# Patient Record
Sex: FEMALE | Race: WHITE | NOT HISPANIC OR LATINO
[De-identification: names, ages, dates, MRNs, and addresses within clinical notes are randomized per-mention and may not be internally consistent; named-entity substitution may affect disease eponyms.]

---

## 2017-09-12 PROBLEM — Z00.00 ENCOUNTER FOR PREVENTIVE HEALTH EXAMINATION: Status: ACTIVE | Noted: 2017-09-12

## 2017-09-13 ENCOUNTER — APPOINTMENT (OUTPATIENT)
Dept: GASTROENTEROLOGY | Facility: CLINIC | Age: 83
End: 2017-09-13
Payer: MEDICARE

## 2017-09-13 VITALS
HEART RATE: 84 BPM | WEIGHT: 158 LBS | BODY MASS INDEX: 29.08 KG/M2 | OXYGEN SATURATION: 98 % | RESPIRATION RATE: 14 BRPM | SYSTOLIC BLOOD PRESSURE: 114 MMHG | HEIGHT: 62 IN | DIASTOLIC BLOOD PRESSURE: 62 MMHG

## 2017-09-13 DIAGNOSIS — Z87.891 PERSONAL HISTORY OF NICOTINE DEPENDENCE: ICD-10-CM

## 2017-09-13 DIAGNOSIS — Z78.9 OTHER SPECIFIED HEALTH STATUS: ICD-10-CM

## 2017-09-13 DIAGNOSIS — Z86.59 PERSONAL HISTORY OF OTHER MENTAL AND BEHAVIORAL DISORDERS: ICD-10-CM

## 2017-09-13 DIAGNOSIS — Z86.79 PERSONAL HISTORY OF OTHER DISEASES OF THE CIRCULATORY SYSTEM: ICD-10-CM

## 2017-09-13 DIAGNOSIS — I10 ESSENTIAL (PRIMARY) HYPERTENSION: ICD-10-CM

## 2017-09-13 DIAGNOSIS — Z86.39 PERSONAL HISTORY OF OTHER ENDOCRINE, NUTRITIONAL AND METABOLIC DISEASE: ICD-10-CM

## 2017-09-13 DIAGNOSIS — Z80.0 FAMILY HISTORY OF MALIGNANT NEOPLASM OF DIGESTIVE ORGANS: ICD-10-CM

## 2017-09-13 DIAGNOSIS — E11.9 TYPE 2 DIABETES MELLITUS W/OUT COMPLICATIONS: ICD-10-CM

## 2017-09-13 PROCEDURE — 99204 OFFICE O/P NEW MOD 45 MIN: CPT

## 2017-09-13 RX ORDER — PEN NEEDLE, DIABETIC 32 GX 1/4"
32G X 6 MM NEEDLE, DISPOSABLE MISCELLANEOUS
Qty: 100 | Refills: 0 | Status: ACTIVE | COMMUNITY
Start: 2017-05-12

## 2017-09-13 RX ORDER — LIRAGLUTIDE 6 MG/ML
18 INJECTION SUBCUTANEOUS
Qty: 9 | Refills: 0 | Status: ACTIVE | COMMUNITY
Start: 2017-02-22

## 2017-09-13 RX ORDER — HYDROCHLOROTHIAZIDE 25 MG/1
25 TABLET ORAL
Qty: 25 | Refills: 0 | Status: ACTIVE | COMMUNITY
Start: 2016-11-01

## 2017-09-13 RX ORDER — SIMVASTATIN 40 MG/1
40 TABLET, FILM COATED ORAL
Qty: 30 | Refills: 0 | Status: ACTIVE | COMMUNITY
Start: 2017-01-23

## 2017-09-13 RX ORDER — ALPRAZOLAM 0.25 MG/1
0.25 TABLET ORAL
Qty: 30 | Refills: 0 | Status: ACTIVE | COMMUNITY
Start: 2017-04-26

## 2017-09-13 RX ORDER — BLOOD SUGAR DIAGNOSTIC
STRIP MISCELLANEOUS
Qty: 200 | Refills: 0 | Status: ACTIVE | COMMUNITY
Start: 2016-10-05

## 2017-09-13 RX ORDER — METFORMIN HYDROCHLORIDE 500 MG/1
500 TABLET, COATED ORAL
Qty: 120 | Refills: 0 | Status: ACTIVE | COMMUNITY
Start: 2016-11-28

## 2017-09-13 RX ORDER — HYDROCHLOROTHIAZIDE 12.5 MG/1
12.5 TABLET ORAL
Qty: 30 | Refills: 0 | Status: ACTIVE | COMMUNITY
Start: 2017-05-12

## 2017-09-13 RX ORDER — FOSINOPRIL SODIUM 40 MG/1
40 TABLET ORAL
Qty: 25 | Refills: 0 | Status: ACTIVE | COMMUNITY
Start: 2016-11-01

## 2017-09-13 RX ORDER — DULOXETINE HYDROCHLORIDE 30 MG/1
30 CAPSULE, DELAYED RELEASE PELLETS ORAL
Qty: 90 | Refills: 0 | Status: ACTIVE | COMMUNITY
Start: 2017-04-26

## 2018-05-29 ENCOUNTER — OTHER (OUTPATIENT)
Age: 84
End: 2018-05-29

## 2018-06-19 ENCOUNTER — APPOINTMENT (OUTPATIENT)
Dept: ORTHOPEDIC SURGERY | Facility: CLINIC | Age: 84
End: 2018-06-19

## 2018-07-23 PROBLEM — Z80.0 FAMILY HISTORY OF COLON CANCER: Status: ACTIVE | Noted: 2017-09-13

## 2018-07-23 PROBLEM — Z78.9 ALCOHOL USE: Status: ACTIVE | Noted: 2017-09-13

## 2019-01-23 ENCOUNTER — APPOINTMENT (OUTPATIENT)
Dept: GASTROENTEROLOGY | Facility: CLINIC | Age: 85
End: 2019-01-23

## 2020-07-29 ENCOUNTER — APPOINTMENT (OUTPATIENT)
Dept: GASTROENTEROLOGY | Facility: CLINIC | Age: 86
End: 2020-07-29
Payer: MEDICARE

## 2020-07-29 VITALS
SYSTOLIC BLOOD PRESSURE: 128 MMHG | WEIGHT: 172 LBS | DIASTOLIC BLOOD PRESSURE: 69 MMHG | HEIGHT: 61 IN | HEART RATE: 84 BPM | BODY MASS INDEX: 32.47 KG/M2 | OXYGEN SATURATION: 97 %

## 2020-07-29 DIAGNOSIS — R19.5 OTHER FECAL ABNORMALITIES: ICD-10-CM

## 2020-07-29 DIAGNOSIS — I10 ESSENTIAL (PRIMARY) HYPERTENSION: ICD-10-CM

## 2020-07-29 DIAGNOSIS — R15.9 FULL INCONTINENCE OF FECES: ICD-10-CM

## 2020-07-29 DIAGNOSIS — E11.9 TYPE 2 DIABETES MELLITUS W/OUT COMPLICATIONS: ICD-10-CM

## 2020-07-29 DIAGNOSIS — F41.9 ANXIETY DISORDER, UNSPECIFIED: ICD-10-CM

## 2020-07-29 DIAGNOSIS — K21.9 GASTRO-ESOPHAGEAL REFLUX DISEASE W/OUT ESOPHAGITIS: ICD-10-CM

## 2020-07-29 DIAGNOSIS — E78.00 PURE HYPERCHOLESTEROLEMIA, UNSPECIFIED: ICD-10-CM

## 2020-07-29 PROCEDURE — 36415 COLL VENOUS BLD VENIPUNCTURE: CPT

## 2020-07-29 PROCEDURE — 99214 OFFICE O/P EST MOD 30 MIN: CPT | Mod: 25

## 2020-07-29 NOTE — ASSESSMENT
[FreeTextEntry1] : Pression,\par \par Change in bowel movement, loose bowel movement, change in size\par \par Son had colon cancer at a very young age, after exposure to ground 0, retired [employer] department\par \par Mild chronic acid reflux\par \par Suggest,\par \par Blood work\par \par Stool studies\par \par CT virtual colonoscopy\par \par We discussed possible traditional colonoscopy, we both feel CT virtual might make more sense at age 86\par \par Small bowel bacterial overgrowth breath testing\par \par Follow-up after above\par \par Call or return anytime sooner as needed
Name band;

## 2020-07-29 NOTE — CONSULT LETTER
[Dear  ___] : Dear  [unfilled], [Consult Letter:] : I had the pleasure of evaluating your patient, [unfilled]. [Please see my note below.] : Please see my note below. [Consult Closing:] : Thank you very much for allowing me to participate in the care of this patient.  If you have any questions, please do not hesitate to contact me. [Sincerely,] : Sincerely, [FreeTextEntry3] : Fredo Schaefer MD\par  [FreeTextEntry2] : Dr. Mai Marshall\par \par [address] [facility] [address], [place] [address]\par \par [phone]\par

## 2020-07-29 NOTE — PHYSICAL EXAM
[General Appearance - Alert] : alert [General Appearance - In No Acute Distress] : in no acute distress [FreeTextEntry1] : Heavyset pleasant female, no acute distress alert and oriented x3 [Neck Appearance] : the appearance of the neck was normal [Sclera] : the sclera and conjunctiva were normal [Jugular Venous Distention Increased] : there was no jugular-venous distention [Neck Cervical Mass (___cm)] : no neck mass was observed [Auscultation Breath Sounds / Voice Sounds] : lungs were clear to auscultation bilaterally [Apical Impulse] : the apical impulse was normal [Full Pulse] : the pedal pulses are present [Edema] : there was no peripheral edema [Bowel Sounds] : normal bowel sounds [Abdomen Soft] : soft [Abdomen Tenderness] : non-tender [Abdomen Mass (___ Cm)] : no abdominal mass palpated [Cervical Lymph Nodes Enlarged Anterior Bilaterally] : anterior cervical [Patient Refused] : rectal exam was refused by the patient [Cervical Lymph Nodes Enlarged Posterior Bilaterally] : posterior cervical [Femoral Lymph Nodes Enlarged Bilaterally] : femoral [Axillary Lymph Nodes Enlarged Bilaterally] : axillary [Supraclavicular Lymph Nodes Enlarged Bilaterally] : supraclavicular [Inguinal Lymph Nodes Enlarged Bilaterally] : inguinal [No CVA Tenderness] : no ~M costovertebral angle tenderness [No Spinal Tenderness] : no spinal tenderness [Abnormal Walk] : normal gait [Musculoskeletal - Swelling] : no joint swelling seen [Nail Clubbing] : no clubbing  or cyanosis of the fingernails [Motor Tone] : muscle strength and tone were normal [Skin Turgor] : normal skin turgor [Skin Color & Pigmentation] : normal skin color and pigmentation [] : no rash [Oriented To Time, Place, And Person] : oriented to person, place, and time [No Focal Deficits] : no focal deficits [Affect] : the affect was normal [Impaired Insight] : insight and judgment were intact

## 2020-07-29 NOTE — HISTORY OF PRESENT ILLNESS
[de-identified] : Dr. Mai Marshall\par \par [address] [facility] [address], [place] [address]\par \par [phone]\par \par Very pleasant 86-year-old female, lost her son to colon cancer after he had exposure to ground 0, [place] \par \par She has altered bowel movements, loose bowel movements, change in size\par \par No blood or mucus\par \par No abdominal pain\par \par She has some mild heartburn controlled with omeprazole\par \par No dysphasia or pain with swallowing\par \par She is a long-term diabetic on oral medication, metformin\par \par No weight loss\par \par No nausea or vomiting

## 2020-07-30 LAB
ALBUMIN SERPL ELPH-MCNC: 4.4 G/DL
ALP BLD-CCNC: 69 U/L
ALT SERPL-CCNC: 11 U/L
ANION GAP SERPL CALC-SCNC: 12 MMOL/L
AST SERPL-CCNC: 14 U/L
BASOPHILS # BLD AUTO: 0.01 K/UL
BASOPHILS NFR BLD AUTO: 0.2 %
BILIRUB SERPL-MCNC: 0.2 MG/DL
BUN SERPL-MCNC: 27 MG/DL
CALCIUM SERPL-MCNC: 9.6 MG/DL
CHLORIDE SERPL-SCNC: 102 MMOL/L
CO2 SERPL-SCNC: 27 MMOL/L
CREAT SERPL-MCNC: 1.11 MG/DL
CRP SERPL-MCNC: 0.19 MG/DL
ENDOMYSIUM IGA SER QL: NEGATIVE
ENDOMYSIUM IGA TITR SER: NORMAL
EOSINOPHIL # BLD AUTO: 0.2 K/UL
EOSINOPHIL NFR BLD AUTO: 3.5 %
FERRITIN SERPL-MCNC: 46 NG/ML
FOLATE SERPL-MCNC: >20 NG/ML
GLIADIN IGA SER QL: <5 UNITS
GLIADIN IGG SER QL: <5 UNITS
GLIADIN PEPTIDE IGA SER-ACNC: NEGATIVE
GLIADIN PEPTIDE IGG SER-ACNC: NEGATIVE
GLUCOSE SERPL-MCNC: 113 MG/DL
HCT VFR BLD CALC: 40.4 %
HGB BLD-MCNC: 12.6 G/DL
IGA SER QL IEP: 96 MG/DL
IMM GRANULOCYTES NFR BLD AUTO: 0.3 %
IRON SATN MFR SERPL: 19 %
IRON SERPL-MCNC: 67 UG/DL
LYMPHOCYTES # BLD AUTO: 1.4 K/UL
LYMPHOCYTES NFR BLD AUTO: 24.3 %
MAN DIFF?: NORMAL
MCHC RBC-ENTMCNC: 30.1 PG
MCHC RBC-ENTMCNC: 31.2 GM/DL
MCV RBC AUTO: 96.4 FL
MONOCYTES # BLD AUTO: 0.58 K/UL
MONOCYTES NFR BLD AUTO: 10.1 %
NEUTROPHILS # BLD AUTO: 3.56 K/UL
NEUTROPHILS NFR BLD AUTO: 61.6 %
PLATELET # BLD AUTO: 229 K/UL
POTASSIUM SERPL-SCNC: 5.1 MMOL/L
PROT SERPL-MCNC: 6.4 G/DL
RBC # BLD: 4.19 M/UL
RBC # FLD: 14.1 %
SODIUM SERPL-SCNC: 142 MMOL/L
TIBC SERPL-MCNC: 348 UG/DL
TTG IGA SER IA-ACNC: <1.2 U/ML
TTG IGA SER-ACNC: NEGATIVE
TTG IGG SER IA-ACNC: <1.2 U/ML
TTG IGG SER IA-ACNC: NEGATIVE
UIBC SERPL-MCNC: 280 UG/DL
VIT B12 SERPL-MCNC: 939 PG/ML
WBC # FLD AUTO: 5.77 K/UL

## 2020-08-05 LAB — CAROTENE SERPL-MCNC: 21 MCG/DL

## 2020-08-11 LAB
ANTI ENDOMYSIAL IGA IFA: NEGATIVE
ANTI HUMAN TISSUE TRANSGLUTAMINASE IGA ELISA: < 1.9
DEAMIDATED GLIADIN ANTIBODY IGG: < 2.8
DEAMIDATED GLIADIN PEPTIDE IGA: < 5.2
PROMETHEUS CELIAC GENETICS: NORMAL
PROMETHEUS CELIAC SEROLOGY: NORMAL
PROMETHEUS LABORATORY FOOTER: NORMAL
TOTAL SERUM IGA BY NEPHELOMETRY: 103 MG/DL
TTG IGG SER IA-ACNC: NORMAL

## 2020-08-12 ENCOUNTER — RESULT REVIEW (OUTPATIENT)
Age: 86
End: 2020-08-12

## 2020-08-12 ENCOUNTER — OUTPATIENT (OUTPATIENT)
Dept: OUTPATIENT SERVICES | Facility: HOSPITAL | Age: 86
LOS: 1 days | End: 2020-08-12
Payer: MEDICARE

## 2020-08-12 ENCOUNTER — APPOINTMENT (OUTPATIENT)
Dept: CT IMAGING | Facility: CLINIC | Age: 86
End: 2020-08-12
Payer: MEDICARE

## 2020-08-12 DIAGNOSIS — Z00.8 ENCOUNTER FOR OTHER GENERAL EXAMINATION: ICD-10-CM

## 2020-08-12 PROCEDURE — 74263 CT COLONOGRAPHY SCREENING: CPT

## 2020-08-12 PROCEDURE — 74263 CT COLONOGRAPHY SCREENING: CPT | Mod: 26

## 2020-09-18 ENCOUNTER — APPOINTMENT (OUTPATIENT)
Dept: GASTROENTEROLOGY | Facility: CLINIC | Age: 86
End: 2020-09-18
Payer: MEDICARE

## 2020-09-18 PROCEDURE — 91065 BREATH HYDROGEN/METHANE TEST: CPT

## 2020-10-07 DIAGNOSIS — K52.9 NONINFECTIVE GASTROENTERITIS AND COLITIS, UNSPECIFIED: ICD-10-CM

## 2020-10-07 LAB
C DIFF TOX GENS STL QL NAA+PROBE: NORMAL
CDIFF BY PCR: NOT DETECTED
DEPRECATED O AND P PREP STL: NORMAL
G LAMBLIA AG STL QL: NORMAL
GI PCR PANEL, STOOL: ABNORMAL

## 2020-10-07 RX ORDER — AZITHROMYCIN 500 MG/1
500 TABLET, FILM COATED ORAL
Qty: 2 | Refills: 0 | Status: ACTIVE | COMMUNITY
Start: 2020-10-07 | End: 1900-01-01

## 2020-10-08 LAB
FAT STL QN: NORMAL
FAT STL QN: NORMAL

## 2020-10-09 LAB
BACTERIA STL CULT: NORMAL
C DIFF TOX B STL QL CT TISS CULT: NORMAL
Lab: NORMAL

## 2020-10-14 LAB — CALPROTECTIN FECAL: 141 UG/G

## 2020-10-21 LAB — PANCREATIC ELASTASE, FECAL: 209

## 2021-10-06 PROBLEM — I10 ESSENTIAL HYPERTENSION: Status: ACTIVE | Noted: 2020-07-29

## 2024-12-24 PROBLEM — F10.90 ALCOHOL USE: Status: ACTIVE | Noted: 2017-09-13

## 2025-01-26 ENCOUNTER — INPATIENT (INPATIENT)
Facility: HOSPITAL | Age: 89
LOS: 16 days | Discharge: HOME | DRG: 282 | End: 2025-02-12
Attending: STUDENT IN AN ORGANIZED HEALTH CARE EDUCATION/TRAINING PROGRAM | Admitting: STUDENT IN AN ORGANIZED HEALTH CARE EDUCATION/TRAINING PROGRAM
Payer: MEDICARE

## 2025-01-26 VITALS
DIASTOLIC BLOOD PRESSURE: 94 MMHG | RESPIRATION RATE: 18 BRPM | TEMPERATURE: 98 F | SYSTOLIC BLOOD PRESSURE: 190 MMHG | HEART RATE: 83 BPM | WEIGHT: 160.06 LBS | HEIGHT: 62 IN | OXYGEN SATURATION: 97 %

## 2025-01-26 DIAGNOSIS — N17.9 ACUTE KIDNEY FAILURE, UNSPECIFIED: ICD-10-CM

## 2025-01-26 DIAGNOSIS — I21.3 ST ELEVATION (STEMI) MYOCARDIAL INFARCTION OF UNSPECIFIED SITE: ICD-10-CM

## 2025-01-26 DIAGNOSIS — Z29.9 ENCOUNTER FOR PROPHYLACTIC MEASURES, UNSPECIFIED: ICD-10-CM

## 2025-01-26 DIAGNOSIS — I10 ESSENTIAL (PRIMARY) HYPERTENSION: ICD-10-CM

## 2025-01-26 DIAGNOSIS — E78.5 HYPERLIPIDEMIA, UNSPECIFIED: ICD-10-CM

## 2025-01-26 DIAGNOSIS — E11.9 TYPE 2 DIABETES MELLITUS WITHOUT COMPLICATIONS: ICD-10-CM

## 2025-01-26 LAB
ALBUMIN SERPL ELPH-MCNC: 3.4 G/DL — SIGNIFICANT CHANGE UP (ref 3.3–5)
ALP SERPL-CCNC: 103 U/L — SIGNIFICANT CHANGE UP (ref 40–120)
ALT FLD-CCNC: 14 U/L — SIGNIFICANT CHANGE UP (ref 12–78)
ANION GAP SERPL CALC-SCNC: 10 MMOL/L — SIGNIFICANT CHANGE UP (ref 5–17)
AST SERPL-CCNC: 16 U/L — SIGNIFICANT CHANGE UP (ref 15–37)
BASOPHILS # BLD AUTO: 0.02 K/UL — SIGNIFICANT CHANGE UP (ref 0–0.2)
BASOPHILS NFR BLD AUTO: 0.3 % — SIGNIFICANT CHANGE UP (ref 0–2)
BILIRUB SERPL-MCNC: 0.2 MG/DL — SIGNIFICANT CHANGE UP (ref 0.2–1.2)
BUN SERPL-MCNC: 29 MG/DL — HIGH (ref 7–23)
CALCIUM SERPL-MCNC: 9.4 MG/DL — SIGNIFICANT CHANGE UP (ref 8.5–10.1)
CHLORIDE SERPL-SCNC: 103 MMOL/L — SIGNIFICANT CHANGE UP (ref 96–108)
CO2 SERPL-SCNC: 24 MMOL/L — SIGNIFICANT CHANGE UP (ref 22–31)
CREAT SERPL-MCNC: 1.5 MG/DL — HIGH (ref 0.5–1.3)
EGFR: 33 ML/MIN/1.73M2 — LOW
EOSINOPHIL # BLD AUTO: 0.14 K/UL — SIGNIFICANT CHANGE UP (ref 0–0.5)
EOSINOPHIL NFR BLD AUTO: 1.8 % — SIGNIFICANT CHANGE UP (ref 0–6)
GLUCOSE SERPL-MCNC: 300 MG/DL — HIGH (ref 70–99)
HCT VFR BLD CALC: 38.8 % — SIGNIFICANT CHANGE UP (ref 34.5–45)
HGB BLD-MCNC: 12.8 G/DL — SIGNIFICANT CHANGE UP (ref 11.5–15.5)
IMM GRANULOCYTES NFR BLD AUTO: 0.4 % — SIGNIFICANT CHANGE UP (ref 0–0.9)
LYMPHOCYTES # BLD AUTO: 1.07 K/UL — SIGNIFICANT CHANGE UP (ref 1–3.3)
LYMPHOCYTES # BLD AUTO: 13.5 % — SIGNIFICANT CHANGE UP (ref 13–44)
MAGNESIUM SERPL-MCNC: 2 MG/DL — SIGNIFICANT CHANGE UP (ref 1.6–2.6)
MCHC RBC-ENTMCNC: 29.6 PG — SIGNIFICANT CHANGE UP (ref 27–34)
MCHC RBC-ENTMCNC: 33 G/DL — SIGNIFICANT CHANGE UP (ref 32–36)
MCV RBC AUTO: 89.8 FL — SIGNIFICANT CHANGE UP (ref 80–100)
MONOCYTES # BLD AUTO: 0.68 K/UL — SIGNIFICANT CHANGE UP (ref 0–0.9)
MONOCYTES NFR BLD AUTO: 8.6 % — SIGNIFICANT CHANGE UP (ref 2–14)
NEUTROPHILS # BLD AUTO: 5.96 K/UL — SIGNIFICANT CHANGE UP (ref 1.8–7.4)
NEUTROPHILS NFR BLD AUTO: 75.4 % — SIGNIFICANT CHANGE UP (ref 43–77)
NRBC # BLD: 0 /100 WBCS — SIGNIFICANT CHANGE UP (ref 0–0)
NRBC BLD-RTO: 0 /100 WBCS — SIGNIFICANT CHANGE UP (ref 0–0)
PLATELET # BLD AUTO: 263 K/UL — SIGNIFICANT CHANGE UP (ref 150–400)
POTASSIUM SERPL-MCNC: 4.9 MMOL/L — SIGNIFICANT CHANGE UP (ref 3.5–5.3)
POTASSIUM SERPL-SCNC: 4.9 MMOL/L — SIGNIFICANT CHANGE UP (ref 3.5–5.3)
PROT SERPL-MCNC: 7.1 G/DL — SIGNIFICANT CHANGE UP (ref 6–8.3)
RBC # BLD: 4.32 M/UL — SIGNIFICANT CHANGE UP (ref 3.8–5.2)
RBC # FLD: 13.8 % — SIGNIFICANT CHANGE UP (ref 10.3–14.5)
SODIUM SERPL-SCNC: 137 MMOL/L — SIGNIFICANT CHANGE UP (ref 135–145)
TROPONIN I, HIGH SENSITIVITY RESULT: 737.1 NG/L — HIGH
WBC # BLD: 7.9 K/UL — SIGNIFICANT CHANGE UP (ref 3.8–10.5)
WBC # FLD AUTO: 7.9 K/UL — SIGNIFICANT CHANGE UP (ref 3.8–10.5)

## 2025-01-26 PROCEDURE — 99285 EMERGENCY DEPT VISIT HI MDM: CPT

## 2025-01-26 PROCEDURE — 92928 PRQ TCAT PLMT NTRAC ST 1 LES: CPT | Mod: LD

## 2025-01-26 PROCEDURE — 71045 X-RAY EXAM CHEST 1 VIEW: CPT | Mod: 26

## 2025-01-26 PROCEDURE — 93010 ELECTROCARDIOGRAM REPORT: CPT

## 2025-01-26 PROCEDURE — 93458 L HRT ARTERY/VENTRICLE ANGIO: CPT | Mod: 26,59

## 2025-01-26 PROCEDURE — 99223 1ST HOSP IP/OBS HIGH 75: CPT | Mod: GC

## 2025-01-26 PROCEDURE — 99152 MOD SED SAME PHYS/QHP 5/>YRS: CPT

## 2025-01-26 RX ORDER — ANTISEPTIC SURGICAL SCRUB 0.04 MG/ML
1 SOLUTION TOPICAL ONCE
Refills: 0 | Status: COMPLETED | OUTPATIENT
Start: 2025-01-26 | End: 2025-01-27

## 2025-01-26 RX ORDER — METFORMIN HYDROCHLORIDE 1000 MG/1
2 TABLET, COATED ORAL
Refills: 0 | DISCHARGE

## 2025-01-26 RX ORDER — ASPIRIN 81 MG/1
324 TABLET, COATED ORAL ONCE
Refills: 0 | Status: COMPLETED | OUTPATIENT
Start: 2025-01-26 | End: 2025-01-26

## 2025-01-26 RX ORDER — TICAGRELOR 90 MG/1
180 TABLET ORAL ONCE
Refills: 0 | Status: COMPLETED | OUTPATIENT
Start: 2025-01-26 | End: 2025-01-26

## 2025-01-26 RX ORDER — ASPIRIN 81 MG/1
81 TABLET, COATED ORAL DAILY
Refills: 0 | Status: DISCONTINUED | OUTPATIENT
Start: 2025-01-27 | End: 2025-02-04

## 2025-01-26 RX ORDER — ANTISEPTIC SURGICAL SCRUB 0.04 MG/ML
1 SOLUTION TOPICAL
Refills: 0 | Status: DISCONTINUED | OUTPATIENT
Start: 2025-01-26 | End: 2025-02-12

## 2025-01-26 RX ORDER — HEPARIN SODIUM,PORCINE 10000/ML
5000 VIAL (ML) INJECTION ONCE
Refills: 0 | Status: DISCONTINUED | OUTPATIENT
Start: 2025-01-26 | End: 2025-01-27

## 2025-01-26 RX ORDER — BACTERIOSTATIC SODIUM CHLORIDE 0.9 %
1000 VIAL (ML) INJECTION
Refills: 0 | Status: COMPLETED | OUTPATIENT
Start: 2025-01-26 | End: 2025-01-27

## 2025-01-26 RX ORDER — ALPRAZOLAM 2 MG
1 TABLET ORAL
Refills: 0 | DISCHARGE

## 2025-01-26 RX ORDER — ACETAMINOPHEN, DIPHENHYDRAMINE HCL, PHENYLEPHRINE HCL 325; 25; 5 MG/1; MG/1; MG/1
1 TABLET ORAL
Refills: 0 | DISCHARGE

## 2025-01-26 RX ORDER — TICAGRELOR 90 MG/1
90 TABLET ORAL EVERY 12 HOURS
Refills: 0 | Status: DISCONTINUED | OUTPATIENT
Start: 2025-01-27 | End: 2025-01-27

## 2025-01-26 RX ORDER — DULOXETINE 20 MG/1
3 CAPSULE, DELAYED RELEASE ORAL
Refills: 0 | DISCHARGE

## 2025-01-26 RX ORDER — METFORMIN HYDROCHLORIDE 1000 MG/1
1 TABLET, COATED ORAL
Refills: 0 | DISCHARGE

## 2025-01-26 RX ADMIN — TICAGRELOR 180 MILLIGRAM(S): 90 TABLET ORAL at 19:51

## 2025-01-26 RX ADMIN — ASPIRIN 324 MILLIGRAM(S): 81 TABLET, COATED ORAL at 19:51

## 2025-01-26 NOTE — H&P ADULT - HISTORY OF PRESENT ILLNESS
Pt is a 89y/o F with PMHx HTN, DM2, HLD who presented to the ED with intermittent jaw pain followed by anterior chest pressure radiating to the left shoulder, Pt was found to have elevated troponins and EKG with ST elevations with reciprocal ST-T changes in leads II, III, aVF. Code STEMI was activated and patient was taken to the cath lab where she was found to have 100% occlusion to _____.     ED Course:   Vitals: BP: 190/94, HR: 83, Temp: 97.8, RR: 18, SpO2: 97% on RA  Labs: BUN/Cr 29/1.5, Glucose 300, Troponin 737.1   EKG: ST elevation in lead I with ST changes in II, III, aVF  Received in the ED: aspirin 324, brilinta 180mg, heparin IV   Pt is a 89y/o F with PMHx HTN, DM2, HLD, depression who presented to the ED with intermittent jaw pain followed by anterior chest pressure radiating to the left shoulder, Pt was found to have elevated troponins and EKG with ST elevations with reciprocal ST-T changes in leads II, III, aVF. Code STEMI was activated and patient was taken to the cath lab where she was found to have 100% occlusion to the diagonal now s/p 1 stent placement. Patient seen and examined in the ICU, reports feeling much better. She denies any current chest pain or jaw pain, and also denies dizziness, headache, numbness/tingling, nausea, vomiting, palpitations, shortness of breath, abdominal pain. She does admit to chronic diarrhea. Prior to this event, patient was in her usual state of health. No other concerns at this time.    ED Course:   Vitals: BP: 190/94, HR: 83, Temp: 97.8, RR: 18, SpO2: 97% on RA  Labs: BUN/Cr 29/1.5, Glucose 300, Troponin 737.1   EKG: ST elevation in lead I with ST changes in II, III, aVF  Received in the ED: aspirin 324, brilinta 180mg, heparin IV

## 2025-01-26 NOTE — H&P ADULT - PROBLEM SELECTOR PLAN 2
BUN/Cr 29/1.5  - unclear baseline, JOHNNY vs CKD  - will start mIVF @ 75cc/hr for 8 hrs as per interventional  - monitor renal function, may inc s/p cardiac cath  - avoid nephrotoxic medications as able BUN/Cr 29/1.5  - unclear baseline, - GFR similar to 2023 on chart review  - will start mIVF @ 75cc/hr for 8 hrs as per interventional  - monitor renal function, may inc s/p cardiac cath  - avoid nephrotoxic medications as able

## 2025-01-26 NOTE — ED ADULT TRIAGE NOTE - GLASGOW COMA SCALE: BEST VERBAL RESPONSE, MLM
Upon review of the In Basket request we  Received back from practice  no longer at practice.     Any additional questions or concerns should be emailed to the Practice Liaisons via the appropriate education email address, please do not reply via In Basket.    Thank you  Eric Hooks MA            (V5) oriented

## 2025-01-26 NOTE — CONSULT NOTE ADULT - SUBJECTIVE AND OBJECTIVE BOX
Date of service is equal to the date of entry    HPI: 89 y/o F w/ pmh of htn, dm2, hld, presented to [facility] for chest pain radiating into the jaw and L. shoulder, in the ED pt was found to have +trops and ST changes concerning for STEMI. Code STEMI activated pt taken to the cath lab was found to have 100% occlusion to the diagonal now s/p x1 stent placed. No cath lab complication reported and pt admitted to the ICU for post cath lab management. Pt seen and examined at bedside     24 hour events:     Review of Systems:  Constitutional: No fever, chills, fatigue  Neuro: No headache, numbness, weakness  Resp: No cough, wheezing, shortness of breath  CVS: No chest pain, palpitations, leg swelling  GI: No abdominal pain, nausea, vomiting, diarrhea   : No dysuria, frequency, incontinence  Skin: No itching, burning, rashes, or lesions   Msk: No joint pain or swelling  Psych: No depression, anxiety, mood swings    T(F): 98.1 (01-26-25 @ 21:54), Max: 98.1 (01-26-25 @ 21:54)  HR: 87 (01-26-25 @ 22:01) (83 - 88)  BP: 123/89 (01-26-25 @ 22:01) (123/89 - 190/94)  RR: 16 (01-26-25 @ 22:01) (16 - 18)  SpO2: 95% (01-26-25 @ 22:01) (93% - 97%)  Wt(kg): --        CAPILLARY BLOOD GLUCOSE          I&O's Summary      Physical Exam:   Gen:  Neuro:  HEENT:  Resp:  CVS:  Abd:  Ext:  Skin:    Meds:              heparin   Injectable IV Push        sodium chloride 0.9%. IV Continuous      chlorhexidine 4% Liquid Topical                              12.8   7.90  )-----------( 263      ( 26 Jan 2025 19:45 )             38.8       01-26    137  |  103  |  29[H]  ----------------------------<  300[H]  4.9   |  24  |  1.50[H]    Ca    9.4      26 Jan 2025 19:45  Mg     2.0     01-26    TPro  7.1  /  Alb  3.4  /  TBili  0.2  /  DBili  x   /  AST  16  /  ALT  14  /  AlkPhos  103  01-26            Urinalysis Basic - ( 26 Jan 2025 19:45 )    Color: x / Appearance: x / SG: x / pH: x  Gluc: 300 mg/dL / Ketone: x  / Bili: x / Urobili: x   Blood: x / Protein: x / Nitrite: x   Leuk Esterase: x / RBC: x / WBC x   Sq Epi: x / Non Sq Epi: x / Bacteria: x        Radiology: ***  Bedside ultrasound: ***    CENTRAL LINE: N/Y          DATE INSERTED:              REMOVE: Y/N  STANTON: N/Y                       DATE INSERTED:              REMOVE: Y/N  A-LINE: N/Y                       DATE INSERTED:              REMOVE: Y/N    GLOBAL ISSUE/BEST PRACTICE:  Analgesia:  Sedation:  CAM-ICU:   HOB elevation: yes  Stress ulcer prophylaxis:  VTE prophylaxis:  Glycemic control:  Nutrition:    CODE STATUS: ***    CRITICAL CARE TIME SPENT:  (Assessing presenting problems of acute illness, which pose high probability of life threatening deterioration or end organ damage/dysfunction, as well as medical decision making including initiating plan of care, reviewing data, reviewing radiologic exams, discussing with multidisciplinary team,  discussing goals of care with patient/family, and writing this note.  Non-inclusive of procedures performed)     Date of service is equal to the date of entry    HPI: 89 y/o F w/ pmh of htn, dm2, hld, presented to [facility] for chest pain radiating into the jaw and L. shoulder, in the ED pt was found to have +trops and ST changes concerning for STEMI. Code STEMI activated pt taken to the cath lab was found to have 100% occlusion to the diagonal now s/p x1 stent placed. No cath lab complication reported and pt admitted to the ICU for post cath lab management. Pt seen and examined at bedside currently comfortable in bed w/out any complains      24 hour events:     Review of Systems:  Constitutional: No fever, chills, fatigue  Neuro: No headache, numbness, weakness  Resp: No cough, wheezing, shortness of breath  CVS: No chest pain, palpitations, leg swelling  GI: No abdominal pain, nausea, vomiting, diarrhea   : No dysuria, frequency, incontinence  Skin: No itching, burning, rashes, or lesions   Msk: No joint pain or swelling  Psych: No depression, anxiety, mood swings    T(F): 98.1 (01-26-25 @ 21:54), Max: 98.1 (01-26-25 @ 21:54)  HR: 87 (01-26-25 @ 22:01) (83 - 88)  BP: 123/89 (01-26-25 @ 22:01) (123/89 - 190/94)  RR: 16 (01-26-25 @ 22:01) (16 - 18)  SpO2: 95% (01-26-25 @ 22:01) (93% - 97%)  Wt(kg): --        CAPILLARY BLOOD GLUCOSE          I&O's Summary      Physical Exam:   Gen:  Neuro:  HEENT:  Resp:  CVS:  Abd:  Ext:  Skin:    Meds:              heparin   Injectable IV Push        sodium chloride 0.9%. IV Continuous      chlorhexidine 4% Liquid Topical                              12.8   7.90  )-----------( 263      ( 26 Jan 2025 19:45 )             38.8       01-26    137  |  103  |  29[H]  ----------------------------<  300[H]  4.9   |  24  |  1.50[H]    Ca    9.4      26 Jan 2025 19:45  Mg     2.0     01-26    TPro  7.1  /  Alb  3.4  /  TBili  0.2  /  DBili  x   /  AST  16  /  ALT  14  /  AlkPhos  103  01-26            Urinalysis Basic - ( 26 Jan 2025 19:45 )    Color: x / Appearance: x / SG: x / pH: x  Gluc: 300 mg/dL / Ketone: x  / Bili: x / Urobili: x   Blood: x / Protein: x / Nitrite: x   Leuk Esterase: x / RBC: x / WBC x   Sq Epi: x / Non Sq Epi: x / Bacteria: x        Radiology: ***  Bedside ultrasound: ***    CENTRAL LINE: N/Y          DATE INSERTED:              REMOVE: Y/N  STANTON: N/Y                       DATE INSERTED:              REMOVE: Y/N  A-LINE: N/Y                       DATE INSERTED:              REMOVE: Y/N    GLOBAL ISSUE/BEST PRACTICE:  Analgesia:  Sedation:  CAM-ICU:   HOB elevation: yes  Stress ulcer prophylaxis:  VTE prophylaxis:  Glycemic control:  Nutrition:    CODE STATUS: ***    CRITICAL CARE TIME SPENT:  (Assessing presenting problems of acute illness, which pose high probability of life threatening deterioration or end organ damage/dysfunction, as well as medical decision making including initiating plan of care, reviewing data, reviewing radiologic exams, discussing with multidisciplinary team,  discussing goals of care with patient/family, and writing this note.  Non-inclusive of procedures performed)     Date of service is equal to the date of entry    HPI: 89 y/o F w/ pmh of htn, dm2, hld, presented to [facility] for chest pain radiating into the jaw and L. shoulder, in the ED pt was found to have +trops and ST changes concerning for STEMI. Code STEMI activated pt taken to the cath lab was found to have 100% occlusion to the diagonal now s/p x1 stent placed. No cath lab complication reported and pt admitted to the ICU for post cath lab management. Pt seen and examined at bedside currently comfortable in bed w/out any complains and states chest pain now has resolved.    Review of Systems:  Constitutional: No fever, chills, fatigue  Neuro: No headache, numbness, weakness  Resp: No cough, wheezing, shortness of breath  CVS: No chest pain, palpitations, leg swelling  GI: No abdominal pain, nausea, vomiting, diarrhea   : No dysuria, frequency, incontinence  Skin: No itching, burning, rashes, or lesions   Msk: No joint pain or swelling  Psych: No depression, anxiety, mood swings    T(F): 98.1 (01-26-25 @ 21:54), Max: 98.1 (01-26-25 @ 21:54)  HR: 87 (01-26-25 @ 22:01) (83 - 88)  BP: 123/89 (01-26-25 @ 22:01) (123/89 - 190/94)  RR: 16 (01-26-25 @ 22:01) (16 - 18)  SpO2: 95% (01-26-25 @ 22:01) (93% - 97%)  Wt(kg): --        CAPILLARY BLOOD GLUCOSE          I&O's Summary      Physical Exam:   Gen: Comfortable in bed in NAD  Neuro: AAOx3  HEENT: NC/AT  Resp: CTA b/l  CVS: +RRR  Abd: BSx4, soft, nt/nd  Ext: no edema   Skin: warm/dry    Meds:    heparin   Injectable IV Push        sodium chloride 0.9%. IV Continuous      chlorhexidine 4% Liquid Topical                              12.8   7.90  )-----------( 263      ( 26 Jan 2025 19:45 )             38.8       01-26    137  |  103  |  29[H]  ----------------------------<  300[H]  4.9   |  24  |  1.50[H]    Ca    9.4      26 Jan 2025 19:45  Mg     2.0     01-26    TPro  7.1  /  Alb  3.4  /  TBili  0.2  /  DBili  x   /  AST  16  /  ALT  14  /  AlkPhos  103  01-26            Urinalysis Basic - ( 26 Jan 2025 19:45 )    Color: x / Appearance: x / SG: x / pH: x  Gluc: 300 mg/dL / Ketone: x  / Bili: x / Urobili: x   Blood: x / Protein: x / Nitrite: x   Leuk Esterase: x / RBC: x / WBC x   Sq Epi: x / Non Sq Epi: x / Bacteria: x        Radiology:     < from: Xray Chest 1 View- PORTABLE-Urgent (01.26.25 @ 20:02) >    ACC: [id number] EXAM:  XR CHEST PORTABLE URGENT 1V   ORDERED BY: MIKE TRAVIS     PROCEDURE DATE:  01/26/2025          INTERPRETATION:  TECHNIQUE: Single portable view of the chest.    COMPARISON:  None    CLINICAL HISTORY: Chest Pain    FINDINGS:    Single frontal view of the chest demonstrates the lungs to be clear. The   cardiomediastinal silhouette is unremarkable. No acute osseous   abnormalities. Overlying EKG leads and wires are noted    IMPRESSION: No acute cardiopulmonary disease process.    --- End of Report ---      REGINALDO MAJOR MD; Attending Radiologist  This document has been electronically signed. Jan 26 2025  9:46PM    < end of copied text >      CODE STATUS: FULL CODE    CRITICAL CARE TIME SPENT: 55 MINS  (Assessing presenting problems of acute illness, which pose high probability of life threatening deterioration or end organ damage/dysfunction, as well as medical decision making including initiating plan of care, reviewing data, reviewing radiologic exams, discussing with multidisciplinary team,  discussing goals of care with patient/family, and writing this note.  Non-inclusive of procedures performed)

## 2025-01-26 NOTE — CONSULT NOTE ADULT - ASSESSMENT
89 y/o F w/ pmh of htn, dm2, hld, presented to [facility] for chest pain radiating into the jaw and L. shoulder, in the ED pt was found to have +trops and ST changes concerning for STEMI. Code STEMI activated pt taken to the cath lab was found to have 100% occlusion to the diagonal now s/p x1 stent placed. No cath lab complication reported and pt admitted to the ICU for post cath lab management.    -Neuro: No acute issues, MS stable  -Cardiac: STEMI now s/p x1 MARLENE to the diagonal, cont asa/brilliant, TTE in the AM along w/ repeat EKG and trops, will obtain EKG overnight for any complains of CP, check lipid panel and A1C  -Resp: No acute issues, o2 stable  -GI: Maintain NPO status tonight, can likely start diet in the AM  -Renal: Renal function stable cont to monitor along w/ lytes, start IVF at 75cc/hr  -ID: No acute infectious process suspected  -Endo: DM start poc q6h FS while NPO  -Heme: DVT ppx w/ lovenox in the AM, cont asa/brilliant  -Dispo: Admit to MICU, pt is full code case d/w eicu dr bowman

## 2025-01-26 NOTE — ED ADULT NURSE NOTE - OBJECTIVE STATEMENT
pt BIBEMS c/o chest discomfort radiating to jaw. as per daughter, pt went jennifer the bathroom and was walking down the stairs and felt sudden chest discomfort. ekg done. repeat ekg done. code stemi called at 2005. pt on cardiac monitor and pulse ox 97% on RA. denies sob, difficulty breathing, back/arm pain, diaphoresis, dizziness, weakness, n/v @ this time. ANGY SHAFFER @ bedside. aox4, maex4. daughter @ bedside. iv lock 20 g placed to Lac, patent and flushing. no s/s redness, swelling or infiltration. labs collected and sent.. medicated and tolerated well. pt undressed and placed in gown, safety precautions maintained. pending cath lab.

## 2025-01-26 NOTE — PROVIDER CONTACT NOTE (EICU) - SITUATION
89 y/o F w/ pmh of htn, dm2, hld, presented to [facility] for chest pain radiating into the jaw and L. shoulder, in the ED pt was found to have +trops and ST changes concerning for STEMI. Code STEMI activated pt taken to the cath lab was found to have 100% occlusion to the diagonal now s/p x1 stent placed.  Case discussed with the ICU PA

## 2025-01-26 NOTE — H&P ADULT - NSICDXPASTMEDICALHX_GEN_ALL_CORE_FT
PAST MEDICAL HISTORY:  DM (diabetes mellitus)     HLD (hyperlipidemia)     HTN (hypertension)     Major depression

## 2025-01-26 NOTE — H&P ADULT - NSHPREVIEWOFSYSTEMS_GEN_ALL_CORE
CONSTITUTIONAL: denies fever, chills, fatigue, weakness  HEENT: denies blurred vision, sore throat  CARDIOVASCULAR: denies chest pain, chest pressure, palpitations  RESPIRATORY: denies shortness of breath, sputum production  GASTROINTESTINAL: denies nausea, vomiting, abdominal pain  NEUROLOGICAL: denies numbness, headache, focal weakness  MUSCULOSKELETAL: denies new joint pain, muscle aches  HEMATOLOGIC: denies gross bleeding, bruising

## 2025-01-26 NOTE — ED PROVIDER NOTE - PHYSICAL EXAMINATION
Examination reveals a overweight white female pleasant oriented x 4 in mild distress secondary to jaw and chest pain.  HEENT normocephalic atraumatic pupils equal round react light commendation extraocular is intact neck is supple no JVD no bruits lungs are clear and symmetrical bilaterally heart regular rate and rhythm without any murmurs rubs gallops abdomen is obese soft nontender positive bowel sounds extremities reveal trace pitting edema pretibial bilateral lower extremities.  Neurologically patient is alert and oriented x 4 without any focal neurologic deficits.

## 2025-01-26 NOTE — H&P ADULT - ATTENDING COMMENTS
Pt is a 89y/o F with PMHx HTN, DM2, HLD, depression who presented to the ED with intermittent jaw pain followed by anterior chest pressure radiating to the left shoulder, admitted for STEMI.    #STEMI  -100% occlusion to the diagonal now s/p x1 stent placed.  - s/p ASA and Brilinta load prior to Cath  - continue ASA/Brilinta from 1/27/24  - repeat echo in AM    #JOHNNY  BUN/Cr 29/1.5  - unclear baseline, - GFR similar to 2023 on chart review  - will start mIVF @ 75cc/hr for 8 hrs as per interventional  - monitor renal function, may inc s/p cardiac cath  - avoid nephrotoxic medications as able    Agree with Resident's Note. Refer to resident's note for chronic comorbidities  76 minutes spent on total encounter. The necessity of the time spent during the encounter on this date of service was due to:   activities including direct patient care, counseling and/or coordinating care, and reviewing notes/lab data/imaging.

## 2025-01-26 NOTE — H&P ADULT - ASSESSMENT
Pt is a 89y/o F with PMHx HTN, DM2, HLD, depression who presented to the ED with intermittent jaw pain followed by anterior chest pressure radiating to the left shoulder, admitted for STEMI.

## 2025-01-26 NOTE — PROVIDER CONTACT NOTE (EICU) - BACKGROUND
labs reviewed  < from: Xray Chest 1 View- PORTABLE-Urgent (01.26.25 @ 20:02) >    No acute cardiopulmonary disease process.    < end of copied text >

## 2025-01-26 NOTE — H&P ADULT - VTE RISK ASSESSMENT
<<--- Click to launch
take antibiotic as prescribed.  Follow-up with PMD later in the week for re-assessment.

## 2025-01-26 NOTE — ED PROVIDER NOTE - OBJECTIVE STATEMENT
Patient is a 89-year-old white female with history of hypertension diabetes hyperlipidemia in her baseline state of good health up until approximately 1 and half to 2 hours ago when she began to experience intermittent episodes of bilateral jaw pain followed by anterior chest pressure radiating to the left shoulder.  Slight shortness of breath.  No diaphoresis.  No nausea no vomiting.  No back pain.

## 2025-01-26 NOTE — ED ADULT TRIAGE NOTE - CHIEF COMPLAINT QUOTE
89y F BIBEMS from home with c/o non-radiating substernal CP x1 hour after walking down steps .. pt currently denies palpitations, SOB, dizziness, blurry vision, N/V/D... ... Charge RN made aware, pt directed to ED room for STAT EKG... pt hypertensive on scene, 190/90 now... pt given 324 aspirin by EMS, pt took 4x nitroglycerin sublingual prior to EMS arrival ('s Rx)

## 2025-01-26 NOTE — ED PROVIDER NOTE - CLINICAL SUMMARY MEDICAL DECISION MAKING FREE TEXT BOX
Chest pain and jaw pain x 1-1/2 to 2 hours and this elderly female with diabetes hypertension hyperlipidemia never having had chest pain before requiring thorough evaluation performed in an independent manner along with labs CBC chemistries cardiac enzymes such as troponin chest x-ray EKG repeat EKG and medication such as Brilinta aspirin and heparin. Paramedian Forehead Flap Text: A decision was made to reconstruct the defect utilizing an interpolation axial flap and a staged reconstruction.  A telfa template was made of the defect.  This telfa template was then used to outline the paramedian forehead pedicle flap.  The donor area for the pedicle flap was then injected with anesthesia.  The flap was excised through the skin and subcutaneous tissue down to the layer of the underlying musculature.  The pedicle flap was carefully excised within this deep plane to maintain its blood supply.  The edges of the donor site were undermined.   The donor site was closed in a primary fashion.  The pedicle was then rotated into position and sutured.  Once the tube was sutured into place, adequate blood supply was confirmed with blanching and refill.  The pedicle was then wrapped with xeroform gauze and dressed appropriately with a telfa and gauze bandage to ensure continued blood supply and protect the attached pedicle.

## 2025-01-26 NOTE — ED PROVIDER NOTE - TOBACCO USE
[Follow-Up Visit] : a follow-up [FreeTextEntry2] : follow up for breast cancer on letrozole  Unknown if ever smoked

## 2025-01-26 NOTE — ED PROVIDER NOTE - PROGRESS NOTE DETAILS
Dr. Parada contacted via teams at approximately 1943 as I was concerned with probable ST elevations 1 aVL with reciprocal ST-T changes leads II, III, aVF.  Will repeat EKG shortly and resend EKG for his review for possible STEMI activation.

## 2025-01-26 NOTE — H&P ADULT - PROBLEM SELECTOR PLAN 5
Patient with Hypercapnic Respiratory failure which is Acute on chronic.  she is not on home oxygen. Supplemental oxygen was provided and noted- Oxygen Concentration (%):  [24-28] 28    .   Signs/symptoms of respiratory failure include- tachypnea, increased work of breathing, respiratory distress, and lethargy. Contributing diagnoses includes - COPD and Pneumonia Labs and images were reviewed. Patient Has recent ABG, which has been reviewed. Will treat underlying causes and adjust management of respiratory failure as follows-  intubated on arrival  Consult Pulmonary critical for vent management   Appreciate pulmonary team extubated on 05/31 2 s/p BiPAP  -now on nasal cannula, continue to wean   Chronic  - continue home statin

## 2025-01-26 NOTE — ED ADULT NURSE NOTE - NSFALLHARMRISKINTERV_ED_ALL_ED

## 2025-01-26 NOTE — H&P ADULT - PROBLEM SELECTOR PLAN 4
Chronic  - elevated on arrival likely in setting of STEMI; now improved  - takes fosinopril at home; previously on HCTZ however pt states she hasn't been taking this recently  - could hold ACE for now given JOHNNY, restart when able  - monitor routine hemodynamics

## 2025-01-26 NOTE — H&P ADULT - PROBLEM SELECTOR PLAN 1
Pt p/w new onset jaw and chest pain  - VS with elevated BP  - Labs significant for BUN/Cr 29/1.5, Glucose 300, troponin 737.1  - EKG: ST elevation in lead I with ST changes in II, III, aVF  - s/p aspirin 324, brilinta 180mg, heparin IV in ED  - code STEMI called and pt taken emergently to the cath lab where she was found to have _____  - now transferred to ICU s/p cath  - start aspirin and brilinta @ maintenance dosing tomorrow AM  - TTE ordered  - repeat EKG in AM  - mIVF with NS @ 75cc/hr for 8 hrs as per interventional cardiology  - plan per ICU Pt p/w new onset jaw and chest pain  - VS with elevated BP  - Labs significant for BUN/Cr 29/1.5, Glucose 300, troponin 737.1  - EKG: ST elevation in lead I with ST changes in II, III, aVF  - s/p aspirin 324, brilinta 180mg, heparin IV in ED  - code STEMI called and pt taken emergently to the cath lab where she was found to have 100% occl to the diagonal now s/p 1 stent placement  - transferred to ICU s/p cath  - start aspirin and brilinta @ maintenance dosing tomorrow AM  - TTE ordered  - repeat EKG in AM  - mIVF with NS @ 75cc/hr for 8 hrs as per interventional cardiology  - plan per ICU

## 2025-01-26 NOTE — H&P ADULT - NSHPPHYSICALEXAM_GEN_ALL_CORE
T(C): 36.7 (01-26-25 @ 21:54), Max: 36.7 (01-26-25 @ 21:54)  HR: 82 (01-26-25 @ 23:00) (79 - 93)  BP: 147/81 (01-26-25 @ 23:00) (123/89 - 190/94)  RR: 15 (01-26-25 @ 23:00) (11 - 26)  SpO2: 98% (01-26-25 @ 23:00) (92% - 100%)    GENERAL: patient appears well, no acute distress, appropriately interactive  HEENT: NCAT, EOMI, sclera clear  LUNGS: clear to auscultation, symmetric breath sounds, no wheezing or rhonchi appreciated  HEART: S1/S2, regular rate and rhythm, no murmurs noted, +mild pitting edema b/l feet  GASTROINTESTINAL: abdomen is soft, nontender, nondistended, hyperactive bowel sounds  INTEGUMENT: warm skin, appears well perfused  MUSCULOSKELETAL: no clubbing or cyanosis, no obvious deformity  NEUROLOGIC: awake and alert, answers questions and follows commands appropriately  HEME/LYMPH: no obvious ecchymosis or petechiae

## 2025-01-26 NOTE — H&P ADULT - PROBLEM SELECTOR PLAN 3
Chronic  - glucose elevated to 300 on arrival  - takes ____ at home  - start PRITESH with FS QAC/QHS  - hypoglycemia protocol  - f/u AM A1c Chronic  - glucose elevated to 300 on arrival  - takes metformin at home  - would start PRITESH with FS QAC/QHS  - hypoglycemia protocol  - f/u AM A1c

## 2025-01-27 ENCOUNTER — RESULT REVIEW (OUTPATIENT)
Age: 89
End: 2025-01-27

## 2025-01-27 LAB
A1C WITH ESTIMATED AVERAGE GLUCOSE RESULT: 7.3 % — HIGH (ref 4–5.6)
ALBUMIN SERPL ELPH-MCNC: 3.1 G/DL — LOW (ref 3.3–5)
ALP SERPL-CCNC: 81 U/L — SIGNIFICANT CHANGE UP (ref 40–120)
ALT FLD-CCNC: 18 U/L — SIGNIFICANT CHANGE UP (ref 12–78)
ANION GAP SERPL CALC-SCNC: 5 MMOL/L — SIGNIFICANT CHANGE UP (ref 5–17)
AST SERPL-CCNC: 66 U/L — HIGH (ref 15–37)
BASOPHILS # BLD AUTO: 0.01 K/UL — SIGNIFICANT CHANGE UP (ref 0–0.2)
BASOPHILS NFR BLD AUTO: 0.1 % — SIGNIFICANT CHANGE UP (ref 0–2)
BILIRUB SERPL-MCNC: 0.3 MG/DL — SIGNIFICANT CHANGE UP (ref 0.2–1.2)
BUN SERPL-MCNC: 28 MG/DL — HIGH (ref 7–23)
CALCIUM SERPL-MCNC: 9.1 MG/DL — SIGNIFICANT CHANGE UP (ref 8.5–10.1)
CHLORIDE SERPL-SCNC: 109 MMOL/L — HIGH (ref 96–108)
CHOLEST SERPL-MCNC: 261 MG/DL — HIGH
CO2 SERPL-SCNC: 25 MMOL/L — SIGNIFICANT CHANGE UP (ref 22–31)
CREAT SERPL-MCNC: 1.5 MG/DL — HIGH (ref 0.5–1.3)
EGFR: 33 ML/MIN/1.73M2 — LOW
EOSINOPHIL # BLD AUTO: 0.03 K/UL — SIGNIFICANT CHANGE UP (ref 0–0.5)
EOSINOPHIL NFR BLD AUTO: 0.3 % — SIGNIFICANT CHANGE UP (ref 0–6)
ESTIMATED AVERAGE GLUCOSE: 163 MG/DL — HIGH (ref 68–114)
GLUCOSE SERPL-MCNC: 225 MG/DL — HIGH (ref 70–99)
HCT VFR BLD CALC: 34 % — LOW (ref 34.5–45)
HCT VFR BLD CALC: 34.6 % — SIGNIFICANT CHANGE UP (ref 34.5–45)
HDLC SERPL-MCNC: 43 MG/DL — LOW
HGB BLD-MCNC: 11.3 G/DL — LOW (ref 11.5–15.5)
HGB BLD-MCNC: 11.5 G/DL — SIGNIFICANT CHANGE UP (ref 11.5–15.5)
IMM GRANULOCYTES NFR BLD AUTO: 0.3 % — SIGNIFICANT CHANGE UP (ref 0–0.9)
LIPID PNL WITH DIRECT LDL SERPL: 173 MG/DL — HIGH
LYMPHOCYTES # BLD AUTO: 0.78 K/UL — LOW (ref 1–3.3)
LYMPHOCYTES # BLD AUTO: 8.5 % — LOW (ref 13–44)
MAGNESIUM SERPL-MCNC: 1.9 MG/DL — SIGNIFICANT CHANGE UP (ref 1.6–2.6)
MCHC RBC-ENTMCNC: 29.9 PG — SIGNIFICANT CHANGE UP (ref 27–34)
MCHC RBC-ENTMCNC: 30.1 PG — SIGNIFICANT CHANGE UP (ref 27–34)
MCHC RBC-ENTMCNC: 33.2 G/DL — SIGNIFICANT CHANGE UP (ref 32–36)
MCHC RBC-ENTMCNC: 33.2 G/DL — SIGNIFICANT CHANGE UP (ref 32–36)
MCV RBC AUTO: 89.9 FL — SIGNIFICANT CHANGE UP (ref 80–100)
MCV RBC AUTO: 90.4 FL — SIGNIFICANT CHANGE UP (ref 80–100)
MONOCYTES # BLD AUTO: 0.75 K/UL — SIGNIFICANT CHANGE UP (ref 0–0.9)
MONOCYTES NFR BLD AUTO: 8.2 % — SIGNIFICANT CHANGE UP (ref 2–14)
NEUTROPHILS # BLD AUTO: 7.54 K/UL — HIGH (ref 1.8–7.4)
NEUTROPHILS NFR BLD AUTO: 82.6 % — HIGH (ref 43–77)
NON HDL CHOLESTEROL: 219 MG/DL — HIGH
NRBC # BLD: 0 /100 WBCS — SIGNIFICANT CHANGE UP (ref 0–0)
NRBC # BLD: 0 /100 WBCS — SIGNIFICANT CHANGE UP (ref 0–0)
NRBC BLD-RTO: 0 /100 WBCS — SIGNIFICANT CHANGE UP (ref 0–0)
NRBC BLD-RTO: 0 /100 WBCS — SIGNIFICANT CHANGE UP (ref 0–0)
PHOSPHATE SERPL-MCNC: 4.1 MG/DL — SIGNIFICANT CHANGE UP (ref 2.5–4.5)
PLATELET # BLD AUTO: 245 K/UL — SIGNIFICANT CHANGE UP (ref 150–400)
PLATELET # BLD AUTO: 247 K/UL — SIGNIFICANT CHANGE UP (ref 150–400)
POTASSIUM SERPL-MCNC: 4.8 MMOL/L — SIGNIFICANT CHANGE UP (ref 3.5–5.3)
POTASSIUM SERPL-SCNC: 4.8 MMOL/L — SIGNIFICANT CHANGE UP (ref 3.5–5.3)
PROT SERPL-MCNC: 6.4 G/DL — SIGNIFICANT CHANGE UP (ref 6–8.3)
RBC # BLD: 3.76 M/UL — LOW (ref 3.8–5.2)
RBC # BLD: 3.85 M/UL — SIGNIFICANT CHANGE UP (ref 3.8–5.2)
RBC # FLD: 14 % — SIGNIFICANT CHANGE UP (ref 10.3–14.5)
RBC # FLD: 14.2 % — SIGNIFICANT CHANGE UP (ref 10.3–14.5)
SODIUM SERPL-SCNC: 139 MMOL/L — SIGNIFICANT CHANGE UP (ref 135–145)
TRIGL SERPL-MCNC: 239 MG/DL — HIGH
TROPONIN I, HIGH SENSITIVITY RESULT: HIGH NG/L
WBC # BLD: 9.14 K/UL — SIGNIFICANT CHANGE UP (ref 3.8–10.5)
WBC # BLD: 9.5 K/UL — SIGNIFICANT CHANGE UP (ref 3.8–10.5)
WBC # FLD AUTO: 9.14 K/UL — SIGNIFICANT CHANGE UP (ref 3.8–10.5)
WBC # FLD AUTO: 9.5 K/UL — SIGNIFICANT CHANGE UP (ref 3.8–10.5)

## 2025-01-27 PROCEDURE — 99233 SBSQ HOSP IP/OBS HIGH 50: CPT

## 2025-01-27 PROCEDURE — 99291 CRITICAL CARE FIRST HOUR: CPT

## 2025-01-27 PROCEDURE — 93010 ELECTROCARDIOGRAM REPORT: CPT

## 2025-01-27 PROCEDURE — 93308 TTE F-UP OR LMTD: CPT | Mod: 26,59

## 2025-01-27 PROCEDURE — 93306 TTE W/DOPPLER COMPLETE: CPT | Mod: 26

## 2025-01-27 PROCEDURE — 99233 SBSQ HOSP IP/OBS HIGH 50: CPT | Mod: GC

## 2025-01-27 RX ORDER — SODIUM CHLORIDE 9 G/ML
500 INJECTION, SOLUTION INTRAVENOUS ONCE
Refills: 0 | Status: COMPLETED | OUTPATIENT
Start: 2025-01-27 | End: 2025-01-27

## 2025-01-27 RX ORDER — ATORVASTATIN CALCIUM 80 MG/1
40 TABLET, FILM COATED ORAL AT BEDTIME
Refills: 0 | Status: DISCONTINUED | OUTPATIENT
Start: 2025-01-27 | End: 2025-02-07

## 2025-01-27 RX ORDER — ACETAMINOPHEN 160 MG/5ML
650 SUSPENSION ORAL EVERY 6 HOURS
Refills: 0 | Status: DISCONTINUED | OUTPATIENT
Start: 2025-01-27 | End: 2025-02-12

## 2025-01-27 RX ORDER — ATORVASTATIN CALCIUM 80 MG/1
1 TABLET, FILM COATED ORAL
Qty: 30 | Refills: 0
Start: 2025-01-27 | End: 2025-02-25

## 2025-01-27 RX ORDER — DULOXETINE 20 MG/1
90 CAPSULE, DELAYED RELEASE ORAL DAILY
Refills: 0 | Status: DISCONTINUED | OUTPATIENT
Start: 2025-01-27 | End: 2025-02-12

## 2025-01-27 RX ORDER — METOPROLOL SUCCINATE 25 MG
25 TABLET, EXTENDED RELEASE 24 HR ORAL EVERY 12 HOURS
Refills: 0 | Status: DISCONTINUED | OUTPATIENT
Start: 2025-01-27 | End: 2025-01-27

## 2025-01-27 RX ORDER — ASPIRIN 81 MG/1
1 TABLET, COATED ORAL
Qty: 30 | Refills: 0
Start: 2025-01-27 | End: 2025-02-25

## 2025-01-27 RX ORDER — SODIUM CHLORIDE 9 G/ML
1000 INJECTION, SOLUTION INTRAVENOUS
Refills: 0 | Status: DISCONTINUED | OUTPATIENT
Start: 2025-01-27 | End: 2025-01-27

## 2025-01-27 RX ORDER — TICAGRELOR 90 MG/1
1 TABLET ORAL
Qty: 60 | Refills: 0
Start: 2025-01-27 | End: 2025-02-25

## 2025-01-27 RX ORDER — SODIUM CHLORIDE 9 G/ML
1000 INJECTION, SOLUTION INTRAVENOUS
Refills: 0 | Status: DISCONTINUED | OUTPATIENT
Start: 2025-01-27 | End: 2025-01-28

## 2025-01-27 RX ADMIN — Medication 600 MILLIGRAM(S): at 18:53

## 2025-01-27 RX ADMIN — ACETAMINOPHEN 650 MILLIGRAM(S): 160 SUSPENSION ORAL at 23:20

## 2025-01-27 RX ADMIN — ASPIRIN 81 MILLIGRAM(S): 81 TABLET, COATED ORAL at 11:00

## 2025-01-27 RX ADMIN — ANTISEPTIC SURGICAL SCRUB 1 APPLICATION(S): 0.04 SOLUTION TOPICAL at 12:25

## 2025-01-27 RX ADMIN — TICAGRELOR 90 MILLIGRAM(S): 90 TABLET ORAL at 05:41

## 2025-01-27 RX ADMIN — DULOXETINE 90 MILLIGRAM(S): 20 CAPSULE, DELAYED RELEASE ORAL at 17:00

## 2025-01-27 RX ADMIN — ATORVASTATIN CALCIUM 40 MILLIGRAM(S): 80 TABLET, FILM COATED ORAL at 21:02

## 2025-01-27 RX ADMIN — SODIUM CHLORIDE 1000 MILLILITER(S): 9 INJECTION, SOLUTION INTRAVENOUS at 14:12

## 2025-01-27 RX ADMIN — Medication 75 MILLILITER(S): at 06:11

## 2025-01-27 RX ADMIN — SODIUM CHLORIDE 500 MILLILITER(S): 9 INJECTION, SOLUTION INTRAVENOUS at 12:53

## 2025-01-27 RX ADMIN — ANTISEPTIC SURGICAL SCRUB 1 APPLICATION(S): 0.04 SOLUTION TOPICAL at 05:51

## 2025-01-27 RX ADMIN — Medication 25 MILLIGRAM(S): at 09:22

## 2025-01-27 NOTE — DISCHARGE NOTE NURSING/CASE MANAGEMENT/SOCIAL WORK - FLU SEASON?
Yes... Previously negative F: Given 2L NS in ED. Patient clinically dehydrated, BUN 30 - put in for maintenance fluids.   E: Mg on admission 1.3, repleted. Replete K+ to 4 and Mg to 2.   N: Regular Diet   Dispo: [facility] 7Wo   Full Code

## 2025-01-27 NOTE — DISCHARGE NOTE PROVIDER - NSDCFUADDINST_GEN_ALL_CORE_FT
Wound Care:   the day AFTER your procedure...     Remove the bandage from the site and gently clean with soap and water then pat dry; leave open to air.     You may take a brief shower     Do NOT apply lotions, creams, powders, ointments, or perfumes to your incision site unless prescribed by your physician     Do NOT soak your procedure site for 1 week (no baths, no pools, no tubs, etc...)     Check  your groin and /or wrist daily. A small amount of bruising, and soreness are normal    ACTIVITY: for 24 hours      - DO NOT DRIVE     - DO NOT make any important decisions or sign legal documents      - DO NOT operate heavy machinery      - you may resume sexual activity in 48 hours, unless otherwise instructed by your cardiologist          If your procedure was done through the WRIST: for the NEXT 3 DAYS:          - avoid pushing, pulling, with that affected wrist (such as pushing up from a seated position)          - avoid repeated movement of that hand and wrist (such as typing or hammering)          - DO NOT LIFT anything more than 5 pounds         If your procedure was done through the GROIN: for the NEXT 5 DAYS          - Limit climbing stairs, DO NOT soak in bathtub or pool          - no strenous activities, pushing, pulling, straining          - Do not lift anything more than 10 pounds     MEDICATION:      Please take your medications as explained to you (found on your discharge paperwork)      If you received a stent, you will be taking medication to KEEP YOUR STENT OPEN.            You MUST start taking this medication immediately.           Take this medication as prescribed and uninterrupted.            DO NOT STOP taking them for any reason without consulting with your cardiologist first.      **if you have diabetes and take metformin please do not take this medication for 2 days after the procedure. Restart and take as usual starting day 3.    Follow the heart healthy diet recommended by your doctor.   Drink plenty of water for the next 24 hours unless otherwise instructed.  Do not drink any alcoholic beverages for 24 hours (beer, wine, liquor, etc).    If you smoke: STOP SMOKING. Call the [facility] at [phone] for assistance.    CALL your cardiologist/primary care doctor to make a follow-up appointment in 2 WEEKS     **CALL YOUR DOCTOR if you experience     fever, chills, body aches, or severe pain, swelling, redness, heat or yellow discharge at incision site     bleeding or excruciating pain at the procedural site, swelling (golf ball size) at your procedural site     CHEST PAIN     numbness, tingling, temperature change (of your procedural site)     Pain  -you may have pain after your surgery or procedure at the puncture site or in the artery/vein that has been treated.  -take pain medication as directed by your doctor.  call your doctor if your pain is not getting better within 5 days or if it gets worse  -prescription pain medication should be taken with food, and can cause constipation, an over-the-counter softener may be helpful    Nausea  -anesthesia/sedation can upset your stomach  -eat bland foods (Jell-o, crackers, toast) and drink ginger ale if you are nauseated  -drink plenty of fluids such as water or ginger ale (unless instructed otherwise by your doctor)  -if you have nausea or vomiting the day after your procedure, call your doctor    Bleeding  -you may have a small amount of oozing from your surgical or procedural site  -bleeding as the site can be dangerous and should prompt immediate medical attention    Infection  -if you have any of the following signs of infection, call your doctor:       redness, swelling, fever over 101 degrees, thick yellow/white drainage    If you are unable to reach your doctor, you may contact:   Dr Emely Parada @ [phone]    **Call 911 immediately if:     - your hand or leg becomes blue, feels cold to touch, or if you have numbness or tingling     - bleeding or swelling from your wrist or groin site cannot be controlled or if area becomes very red or hot to touch     - you have pain, pressure, tightness or burning in your chest, arms, jaw or stomach; shortness of breath; nausea or excessive sweating; lightheadedness; dizziness or a fainting spell; or if you have sudden back or stomach pain     -you have rapid heartbeat or palpitations     - you have bright red blood in large amounts, severe pain at access site (wrist or groin) or significant new swelling at the puncture site    If/because you had anesthesia, for the next 24 hours you should NOT:  -drive a car, operate power tool or machinery  -drink alcohol, beer, or wine  -make important personal or business decisions  If you had any type of sedation, you may experience lightheadedness, dizziness, or sleepiness following your procedure. A responsible adult should stay with you for at least 24 hours following your procedure.

## 2025-01-27 NOTE — DISCHARGE NOTE PROVIDER - CARE PROVIDER_API CALL
Emely Parada  [address] [place] [address]  Phone: [phone]  Fax: (   )    -  Follow Up Time: 1 week   Emely Parada  [address] [place] [id number]  Phone: [phone]  Fax: (   )    -  Follow Up Time: 1 week    Eliud Salinas  Cardiovascular Disease  [address] [place] [address]  Phone: [phone]  Fax: [phone]  Follow Up Time: 2 weeks   Eliud Salinas  Cardiovascular Disease  [address] [facility], [facility] [place] [address]  Phone: [phone]  Fax: [phone]  Follow Up Time: 2 weeks    Mai Amaya  Family Medicine  [address] [facility], [address]  [place] [address]  Phone: [phone]  Fax: [phone]  Follow Up Time:     Emely Parada  [address] [place] [address]  Phone: [phone]  Fax: (   )    -  Follow Up Time: 1 week    Zeinab Haley  Nephrology  [address] [facility], [address]  [place] [address]  Phone: [phone]  Fax: [phone]  Follow Up Time:

## 2025-01-27 NOTE — PROGRESS NOTE ADULT - SUBJECTIVE AND OBJECTIVE BOX
INTERVAL HPI/OVERNIGHT EVENTS: No acute overnight events occurred.      Review of Systems:  Constitutional: No fever, chills, fatigue  Neuro: No headache, numbness, weakness  Resp: No cough, wheezing, shortness of breath  CVS: No chest pain, palpitations, leg swelling  GI: No abdominal pain, nausea, vomiting, diarrhea   : No dysuria, frequency, incontinence  Skin: No itching, burning, rashes, or lesions   Msk: No joint pain or swelling  Psych: No depression, anxiety, mood swings    ICU Vital Signs Last 24 Hrs  T(C): 37.3 (27 Jan 2025 11:47), Max: 37.3 (27 Jan 2025 11:47)  T(F): 99.1 (27 Jan 2025 11:47), Max: 99.1 (27 Jan 2025 11:47)  HR: 68 (27 Jan 2025 15:00) (63 - 93)  BP: 101/61 (27 Jan 2025 15:00) (80/56 - 190/94)  BP(mean): 74 (27 Jan 2025 15:00) (57 - 110)  ABP: --  ABP(mean): --  RR: 25 (27 Jan 2025 15:00) (10 - 26)  SpO2: 98% (27 Jan 2025 15:00) (92% - 100%)    O2 Parameters below as of 27 Jan 2025 06:00  Patient On (Oxygen Delivery Method): room air              01-26-25 @ 07:01  -  01-27-25 @ 07:00  --------------------------------------------------------  IN: 675 mL / OUT: 0 mL / NET: 675 mL    01-27-25 @ 07:01 - 01-27-25 @ 15:54  --------------------------------------------------------  IN: 1400 mL / OUT: 400 mL / NET: 1000 mL        CAPILLARY BLOOD GLUCOSE          I&O's Summary    26 Jan 2025 07:01  -  27 Jan 2025 07:00  --------------------------------------------------------  IN: 675 mL / OUT: 0 mL / NET: 675 mL    27 Jan 2025 07:01  -  27 Jan 2025 15:54  --------------------------------------------------------  IN: 1400 mL / OUT: 400 mL / NET: 1000 mL        PHYSICAL EXAM:  General: NAD  Neurology: awake and alert  HEENT: NC/AT  Respiratory: CTA b/l, no rales or rhonchi noted  Cardiovascular: RRR, normal S1S2, no M/R/G  Abdomen: soft, NT/ND, +BS, no palpable masses  Extremities: WWP, no clubbing, cyanosis, or edema  Skin: warm/dry      Meds:      atorvastatin Oral      DULoxetine Oral      aspirin enteric coated Oral  clopidogrel Tablet Oral            chlorhexidine 2% Cloths Topical                              11.5   9.14  )-----------( 247      ( 27 Jan 2025 06:10 )             34.6       01-27    139  |  109[H]  |  28[H]  ----------------------------<  225[H]  4.8   |  25  |  1.50[H]    Ca    9.1      27 Jan 2025 06:10  Phos  4.1     01-27  Mg     1.9     01-27    TPro  6.4  /  Alb  3.1[L]  /  TBili  0.3  /  DBili  x   /  AST  66[H]  /  ALT  18  /  AlkPhos  81  01-27            Urinalysis Basic - ( 27 Jan 2025 06:10 )    Color: x / Appearance: x / SG: x / pH: x  Gluc: 225 mg/dL / Ketone: x  / Bili: x / Urobili: x   Blood: x / Protein: x / Nitrite: x   Leuk Esterase: x / RBC: x / WBC x   Sq Epi: x / Non Sq Epi: x / Bacteria: x                  Radiology:    Bedside Ultrasound:    Tubes/Lines:      GLOBAL ISSUE/BEST PRACTICE:  Analgesia:  Sedation:  HOB elevation: Y  Stress ulcer prophylaxis:  VTE prophylaxis:  Glycemic control:  Nutrition:    CODE STATUS:        INTERVAL HPI/OVERNIGHT EVENTS: 1 MARLENE to diag. Patient has no acute complaints. Intermittent hypotensive episodes this AM ~80/60 improved s/p 500cc bolus. Denying dizziness, lightheadedness, chest pain, palpitations, SOB, fatigue.      Review of Systems:  Constitutional: No fever, chills, fatigue  Neuro: No headache, numbness, weakness  Resp: No cough, wheezing, shortness of breath  CVS: No chest pain, palpitations, leg swelling  GI: No abdominal pain, nausea, vomiting, diarrhea   : No dysuria, frequency, incontinence  Skin: No itching, burning, rashes, or lesions   Msk: No joint pain or swelling  Psych: No depression, anxiety, mood swings      ICU Vital Signs Last 24 Hrs  T(C): 37.3 (27 Jan 2025 11:47), Max: 37.3 (27 Jan 2025 11:47)  T(F): 99.1 (27 Jan 2025 11:47), Max: 99.1 (27 Jan 2025 11:47)  HR: 68 (27 Jan 2025 15:00) (63 - 93)  BP: 101/61 (27 Jan 2025 15:00) (80/56 - 190/94)  BP(mean): 74 (27 Jan 2025 15:00) (57 - 110)  ABP: --  ABP(mean): --  RR: 25 (27 Jan 2025 15:00) (10 - 26)  SpO2: 98% (27 Jan 2025 15:00) (92% - 100%)    O2 Parameters below as of 27 Jan 2025 06:00  Patient On (Oxygen Delivery Method): room air              01-26-25 @ 07:01 - 01-27-25 @ 07:00  --------------------------------------------------------  IN: 675 mL / OUT: 0 mL / NET: 675 mL    01-27-25 @ 07:01 - 01-27-25 @ 15:54  --------------------------------------------------------  IN: 1400 mL / OUT: 400 mL / NET: 1000 mL        CAPILLARY BLOOD GLUCOSE          I&O's Summary    26 Jan 2025 07:01  -  27 Jan 2025 07:00  --------------------------------------------------------  IN: 675 mL / OUT: 0 mL / NET: 675 mL    27 Jan 2025 07:01  -  27 Jan 2025 15:54  --------------------------------------------------------  IN: 1400 mL / OUT: 400 mL / NET: 1000 mL        PHYSICAL EXAM:  General: NAD  Neurology: awake and alert  HEENT: NC/AT  Respiratory: CTA b/l, no rales or rhonchi noted  Cardiovascular: mildly distant heart sounds, RRR, normal S1S2, no M/R/G  Abdomen: soft, NT/ND, +BS, no palpable masses  Extremities: WWP, no clubbing, cyanosis, or edema, R groin C/D/I/soft/no hematoma.   Skin: warm/dry      Meds:      atorvastatin Oral      DULoxetine Oral      aspirin enteric coated Oral  clopidogrel Tablet Oral            chlorhexidine 2% Cloths Topical                              11.5   9.14  )-----------( 247      ( 27 Jan 2025 06:10 )             34.6       01-27    139  |  109[H]  |  28[H]  ----------------------------<  225[H]  4.8   |  25  |  1.50[H]    Ca    9.1      27 Jan 2025 06:10  Phos  4.1     01-27  Mg     1.9     01-27    TPro  6.4  /  Alb  3.1[L]  /  TBili  0.3  /  DBili  x   /  AST  66[H]  /  ALT  18  /  AlkPhos  81  01-27            Urinalysis Basic - ( 27 Jan 2025 06:10 )    Color: x / Appearance: x / SG: x / pH: x  Gluc: 225 mg/dL / Ketone: x  / Bili: x / Urobili: x   Blood: x / Protein: x / Nitrite: x   Leuk Esterase: x / RBC: x / WBC x   Sq Epi: x / Non Sq Epi: x / Bacteria: x          Bedside Ultrasound:  POCUS revealing minimal inspiratory collapse of IVC, mild pericardial effusion          GLOBAL ISSUE/BEST PRACTICE:  Analgesia: N  Sedation: N  HOB elevation: Y  Stress ulcer prophylaxis: N  VTE prophylaxis: Y  Glycemic control: Y  Nutrition: Y    CODE STATUS:   Full Code at this time as she is post cath. Rescinded DNR/DNI.     INTERVAL HPI/OVERNIGHT EVENTS: 1 MARLENE to diag. Patient has no acute complaints      ICU Vital Signs Last 24 Hrs  T(C): 37.3 (27 Jan 2025 11:47), Max: 37.3 (27 Jan 2025 11:47)  T(F): 99.1 (27 Jan 2025 11:47), Max: 99.1 (27 Jan 2025 11:47)  HR: 68 (27 Jan 2025 15:00) (63 - 93)  BP: 101/61 (27 Jan 2025 15:00) (80/56 - 190/94)  BP(mean): 74 (27 Jan 2025 15:00) (57 - 110)  ABP: --  ABP(mean): --  RR: 25 (27 Jan 2025 15:00) (10 - 26)  SpO2: 98% (27 Jan 2025 15:00) (92% - 100%)    O2 Parameters below as of 27 Jan 2025 06:00  Patient On (Oxygen Delivery Method): room air              01-26-25 @ 07:01  -  01-27-25 @ 07:00  --------------------------------------------------------  IN: 675 mL / OUT: 0 mL / NET: 675 mL    01-27-25 @ 07:01 - 01-27-25 @ 15:54  --------------------------------------------------------  IN: 1400 mL / OUT: 400 mL / NET: 1000 mL        CAPILLARY BLOOD GLUCOSE          I&O's Summary    26 Jan 2025 07:01  -  27 Jan 2025 07:00  --------------------------------------------------------  IN: 675 mL / OUT: 0 mL / NET: 675 mL    27 Jan 2025 07:01  -  27 Jan 2025 15:54  --------------------------------------------------------  IN: 1400 mL / OUT: 400 mL / NET: 1000 mL        PHYSICAL EXAM:  General: NAD, well appearing elderly woman  Neurology: awake and alert  HEENT: NC/AT  Respiratory: CTA b/l, no rales or rhonchi noted  Cardiovascular: mildly distant heart sounds, RRR, normal S1S2, no M/R/G  Abdomen: soft, NT/ND, +BS, no palpable masses  Extremities: WWP, no clubbing, cyanosis, or edema, R groin C/D/I/soft/no hematoma.   Skin: warm/dry      Meds:      atorvastatin Oral      DULoxetine Oral      aspirin enteric coated Oral  clopidogrel Tablet Oral            chlorhexidine 2% Cloths Topical                              11.5   9.14  )-----------( 247      ( 27 Jan 2025 06:10 )             34.6       01-27    139  |  109[H]  |  28[H]  ----------------------------<  225[H]  4.8   |  25  |  1.50[H]    Ca    9.1      27 Jan 2025 06:10  Phos  4.1     01-27  Mg     1.9     01-27    TPro  6.4  /  Alb  3.1[L]  /  TBili  0.3  /  DBili  x   /  AST  66[H]  /  ALT  18  /  AlkPhos  81  01-27            Urinalysis Basic - ( 27 Jan 2025 06:10 )    Color: x / Appearance: x / SG: x / pH: x  Gluc: 225 mg/dL / Ketone: x  / Bili: x / Urobili: x   Blood: x / Protein: x / Nitrite: x   Leuk Esterase: x / RBC: x / WBC x   Sq Epi: x / Non Sq Epi: x / Bacteria: x      DATA REVIEW    All data personally reviewed.  See above for details    VS trends--afebrile, normal HR, slight htn this am, low BP this afternoon  Laboratory results--normal WBC, stable Hb, slightly elevated Cr, high trop, high LDH  Radiology results--echo with small pericardial effusion, EF low   Microbio results--none    Bedside Ultrasound:  POCUS revealing minimal inspiratory collapse of IVC, small pericardial effusion    < from: TTE Limited W or WO Ultrasound Enhancing Agent (01.27.25 @ 13:30) >    CONCLUSIONS:      1. Left ventricular systolic function is moderately decreased with an ejection fraction visually estimated at 40 to 45 %.   2. There is no evidence of a left ventricular thrombus.    ________________________________________________________________________________________  FINDINGS:     Left Ventricle:  After obtaining consent, Definity ultrasound enhancing agent was given for enhanced left ventricular opacification and improved delineation of the left ventricular endocardial borders. Left ventricular systolic function is moderately decreased with an ejection fraction visually estimated at 40 to 45%. There is no evidence of a left ventricular thrombus.     Right Ventricle:  Right ventricular systolic function is normal.     Left Atrium:  The left atrium is dilated.     Mitral Valve:  There is calcification of the mitral valve annulus.     Pericardium:  There is a small pericardial effusion noted adjacent to the anterior right ventricle measuring 0.80 cm.  ____________________________________________________________________    < end of copied text >  < from: TTE W or WO Ultrasound Enhancing Agent (01.27.25 @ 10:24) >    CONCLUSIONS:      1. Left ventricular systolic function is moderately decreased with an ejection fraction visually estimated at 40 to 45 %. Regional wall motion abnormalities present.   2. Entire apexis abnormal.   3. Normal right ventricular cavity size, with normal wall thickness, and normal right ventricular systolic function.   4. Left atrium is mildly dilated.   5. Estimated pulmonary artery systolic pressure is 36 mmHg.   6. Trace pericardial effusion noted adjacent to the anterior right ventricle and small pericardial effusion noted adjacent to the right atrium.      < end of copied text >        GLOBAL ISSUE/BEST PRACTICE:  Analgesia: N  Sedation: N  HOB elevation: Y  Stress ulcer prophylaxis: N  VTE prophylaxis: Y  Glycemic control: Y  Nutrition: Y    CODE STATUS:   Full Code at this time as she is post cath

## 2025-01-27 NOTE — DISCHARGE NOTE PROVIDER - NSDCMRMEDTOKEN_GEN_ALL_CORE_FT
ALPRAZolam 0.25 mg oral tablet: 1 tab(s) orally once a day as needed for  anxiety  DULoxetine 30 mg oral delayed release capsule: 3 cap(s) orally once a day  fosinopril 40 mg oral tablet: 1 tab(s) orally once a day  melatonin 5 mg oral tablet: 1 tab(s) orally once a day (at bedtime)  metFORMIN 500 mg oral tablet: 2 tab(s) orally once a day with dinner  metFORMIN 500 mg oral tablet, extended release: 1 tab(s) orally once a day with breakfast  simvastatin 40 mg oral tablet: 1 tab(s) orally once a day (at bedtime)   ALPRAZolam 0.25 mg oral tablet: 1 tab(s) orally once a day as needed for  anxiety  aspirin 81 mg oral delayed release tablet: 1 tab(s) orally once a day  atorvastatin 40 mg oral tablet: 1 tab(s) orally once a day (at bedtime)  clopidogrel 75 mg oral tablet: 1 tab(s) orally every 24 hours  DULoxetine 30 mg oral delayed release capsule: 3 cap(s) orally once a day  melatonin 5 mg oral tablet: 1 tab(s) orally once a day (at bedtime)  metFORMIN 500 mg oral tablet: 2 tab(s) orally once a day with dinner  metFORMIN 500 mg oral tablet, extended release: 1 tab(s) orally once a day with breakfast   ALPRAZolam 0.25 mg oral tablet: 1 tab(s) orally once a day as needed for  anxiety  aspirin 81 mg oral delayed release tablet: 1 tab(s) orally once a day  atorvastatin 40 mg oral tablet: 1 tab(s) orally once a day (at bedtime)  clopidogrel 75 mg oral tablet: 1 tab(s) orally every 24 hours  DULoxetine 30 mg oral delayed release capsule: 3 cap(s) orally once a day  melatonin 5 mg oral tablet: 1 tab(s) orally once a day (at bedtime)  metFORMIN 500 mg oral tablet: 2 tab(s) orally once a day with dinner  metFORMIN 500 mg oral tablet, extended release: 1 tab(s) orally once a day with breakfast  Metoprolol Tartrate 25 mg oral tablet: 0.5 tab(s) orally 2 times a day   ALPRAZolam 0.25 mg oral tablet: 1 tab(s) orally once a day as needed for  anxiety  apixaban 2.5 mg oral tablet: 1 tab(s) orally 2 times a day  aspirin 81 mg oral delayed release tablet: 1 tab(s) orally once a day  atorvastatin 40 mg oral tablet: 1 tab(s) orally once a day (at bedtime)  clopidogrel 75 mg oral tablet: 1 tab(s) orally every 24 hours  DULoxetine 30 mg oral delayed release capsule: 3 cap(s) orally once a day  Lasix 20 mg oral tablet: 1 tab(s) orally once a day  melatonin 5 mg oral tablet: 1 tab(s) orally once a day (at bedtime)  metFORMIN 500 mg oral tablet: 2 tab(s) orally once a day with dinner  metFORMIN 500 mg oral tablet, extended release: 1 tab(s) orally once a day with breakfast  Metoprolol Succinate ER 50 mg oral tablet, extended release: 1 tab(s) orally once a day

## 2025-01-27 NOTE — PROGRESS NOTE ADULT - PROBLEM SELECTOR PLAN 6
Chronic  - pt takes duloxetine 90mg daily  - would continue home medications  - pt uses alprazolam 0.25mg PRN, does not take often

## 2025-01-27 NOTE — CONSULT NOTE ADULT - ATTENDING COMMENTS
89y/o F with PMHx HTN, DM2, HLD, depression who presented to the ED with intermittent jaw pain followed by anterior chest pressure radiating to the left shoulder, admitted for STEMI.      - s/p code STEMI  - S/P: PCI (1/26/25) with 100% occlusion of diagonal and MARLENE x1  - EKG: NSR S/P cath  - continue DAPT with ASA 81 QD and brilinta 90 BID  - start BB metoprolol 25mg BID  - cont HI statin with Lipitor 40mg QHS  - hyperglycemic on current DM regimen, care plan per ICU   - no TTE in the past. Ordered TTE, follow up results   - Continue Fluid restriction  - Strict I/Os, daily weights  - No VT on the monitor  - Monitor creatinine.  Got IVF overnight given contrast load  - Monitor in ICU.  At risk for abrupt decompensation

## 2025-01-27 NOTE — CONSULT NOTE ADULT - SUBJECTIVE AND OBJECTIVE BOX
Department of Cardiology                                                               Division of Interventional Cardiology                                                               [facility] / [place] [id number]                                                                                 [phone]                                                                                                                               Interventional Cardiology Consult / Post-Procedure Note    Recent/pertinent 24 hour events:    pt p/w CP, STEMI, emergent LHC via RFA (angioselaed), aspirin and brilinta loaded     Diag 100%, s/p MARLENE x 1     EF 40%     OM and LAD CAD (medical therapy)      Subjective/ROS: didn't sleep well overnight otherwise no c/o offered. Denies CP, SOB, palpitations, N/V, fever/chills, abd pain, numbness/tingling/weakness, other c/o at this time.  ROS negative x 10 systems except as documented as above.      HPI:  Pt is a 89y/o F with PMHx HTN, DM2, HLD, depression who presented to the ED with intermittent jaw pain followed by anterior chest pressure radiating to the left shoulder, Pt was found to have elevated troponins and EKG with ST elevations with reciprocal ST-T changes in leads II, III, aVF. Code STEMI was activated and patient was taken to the cath lab where she was found to have 100% occlusion to the diagonal now s/p 1 stent placement. Patient seen and examined in the ICU, reports feeling much better. She denies any current chest pain or jaw pain, and also denies dizziness, headache, numbness/tingling, nausea, vomiting, palpitations, shortness of breath, abdominal pain. She does admit to chronic diarrhea. Prior to this event, patient was in her usual state of health. No other concerns at this time.    ED Course:   Vitals: BP: 190/94, HR: 83, Temp: 97.8, RR: 18, SpO2: 97% on RA  Labs: BUN/Cr 29/1.5, Glucose 300, Troponin 737.1   EKG: ST elevation in lead I with ST changes in II, III, aVF  Received in the ED: aspirin 324, brilinta 180mg, heparin IV   (2025 21:31)      PAST MEDICAL & SURGICAL HISTORY:  HTN (hypertension)  DM (diabetes mellitus)  HLD (hyperlipidemia)  Major depression  No significant past surgical history    Social History:  Lives: with   ADLs: independent  Diet: no restrictions  Alcohol Use: denies  Tobacco Use: denies  Recreational Drug Use: denies (2025 21:31)      MEDICATIONS  (STANDING):  aspirin enteric coated 81 milliGRAM(s) Oral daily  atorvastatin 40 milliGRAM(s) Oral at bedtime  chlorhexidine 2% Cloths 1 Application(s) Topical <User Schedule>  chlorhexidine 4% Liquid 1 Application(s) Topical once  metoprolol tartrate 25 milliGRAM(s) Oral every 12 hours  ticagrelor 90 milliGRAM(s) Oral every 12 hours    No Known Allergies      T(C): 37.1 (25 @ 07:58), Max: 37.1 (25 @ 07:58)  HR: 78 (25 @ 09:00) (70 - 93), NSR without ectopy  BP: 107/48 (25 @ 09:00) (100/56 - 190/94)  RR: 17 (25 @ 09:00) (10 - 26)  SpO2: 98% (25 @ 09:00) (92% - 100%) on room air    Daily Height in cm: 157.48 (2025 19:18)    Daily Weight in k.8 (2025 06:46)    I&O's Summary  2025 07:01  -  2025 07:00  --------------------------------------------------------  IN: 675 mL / OUT: 0 mL / NET: 675 mL    2025 07:01  -  2025 10:17  --------------------------------------------------------  IN: 200 mL / OUT: 0 mL / NET: 200 mL      LABS:	 	                        11.5   9.14  )-----------( 247      ( 2025 06:10 )             34.6         139  |  109[H]  |  28[H]  ----------------------------<  225[H]  4.8   |  25  |  1.50[H]    Ca    9.1      2025 06:10  Phos  4.1       Mg     1.9         TPro  6.4  /  Alb  3.1[L]  /  TBili  0.3  /  DBili  x   /  AST  66[H]  /  ALT  18  /  AlkPhos  81      Troponin I, High Sensitivity Result: 62354.5 ng/L (25 @ 06:10)  Troponin I, High Sensitivity Result: 737.1 ng/L (25 @ 19:45)    Cholesterol: 261 mg/dL (25 @ 06:10)  HDL Cholesterol: 43 mg/dL (25 @ 06:10)  Triglycerides, Serum: 239 mg/dL (25 @ 06:10)  LDL Cholesterol Calculated: 173 mg/dL (25 @ 06:10)  Non HDL Cholesterol: 219 mg/dL (25 @ 06:10)      Physical Exam:  Constitutional: NAD  Neuro: A+O x 3, non-focal, speech clear and intact  HEENT: NC/AT, PERRL, EOMI, anicteric sclerae, oral mucosa pink and moist  Neck: supple, no JVD  CV: regular rate, regular rhythm, +S1S2, no murmurs or rub  Pulm/chest: lung sounds CTA and equal bilaterally, no accessory muscle use noted  Abd: soft, NT, ND  Ext: MENDEZ x 4, no C/C/E. R groin C/D/I/soft/no hematoma. RLE motor/neuro/circ intact  Skin: warm, well perfused  Psych: calm, appropriate affect           H/O percutaneous left heart catheterization                                                                       Department of Cardiology                                                               Division of Interventional Cardiology                                                               [facility] / [place] [id number]                                                                                 [phone]                                                                                                                               Interventional Cardiology Consult / Post-Procedure Note    Recent/pertinent 24 hour events:    pt p/w CP, STEMI, emergent LHC via RFA (angiosealed), aspirin and brilinta loaded     Diag 100%, s/p MARLENE x 1     EF 40%     OM and LAD CAD (medical therapy)      Subjective/ROS: didn't sleep well overnight otherwise no c/o offered. Denies CP, SOB, palpitations, N/V, fever/chills, abd pain, numbness/tingling/weakness, other c/o at this time.  ROS negative x 10 systems except as documented as above.      HPI:  Pt is a 89y/o F with PMHx HTN, DM2, HLD, depression who presented to the ED with intermittent jaw pain followed by anterior chest pressure radiating to the left shoulder, Pt was found to have elevated troponins and EKG with ST elevations with reciprocal ST-T changes in leads II, III, aVF. Code STEMI was activated and patient was taken to the cath lab where she was found to have 100% occlusion to the diagonal now s/p 1 stent placement. Patient seen and examined in the ICU, reports feeling much better. She denies any current chest pain or jaw pain, and also denies dizziness, headache, numbness/tingling, nausea, vomiting, palpitations, shortness of breath, abdominal pain. She does admit to chronic diarrhea. Prior to this event, patient was in her usual state of health. No other concerns at this time.    ED Course:   Vitals: BP: 190/94, HR: 83, Temp: 97.8, RR: 18, SpO2: 97% on RA  Labs: BUN/Cr 29/1.5, Glucose 300, Troponin 737.1   EKG: ST elevation in lead I with ST changes in II, III, aVF  Received in the ED: aspirin 324, brilinta 180mg, heparin IV   (2025 21:31)      PAST MEDICAL & SURGICAL HISTORY:  HTN (hypertension)  DM (diabetes mellitus)  HLD (hyperlipidemia)  Major depression  No significant past surgical history    Social History:  Lives: with   ADLs: independent  Diet: no restrictions  Alcohol Use: denies  Tobacco Use: denies  Recreational Drug Use: denies (2025 21:31)      MEDICATIONS  (STANDING):  aspirin enteric coated 81 milliGRAM(s) Oral daily  atorvastatin 40 milliGRAM(s) Oral at bedtime  chlorhexidine 2% Cloths 1 Application(s) Topical <User Schedule>  chlorhexidine 4% Liquid 1 Application(s) Topical once  metoprolol tartrate 25 milliGRAM(s) Oral every 12 hours  ticagrelor 90 milliGRAM(s) Oral every 12 hours    No Known Allergies      T(C): 37.1 (25 @ 07:58), Max: 37.1 (25 @ 07:58)  HR: 78 (25 @ 09:00) (70 - 93), NSR without ectopy  BP: 107/48 (25 @ 09:00) (100/56 - 190/94)  RR: 17 (25 @ 09:00) (10 - 26)  SpO2: 98% (25 @ 09:00) (92% - 100%) on room air    Daily Height in cm: 157.48 (2025 19:18)    Daily Weight in k.8 (2025 06:46)    I&O's Summary  2025 07:01  -  2025 07:00  --------------------------------------------------------  IN: 675 mL / OUT: 0 mL / NET: 675 mL    2025 07:01  -  2025 10:17  --------------------------------------------------------  IN: 200 mL / OUT: 0 mL / NET: 200 mL      LABS:	 	                        11.5   9.14  )-----------( 247      ( 2025 06:10 )             34.6         139  |  109[H]  |  28[H]  ----------------------------<  225[H]  4.8   |  25  |  1.50[H]    Ca    9.1      2025 06:10  Phos  4.1       Mg     1.9         TPro  6.4  /  Alb  3.1[L]  /  TBili  0.3  /  DBili  x   /  AST  66[H]  /  ALT  18  /  AlkPhos  81      Troponin I, High Sensitivity Result: 60813.5 ng/L (25 @ 06:10)  Troponin I, High Sensitivity Result: 737.1 ng/L (25 @ 19:45)    Cholesterol: 261 mg/dL (25 @ 06:10)  HDL Cholesterol: 43 mg/dL (25 @ 06:10)  Triglycerides, Serum: 239 mg/dL (25 @ 06:10)  LDL Cholesterol Calculated: 173 mg/dL (25 @ 06:10)  Non HDL Cholesterol: 219 mg/dL (25 @ 06:10)      Physical Exam:  Constitutional: NAD  Neuro: A+O x 3, non-focal, speech clear and intact  HEENT: NC/AT, PERRL, EOMI, anicteric sclerae, oral mucosa pink and moist  Neck: supple, no JVD  CV: regular rate, regular rhythm, +S1S2, no murmurs or rub  Pulm/chest: lung sounds CTA and equal bilaterally, no accessory muscle use noted  Abd: soft, NT, ND  Ext: MENDEZ x 4, no C/C/E. R groin C/D/I/soft/no hematoma. RLE motor/neuro/circ intact  Skin: warm, well perfused  Psych: calm, appropriate affect

## 2025-01-27 NOTE — DISCHARGE NOTE PROVIDER - NSDCCAREPROVSEEN_GEN_ALL_CORE_FT
Karma, Amrita Ortega Karma, Emely Artis, Amrita Waldrop, Emil [place], Wes Karma, Emely Artis, Amrita [place], Wes Dooley, [place]

## 2025-01-27 NOTE — CARE COORDINATION ASSESSMENT. - NSCAREPROVIDERS_GEN_ALL_CORE_FT
CARE PROVIDERS:  Accepting Physician: Matti Bingham  Access Services: Seth Roth  Administration: Jeremiah Hill  Administration: Eric Holland  Admitting: Matti Bingham  Attending: Matti Bingham  Cardiology Technician: Marlys Queen  Case Management: Arabella Santillan  Consultant: Trev Sim  Consultant: Hodan Blue  Consultant: Ethel Bear  Consultant: Adri Leach  Consultant: Benedicto Mijares  Consultant: Neymar Loya  Consultant: Jenna Martinez  Consultant: Amish Campbell  Consultant: Wes [place]  Consultant: Astrid Ledesma  Covering Team: Elliot Osullivan  ED Attending: Tez Clark  ED Nurse: Brenda Gutierrez  Food & Nutrition Services: Farhana Pierre  Nurse: Ann-Marie Wells  Nurse: Corinne Mejia  Nurse: Clara Keenan  Nurse: Elizabeth Grant  Nurse: Lorri Reece  Ordered: ADM, User  Ordered: Physician, Ordering  Override: Isis Evans  Override: Efraín Campbell  Override: Eva Martinez  PCA/Nursing Assistant: isidro Plunkett  Primary Team: Ina Giron  Primary Team: Matti Bingham  Primary Team: Ankur Moran  Primary Team: Arias Galvan  Primary Team: Poornima Merrill  Primary Team: Emely Parada  Primary Team: Amrita Artis  Primary Team: Surinder Mccarthy  Primary Team: Cecily Weiss  Quality Review: Mlodynia, April  Registered Dietitian: Britni Diaz  Respiratory Therapy: Adamaris Germain  : Sylvia Crooks  : Thu Denise  Student: Rita Foster  Team: [facility], Team

## 2025-01-27 NOTE — DISCHARGE NOTE NURSING/CASE MANAGEMENT/SOCIAL WORK - NSPROEXTENSIONSOFSELF_GEN_A_NUR
Al Doan has chosen to leave the hospital against medical advice. The attending physician has not discharged the patient. Patient is not a risk to himself or others. I have discussed with the patient the following:  Physician has not determined patient is ready for discharge, Risks and consequences of leaving the hospital too soon and Benefit of continued hospitalization.       Attending physician has been notified and reinforced the risks of leaving/benefits of staying. Pt still has chosen to leave.    PIV and tele monitor both removed.     none

## 2025-01-27 NOTE — DISCHARGE NOTE PROVIDER - NSDCCPTREATMENT_GEN_ALL_CORE_FT
PRINCIPAL PROCEDURE  Procedure: Left heart catheterization  Findings and Treatment: 1/26/25 100% Diag (MARLENE x 1) and OM and LAD CAD (medical therapy)      SECONDARY PROCEDURE  Procedure: Insertion, coronary artery stent  Findings and Treatment: 1/26/25 100% diag (MARLENE x 1)

## 2025-01-27 NOTE — CONSULT NOTE ADULT - ASSESSMENT
89 year old female with PMH HTN, DM2, HLD, depression who presented to the ED with intermittent jaw pain followed by anterior chest pressure radiating to the left shoulder, admitted for STEMI. Acute HFrEF based on LV gram    in ICU for post STEMI/PCI observation, monitoring and care  post cath access site precautions reviewed with pt who verbalized good understanding  activity as tolerated (except access site precautions)  am labs and EKG noted  check TTE  trend troponin to peak  continue dual anti platelet therapy with aspirin AND ticagrelor  Pt education provided/reinforced re: importance of strict adherence to uninterrupted DAPT for minimum of 9-12 months (cardiologist will determine duration)  Patient educated on benefits of Cardiac Rehab Program; referral provided to patient. Referral faxed and copy placed in medical record. Patient given list of locations with phone numbers of local rehab facilities and advised to contact their insurance company for participating providers. Patient educated on need to bring discharge documents including cardiovascular history, medications, and testing/treatments to first appointment.  prescribed statin and beta blocker  will restart home ACEI/ARB if/when creatinine stable  further GDMT for HF deferred to primary cardiologist  diet as tolerated  Lifestyle modifications discussed to reduce cardiovascular risk factors including weight reduction, smoking cessation (referral provided if applicable), medication compliance, and routine follow up with Cardiologist to track your BMI, cholesterol, and glucose levels.   follow-up next week with Dr Parada for post PCI check  follow-up in 2 weeks with outpatient/referring cardiologist  89 year old female with PMH HTN, DM2, HLD, depression who presented to the ED with intermittent jaw pain followed by anterior chest pressure radiating to the left shoulder, admitted for STEMI. Acute HFrEF based on LV gram    1/26 s/p LHC, s/p MARLENE x 1 to 100% diag          OM and LAD CAD (medical therapy)     in ICU for post STEMI/PCI observation, monitoring and care  post cath access site precautions reviewed with pt who verbalized good understanding  activity as tolerated (except access site precautions)  am labs and EKG noted  check TTE  trend troponin to peak  continue dual anti platelet therapy with aspirin AND ticagrelor  Pt education provided/reinforced re: importance of strict adherence to uninterrupted DAPT for minimum of 9-12 months (cardiologist will determine duration)  Patient educated on benefits of Cardiac Rehab Program; referral provided to patient. Referral faxed and copy placed in medical record. Patient given list of locations with phone numbers of local rehab facilities and advised to contact their insurance company for participating providers. Patient educated on need to bring discharge documents including cardiovascular history, medications, and testing/treatments to first appointment.  prescribed statin and beta blocker  will restart home ACEI/ARB if/when creatinine stable  further GDMT for HF deferred to primary cardiologist  diet as tolerated  Lifestyle modifications discussed to reduce cardiovascular risk factors including weight reduction, smoking cessation (referral provided if applicable), medication compliance, and routine follow up with Cardiologist to track your BMI, cholesterol, and glucose levels.   follow-up next week with Dr Parada for post PCI check  follow-up in 2 weeks with outpatient/referring cardiologist  89 year old female with PMH HTN, DM2, HLD, depression who presented to the ED with intermittent jaw pain followed by anterior chest pressure radiating to the left shoulder, admitted for STEMI. Acute HFrEF based on LV gram    1/26 s/p LHC, s/p MARLENE x 1 to 100% diag          OM and LAD CAD (medical therapy)     in ICU for post STEMI/PCI observation, monitoring and care  post cath access site precautions reviewed with pt who verbalized good understanding  activity as tolerated (except access site precautions)  am labs and EKG noted  check TTE  trend troponin to peak  continue dual anti platelet therapy with aspirin AND ticagrelor  Pt education provided/reinforced re: importance of strict adherence to uninterrupted DAPT for minimum of 9-12 months (cardiologist will determine duration)  Patient educated on benefits of Cardiac Rehab Program; referral provided to patient. Referral faxed and copy placed in medical record. Patient given list of locations with phone numbers of local rehab facilities and advised to contact their insurance company for participating providers. Patient educated on need to bring discharge documents including cardiovascular history, medications, and testing/treatments to first appointment.  prescribed statin and beta blocker  will restart home ACEI/ARB if/when creatinine stable  further GDMT for HF deferred to primary cardiologist  diet as tolerated  Lifestyle modifications discussed to reduce cardiovascular risk factors including weight reduction, smoking cessation (referral provided if applicable), medication compliance, and routine follow up with Cardiologist to track your BMI, cholesterol, and glucose levels.   follow-up next week with Dr Parada for post PCI check  follow-up in 2 weeks with Dr Salinas, outpatient cardiologist

## 2025-01-27 NOTE — DISCHARGE NOTE PROVIDER - PROVIDER TOKENS
FREE:[LAST:[Karma],FIRST:[Emely],PHONE:[phone]],FAX:[(   )    -],ADDRESS:[[address] [place] [address]],FOLLOWUP:[1 week]] FREE:[LAST:[Boutis],FIRST:[Emely],PHONE:[phone]],FAX:[(   )    -],ADDRESS:[[address] [place] [address]],FOLLOWUP:[1 week]],PROVIDER:[TOKEN:[[id number]],FOLLOWUP:[2 weeks]] PROVIDER:[TOKEN:[[id number]],FOLLOWUP:[2 weeks]],PROVIDER:[TOKEN:[[id number]]],FREE:[LAST:[Boutis],FIRST:[Emely],PHONE:[phone]],FAX:[(   )    -],ADDRESS:[[address] [place] [address]],FOLLOWUP:[1 week]],PROVIDER:[TOKEN:[[id number]]]

## 2025-01-27 NOTE — PROGRESS NOTE ADULT - PROBLEM SELECTOR PLAN 3
Chronic  - glucose elevated to 300 on arrival  - takes metformin at home  - would start PRITESH with FS QAC/QHS  - hypoglycemia protocol  - f/u A1c

## 2025-01-27 NOTE — DISCHARGE NOTE NURSING/CASE MANAGEMENT/SOCIAL WORK - PATIENT PORTAL LINK FT
You can access the FollowMyHealth Patient Portal offered by [facility] by registering at the following website: [email or web address]. By joining [employer]’s FollowMyHealth portal, you will also be able to view your health information using other applications (apps) compatible with our system.

## 2025-01-27 NOTE — DISCHARGE NOTE NURSING/CASE MANAGEMENT/SOCIAL WORK - FINANCIAL ASSISTANCE
[facility] provides services at a reduced cost to those who are determined to be eligible through [facility]’s financial assistance program. Information regarding [facility]’s financial assistance program can be found by going to [email or web address] or by calling [phone].

## 2025-01-27 NOTE — PROGRESS NOTE ADULT - PROBLEM SELECTOR PLAN 2
BUN/Cr 29/1.5  - unclear baseline, - GFR similar to 2023 on chart review  - will start mIVF @ 75cc/hr for 8 hrs as per interventional  - monitor renal function, may inc s/p cardiac cath  - avoid nephrotoxic medications as able

## 2025-01-27 NOTE — CONSULT NOTE ADULT - ASSESSMENT
Pt is a 89y/o F with PMHx HTN, DM2, HLD, depression who presented to the ED with intermittent jaw pain followed by anterior chest pressure radiating to the left shoulder, admitted for STEMI.    #Acute on chronic CHF exacerbation   - Pro bnp   - S/p Lasix   - Echo from   - Will repeat TTE, follow up results   - Continue Fluid restriction  - Strict I/Os, daily weights    #Ischemia   - No clear evidence of acute ischemia  - Troponin negative   - EKG: NSR  - Hx of CAD: Continue   - Continue to monitor for signs or symptoms of ischemia     #Arrhythmia  - EKG: NSR, unchanged from prior EKG  - Monitor and replete lytes, keep K>4, Mg>2.    #HTN  - BP stable currently  - Continue home meds:   - Continue to monitor hemodynamics     - Other cardiovascular workup will depend on clinical course.  - All other workup per primary team.  - Will continue to follow.       Pt is a 89y/o F with PMHx HTN, DM2, HLD, depression who presented to the ED with intermittent jaw pain followed by anterior chest pressure radiating to the left shoulder, admitted for STEMI.    #Ischemia   - admitted under code STEMI  - S/P: PCI (1/26/25) with 100% occlusion of marginal and MARLENE x1  - EKG: NSR S/P cath  - continue DAPT with ASA 81 QD and brilinta 90 BID  - cont BB metoprolol 25mg BID  - cont HI statin with Lipitor 40mg QHS  - hyperglycemic on current DM regimen, care plan per ICU   - Continue to monitor for signs or symptoms of ischemia     #Volume Status   - S/p IV with mild LE edema B/L  - no TTE in the past. Ordered TTE, follow up results   - Continue Fluid restriction  - Strict I/Os, daily weights      #HTN  - BP stable currently  - Continue home meds: metoprolol 25mg BID  - Continue to monitor hemodynamics     #Arrhythmia  - EKG: NSR, unchanged from prior EKG  - Monitor and replete lytes, keep K>4, Mg>2.    - Other cardiovascular workup will depend on clinical course.  - All other workup per primary team.  - Will continue to follow.

## 2025-01-27 NOTE — CAREGIVER ENGAGEMENT NOTE - CAREGIVER OUTREACH NOTES - FREE TEXT
CM spoke with patient's daughter to schedule follow up with provider. Patient's daughter stated she would like to schedule the appt and will follow up with CM today.

## 2025-01-27 NOTE — DISCHARGE NOTE NURSING/CASE MANAGEMENT/SOCIAL WORK - NSDCFUADDAPPT_GEN_ALL_CORE_FT
Followup with Dr. Eliud Salinas (cardiologist) 02/04/25 at 3:15pm Followup with Dr. Parada (Surgeon) 02/06/25 at 3:30pm

## 2025-01-27 NOTE — PROGRESS NOTE ADULT - SUBJECTIVE AND OBJECTIVE BOX
Patient is a 89y old  Female who presents with a chief complaint of STEMI (27 Jan 2025 08:39)      INTERVAL HPI/OVERNIGHT EVENTS: Patient seen and examined at bedside. No new events/complaints noted. Denies chest pain, sob, palpitations     MEDICATIONS  (STANDING):  aspirin enteric coated 81 milliGRAM(s) Oral daily  atorvastatin 40 milliGRAM(s) Oral at bedtime  chlorhexidine 2% Cloths 1 Application(s) Topical <User Schedule>  chlorhexidine 4% Liquid 1 Application(s) Topical once  metoprolol tartrate 25 milliGRAM(s) Oral every 12 hours  ticagrelor 90 milliGRAM(s) Oral every 12 hours    MEDICATIONS  (PRN):      Allergies    No Known Allergies    Intolerances        REVIEW OF SYSTEMS:  Per HPI. All other ROS Noted Negative   Vital Signs Last 24 Hrs  T(C): 37.1 (27 Jan 2025 07:58), Max: 37.1 (27 Jan 2025 07:58)  T(F): 98.8 (27 Jan 2025 07:58), Max: 98.8 (27 Jan 2025 07:58)  HR: 85 (27 Jan 2025 09:25) (70 - 93)  BP: 98/49 (27 Jan 2025 09:25) (89/67 - 190/94)  BP(mean): 65 (27 Jan 2025 09:25) (64 - 110)  RR: 14 (27 Jan 2025 09:25) (10 - 26)  SpO2: 99% (27 Jan 2025 09:25) (92% - 100%)    Parameters below as of 27 Jan 2025 06:00  Patient On (Oxygen Delivery Method): room air        PHYSICAL EXAM:  GENERAL: NAD, awake, alert   HEENT: NCAT, EOMI, sclera clear  LUNGS: clear to auscultation, symmetric breath sounds, no wheezing or rhonchi appreciated  HEART: S1/S2, regular rate and rhythm, no murmurs noted, +mild pitting edema b/l feet  GASTROINTESTINAL: abdomen is soft, nontender, nondistended, hyperactive bowel sounds  INTEGUMENT: warm skin, appears well perfused  MUSCULOSKELETAL: no clubbing or cyanosis, no obvious deformity  NEUROLOGIC: awake and alert, answers questions and follows commands appropriately  HEME/LYMPH: no obvious ecchymosis or petechiae  LABS:                        11.5   9.14  )-----------( 247      ( 27 Jan 2025 06:10 )             34.6     27 Jan 2025 06:10    139    |  109    |  28     ----------------------------<  225    4.8     |  25     |  1.50     Ca    9.1        27 Jan 2025 06:10  Phos  4.1       27 Jan 2025 06:10  Mg     1.9       27 Jan 2025 06:10    TPro  6.4    /  Alb  3.1    /  TBili  0.3    /  DBili  x      /  AST  66     /  ALT  18     /  AlkPhos  81     27 Jan 2025 06:10      CAPILLARY BLOOD GLUCOSE        BLOOD CULTURE    RADIOLOGY & ADDITIONAL TESTS:    Imaging Personally Reviewed:  [ ] YES     Consultant(s) Notes Reviewed:      Care Discussed with Consultants/Other Providers:

## 2025-01-27 NOTE — DISCHARGE NOTE PROVIDER - NSDCFUSCHEDAPPT_GEN_ALL_CORE_FT
Emely Parada  [facility]  CARDIOLOGY [address]  Scheduled Appointment: 02/06/2025

## 2025-01-27 NOTE — GOALS OF CARE CONVERSATION - ADVANCED CARE PLANNING - CONVERSATION DETAILS
[facility]-Palliative care SW met with patient at bedside. Reviewed patient's medical and social history as well as events leading to patient's hospitalization. Writer discussed patient's current diagnosis (STEMI, HTN, DM2, HLD, Depression), medical condition and management. Patient states she has a HCP with her son Garcia as health care agent.  Inquired about patient's wishes regarding extent of medical care to be provided including thoughts regarding cardiopulmonary resuscitation and mechanical ventilation/intubation. Patient initially stated that she and her  had previously stated wishes for DNR/DNI and wanted to put these wishes in place at the hospital. Following a conversation with cardiology team along with PC team, patient decided that she would be open to defibrillation in setting of post PCI arrythmia and she rescinded DNR/DNI.  All questions answered. Patient remains FULL CODE.

## 2025-01-27 NOTE — CONSULT NOTE ADULT - SUBJECTIVE AND OBJECTIVE BOX
cc: Intermittent jaw pain followed by anterior chest pressure radiating to the left shoulder,    HPI: Pt is a 89y/o F with PMHx HTN, DM2, HLD, depression who presented to the ED with intermittent jaw pain followed by anterior chest pressure radiating to the left shoulder, Pt was found to have elevated troponins and EKG with ST elevations with reciprocal ST-T changes in leads II, III, aVF. Code STEMI was activated and patient was taken to the cath lab where she was found to have 100% occlusion to the diagonal now s/p 1 stent placement. Patient seen and examined in the ICU, reports feeling much better. She denies any current chest pain or jaw pain, and also denies dizziness, headache, numbness/tingling, nausea, vomiting, palpitations, shortness of breath, abdominal pain. She does admit to chronic diarrhea. Prior to this event, patient was in her usual state of health. No other concerns at this time.    Interval HPI: Code STEMI activated pt taken to the cath lab was found to have 100% occlusion to the diagonal now s/p x1 stent placed. Pt seen and examined at the bedside this am, lying comfortably in bed. Denies , SOB, palpitations.     ED Course:   Vitals: BP: 190/94, HR: 83, Temp: 97.8, RR: 18, SpO2: 97% on RA  Labs: BUN/Cr 29/1.5, Glucose 300, Troponin 737.1   EKG: ST elevation in lead I with ST changes in II, III, aVF  Received in the ED: aspirin 324, brilinta 180mg, heparin IV   (26 Jan 2025 21:31)      PAST MEDICAL & SURGICAL HISTORY:  HTN (hypertension)      DM (diabetes mellitus)      HLD (hyperlipidemia)      Major depression      No significant past surgical history                ECHO  FINDINGS:      MEDICATIONS  (STANDING):  aspirin enteric coated 81 milliGRAM(s) Oral daily  atorvastatin 40 milliGRAM(s) Oral at bedtime  chlorhexidine 2% Cloths 1 Application(s) Topical <User Schedule>  chlorhexidine 4% Liquid 1 Application(s) Topical once  metoprolol tartrate 25 milliGRAM(s) Oral every 12 hours  ticagrelor 90 milliGRAM(s) Oral every 12 hours    MEDICATIONS  (PRN):      FAMILY HISTORY:    Denies Family history of CAD or early MI    ROS:  Constitutional: denies fever, chills  HEENT: denies blurry vision, difficulty hearing  Respiratory: denies SOB, WILSON, cough  Cardiovascular: denies CP, palpitations, orthopnea, PND, LE edema  Gastrointestinal: denies nausea, vomiting, abdominal pain  Genitourinary: denies urinary changes  Skin: Denies rashes, itching  Neurologic: denies headache, weakness, dizziness  Hematology/Oncology: denies bleeding, easy bruising  ROS negative except as noted above      SOCIAL HISTORY:    No tobacco, Alcohol or Ddrug use    Vital Signs Last 24 Hrs  T(C): 37.1 (27 Jan 2025 07:58), Max: 37.1 (27 Jan 2025 07:58)  T(F): 98.8 (27 Jan 2025 07:58), Max: 98.8 (27 Jan 2025 07:58)  HR: 74 (27 Jan 2025 07:00) (70 - 93)  BP: 120/63 (27 Jan 2025 07:00) (100/56 - 190/94)  BP(mean): 75 (27 Jan 2025 07:00) (69 - 110)  RR: 16 (27 Jan 2025 07:00) (10 - 26)  SpO2: 97% (27 Jan 2025 07:00) (92% - 100%)    Parameters below as of 27 Jan 2025 06:00  Patient On (Oxygen Delivery Method): room air        Physical Exam:  General: Well developed, well nourished, NAD  HEENT: NCAT, PERRLA, EOMI bl, moist mucous membranes   Neck: Supple, nontender, no mass  Neurology: A&Ox3, nonfocal, sensation intact   Respiratory: CTA B/L, No W/R/R  CV: RRR, +S1/S2, no murmurs, rubs or gallops  Abdominal: Soft, NT, ND +BSx4, no palpable masses  Extremities: 1+ B/L LE edema. + peripheral pulses. No C/C.   MSK: Normal ROM, no joint erythema or warmth, no joint swelling   Heme: No obvious ecchymosis or petechiae   Skin: warm, dry, normal color      ECG:    I&O's Detail    26 Jan 2025 07:01  -  27 Jan 2025 07:00  --------------------------------------------------------  IN:    sodium chloride 0.9%: 675 mL  Total IN: 675 mL    OUT:  Total OUT: 0 mL    Total NET: 675 mL          LABS:                        11.5   9.14  )-----------( 247      ( 27 Jan 2025 06:10 )             34.6     01-27    139  |  109[H]  |  28[H]  ----------------------------<  225[H]  4.8   |  25  |  1.50[H]    Ca    9.1      27 Jan 2025 06:10  Phos  4.1     01-27  Mg     1.9     01-27    TPro  6.4  /  Alb  3.1[L]  /  TBili  0.3  /  DBili  x   /  AST  66[H]  /  ALT  18  /  AlkPhos  81  01-27          Urinalysis Basic - ( 27 Jan 2025 06:10 )    Color: x / Appearance: x / SG: x / pH: x  Gluc: 225 mg/dL / Ketone: x  / Bili: x / Urobili: x   Blood: x / Protein: x / Nitrite: x   Leuk Esterase: x / RBC: x / WBC x   Sq Epi: x / Non Sq Epi: x / Bacteria: x      I&O's Summary    26 Jan 2025 07:01  -  27 Jan 2025 07:00  --------------------------------------------------------  IN: 675 mL / OUT: 0 mL / NET: 675 mL      BNP  RADIOLOGY & ADDITIONAL STUDIES:

## 2025-01-27 NOTE — DISCHARGE NOTE PROVIDER - NSDCCPCAREPLAN_GEN_ALL_CORE_FT
PRINCIPAL DISCHARGE DIAGNOSIS  Diagnosis: STEMI (ST elevation myocardial infarction)  Assessment and Plan of Treatment:      PRINCIPAL DISCHARGE DIAGNOSIS  Diagnosis: STEMI (ST elevation myocardial infarction)  Assessment and Plan of Treatment: You presented to the hospital with a heart attack known as a STEMI. You were brought to the cath lab and received one stent in an artery of your heart. Following this procedure, you were started on medications called Plavix and Aspirin which you should continue while at home EVERY DAY as prescribed.   You MUST follow up with your cardiologist and interventional cardiologist.      SECONDARY DISCHARGE DIAGNOSES  Diagnosis: JOHNNY (acute kidney injury)  Assessment and Plan of Treatment: Following the procedure, you were noted to have an injury to your kidneys likely related to your low blood pressure as well as the contrast fluid used to get a good picture of your heart during the procedure. This injury slowly improved while you were here. Make sure to stay hydrated at home to continue improvement of these lab values and kidney function.  Please follow up with your PCP within 2 weeks of discharge from the hospital.    Diagnosis: DM (diabetes mellitus)  Assessment and Plan of Treatment: You came to the hospital with a previous diagnosis of diabetes. While in the hospital, your sugars were monitored and you were given small doses of insulin as needed (which was not often). You may restart your Metformin when you get home as you had been taking it prior to admission to the hospital.    Diagnosis: HTN (hypertension)  Assessment and Plan of Treatment: You developed LOW blood pressure while in the hospital so you should NO LONGER continue your home blood pressure medication (Fosinopril). This should be followed up with your primary care physician and/or cardiology within the next one to two weeks.     PRINCIPAL DISCHARGE DIAGNOSIS  Diagnosis: STEMI (ST elevation myocardial infarction)  Assessment and Plan of Treatment: You presented to the hospital with a heart attack known as a STEMI. You were brought to the cath lab and received one stent in an artery of your heart. Following this procedure, you were started on medications called Plavix and Aspirin which you should continue while at home EVERY DAY as prescribed.   Your simvastatin has been stopped and changed to atorvastatin.  You were found to have a low ejection fraction (acute systolic congestive heart failure) - you have been started on a low dose medication called metoprolol tartrate 12.5mg twice a day - this is a short acting version of the medication - follow up with Dr. Salinas to be switched to the long acting verson called metoprolol succinate, which has benefit in heart failure. You may also benefit from a diabetes medication called farxiga which also gives heart failure benefit. If your blood pressure and kidney numbers are better as an outpatient, you may also benefit to a similar medication to your home fosinopril (again for heart failure benefit). Discuss this with Dr. Salinas at your follow up appointment.  You MUST follow up with your cardiologist and interventional cardiologist.      SECONDARY DISCHARGE DIAGNOSES  Diagnosis: JOHNNY (acute kidney injury)  Assessment and Plan of Treatment: Following the procedure, you were noted to have an injury to your kidneys likely related to your low blood pressure as well as the contrast fluid used to get a good picture of your heart during the procedure. This injury slowly improved while you were here. Make sure to stay hydrated at home to continue improvement of these lab values and kidney function. Your fosinopril has been stopped due to your abnormal kidney function as well as episodes of low blood pressure.  Please follow up with your PCP within 2 weeks of discharge from the hospital.    Diagnosis: DM (diabetes mellitus)  Assessment and Plan of Treatment: You came to the hospital with a previous diagnosis of type 2 diabetes. While in the hospital, your sugars were monitored and you were given small doses of insulin as needed (which was not often). You may restart your Metformin when you get home as you had been taking it prior to admission to the hospital. You may benefit from the addition of a diabetes medication called farxiga for heart failure benefit once your kidney numbers completely stabilize.    Diagnosis: HTN (hypertension)  Assessment and Plan of Treatment: You developed LOW blood pressure and kidney insufficiency while in the hospital so you should NO LONGER continue your home blood pressure medication (Fosinopril). You have alternatively been started on a low dose metoprolol as above and if your blood pressure starts going up, a medication similar to fosinopril would be recommended for you at a later point. This should be followed up with your primary care physician and/or cardiology within the next one to two weeks.     PRINCIPAL DISCHARGE DIAGNOSIS  Diagnosis: STEMI (ST elevation myocardial infarction)  Assessment and Plan of Treatment: You presented to the hospital with a heart attack known as a STEMI. You were brought to the cath lab and received one stent in an artery of your heart. Following this procedure, you were started on medications called Plavix and Aspirin which you should continue while at home EVERY DAY as prescribed.   Your simvastatin has been stopped and changed to atorvastatin.  You were found to have a low ejection fraction (acute systolic congestive heart failure) - you have been started on a low dose medication called metoprolol tartrate 12.5mg twice a day - this is a short acting version of the medication - follow up with Dr. Salinas to be switched to the long acting verson called metoprolol succinate, which has benefit in heart failure. You may also benefit from a diabetes medication called farxiga which also gives heart failure benefit. If your blood pressure and kidney numbers are better as an outpatient, you may also benefit to a similar medication to your home fosinopril (again for heart failure benefit). Discuss this with Dr. Salinas at your follow up appointment.  You MUST follow up with your cardiologist and interventional cardiologist.      SECONDARY DISCHARGE DIAGNOSES  Diagnosis: Acute systolic congestive heart failure  Assessment and Plan of Treatment: The function of your heart was reduced likely as a consequence of your myocardial infarction (heart attack). Medications have been started and will be added by your outpatient cardiologist as well. You were also treated with diuretics (colloquially referred to as "water pills"). Fluid build up is a sign of congestive heart failure exacerbation. This can happen with excessive salt or water intake or even stresses placed on the body. It is important to weigh yourself daily, monitor your salt/water intake and follow regularly with a cardiologist.    Diagnosis: JOHNNY (acute kidney injury)  Assessment and Plan of Treatment: Following the procedure, you were noted to have an injury to your kidneys likely related to your low blood pressure as well as the contrast fluid used to get a good picture of your heart during the procedure. This injury slowly improved while you were here. Make sure to stay hydrated at home to continue improvement of these lab values and kidney function. Your fosinopril has been stopped due to your abnormal kidney function as well as episodes of low blood pressure.  Please follow up with your PCP within 2 weeks of discharge from the hospital.    Diagnosis: DM (diabetes mellitus)  Assessment and Plan of Treatment: You came to the hospital with a previous diagnosis of type 2 diabetes. While in the hospital, your sugars were monitored and you were given small doses of insulin as needed (which was not often). You may restart your Metformin when you get home as you had been taking it prior to admission to the hospital. You may benefit from the addition of a diabetes medication called farxiga for heart failure benefit once your kidney numbers completely stabilize.    Diagnosis: HTN (hypertension)  Assessment and Plan of Treatment: You developed LOW blood pressure and kidney insufficiency while in the hospital so you should NO LONGER continue your home blood pressure medication (Fosinopril). You have alternatively been started on a low dose metoprolol as above and if your blood pressure starts going up, a medication similar to fosinopril would be recommended for you at a later point. This should be followed up with your primary care physician and/or cardiology within the next one to two weeks.     PRINCIPAL DISCHARGE DIAGNOSIS  Diagnosis: STEMI (ST elevation myocardial infarction)  Assessment and Plan of Treatment: You presented to the hospital with a heart attack known as a STEMI. You were brought to the cath lab and received one stent in an artery of your heart. Following this procedure, you were started on medications called Plavix and Aspirin which you should continue while at home EVERY DAY as prescribed.   Your simvastatin has been stopped and changed to atorvastatin.  You were found to have a low ejection fraction (acute systolic congestive heart failure) - you have been started on a medication called metoprolol succinate 25mg once a day - please follow up with Dr. Salinas within 1 week of discharge for close follow up on the medications. You have also been started on a medication called lasix 20mg once a day. Please CONTINUE this on discharge and follow up with Dr. Salinas. You may also benefit from a diabetes medication called farxiga which also gives heart failure benefit. If your blood pressure and kidney numbers are better as an outpatient, you may also benefit to a similar medication to your home fosinopril (again for heart failure benefit). Discuss this with Dr. Salinas at your follow up appointment.  You MUST follow up with your cardiologist and interventional cardiologist.      SECONDARY DISCHARGE DIAGNOSES  Diagnosis: JOHNNY (acute kidney injury)  Assessment and Plan of Treatment: Following the procedure, you were noted to have an injury to your kidneys likely related to your low blood pressure as well as the contrast fluid used to get a good picture of your heart during the procedure. This injury slowly improved while you were here. Make sure to stay hydrated at home to continue improvement of these lab values and kidney function. Your fosinopril has been stopped due to your abnormal kidney function as well as episodes of low blood pressure.  Please follow up with your PCP within 2 weeks of discharge from the hospital.    Diagnosis: DM (diabetes mellitus)  Assessment and Plan of Treatment: You came to the hospital with a previous diagnosis of type 2 diabetes. While in the hospital, your sugars were monitored and you were given small doses of insulin as needed (which was not often). You may restart your Metformin when you get home as you had been taking it prior to admission to the hospital. You may benefit from the addition of a diabetes medication called farxiga for heart failure benefit once your kidney numbers completely stabilize.    Diagnosis: HTN (hypertension)  Assessment and Plan of Treatment: You developed LOW blood pressure and kidney insufficiency while in the hospital so you should NO LONGER continue your home blood pressure medication (Fosinopril). You have alternatively been started on a metoprolol as above and if your blood pressure starts going up, a medication similar to fosinopril would be recommended for you at a later point. This should be followed up with your primary care physician and/or cardiology within the next one to two weeks.    Diagnosis: Acute systolic congestive heart failure  Assessment and Plan of Treatment: The function of your heart was reduced likely as a consequence of your myocardial infarction (heart attack). Medications have been started and will be added by your outpatient cardiologist as well. You were also treated with diuretics (colloquially referred to as "water pills"). Fluid build up is a sign of congestive heart failure exacerbation. This can happen with excessive salt or water intake or even stresses placed on the body. It is important to weigh yourself daily, monitor your salt/water intake and follow regularly with a cardiologist.  You have been started on a medication called lasix 20mg once a day. Please CONTINUE this on discharge and follow up with Dr. Salinas.    Diagnosis: Atrial fibrillation  Assessment and Plan of Treatment: After your cardiac stent was placed, your heart was noted to be in an abnormal rhythm called atrial fibrillation. You were started on medications to control your heart rate and try to bring your rhythm back to normal. Your heart rate was controlled on metoprolol succinate 25mg daily, please CONTINUE  this medication on discharge. You were also started on Eliquis 2.5mg twice a day. Please CONTINUE the eliquis 2.5mg twice a day. You remained in atrial fibrillation even though your heart rate was controlled. Please FOLLOW UP with Dr. Salinas within 1 week of discharge to go over medication options and possible procedures.     PRINCIPAL DISCHARGE DIAGNOSIS  Diagnosis: STEMI (ST elevation myocardial infarction)  Assessment and Plan of Treatment: You presented to the hospital with a heart attack known as a STEMI. You were brought to the cath lab and received one stent in an artery of your heart. Following this procedure, you were started on medications called Plavix and Aspirin which you should continue while at home EVERY DAY as prescribed.   Your simvastatin has been stopped and changed to atorvastatin.  You were found to have a low ejection fraction (acute systolic congestive heart failure) - you have been started on a medication called metoprolol succinate 50mg once a day - please follow up with Dr. Salinas within 1 week of discharge for close follow up on the medications. You have also been started on a medication called lasix 20mg once a day. Please CONTINUE this on discharge and follow up with Dr. Salinas. You may also benefit from a diabetes medication called farxiga which also gives heart failure benefit. If your blood pressure and kidney numbers are better as an outpatient, you may also benefit to a similar medication to your home fosinopril (again for heart failure benefit). Discuss this with Dr. Salinas at your follow up appointment.  You MUST follow up with your cardiologist and interventional cardiologist.      SECONDARY DISCHARGE DIAGNOSES  Diagnosis: JOHNNY (acute kidney injury)  Assessment and Plan of Treatment: Following the procedure, you were noted to have an injury to your kidneys likely related to your low blood pressure as well as the contrast fluid used to get a good picture of your heart during the procedure. This injury slowly improved while you were here. Make sure to stay hydrated at home to continue improvement of these lab values and kidney function. Your fosinopril has been stopped due to your abnormal kidney function as well as episodes of low blood pressure.  Please follow up with your PCP within 2 weeks of discharge from the hospital.    Diagnosis: DM (diabetes mellitus)  Assessment and Plan of Treatment: You came to the hospital with a previous diagnosis of type 2 diabetes. While in the hospital, your sugars were monitored and you were given small doses of insulin as needed (which was not often). You may restart your Metformin when you get home as you had been taking it prior to admission to the hospital. You may benefit from the addition of a diabetes medication called farxiga for heart failure benefit once your kidney numbers completely stabilize.    Diagnosis: HTN (hypertension)  Assessment and Plan of Treatment: You developed LOW blood pressure and kidney insufficiency while in the hospital so you should NO LONGER continue your home blood pressure medication (Fosinopril). You have alternatively been started on a metoprolol as above and if your blood pressure starts going up, a medication similar to fosinopril would be recommended for you at a later point. This should be followed up with your primary care physician and/or cardiology within the next one to two weeks.    Diagnosis: Acute systolic congestive heart failure  Assessment and Plan of Treatment: The function of your heart was reduced likely as a consequence of your myocardial infarction (heart attack). Medications have been started and will be added by your outpatient cardiologist as well. You were also treated with diuretics (colloquially referred to as "water pills"). Fluid build up is a sign of congestive heart failure exacerbation. This can happen with excessive salt or water intake or even stresses placed on the body. It is important to weigh yourself daily, monitor your salt/water intake and follow regularly with a cardiologist.  You have been started on a medication called lasix 20mg once a day. Please CONTINUE this on discharge and follow up with Dr. Salinas.    Diagnosis: Atrial fibrillation  Assessment and Plan of Treatment: After your cardiac stent was placed, your heart was noted to be in an abnormal rhythm called atrial fibrillation. You were started on medications to control your heart rate and try to bring your rhythm back to normal. Your heart rate was controlled on metoprolol succinate 50mg daily, please CONTINUE  this medication on discharge. You were also started on Eliquis 2.5mg twice a day. Please CONTINUE the eliquis 2.5mg twice a day. You remained in atrial fibrillation even though your heart rate was controlled. Please FOLLOW UP with Dr. Salinas within 1 week of discharge to go over medication options and possible procedures.

## 2025-01-27 NOTE — DISCHARGE NOTE NURSING/CASE MANAGEMENT/SOCIAL WORK - NSSCTYPOFSERV_GEN_ALL_CORE
Visiting Nurse- you should receive a call within 24-48 hours to schedule the first visit. If you have not received a call please contact the agency at the number listed above.  Visiting Nurse/Physical Therapy- you should receive a call within 24-48 hours to schedule the first visit. If you have not received a call please contact the agency at the number listed above.

## 2025-01-27 NOTE — CARE COORDINATION ASSESSMENT. - NSDCPLANSERVICES_GEN_ALL_CORE
CM spoke with patient to discuss home care agency choices. Patient agreed to [facility] services [phone] at this time. Resource folder left at bedside. CM to follow up/Anticipated Needs Unclear at Present

## 2025-01-27 NOTE — PROGRESS NOTE ADULT - ASSESSMENT
89 y/o F w/ pmh of htn, dm2, hld, presented to [facility] for chest pain radiating into the jaw and L. shoulder, in the ED pt was found to have +trops and ST changes concerning for STEMI. Code STEMI activated pt taken to the cath lab was found to have 100% occlusion to the diagonal now s/p x1 stent placed. No cath lab complication reported and pt admitted to the ICU for post cath lab management.    -Neuro: No acute issues, MS stable  -Cardiac: STEMI now s/p x1 MARLENE to the diagonal, cont asa/brilliant, TTE in the AM along w/ repeat EKG and trops, will obtain EKG overnight for any complains of CP, check lipid panel and A1C  -Resp: No acute issues, o2 stable  -GI: Maintain NPO status tonight, can likely start diet in the AM  -Renal: Renal function stable cont to monitor along w/ lytes, start IVF at 75cc/hr  -ID: No acute infectious process suspected  -Endo: DM start poc q6h FS while NPO  -Heme: DVT ppx w/ lovenox in the AM, cont asa/brilliant  -Dispo: Admit to MICU, pt is full code case d/w eicu dr bowman 89 y/o F w/ pmh of htn, dm2, hld, presented to [facility] for chest pain radiating into the jaw and L. shoulder, in the ED pt was found to have +trops and ST changes concerning for STEMI. Code STEMI activated pt taken to the cath lab was found to have 100% occlusion to the diagonal now s/p x1 stent placed. No cath lab complication reported and pt admitted to the ICU for post cath lab management.    -Neuro: No acute issues, MS stable. Restart Cymbalta home medication.  -Cardiac: STEMI now s/p x1 MARLENE to the diagonal, cont asa/brilliant, repeat EKGs, TTE x2 revealing mild pericardial effusion, discontinue BB with repeat hypotensive episodes, lipid panel with elevated LDL; continue statin therapy  -Resp: No acute issues, o2 stable on RA  -GI: Diet CC/DASH  -Renal: Renal function stable cont to monitor along w/ lytes  -ID: No acute infectious process suspected  -Endo: H/o DM2, A1c 7.3  -Heme: DVT ppx w/ lovenox, cont asa/brilliant  -Dispo: Admit to MICU, pt is full code case d/w eicu dr bowman

## 2025-01-27 NOTE — DISCHARGE NOTE PROVIDER - HOSPITAL COURSE
ADMISSION DATE:  01-26-25    ---  FROM ADMISSION H+P:   HPI:  Pt is a 89y/o F with PMHx HTN, DM2, HLD, depression who presented to the ED with intermittent jaw pain followed by anterior chest pressure radiating to the left shoulder, Pt was found to have elevated troponins and EKG with ST elevations with reciprocal ST-T changes in leads II, III, aVF. Code STEMI was activated and patient was taken to the cath lab where she was found to have 100% occlusion to the diagonal now s/p 1 stent placement. Patient seen and examined in the ICU, reports feeling much better. She denies any current chest pain or jaw pain, and also denies dizziness, headache, numbness/tingling, nausea, vomiting, palpitations, shortness of breath, abdominal pain. She does admit to chronic diarrhea. Prior to this event, patient was in her usual state of health. No other concerns at this time.    ED Course:   Vitals: BP: 190/94, HR: 83, Temp: 97.8, RR: 18, SpO2: 97% on RA  Labs: BUN/Cr 29/1.5, Glucose 300, Troponin 737.1   EKG: ST elevation in lead I with ST changes in II, III, aVF  Received in the ED: aspirin 324, brilinta 180mg, heparin IV   (26 Jan 2025 21:31)      ---  HOSPITAL COURSE/PERTINENT LABS/PROCEDURES PERFORMED/PENDING TESTS:   Pt was admitted for       Patient is stable for discharge as per primary medical team and consultants.    PT consulted, recommends discharge ______    Patient showed improvement throughout hospitalization. Patient was seen and examined on day of discharge. Patient was medically optimized for discharge with close outpatient follow up.    ---  PATIENT CONDITION:  - stable    --  VITALS:   T(C): 36.6 (01-30-25 @ 11:38), Max: 37.2 (01-29-25 @ 19:16)  HR: 70 (01-30-25 @ 16:00) (61 - 88)  BP: 118/61 (01-30-25 @ 16:00) (93/45 - 149/65)  RR: 24 (01-30-25 @ 16:00) (14 - 27)  SpO2: 98% (01-30-25 @ 16:00) (88% - 100%)    PHYSICAL EXAM ON DAY OF DISCHARGE:    ---  CONSULTANTS:   -    ---  ADVANCED CARE PLANNING:  - Code status:      - MOLST completed:      [  ] NO     [  ] YES    ---  TIME SPENT:  I, the attending physician, was physically present for the key portions of the evaluation and management (E/M) service provided. The total amount of time spent reviewing the hospital notes, laboratory values, imaging findings, assessing/counseling the patient, discussing with consultant physicians, social work, nursing staff was -- minutes       ADMISSION DATE:  01-26-25    ---  FROM ADMISSION H+P:   HPI:  Pt is a 89y/o F with PMHx HTN, DM2, HLD, depression who presented to the ED with intermittent jaw pain followed by anterior chest pressure radiating to the left shoulder, Pt was found to have elevated Troponins and EKG with ST elevations with reciprocal ST-T changes in leads II, III, aVF. Code STEMI was activated and patient was taken to the cath lab where she was found to have 100% occlusion to the diagonal now s/p 1 stent placement. Patient seen and examined in the ICU, reports feeling much better. She denies any current chest pain or jaw pain, and also denies dizziness, headache, numbness/tingling, nausea, vomiting, palpitations, shortness of breath, abdominal pain. She does admit to chronic diarrhea. Prior to this event, patient was in her usual state of health. No other concerns at this time.    ED Course:   Vitals: BP: 190/94, HR: 83, Temp: 97.8, RR: 18, SpO2: 97% on RA  Labs: BUN/Cr 29/1.5, Glucose 300, Troponin 737.1   EKG: ST elevation in lead I with ST changes in II, III, aVF  Received in the ED: aspirin 324, brilinta 180mg, heparin IV   (26 Jan 2025 21:31)      ---  HOSPITAL COURSE/PERTINENT LABS/PROCEDURES PERFORMED/PENDING TESTS:   Pt was admitted for STEMI, s/p PCI w/ DESx1 to [facility]. Patient was then monitored closely in ICU. Started on BB per protocol following stent placement but developed hypotension. Repeat EKG w/o concerning findings. TTE x2 conducted post procedure revealing an unchanging pericardial effusion and thickened pericardium without signs of tamponade. Hypotension improved w/ small boluses of IVF. Not requiring IVF and VSS w/ ambulation and at rest. Asymptomatic.    Patient is stable for discharge as per primary medical team and consultants.    PT consulted, recommends discharge w/ Home PT.    Patient showed improvement throughout hospitalization. Patient was seen and examined on day of discharge. Patient was medically optimized for discharge with close outpatient follow up.    ---  PATIENT CONDITION:  - stable    --    PHYSICAL EXAM ON DAY OF DISCHARGE:  General: NAD  Neurology: awake, A&Ox4  HEENT: NC/AT  Respiratory: CTA b/l, no rales or rhonchi noted  Cardiovascular: RRR, normal S1S2, no M/R/G  Abdomen: soft, NT/ND, +BS, no palpable masses, RLQ with superficial ecchymosis, nontender, no hematoma  Extremities: WWP, no clubbing, cyanosis, or edema, Groin access site  w/o tenderness, ecchymosis nor induration.  Skin: warm/dry    ---  CONSULTANTS:   Interventional Cardiology - Dr. Parada  Cardiology - Dr. Villela    ---  ADVANCED CARE PLANNING:  - Code status:  Full Code, s/p PCI        ---  TIME SPENT:  I, the attending physician, was physically present for the key portions of the evaluation and management (E/M) service provided. The total amount of time spent reviewing the hospital notes, laboratory values, imaging findings, assessing/counseling the patient, discussing with consultant physicians, social work, nursing staff was -- minutes       ADMISSION DATE:  01-26-25    ---  FROM ADMISSION H+P:   HPI:  Pt is a 89y/o F with PMHx HTN, DM2, HLD, depression who presented to the ED with intermittent jaw pain followed by anterior chest pressure radiating to the left shoulder, Pt was found to have elevated Troponins and EKG with ST elevations with reciprocal ST-T changes in leads II, III, aVF. Code STEMI was activated and patient was taken to the cath lab where she was found to have 100% occlusion to the diagonal now s/p 1 stent placement. Patient seen and examined in the ICU, reports feeling much better. She denies any current chest pain or jaw pain, and also denies dizziness, headache, numbness/tingling, nausea, vomiting, palpitations, shortness of breath, abdominal pain. She does admit to chronic diarrhea. Prior to this event, patient was in her usual state of health. No other concerns at this time.    ED Course:   Vitals: BP: 190/94, HR: 83, Temp: 97.8, RR: 18, SpO2: 97% on RA  Labs: BUN/Cr 29/1.5, Glucose 300, Troponin 737.1   EKG: ST elevation in lead I with ST changes in II, III, aVF  Received in the ED: aspirin 324, brilinta 180mg, heparin IV   (26 Jan 2025 21:31)      ---  HOSPITAL COURSE/PERTINENT LABS/PROCEDURES PERFORMED/PENDING TESTS:   Pt was admitted for STEMI, s/p PCI w/ DESx1 to [facility]. Patient was then monitored closely in ICU. Started on BB per protocol following stent placement but developed hypotension. Repeat EKG w/o concerning findings. TTE x2 conducted post procedure revealing an unchanging pericardial effusion and thickened pericardium without signs of tamponade. Hypotension improved w/ small boluses of IVF. Not requiring IVF and VSS w/ ambulation and at rest. Asymptomatic. TTE revealed EF 40-45% - acute systolic congestive heart failure. Unable to tolerate GDMT Due to hypotension with BB as well as JOHNNY/CKD. JOHNNY felt to be ATN from hypotension along with a component of AIN from contrast induced nephropathy - remained stable. Patient is stable for discharge as per primary medical team and consultants.    PT consulted, recommends discharge w/ Home PT.    Patient showed improvement throughout hospitalization. Patient was seen and examined on day of discharge. Patient was medically optimized for discharge with close outpatient follow up.    ---  CONSULTANTS:   Interventional Cardiology - Dr. Parada  Cardiology - Dr. Villela    ---  ADVANCED CARE PLANNING:  - Code status:  Full Code, s/p PCI    ADMISSION DATE:  01-26-25    ---  FROM ADMISSION H+P:   HPI:  Pt is a 89y/o F with PMHx HTN, DM2, HLD, depression who presented to the ED with intermittent jaw pain followed by anterior chest pressure radiating to the left shoulder, Pt was found to have elevated Troponins and EKG with ST elevations with reciprocal ST-T changes in leads II, III, aVF. Code STEMI was activated and patient was taken to the cath lab where she was found to have 100% occlusion to the diagonal now s/p 1 stent placement. Patient seen and examined in the ICU, reports feeling much better. She denies any current chest pain or jaw pain, and also denies dizziness, headache, numbness/tingling, nausea, vomiting, palpitations, shortness of breath, abdominal pain. She does admit to chronic diarrhea. Prior to this event, patient was in her usual state of health. No other concerns at this time.    ED Course:   Vitals: BP: 190/94, HR: 83, Temp: 97.8, RR: 18, SpO2: 97% on RA  Labs: BUN/Cr 29/1.5, Glucose 300, Troponin 737.1   EKG: ST elevation in lead I with ST changes in II, III, aVF  Received in the ED: aspirin 324, brilinta 180mg, heparin IV   (26 Jan 2025 21:31)      ---  HOSPITAL COURSE/PERTINENT LABS/PROCEDURES PERFORMED/PENDING TESTS:   Pt was admitted for STEMI, s/p PCI w/ DESx1 to [facility]. Patient was then monitored closely in ICU. Started on BB per protocol following stent placement but developed hypotension. Repeat EKG w/o concerning findings. TTE x2 conducted post procedure revealing an unchanging pericardial effusion and thickened pericardium without signs of tamponade. Hypotension improved w/ small boluses of IVF. Not requiring IVF and VSS w/ ambulation and at rest. Asymptomatic. TTE revealed EF 40-45% - acute systolic congestive heart failure. Unable to tolerate full GDMT Due to hypotension, as well as JOHNNY/CKD. Started on low dose BB. JOHNNY felt to be ATN from hypotension along with a component of AIN from contrast induced nephropathy - remained stable. Patient is stable for discharge as per primary medical team and consultants.    PT consulted, recommends discharge w/ Home PT.    Patient showed improvement throughout hospitalization. Patient was seen and examined on day of discharge. Patient was medically optimized for discharge with close outpatient follow up.    ---  CONSULTANTS:   Interventional Cardiology - Dr. Parada  Cardiology - Dr. Villela    ---  ADVANCED CARE PLANNING:  - Code status:  Full Code, s/p PCI    ADMISSION DATE:  01-26-25    ---  FROM ADMISSION H+P:   HPI:  Pt is a 89y/o F with PMHx HTN, DM2, HLD, depression who presented to the ED with intermittent jaw pain followed by anterior chest pressure radiating to the left shoulder, Pt was found to have elevated Troponins and EKG with ST elevations with reciprocal ST-T changes in leads II, III, aVF. Code STEMI was activated and patient was taken to the cath lab where she was found to have 100% occlusion to the diagonal now s/p 1 stent placement. Patient seen and examined in the ICU, reports feeling much better. She denies any current chest pain or jaw pain, and also denies dizziness, headache, numbness/tingling, nausea, vomiting, palpitations, shortness of breath, abdominal pain. She does admit to chronic diarrhea. Prior to this event, patient was in her usual state of health. No other concerns at this time.    ED Course:   Vitals: BP: 190/94, HR: 83, Temp: 97.8, RR: 18, SpO2: 97% on RA  Labs: BUN/Cr 29/1.5, Glucose 300, Troponin 737.1   EKG: ST elevation in lead I with ST changes in II, III, aVF  Received in the ED: aspirin 324, brilinta 180mg, heparin IV   (26 Jan 2025 21:31)      ---  HOSPITAL COURSE/PERTINENT LABS/PROCEDURES PERFORMED/PENDING TESTS:   Pt was admitted for STEMI, s/p PCI w/ DESx1 to [facility]. Patient was then monitored closely in ICU. Started on BB per protocol following stent placement but developed hypotension. Repeat EKG w/o concerning findings. TTE x2 conducted post procedure revealing an unchanging pericardial effusion and thickened pericardium without signs of tamponade. Hypotension improved w/ small boluses of IVF. Not requiring IVF and VSS w/ ambulation and at rest. Asymptomatic. TTE revealed EF 40-45% - acute systolic congestive heart failure. Unable to tolerate full GDMT. Due to hypotension, as well as JOHNNY/CKD. Started on low dose BB. JOHNNY felt to be ATN from hypotension along with a component of AIN from contrast induced nephropathy - remained stable. Patient is stable for discharge as per primary medical team and consultants.    PT consulted, recommends discharge w/ Home PT.    Patient showed improvement throughout hospitalization. Patient was seen and examined on day of discharge. Patient was medically optimized for discharge with close outpatient follow up.    ---  CONSULTANTS:   Interventional Cardiology - Dr. Parada  Cardiology - Dr. Villela    ---  ADVANCED CARE PLANNING:  - Code status:  Full Code, s/p PCI    ADMISSION DATE:  01-26-25    ---  FROM ADMISSION H+P:   HPI:  Pt is a 89y/o F with PMHx HTN, DM2, HLD, depression who presented to the ED with intermittent jaw pain followed by anterior chest pressure radiating to the left shoulder, Pt was found to have elevated Troponins and EKG with ST elevations with reciprocal ST-T changes in leads II, III, aVF. Code STEMI was activated and patient was taken to the cath lab where she was found to have 100% occlusion to the diagonal now s/p 1 stent placement. Patient seen and examined in the ICU, reports feeling much better. She denies any current chest pain or jaw pain, and also denies dizziness, headache, numbness/tingling, nausea, vomiting, palpitations, shortness of breath, abdominal pain. She does admit to chronic diarrhea. Prior to this event, patient was in her usual state of health. No other concerns at this time.    ED Course:   Vitals: BP: 190/94, HR: 83, Temp: 97.8, RR: 18, SpO2: 97% on RA  Labs: BUN/Cr 29/1.5, Glucose 300, Troponin 737.1   EKG: ST elevation in lead I with ST changes in II, III, aVF  Received in the ED: aspirin 324, brilinta 180mg, heparin IV   (26 Jan 2025 21:31)      ---  HOSPITAL COURSE/PERTINENT LABS/PROCEDURES PERFORMED/PENDING TESTS:   Pt was admitted for STEMI, s/p PCI w/ DESx1 to [facility]. Patient was then monitored closely in ICU. Started on BB per protocol following stent placement but developed hypotension. Repeat EKG w/o concerning findings. TTE x2 conducted post procedure revealing an unchanging pericardial effusion and thickened pericardium without signs of tamponade. Hypotension improved w/ small boluses of IVF. Not requiring IVF and VSS w/ ambulation and at rest. Asymptomatic. TTE revealed EF 40-45% - acute systolic congestive heart failure. Unable to tolerate full GDMT. Due to hypotension, as well as JOHNNY/CKD. Started on low dose BB. JOHNNY felt to be ATN from hypotension along with a component of AIN from contrast induced nephropathy - remained stable. Patient is stable for discharge as per primary medical team and consultants.    PT consulted, recommends discharge w/ Home PT.    Patient showed improvement throughout hospitalization. Patient was seen and examined on day of discharge. Patient was medically optimized for discharge with close outpatient follow up.    ---  CONSULTANTS:   Interventional Cardiology - Dr. Parada  Cardiology - Dr. Villela    ---  ADVANCED CARE PLANNING:  - Code status:  Full Code, s/p PCI       PCP/Cardiologist Dr. Salinas notified at time of discharge. ADMISSION DATE:  01-26-25    ---  FROM ADMISSION H+P:   HPI:  Pt is a 89y/o F with PMHx HTN, DM2, HLD, depression who presented to the ED with intermittent jaw pain followed by anterior chest pressure radiating to the left shoulder, Pt was found to have elevated Troponins and EKG with ST elevations with reciprocal ST-T changes in leads II, III, aVF. Code STEMI was activated and patient was taken to the cath lab where she was found to have 100% occlusion to the diagonal now s/p 1 stent placement. Patient seen and examined in the ICU, reports feeling much better. She denies any current chest pain or jaw pain, and also denies dizziness, headache, numbness/tingling, nausea, vomiting, palpitations, shortness of breath, abdominal pain. She does admit to chronic diarrhea. Prior to this event, patient was in her usual state of health. No other concerns at this time.    ED Course:   Vitals: BP: 190/94, HR: 83, Temp: 97.8, RR: 18, SpO2: 97% on RA  Labs: BUN/Cr 29/1.5, Glucose 300, Troponin 737.1   EKG: ST elevation in lead I with ST changes in II, III, aVF  Received in the ED: aspirin 324, brilinta 180mg, heparin IV   (26 Jan 2025 21:31)      ---  HOSPITAL COURSE/PERTINENT LABS/PROCEDURES PERFORMED/PENDING TESTS:   Pt was admitted for STEMI, s/p PCI w/ DESx1 to [facility]. Patient was then monitored closely in ICU. Started on BB per protocol following stent placement but developed hypotension. Repeat EKG w/o concerning findings. TTE x2 conducted post procedure revealing an unchanging pericardial effusion and thickened pericardium without signs of tamponade. Hypotension improved w/ small boluses of IVF. Not requiring IVF and VSS w/ ambulation and at rest. Asymptomatic. TTE revealed EF 40-45% - acute systolic congestive heart failure. Unable to tolerate full GDMT. Due to hypotension, as well as JOHNNY/CKD. Started on low dose BB. JOHNNY felt to be ATN from hypotension along with a component of AIN from contrast induced nephropathy - remained stable. Patient with WILSON and some cough. CXR 1/31 c/w CHF. Kept inpatient for diuresis and optimization.    PT consulted, recommends discharge w/ Home PT.    Patient showed improvement throughout hospitalization. Patient was seen and examined on day of discharge. Patient was medically optimized for discharge with close outpatient follow up.    ---  CONSULTANTS:   Interventional Cardiology - Dr. Parada  Cardiology - Dr. Villela    ---  ADVANCED CARE PLANNING:  - Code status:  Full Code, s/p PCI       PCP/Cardiologist Dr. Salinas notified. ADMISSION DATE:  01-26-25    ---  FROM ADMISSION H+P:   HPI:  Pt is a 89y/o F with PMHx HTN, DM2, HLD, depression who presented to the ED with intermittent jaw pain followed by anterior chest pressure radiating to the left shoulder, Pt was found to have elevated Troponins and EKG with ST elevations with reciprocal ST-T changes in leads II, III, aVF. Code STEMI was activated and patient was taken to the cath lab where she was found to have 100% occlusion to the diagonal now s/p 1 stent placement. Patient seen and examined in the ICU, reports feeling much better. She denies any current chest pain or jaw pain, and also denies dizziness, headache, numbness/tingling, nausea, vomiting, palpitations, shortness of breath, abdominal pain. She does admit to chronic diarrhea. Prior to this event, patient was in her usual state of health. No other concerns at this time.    ED Course:   Vitals: BP: 190/94, HR: 83, Temp: 97.8, RR: 18, SpO2: 97% on RA  Labs: BUN/Cr 29/1.5, Glucose 300, Troponin 737.1   EKG: ST elevation in lead I with ST changes in II, III, aVF  Received in the ED: aspirin 324, brilinta 180mg, heparin IV   (26 Jan 2025 21:31)      ---  HOSPITAL COURSE/PERTINENT LABS/PROCEDURES PERFORMED/PENDING TESTS:   Pt was admitted for STEMI, s/p PCI w/ DESx1 to [facility]. Patient was then monitored closely in ICU. Started on BB per protocol following stent placement but developed hypotension. Repeat EKG w/o concerning findings. TTE x2 conducted post procedure revealing an unchanging pericardial effusion and thickened pericardium without signs of tamponade. Hypotension improved w/ small boluses of IVF. Not requiring IVF and VSS w/ ambulation and at rest. Asymptomatic. TTE revealed EF 40-45% - acute systolic congestive heart failure. Unable to tolerate full GDMT. Due to hypotension, as well as JOHNNY/CKD. Started on low dose BB. JOHNNY felt to be ATN from hypotension along with a component of AIN from contrast induced nephropathy - remained stable. Patient with WILSON and some cough. CXR 1/31 c/w CHF. Kept inpatient for diuresis and optimization. On 2/3 AM patient had elevated proBNP 17k and still on 3L NC oxygen with congestion and cough.     PT consulted, recommends discharge w/ Home PT.    Patient showed improvement throughout hospitalization. Patient was seen and examined on day of discharge. Patient was medically optimized for discharge with close outpatient follow up.    ---  CONSULTANTS:   Interventional Cardiology - Dr. Parada  Cardiology - Dr. Villela    ---  ADVANCED CARE PLANNING:  - Code status:  Full Code, s/p PCI       PCP/Cardiologist Dr. Salinas notified. ADMISSION DATE:  01-26-25    ---  FROM ADMISSION H+P:   HPI:  Pt is a 89y/o F with PMHx HTN, DM2, HLD, depression who presented to the ED with intermittent jaw pain followed by anterior chest pressure radiating to the left shoulder, Pt was found to have elevated Troponins and EKG with ST elevations with reciprocal ST-T changes in leads II, III, aVF. Code STEMI was activated and patient was taken to the cath lab where she was found to have 100% occlusion to the diagonal now s/p 1 stent placement. Patient seen and examined in the ICU, reports feeling much better. She denies any current chest pain or jaw pain, and also denies dizziness, headache, numbness/tingling, nausea, vomiting, palpitations, shortness of breath, abdominal pain. She does admit to chronic diarrhea. Prior to this event, patient was in her usual state of health. No other concerns at this time.    ED Course:   Vitals: BP: 190/94, HR: 83, Temp: 97.8, RR: 18, SpO2: 97% on RA  Labs: BUN/Cr 29/1.5, Glucose 300, Troponin 737.1   EKG: ST elevation in lead I with ST changes in II, III, aVF  Received in the ED: aspirin 324, brilinta 180mg, heparin IV   (26 Jan 2025 21:31)      ---  HOSPITAL COURSE/PERTINENT LABS/PROCEDURES PERFORMED/PENDING TESTS:   Pt was admitted for STEMI, s/p PCI w/ DESx1 to [facility]. Patient was then monitored closely in ICU. Started on BB per protocol following stent placement but developed hypotension. Repeat EKG w/o concerning findings. TTE x2 conducted post procedure revealing an unchanging pericardial effusion and thickened pericardium without signs of tamponade. Hypotension improved w/ small boluses of IVF. Not requiring IVF and VSS w/ ambulation and at rest. Asymptomatic. TTE revealed EF 40-45% - acute systolic congestive heart failure. Unable to tolerate full GDMT. Due to hypotension, as well as JOHNNY/CKD. Started on low dose BB. JOHNNY felt to be ATN from hypotension along with a component of AIN from contrast induced nephropathy - remained stable. Patient with WILSON and some cough. CXR 1/31 c/w CHF. Kept inpatient for diuresis and optimization. On 2/3 AM patient had elevated proBNP 17k and still on 3L NC oxygen with congestion and cough. Patient had newfound atrial fibrillation and was started on full dose lovenox. Patient oxygen requirement continued and increased on 2/5 to 5L. Patient was in rate controlled atrial fibrillation during stay, on lopressor (not home med, started here). Patient had thoracentesis planned due to moderate pleural effusions and continuous oxygen requirements despite being on room air at home.     PT consulted, recommends discharge w/ Home PT.    Patient showed improvement throughout hospitalization. Patient was seen and examined on day of discharge. Patient was medically optimized for discharge with close outpatient follow up.    ---  CONSULTANTS:   Interventional Cardiology - Dr. Parada  Cardiology - Dr. Villela    ---  ADVANCED CARE PLANNING:  - Code status:  Full Code, s/p PCI       PCP/Cardiologist Dr. Salinas notified. ADMISSION DATE:  01-26-25    ---  FROM ADMISSION H+P:   HPI:  Pt is a 89y/o F with PMHx HTN, DM2, HLD, depression who presented to the ED with intermittent jaw pain followed by anterior chest pressure radiating to the left shoulder, Pt was found to have elevated Troponins and EKG with ST elevations with reciprocal ST-T changes in leads II, III, aVF. Code STEMI was activated and patient was taken to the cath lab where she was found to have 100% occlusion to the diagonal now s/p 1 stent placement. Patient seen and examined in the ICU, reports feeling much better. She denies any current chest pain or jaw pain, and also denies dizziness, headache, numbness/tingling, nausea, vomiting, palpitations, shortness of breath, abdominal pain. She does admit to chronic diarrhea. Prior to this event, patient was in her usual state of health. No other concerns at this time.    ED Course:   Vitals: BP: 190/94, HR: 83, Temp: 97.8, RR: 18, SpO2: 97% on RA  Labs: BUN/Cr 29/1.5, Glucose 300, Troponin 737.1   EKG: ST elevation in lead I with ST changes in II, III, aVF  Received in the ED: aspirin 324, brilinta 180mg, heparin IV   (26 Jan 2025 21:31)      ---  HOSPITAL COURSE/PERTINENT LABS/PROCEDURES PERFORMED/PENDING TESTS:   Pt was admitted for STEMI, s/p PCI w/ DESx1 to [facility]. Patient was then monitored closely in ICU. Started on BB per protocol following stent placement but developed hypotension. Repeat EKG w/o concerning findings. TTE x2 conducted post procedure revealing an unchanging pericardial effusion and thickened pericardium without signs of tamponade. Hypotension improved w/ small boluses of IVF. Not requiring IVF and VSS w/ ambulation and at rest. Asymptomatic. TTE revealed EF 40-45% - acute systolic congestive heart failure. Unable to tolerate full GDMT. Due to hypotension, as well as JOHNNY/CKD. Started on low dose BB. JOHNNY felt to be ATN from hypotension along with a component of AIN from contrast induced nephropathy - remained stable. Patient with WILSON and some cough. CXR 1/31 c/w CHF. Kept inpatient for diuresis and optimization. On 2/3 AM patient had elevated proBNP 17k and still on 3L NC oxygen with congestion and cough. Patient had newfound atrial fibrillation and was started on full dose lovenox. Patient oxygen requirement continued and increased on 2/5 to 5L. Patient was in rate controlled atrial fibrillation during stay, on lopressor (not home med, started here). Patient had thoracentesis performed due to moderate pleural effusions and continuous oxygen requirements despite being on room air at home. Thoracentesis faustino 570cc of yellow pleuritic fluid removed from right thorax under sterile conditions and ultrasound guidance. Patient was restarted on plavix and lovenox after procedure. She tolerated it well and had a greatly improved cough and less dyspnea right after the procedure.    PT consulted, recommends discharge w/ Home PT.    Patient showed improvement throughout hospitalization. Patient was seen and examined on day of discharge. Patient was medically optimized for discharge with close outpatient follow up.    ---  CONSULTANTS:   Interventional Cardiology - Dr. Parada  Cardiology - Dr. Villela  Interventional Cards - Dr. Parada  ---  ADVANCED CARE PLANNING:  - Code status:  Full Code, s/p PCI       PCP/Cardiologist Dr. Salinas notified. ADMISSION DATE:  01-26-25    ---  FROM ADMISSION H+P:   HPI:  Pt is a 89y/o F with PMHx HTN, DM2, HLD, depression who presented to the ED with intermittent jaw pain followed by anterior chest pressure radiating to the left shoulder, Pt was found to have elevated Troponins and EKG with ST elevations with reciprocal ST-T changes in leads II, III, aVF. Code STEMI was activated and patient was taken to the cath lab where she was found to have 100% occlusion to the diagonal now s/p 1 stent placement. Patient seen and examined in the ICU, reports feeling much better. She denies any current chest pain or jaw pain, and also denies dizziness, headache, numbness/tingling, nausea, vomiting, palpitations, shortness of breath, abdominal pain. She does admit to chronic diarrhea. Prior to this event, patient was in her usual state of health. No other concerns at this time.    ED Course:   Vitals: BP: 190/94, HR: 83, Temp: 97.8, RR: 18, SpO2: 97% on RA  Labs: BUN/Cr 29/1.5, Glucose 300, Troponin 737.1   EKG: ST elevation in lead I with ST changes in II, III, aVF  Received in the ED: aspirin 324, brilinta 180mg, heparin IV   (26 Jan 2025 21:31)      ---  HOSPITAL COURSE/PERTINENT LABS/PROCEDURES PERFORMED/PENDING TESTS:   Pt was admitted for STEMI, s/p PCI w/ DESx1 to [facility]. Patient was then monitored closely in ICU. Started on BB following stent placement but developed hypotension. Repeat EKG w/o concerning findings. TTE x2 conducted post procedure revealing an unchanging pericardial effusion and thickened pericardium without signs of tamponade. Hypotension improved w/ small boluses of IVF. TTE revealed EF 40-45% - acute systolic congestive heart failure. Unable to tolerate full GDMT. Due to hypotension, as well as JOHNNY/CKD. Started on low dose BB. JOHNNY felt to be ATN from hypotension along with a component of AIN from contrast induced nephropathy - remained stable. Patient with WILSON and some cough. CXR 1/31 c/w CHF. Kept inpatient for diuresis and optimization. On 2/3 AM patient had elevated proBNP 17k and still on 3L NC oxygen with congestion and cough. Patient had newfound atrial fibrillation and was started on full dose lovenox, PO amio load in addition to BB. Patient oxygen requirement continued and increased on 2/5 to 5L. Patient had thoracentesis performed due to moderate pleural effusions and continuous oxygen requirements despite being on room air at home. Thoracentesis faustino 570cc of yellow pleuritic fluid removed from right thorax under sterile conditions and ultrasound guidance. Patient was restarted on plavix and lovenox after procedure. She tolerated it well and had a greatly improved cough and less dyspnea right after the procedure.    PT consulted, recommends discharge w/ Home PT.    Patient showed improvement throughout hospitalization. Patient was seen and examined on day of discharge. Patient was medically optimized for discharge with close outpatient follow up.    ---  CONSULTANTS:   Interventional Cardiology - Dr. Parada  Cardiology - Dr. Villela  Interventional Cards - Dr. Parada  ---  ADVANCED CARE PLANNING:  - Code status:  Full Code, s/p PCI       PCP/Cardiologist Dr. Salinas notified. ADMISSION DATE:  01-26-25    ---  FROM ADMISSION H+P:   HPI:  Pt is a 89y/o F with PMHx HTN, DM2, HLD, depression who presented to the ED with intermittent jaw pain followed by anterior chest pressure radiating to the left shoulder, Pt was found to have elevated Troponins and EKG with ST elevations with reciprocal ST-T changes in leads II, III, aVF. Code STEMI was activated and patient was taken to the cath lab where she was found to have 100% occlusion to the diagonal now s/p 1 stent placement. Patient seen and examined in the ICU, reports feeling much better. She denies any current chest pain or jaw pain, and also denies dizziness, headache, numbness/tingling, nausea, vomiting, palpitations, shortness of breath, abdominal pain. She does admit to chronic diarrhea. Prior to this event, patient was in her usual state of health. No other concerns at this time.    ED Course:   Vitals: BP: 190/94, HR: 83, Temp: 97.8, RR: 18, SpO2: 97% on RA  Labs: BUN/Cr 29/1.5, Glucose 300, Troponin 737.1   EKG: ST elevation in lead I with ST changes in II, III, aVF  Received in the ED: aspirin 324, brilinta 180mg, heparin IV   (26 Jan 2025 21:31)      ---  HOSPITAL COURSE/PERTINENT LABS/PROCEDURES PERFORMED/PENDING TESTS:   Pt was admitted for STEMI, s/p PCI w/ DESx1 to [facility]. Patient was then monitored closely in ICU. Started on BB following stent placement but developed hypotension. Repeat EKG w/o concerning findings. TTE x2 conducted post procedure revealing an unchanging pericardial effusion and thickened pericardium without signs of tamponade. Hypotension improved w/ small boluses of IVF. TTE revealed EF 40-45% - acute systolic congestive heart failure. Unable to tolerate full GDMT. Due to hypotension, as well as JOHNNY/CKD. Started on low dose BB. JOHNNY felt to be ATN from hypotension along with a component of AIN from contrast induced nephropathy - remained stable. Patient with WILSON and some cough. CXR 1/31 c/w CHF. Kept inpatient for diuresis and optimization. On 2/3 AM patient had elevated proBNP 17k and still on 3L NC oxygen with congestion and cough. Patient had newfound atrial fibrillation and was started on full dose lovenox, PO amio load in addition to BB. Patient oxygen requirement continued and increased on 2/5 to 5L. Patient had thoracentesis performed due to moderate pleural effusions and continuous oxygen requirements despite being on room air at home. Thoracentesis faustino 570cc of yellow pleuritic fluid removed from right thorax under sterile conditions and ultrasound guidance. Patient was restarted on plavix and lovenox after procedure. She tolerated it well and had a greatly improved cough and less dyspnea right after the procedure. Patient continued to have congestion her cough, coughing up sputum was given mucinex.    PT consulted, recommends discharge w/ Home PT.    Patient showed improvement throughout hospitalization. Patient was seen and examined on day of discharge. Patient was medically optimized for discharge with close outpatient follow up.    ---  CONSULTANTS:   Interventional Cardiology - Dr. Parada  Cardiology - Dr. Villela  Interventional Cards - Dr. Parada  ---  ADVANCED CARE PLANNING:  - Code status:  Full Code, s/p PCI       PCP/Cardiologist Dr. Salinas notified. ADMISSION DATE:  01-26-25    ---  FROM ADMISSION H+P:   HPI:  Pt is a 89y/o F with PMHx HTN, DM2, HLD, depression who presented to the ED with intermittent jaw pain followed by anterior chest pressure radiating to the left shoulder, Pt was found to have elevated Troponins and EKG with ST elevations with reciprocal ST-T changes in leads II, III, aVF. Code STEMI was activated and patient was taken to the cath lab where she was found to have 100% occlusion to the diagonal now s/p 1 stent placement. Patient seen and examined in the ICU, reports feeling much better. She denies any current chest pain or jaw pain, and also denies dizziness, headache, numbness/tingling, nausea, vomiting, palpitations, shortness of breath, abdominal pain. She does admit to chronic diarrhea. Prior to this event, patient was in her usual state of health. No other concerns at this time.    ED Course:   Vitals: BP: 190/94, HR: 83, Temp: 97.8, RR: 18, SpO2: 97% on RA  Labs: BUN/Cr 29/1.5, Glucose 300, Troponin 737.1   EKG: ST elevation in lead I with ST changes in II, III, aVF  Received in the ED: aspirin 324, brilinta 180mg, heparin IV   (26 Jan 2025 21:31)      ---  HOSPITAL COURSE/PERTINENT LABS/PROCEDURES PERFORMED/PENDING TESTS:   Pt was admitted for STEMI, s/p PCI w/ DESx1 to [facility]. Patient was then monitored closely in ICU. Started on BB following stent placement but developed hypotension. Repeat EKG w/o concerning findings. TTE x2 conducted post procedure revealing an unchanging pericardial effusion and thickened pericardium without signs of tamponade. Hypotension improved w/ small boluses of IVF. TTE revealed EF 40-45% - acute systolic congestive heart failure. Unable to tolerate full GDMT. Due to hypotension, as well as JOHNNY/CKD. Started on low dose BB. JOHNNY felt to be ATN from hypotension along with a component of AIN from contrast induced nephropathy - remained stable. Patient with WILSON and some cough. CXR 1/31 c/w CHF. Kept inpatient for diuresis and optimization. On 2/3 AM patient had elevated proBNP 17k and still on 3L NC oxygen with congestion and cough. Patient had newfound atrial fibrillation and was started on full dose lovenox, PO amio load in addition to BB. Patient oxygen requirement continued and increased on 2/5 to 5L and on 2/9 to 2L. Patient had thoracentesis performed due to moderate pleural effusions and continuous oxygen requirements despite being on room air at home. Thoracentesis faustino 570cc of yellow pleuritic fluid removed from right thorax under sterile conditions and ultrasound guidance. Patient was restarted on plavix and lovenox after procedure. She tolerated it well and had a greatly improved cough and less dyspnea right after the procedure. Patient continued to have congestion her cough, coughing up sputum was given mucinex. Repeat CT Chest showed CT chest - Interval decrease in density of consolidation and groundglass opacities in right upper lobe. Small right pleural effusion with interval decrease in size. Small to moderate left pleural effusions, relatively stable. Mediastinal and right hilar lymphadenopathy Enlarged heterogeneous right thyroid lobe. Patient to follow up outpatient for ultrasound of neck soft tissues for thyroid enlargement.     PT consulted, recommends discharge w/ Home PT.    Patient showed improvement throughout hospitalization. Patient was seen and examined on day of discharge. Patient was medically optimized for discharge with close outpatient follow up.    ---  CONSULTANTS:   Interventional Cardiology - Dr. Parada  Cardiology - Dr. Villela  Interventional Cards - Dr. Parada  ---  ADVANCED CARE PLANNING:  - Code status:  Full Code, s/p PCI       PCP/Cardiologist Dr. Salinas notified. ADMISSION DATE:  01-26-25    ---  FROM ADMISSION H+P:   HPI:  Pt is a 89y/o F with PMHx HTN, DM2, HLD, depression who presented to the ED with intermittent jaw pain followed by anterior chest pressure radiating to the left shoulder, Pt was found to have elevated Troponins and EKG with ST elevations with reciprocal ST-T changes in leads II, III, aVF. Code STEMI was activated and patient was taken to the cath lab where she was found to have 100% occlusion to the diagonal now s/p 1 stent placement. Patient seen and examined in the ICU, reports feeling much better. She denies any current chest pain or jaw pain, and also denies dizziness, headache, numbness/tingling, nausea, vomiting, palpitations, shortness of breath, abdominal pain. She does admit to chronic diarrhea. Prior to this event, patient was in her usual state of health. No other concerns at this time.    ED Course:   Vitals: BP: 190/94, HR: 83, Temp: 97.8, RR: 18, SpO2: 97% on RA  Labs: BUN/Cr 29/1.5, Glucose 300, Troponin 737.1   EKG: ST elevation in lead I with ST changes in II, III, aVF  Received in the ED: aspirin 324, brilinta 180mg, heparin IV   (26 Jan 2025 21:31)      ---  HOSPITAL COURSE/PERTINENT LABS/PROCEDURES PERFORMED/PENDING TESTS:   Pt was admitted for STEMI, s/p PCI w/ DESx1 to [facility]. Patient was then monitored closely in ICU. Started on BB following stent placement but developed hypotension. Repeat EKG w/o concerning findings. TTE x2 conducted post procedure revealing an unchanging pericardial effusion and thickened pericardium without signs of tamponade. Hypotension improved w/ small boluses of IVF. TTE revealed EF 40-45% - acute systolic congestive heart failure. Unable to tolerate full GDMT. Due to hypotension, as well as JOHNNY/CKD. Started on low dose BB. JOHNNY felt to be ATN from hypotension along with a component of AIN from contrast induced nephropathy - remained stable. Patient with WILSON and some cough. CXR 1/31 c/w CHF. Kept inpatient for diuresis and optimization. On 2/3 AM patient had elevated proBNP 17k and still on 3L NC oxygen with congestion and cough. Patient had newfound atrial fibrillation and was started on full dose lovenox, PO amio load in addition to BB. Patient oxygen requirement continued and increased on 2/5 to 5L and on 2/9 to 2L. Patient had thoracentesis performed due to moderate pleural effusions and continuous oxygen requirements despite being on room air at home. Thoracentesis faustino 570cc of yellow pleuritic fluid removed from right thorax under sterile conditions and ultrasound guidance. Patient was restarted on plavix and lovenox after procedure. She tolerated it well and had a greatly improved cough and less dyspnea right after the procedure. Patient continued to have congestion her cough, coughing up sputum was given mucinex. Repeat CT Chest showed CT chest - Interval decrease in density of consolidation and groundglass opacities in right upper lobe. Small right pleural effusion with interval decrease in size. Small to moderate left pleural effusions, relatively stable. Mediastinal and right hilar lymphadenopathy Enlarged heterogeneous right thyroid lobe. Patient to follow up outpatient for ultrasound of neck soft tissues for thyroid enlargement. Metoprolol succinate was increased to 50mg qd for GDMT, lasix was decreased from 40mg PO qd, and amiodarone was discontinued due to maintaining rate control but remaining in afib. Patient medically optimized for discharge home with home care.    PT consulted, recommends discharge w/ Home PT.    Patient showed improvement throughout hospitalization. Patient was seen and examined on day of discharge. Patient was medically optimized for discharge with home care with close outpatient follow up.    ---  CONSULTANTS:   Interventional Cardiology - Dr. Parada  Cardiology - Dr. Villela  Pulmonology - Dr. Parrish   Nephrology- Dr. Haley   ---  ADVANCED CARE PLANNING:  - Code status:  Full Code, s/p PCI       PCP/Cardiologist Dr. Salinas notified. ADMISSION DATE:  01-26-25    ---  FROM ADMISSION H+P:   HPI:  Pt is a 89y/o F with PMHx HTN, DM2, HLD, depression who presented to the ED with intermittent jaw pain followed by anterior chest pressure radiating to the left shoulder, Pt was found to have elevated Troponins and EKG with ST elevations with reciprocal ST-T changes in leads II, III, aVF. Code STEMI was activated and patient was taken to the cath lab where she was found to have 100% occlusion to the diagonal now s/p 1 stent placement. Patient seen and examined in the ICU, reports feeling much better. She denies any current chest pain or jaw pain, and also denies dizziness, headache, numbness/tingling, nausea, vomiting, palpitations, shortness of breath, abdominal pain. She does admit to chronic diarrhea. Prior to this event, patient was in her usual state of health. No other concerns at this time.    ED Course:   Vitals: BP: 190/94, HR: 83, Temp: 97.8, RR: 18, SpO2: 97% on RA  Labs: BUN/Cr 29/1.5, Glucose 300, Troponin 737.1   EKG: ST elevation in lead I with ST changes in II, III, aVF  Received in the ED: aspirin 324, brilinta 180mg, heparin IV   (26 Jan 2025 21:31)      ---  HOSPITAL COURSE/PERTINENT LABS/PROCEDURES PERFORMED/PENDING TESTS:   Pt was admitted for STEMI, s/p PCI w/ DESx1 to [facility]. Patient was then monitored closely in ICU. Started on BB following stent placement but developed hypotension. Repeat EKG w/o concerning findings. TTE x2 conducted post procedure revealing an unchanging pericardial effusion and thickened pericardium without signs of tamponade. Hypotension improved w/ small boluses of IVF. TTE revealed EF 40-45% - acute systolic congestive heart failure. Unable to tolerate full GDMT. Due to hypotension, as well as JOHNNY/CKD. Started on low dose BB. JOHNNY felt to be ATN from hypotension along with a component of AIN from contrast induced nephropathy - remained stable. Patient with WILSON and some cough. CXR 1/31 c/w CHF. Kept inpatient for diuresis and optimization. On 2/3 AM patient had elevated proBNP 17k and still on 3L NC oxygen with congestion and cough. Patient had newfound atrial fibrillation and was started on full dose lovenox, PO amio load in addition to BB. Patient oxygen requirement continued and increased on 2/5 to 5L and on 2/9 to 2L. Patient had thoracentesis performed due to moderate pleural effusions and continuous oxygen requirements despite being on room air at home. Thoracentesis faustino 570cc of yellow pleuritic fluid removed from right thorax under sterile conditions and ultrasound guidance. Patient was restarted on plavix and lovenox after procedure. She tolerated it well and had a greatly improved cough and less dyspnea right after the procedure. Patient continued to have congestion her cough, coughing up sputum was given mucinex. Repeat CT Chest showed CT chest - Interval decrease in density of consolidation and groundglass opacities in right upper lobe. Small right pleural effusion with interval decrease in size. Small to moderate left pleural effusions, relatively stable. Mediastinal and right hilar lymphadenopathy Enlarged heterogeneous right thyroid lobe. Patient to follow up outpatient for ultrasound of neck soft tissues for thyroid enlargement. Metoprolol succinate was increased to 50mg qd for GDMT, lasix was decreased from 40mg PO qd to 20mg QD, and amiodarone was discontinued due to maintaining rate control but remaining in afib. Patient medically optimized for discharge home with home care. PAtient to continue on Eliquis bid for AC for Afib. She will benefit from farxiga as outpatient.    PT consulted, recommends discharge w/ Home PT.    Patient showed improvement throughout hospitalization. Patient was seen and examined on day of discharge. Patient was medically optimized for discharge with home care with close outpatient follow up.    ---  CONSULTANTS:   Interventional Cardiology - Dr. Parada  Cardiology - Dr. Villela  Pulmonology - Dr. Parrish   Nephrology- Dr. Haley   ---  ADVANCED CARE PLANNING:  - Code status:  Full Code, s/p PCI       PCP/Cardiologist Dr. Salinsa notified.

## 2025-01-27 NOTE — CARE COORDINATION ASSESSMENT. - NSPASTMEDSURGHISTORY_GEN_ALL_CORE_FT
PAST MEDICAL & SURGICAL HISTORY:  Major depression      HLD (hyperlipidemia)      DM (diabetes mellitus)      HTN (hypertension)      No significant past surgical history

## 2025-01-27 NOTE — DISCHARGE NOTE PROVIDER - CARE PROVIDERS DIRECT ADDRESSES
,DirectAddress_Unknown ,DirectAddress_Unknown,[email or web address],DirectAddress_Unknown,DirectAddress_Unknown,[email or web address]

## 2025-01-27 NOTE — PROGRESS NOTE ADULT - PROBLEM SELECTOR PLAN 1
- code STEMI called and pt taken emergently to the cath lab where she was found to have 100% occl to the diagonal now s/p 1 stent placement  - transferred to ICU s/p cath  - On aspirin and brilinta  -On statin, BB  -  f/u TTE   - repeat EKG in AM  - mIVF with NS @ 75cc/hr for 8 hrs as per interventional cardiology  - plan per ICU

## 2025-01-27 NOTE — CARE COORDINATION ASSESSMENT. - OTHER PERTINENT DISCHARGE PLANNING INFORMATION:
CM met with patient bedside to explain role and transitions of care. Patient lives with spouse in a private house with 7 steps to get in and 6 to the second floor.  Patient was fully independent prior to admission.  No caregiver or no home care identified. DMEs in home include wheelchair, walker, cane, and chair lift. Daughter (Corinne) will transport patient home when ready to be discharge.  CM explained  home care expectation, process, insurance provision and home safety with good understanding.  CM to make referral. Patient verbalized understanding of plans after discharge and are in agreement.  Resource folder left at bedside.  All questions answered to the best of my abilities.  CM remains available throughout the hospital stay.

## 2025-01-27 NOTE — DISCHARGE NOTE PROVIDER - ATTENDING DISCHARGE PHYSICAL EXAMINATION:
T(C): 36.7 (01-31-25 @ 08:02), Max: 37.5 (01-31-25 @ 04:20)  HR: 77 (01-31-25 @ 07:00) (67 - 88)  BP: 127/56 (01-31-25 @ 07:00) (110/56 - 149/65)  RR: 21 (01-31-25 @ 07:00) (12 - 25)  SpO2: 95% (01-31-25 @ 07:00) (88% - 100%)    GENERAL: NAD, elderly female  HEENT:  anicteric, moist mucous membranes  CHEST/LUNG:  CTA b/l, no rales, wheezes, or rhonchi  HEART:  RRR, S1, S2  ABDOMEN:  BS+, soft, nontender, nondistended  EXTREMITIES: no edema, cyanosis, or calf tenderness  NERVOUS SYSTEM: answers questions and follows commands appropriately  PSYCH: normal affect PHYSICAL EXAM:  GENERAL: NAD on 2L NC  HEENT:  anicteric, moist mucous membranes  CHEST/LUNG: mild rales b/l lung bases. no  wheezes, or rhonchi  HEART: irregularly irregular rhythm, in afib on monitor, rate controlled. S1, S2  ABDOMEN:  BS+, soft, nontender, nondistended  EXTREMITIES: no edema, cyanosis, or calf tenderness  NERVOUS SYSTEM: answers questions and follows commands appropriately

## 2025-01-28 ENCOUNTER — RESULT REVIEW (OUTPATIENT)
Age: 89
End: 2025-01-28

## 2025-01-28 LAB
ALBUMIN SERPL ELPH-MCNC: 2.9 G/DL — LOW (ref 3.3–5)
ALP SERPL-CCNC: 70 U/L — SIGNIFICANT CHANGE UP (ref 40–120)
ALT FLD-CCNC: 21 U/L — SIGNIFICANT CHANGE UP (ref 12–78)
ANION GAP SERPL CALC-SCNC: 11 MMOL/L — SIGNIFICANT CHANGE UP (ref 5–17)
ANION GAP SERPL CALC-SCNC: 7 MMOL/L — SIGNIFICANT CHANGE UP (ref 5–17)
ANION GAP SERPL CALC-SCNC: 7 MMOL/L — SIGNIFICANT CHANGE UP (ref 5–17)
APPEARANCE UR: CLEAR — SIGNIFICANT CHANGE UP
AST SERPL-CCNC: 53 U/L — HIGH (ref 15–37)
BILIRUB SERPL-MCNC: 0.5 MG/DL — SIGNIFICANT CHANGE UP (ref 0.2–1.2)
BILIRUB UR-MCNC: NEGATIVE — SIGNIFICANT CHANGE UP
BUN SERPL-MCNC: 37 MG/DL — HIGH (ref 7–23)
BUN SERPL-MCNC: 39 MG/DL — HIGH (ref 7–23)
BUN SERPL-MCNC: 39 MG/DL — HIGH (ref 7–23)
CALCIUM SERPL-MCNC: 8.5 MG/DL — SIGNIFICANT CHANGE UP (ref 8.5–10.1)
CALCIUM SERPL-MCNC: 8.7 MG/DL — SIGNIFICANT CHANGE UP (ref 8.5–10.1)
CALCIUM SERPL-MCNC: 8.7 MG/DL — SIGNIFICANT CHANGE UP (ref 8.5–10.1)
CHLORIDE SERPL-SCNC: 105 MMOL/L — SIGNIFICANT CHANGE UP (ref 96–108)
CHLORIDE SERPL-SCNC: 106 MMOL/L — SIGNIFICANT CHANGE UP (ref 96–108)
CHLORIDE SERPL-SCNC: 108 MMOL/L — SIGNIFICANT CHANGE UP (ref 96–108)
CK MB BLD-MCNC: 1.9 % — SIGNIFICANT CHANGE UP (ref 0–3.5)
CK MB BLD-MCNC: 2.1 % — SIGNIFICANT CHANGE UP (ref 0–3.5)
CK MB CFR SERPL CALC: 6.9 NG/ML — HIGH (ref 0–3.6)
CK MB CFR SERPL CALC: 8.1 NG/ML — HIGH (ref 0–3.6)
CK SERPL-CCNC: 363 U/L — HIGH (ref 26–192)
CK SERPL-CCNC: 390 U/L — HIGH (ref 26–192)
CO2 SERPL-SCNC: 22 MMOL/L — SIGNIFICANT CHANGE UP (ref 22–31)
CO2 SERPL-SCNC: 22 MMOL/L — SIGNIFICANT CHANGE UP (ref 22–31)
CO2 SERPL-SCNC: 23 MMOL/L — SIGNIFICANT CHANGE UP (ref 22–31)
COLOR SPEC: YELLOW — SIGNIFICANT CHANGE UP
CREAT SERPL-MCNC: 2 MG/DL — HIGH (ref 0.5–1.3)
CREAT SERPL-MCNC: 2 MG/DL — HIGH (ref 0.5–1.3)
CREAT SERPL-MCNC: 2.1 MG/DL — HIGH (ref 0.5–1.3)
DIFF PNL FLD: ABNORMAL
EGFR: 22 ML/MIN/1.73M2 — LOW
EGFR: 23 ML/MIN/1.73M2 — LOW
EGFR: 23 ML/MIN/1.73M2 — LOW
GLUCOSE SERPL-MCNC: 185 MG/DL — HIGH (ref 70–99)
GLUCOSE SERPL-MCNC: 212 MG/DL — HIGH (ref 70–99)
GLUCOSE SERPL-MCNC: 213 MG/DL — HIGH (ref 70–99)
GLUCOSE UR QL: 500 MG/DL
HCT VFR BLD CALC: 30.8 % — LOW (ref 34.5–45)
HCT VFR BLD CALC: 32.6 % — LOW (ref 34.5–45)
HGB BLD-MCNC: 10.3 G/DL — LOW (ref 11.5–15.5)
HGB BLD-MCNC: 10.7 G/DL — LOW (ref 11.5–15.5)
KETONES UR-MCNC: NEGATIVE MG/DL — SIGNIFICANT CHANGE UP
LEUKOCYTE ESTERASE UR-ACNC: NEGATIVE — SIGNIFICANT CHANGE UP
MAGNESIUM SERPL-MCNC: 2 MG/DL — SIGNIFICANT CHANGE UP (ref 1.6–2.6)
MAGNESIUM SERPL-MCNC: 2.1 MG/DL — SIGNIFICANT CHANGE UP (ref 1.6–2.6)
MCHC RBC-ENTMCNC: 29.6 PG — SIGNIFICANT CHANGE UP (ref 27–34)
MCHC RBC-ENTMCNC: 30.1 PG — SIGNIFICANT CHANGE UP (ref 27–34)
MCHC RBC-ENTMCNC: 32.8 G/DL — SIGNIFICANT CHANGE UP (ref 32–36)
MCHC RBC-ENTMCNC: 33.4 G/DL — SIGNIFICANT CHANGE UP (ref 32–36)
MCV RBC AUTO: 90.1 FL — SIGNIFICANT CHANGE UP (ref 80–100)
MCV RBC AUTO: 90.3 FL — SIGNIFICANT CHANGE UP (ref 80–100)
NITRITE UR-MCNC: NEGATIVE — SIGNIFICANT CHANGE UP
NRBC # BLD: 0 /100 WBCS — SIGNIFICANT CHANGE UP (ref 0–0)
NRBC # BLD: 0 /100 WBCS — SIGNIFICANT CHANGE UP (ref 0–0)
NRBC BLD-RTO: 0 /100 WBCS — SIGNIFICANT CHANGE UP (ref 0–0)
NRBC BLD-RTO: 0 /100 WBCS — SIGNIFICANT CHANGE UP (ref 0–0)
PH UR: 5 — SIGNIFICANT CHANGE UP (ref 5–8)
PHOSPHATE SERPL-MCNC: 4.2 MG/DL — SIGNIFICANT CHANGE UP (ref 2.5–4.5)
PHOSPHATE SERPL-MCNC: 4.3 MG/DL — SIGNIFICANT CHANGE UP (ref 2.5–4.5)
PLATELET # BLD AUTO: 204 K/UL — SIGNIFICANT CHANGE UP (ref 150–400)
PLATELET # BLD AUTO: 239 K/UL — SIGNIFICANT CHANGE UP (ref 150–400)
POTASSIUM SERPL-MCNC: 4.1 MMOL/L — SIGNIFICANT CHANGE UP (ref 3.5–5.3)
POTASSIUM SERPL-MCNC: 4.2 MMOL/L — SIGNIFICANT CHANGE UP (ref 3.5–5.3)
POTASSIUM SERPL-MCNC: 4.5 MMOL/L — SIGNIFICANT CHANGE UP (ref 3.5–5.3)
POTASSIUM SERPL-SCNC: 4.1 MMOL/L — SIGNIFICANT CHANGE UP (ref 3.5–5.3)
POTASSIUM SERPL-SCNC: 4.2 MMOL/L — SIGNIFICANT CHANGE UP (ref 3.5–5.3)
POTASSIUM SERPL-SCNC: 4.5 MMOL/L — SIGNIFICANT CHANGE UP (ref 3.5–5.3)
PROT SERPL-MCNC: 6.3 G/DL — SIGNIFICANT CHANGE UP (ref 6–8.3)
PROT UR-MCNC: 30 MG/DL
RBC # BLD: 3.42 M/UL — LOW (ref 3.8–5.2)
RBC # BLD: 3.61 M/UL — LOW (ref 3.8–5.2)
RBC # FLD: 14.4 % — SIGNIFICANT CHANGE UP (ref 10.3–14.5)
RBC # FLD: 14.4 % — SIGNIFICANT CHANGE UP (ref 10.3–14.5)
SODIUM SERPL-SCNC: 135 MMOL/L — SIGNIFICANT CHANGE UP (ref 135–145)
SODIUM SERPL-SCNC: 138 MMOL/L — SIGNIFICANT CHANGE UP (ref 135–145)
SODIUM SERPL-SCNC: 138 MMOL/L — SIGNIFICANT CHANGE UP (ref 135–145)
SP GR SPEC: 1.02 — SIGNIFICANT CHANGE UP (ref 1–1.03)
TROPONIN I, HIGH SENSITIVITY RESULT: HIGH NG/L
TROPONIN I, HIGH SENSITIVITY RESULT: HIGH NG/L
UROBILINOGEN FLD QL: 0.2 MG/DL — SIGNIFICANT CHANGE UP (ref 0.2–1)
WBC # BLD: 7.25 K/UL — SIGNIFICANT CHANGE UP (ref 3.8–10.5)
WBC # BLD: 8.11 K/UL — SIGNIFICANT CHANGE UP (ref 3.8–10.5)
WBC # FLD AUTO: 7.25 K/UL — SIGNIFICANT CHANGE UP (ref 3.8–10.5)
WBC # FLD AUTO: 8.11 K/UL — SIGNIFICANT CHANGE UP (ref 3.8–10.5)

## 2025-01-28 PROCEDURE — 93308 TTE F-UP OR LMTD: CPT | Mod: 26

## 2025-01-28 PROCEDURE — 99232 SBSQ HOSP IP/OBS MODERATE 35: CPT

## 2025-01-28 PROCEDURE — 99291 CRITICAL CARE FIRST HOUR: CPT

## 2025-01-28 PROCEDURE — 93010 ELECTROCARDIOGRAM REPORT: CPT

## 2025-01-28 PROCEDURE — 99233 SBSQ HOSP IP/OBS HIGH 50: CPT | Mod: GC

## 2025-01-28 RX ORDER — SODIUM CHLORIDE 9 G/ML
500 INJECTION, SOLUTION INTRAVENOUS ONCE
Refills: 0 | Status: COMPLETED | OUTPATIENT
Start: 2025-01-28 | End: 2025-01-28

## 2025-01-28 RX ORDER — HEPARIN SODIUM,PORCINE 10000/ML
5000 VIAL (ML) INJECTION EVERY 8 HOURS
Refills: 0 | Status: DISCONTINUED | OUTPATIENT
Start: 2025-01-28 | End: 2025-02-04

## 2025-01-28 RX ADMIN — ANTISEPTIC SURGICAL SCRUB 1 APPLICATION(S): 0.04 SOLUTION TOPICAL at 05:44

## 2025-01-28 RX ADMIN — SODIUM CHLORIDE 500 MILLILITER(S): 9 INJECTION, SOLUTION INTRAVENOUS at 13:02

## 2025-01-28 RX ADMIN — ASPIRIN 81 MILLIGRAM(S): 81 TABLET, COATED ORAL at 11:24

## 2025-01-28 RX ADMIN — ACETAMINOPHEN 650 MILLIGRAM(S): 160 SUSPENSION ORAL at 01:06

## 2025-01-28 RX ADMIN — Medication 75 MILLIGRAM(S): at 05:45

## 2025-01-28 RX ADMIN — Medication 5000 UNIT(S): at 22:17

## 2025-01-28 RX ADMIN — DULOXETINE 90 MILLIGRAM(S): 20 CAPSULE, DELAYED RELEASE ORAL at 11:09

## 2025-01-28 RX ADMIN — Medication 5000 UNIT(S): at 13:27

## 2025-01-28 RX ADMIN — SODIUM CHLORIDE 1000 MILLILITER(S): 9 INJECTION, SOLUTION INTRAVENOUS at 12:34

## 2025-01-28 RX ADMIN — ATORVASTATIN CALCIUM 40 MILLIGRAM(S): 80 TABLET, FILM COATED ORAL at 22:17

## 2025-01-28 NOTE — DIETITIAN INITIAL EVALUATION ADULT - PERTINENT MEDS FT
MEDICATIONS  (STANDING):  aspirin enteric coated 81 milliGRAM(s) Oral daily  atorvastatin 40 milliGRAM(s) Oral at bedtime  chlorhexidine 2% Cloths 1 Application(s) Topical <User Schedule>  clopidogrel Tablet 75 milliGRAM(s) Oral every 24 hours  DULoxetine 90 milliGRAM(s) Oral daily    MEDICATIONS  (PRN):  acetaminophen     Tablet .. 650 milliGRAM(s) Oral every 6 hours PRN Temp greater or equal to 38C (100.4F), Mild Pain (1 - 3)

## 2025-01-28 NOTE — PROGRESS NOTE ADULT - PROBLEM SELECTOR PLAN 1
- code STEMI called and pt taken emergently to the cath lab where she was found to have 100% occl to the diagonal now s/p 1 stent placement  - transferred to ICU s/p cath  - On aspirin and brilinta  -On statin, BB  -  Reviewed TTE 1/27: EF 40 to 45%  - Cardio Following - code STEMI called and pt taken emergently to the cath lab where she was found to have 100% occl to the diagonal now s/p 1 stent placement  - transferred to ICU s/p cath. + Hypotension. S/p IVF   - On aspirin and brilinta  -On statin, BB  -  Reviewed TTE 1/27: EF 40 to 45%  - Cardio Following

## 2025-01-28 NOTE — PROGRESS NOTE ADULT - ASSESSMENT
89y/o F with PMHx HTN, DM2, HLD, depression who presented to the ED with intermittent jaw pain followed by anterior chest pressure radiating to the left shoulder, admitted for STEMI.      - s/p code STEMI  - S/P: PCI (1/26/25) with 100% occlusion of diagonal and MARLENE x1  - continue DAPT with ASA 81 QD and brilinta 90 BID  - cont Lipitor 40mg QHS  - TTE 1/27/25 Left ventricular systolic function is moderately decreased with an ejection fraction visually estimated at 40 to 45 %. small pericardial effusion noted.     - Appears compensated from HF POV.   - 1/28 BP has been low  - unable to tolerate BB  - got ~2L of IVF on 1/27  - Challenge with another 500cc of IVF now  - repeat limited tte for pericardial effusion but clinically does not appear to be in tamponade.   - no TTE in the past. Ordered TTE, follow up results     - cr increasing ?2/2 BP and contrast   - Please continue to maintain strict I/Os, monitor daily weights, Cr, and K.   - hold of on ARB/ARNI    - Monitor in ICU.  At risk for abrupt decompensation.

## 2025-01-28 NOTE — PROGRESS NOTE ADULT - CRITICAL CARE ATTENDING COMMENT
Upon my evaluation, this patient is at high risk for imminent or life threatening deterioration due to hypotension, STEMI and other active medical issues which require my direct attention, intervention, and personal management.  I have personally spent >30 minutes  of critical care time exclusive of time spent on separate billing procedures. This includes review of laboratory data, radiology results, discussion with primary team\patient, and monitoring for potential decompensation. Interventions were performed as documented above.

## 2025-01-28 NOTE — PROGRESS NOTE ADULT - TIME-BASED
Routing refill request to provider for review/approval because:  Lactation warning    Gia Aldrich, RN             [phone]

## 2025-01-28 NOTE — DIETITIAN INITIAL EVALUATION ADULT - PROBLEM SELECTOR PLAN 1
Pt p/w new onset jaw and chest pain  - VS with elevated BP  - Labs significant for BUN/Cr 29/1.5, Glucose 300, troponin 737.1  - EKG: ST elevation in lead I with ST changes in II, III, aVF  - s/p aspirin 324, brilinta 180mg, heparin IV in ED  - code STEMI called and pt taken emergently to the cath lab where she was found to have 100% occl to the diagonal now s/p 1 stent placement  - transferred to ICU s/p cath  - start aspirin and brilinta @ maintenance dosing tomorrow AM  - TTE ordered  - repeat EKG in AM  - mIVF with NS @ 75cc/hr for 8 hrs as per interventional cardiology  - plan per ICU

## 2025-01-28 NOTE — PROGRESS NOTE ADULT - SUBJECTIVE AND OBJECTIVE BOX
Patient is a 89y old  Female who presents with a chief complaint of STEMI (27 Jan 2025 15:52)       INTERVAL HPI/OVERNIGHT EVENTS: Patient seen and examined at bedside. Awake, alert, comfortable. Denies chest pain, palpitations, sob, n/v/d/c     MEDICATIONS  (STANDING):  aspirin enteric coated 81 milliGRAM(s) Oral daily  atorvastatin 40 milliGRAM(s) Oral at bedtime  chlorhexidine 2% Cloths 1 Application(s) Topical <User Schedule>  clopidogrel Tablet 75 milliGRAM(s) Oral every 24 hours  DULoxetine 90 milliGRAM(s) Oral daily    MEDICATIONS  (PRN):  acetaminophen     Tablet .. 650 milliGRAM(s) Oral every 6 hours PRN Temp greater or equal to 38C (100.4F), Mild Pain (1 - 3)      Allergies    No Known Allergies    Intolerances        REVIEW OF SYSTEMS:  CONSTITUTIONAL: No fever or fatigue  EYES: No eye pain, visual disturbances, or discharge  ENMT:  No difficulty hearing, tinnitus, vertigo; No sinus or throat pain  NECK: No pain or stiffness  RESPIRATORY: No cough, wheezing, chills or hemoptysis; No shortness of breath  CARDIOVASCULAR: No chest pain, palpitations, dizziness, or leg swelling  GASTROINTESTINAL: No abdominal or epigastric pain. No nausea, vomiting, or hematemesis; No diarrhea or constipation. No melena or hematochezia.    Vital Signs Last 24 Hrs  T(C): 36.8 (28 Jan 2025 07:51), Max: 38.1 (28 Jan 2025 00:27)  T(F): 98.2 (28 Jan 2025 07:51), Max: 100.5 (28 Jan 2025 00:27)  HR: 74 (28 Jan 2025 09:00) (63 - 78)  BP: 101/53 (28 Jan 2025 09:00) (80/56 - 121/67)  BP(mean): 68 (28 Jan 2025 09:00) (57 - 80)  RR: 20 (28 Jan 2025 09:00) (16 - 25)  SpO2: 96% (28 Jan 2025 09:00) (93% - 100%)        PHYSICAL EXAM:  GENERAL: NAD, well-groomed, well-developed  HEAD:  Atraumatic, Normocephalic  EYES: EOMI, PERRLA, conjunctiva and sclera clear  ENMT: No tonsillar erythema, exudates, or enlargement; Moist mucous membranes, Good dentition, No lesions  NECK: Supple, No JVD, Normal thyroid  NERVOUS SYSTEM:  Alert & Oriented X3, Good concentration; Motor Strength 5/5 B/L upper and lower extremities  CHEST/LUNG: Clear to auscultation bilaterally; No rales, rhonchi, wheezing, or rubs  HEART: Regular rate and rhythm; No murmurs, rubs, or gallops  ABDOMEN: Soft, Nontender, Nondistended; Bowel sounds present  EXTREMITIES:  2+ Peripheral Pulses, No clubbing, cyanosis, or edema  LYMPH: No lymphadenopathy noted  SKIN: No rashes or lesions    LABS:                        10.7   8.11  )-----------( 239      ( 28 Jan 2025 08:28 )             32.6     28 Jan 2025 08:28    135    |  106    |  39     ----------------------------<  212    4.2     |  22     |  2.10     Ca    8.7        28 Jan 2025 08:28  Phos  4.2       28 Jan 2025 08:28  Mg     2.0       28 Jan 2025 08:28    TPro  6.3    /  Alb  2.9    /  TBili  0.5    /  DBili  x      /  AST  53     /  ALT  21     /  AlkPhos  70     28 Jan 2025 08:28      CAPILLARY BLOOD GLUCOSE        BLOOD CULTURE    RADIOLOGY & ADDITIONAL TESTS:    Imaging Personally Reviewed:  [ ] YES     Consultant(s) Notes Reviewed:      Care Discussed with Consultants/Other Providers: Patient is a 89y old  Female who presents with a chief complaint of STEMI (27 Jan 2025 15:52)       INTERVAL HPI/OVERNIGHT EVENTS: Patient seen and examined at bedside. Awake, alert, comfortable. Denies chest pain, palpitations, sob, n/v/d/c . + hypotension     MEDICATIONS  (STANDING):  aspirin enteric coated 81 milliGRAM(s) Oral daily  atorvastatin 40 milliGRAM(s) Oral at bedtime  chlorhexidine 2% Cloths 1 Application(s) Topical <User Schedule>  clopidogrel Tablet 75 milliGRAM(s) Oral every 24 hours  DULoxetine 90 milliGRAM(s) Oral daily    MEDICATIONS  (PRN):  acetaminophen     Tablet .. 650 milliGRAM(s) Oral every 6 hours PRN Temp greater or equal to 38C (100.4F), Mild Pain (1 - 3)      Allergies    No Known Allergies    Intolerances        REVIEW OF SYSTEMS:  CONSTITUTIONAL: No fever or fatigue  EYES: No eye pain, visual disturbances, or discharge  ENMT:  No difficulty hearing, tinnitus, vertigo; No sinus or throat pain  NECK: No pain or stiffness  RESPIRATORY: No cough, wheezing, chills or hemoptysis; No shortness of breath  CARDIOVASCULAR: No chest pain, palpitations, dizziness, or leg swelling  GASTROINTESTINAL: No abdominal or epigastric pain. No nausea, vomiting, or hematemesis; No diarrhea or constipation. No melena or hematochezia.    Vital Signs Last 24 Hrs  T(C): 36.8 (28 Jan 2025 07:51), Max: 38.1 (28 Jan 2025 00:27)  T(F): 98.2 (28 Jan 2025 07:51), Max: 100.5 (28 Jan 2025 00:27)  HR: 74 (28 Jan 2025 09:00) (63 - 78)  BP: 101/53 (28 Jan 2025 09:00) (80/56 - 121/67)  BP(mean): 68 (28 Jan 2025 09:00) (57 - 80)  RR: 20 (28 Jan 2025 09:00) (16 - 25)  SpO2: 96% (28 Jan 2025 09:00) (93% - 100%)        PHYSICAL EXAM:  GENERAL: NAD, well-groomed, well-developed  HEAD:  Atraumatic, Normocephalic  EYES: EOMI, PERRLA, conjunctiva and sclera clear  ENMT: No tonsillar erythema, exudates, or enlargement; Moist mucous membranes, Good dentition, No lesions  NECK: Supple, No JVD, Normal thyroid  NERVOUS SYSTEM:  Alert & Oriented X3, Good concentration; Motor Strength 5/5 B/L upper and lower extremities  CHEST/LUNG: Clear to auscultation bilaterally; No rales, rhonchi, wheezing, or rubs  HEART: Regular rate and rhythm; No murmurs, rubs, or gallops  ABDOMEN: Soft, Nontender, Nondistended; Bowel sounds present  EXTREMITIES:  2+ Peripheral Pulses, No clubbing, cyanosis, or edema  LYMPH: No lymphadenopathy noted  SKIN: No rashes or lesions    LABS:                        10.7   8.11  )-----------( 239      ( 28 Jan 2025 08:28 )             32.6     28 Jan 2025 08:28    135    |  106    |  39     ----------------------------<  212    4.2     |  22     |  2.10     Ca    8.7        28 Jan 2025 08:28  Phos  4.2       28 Jan 2025 08:28  Mg     2.0       28 Jan 2025 08:28    TPro  6.3    /  Alb  2.9    /  TBili  0.5    /  DBili  x      /  AST  53     /  ALT  21     /  AlkPhos  70     28 Jan 2025 08:28      CAPILLARY BLOOD GLUCOSE        BLOOD CULTURE    RADIOLOGY & ADDITIONAL TESTS:    Imaging Personally Reviewed:  [ ] YES     Consultant(s) Notes Reviewed:      Care Discussed with Consultants/Other Providers:

## 2025-01-28 NOTE — PROGRESS NOTE ADULT - SUBJECTIVE AND OBJECTIVE BOX
INTERVAL HPI/OVERNIGHT EVENTS: No acute overnight events occurred. Patient has no acute complaints at this time. States she would like to know when she is going home.      Review of Systems:  Constitutional: No fever, chills, fatigue  Neuro: No headache, numbness, weakness  Resp: No cough, wheezing, shortness of breath  CVS: No chest pain, palpitations, leg swelling  GI: No abdominal pain, nausea, vomiting, diarrhea   : No dysuria, frequency, incontinence  Skin: No itching, burning, rashes, or lesions   Msk: No joint pain or swelling  Psych: No depression, anxiety, mood swings      ICU Vital Signs Last 24 Hrs  T(C): 36.8 (28 Jan 2025 07:51), Max: 38.1 (28 Jan 2025 00:27)  T(F): 98.2 (28 Jan 2025 07:51), Max: 100.5 (28 Jan 2025 00:27)  HR: 66 (28 Jan 2025 11:00) (63 - 78)  BP: 84/53 (28 Jan 2025 11:00) (80/56 - 121/67)  BP(mean): 64 (28 Jan 2025 11:00) (57 - 95)  ABP: --  ABP(mean): --  RR: 20 (28 Jan 2025 11:00) (16 - 25)  SpO2: 98% (28 Jan 2025 11:00) (93% - 100%)          01-27-25 @ 07:01  -  01-28-25 @ 07:00  --------------------------------------------------------  IN: 2650 mL / OUT: 900 mL / NET: 1750 mL    01-28-25 @ 07:01 - 01-28-25 @ 11:55  --------------------------------------------------------  IN: 200 mL / OUT: 350 mL / NET: -150 mL        CAPILLARY BLOOD GLUCOSE          I&O's Summary    27 Jan 2025 07:01  -  28 Jan 2025 07:00  --------------------------------------------------------  IN: 2650 mL / OUT: 900 mL / NET: 1750 mL    28 Jan 2025 07:01  -  28 Jan 2025 11:55  --------------------------------------------------------  IN: 200 mL / OUT: 350 mL / NET: -150 mL        PHYSICAL EXAM:  General: NAD, elderly woman  Neurology: awake and alert, A&Ox4  HEENT: NC/AT  Respiratory: CTA b/l, no rales or rhonchi noted  Cardiovascular: RRR, normal S1S2, no M/R/G, mildly distant heart sounds unchanged  Abdomen: soft, NT/ND, +BS, no palpable masses  Extremities: WWP, no clubbing, cyanosis, or edema. No hematoma noted to Right Groin.  Skin: warm/dry      Meds:      atorvastatin Oral      acetaminophen     Tablet .. Oral PRN  DULoxetine Oral      aspirin enteric coated Oral  clopidogrel Tablet Oral            chlorhexidine 2% Cloths Topical                              10.7   8.11  )-----------( 239      ( 28 Jan 2025 08:28 )             32.6       01-28    135  |  106  |  39[H]  ----------------------------<  212[H]  4.2   |  22  |  2.10[H]    Ca    8.7      28 Jan 2025 08:28  Phos  4.2     01-28  Mg     2.0     01-28    TPro  6.3  /  Alb  2.9[L]  /  TBili  0.5  /  DBili  x   /  AST  53[H]  /  ALT  21  /  AlkPhos  70  01-28      CARDIAC MARKERS ( 28 Jan 2025 08:28 )  x     / x     / x     / x     / 8.1 ng/mL        Urinalysis Basic - ( 28 Jan 2025 08:28 )    Color: x / Appearance: x / SG: x / pH: x  Gluc: 212 mg/dL / Ketone: x  / Bili: x / Urobili: x   Blood: x / Protein: x / Nitrite: x   Leuk Esterase: x / RBC: x / WBC x   Sq Epi: x / Non Sq Epi: x / Bacteria: x      GLOBAL ISSUE/BEST PRACTICE:  Analgesia: N  Sedation: N  HOB elevation: Y  Stress ulcer prophylaxis: N  VTE prophylaxis: Y  Glycemic control: Y  Nutrition: Y    CODE STATUS:   Full Code at this time s/p PCI     INTERVAL HPI/OVERNIGHT EVENTS: No acute overnight events occurred. Patient has no acute complaints at this time. States she would like to know when she is going home.      ICU Vital Signs Last 24 Hrs  T(C): 36.8 (28 Jan 2025 07:51), Max: 38.1 (28 Jan 2025 00:27)  T(F): 98.2 (28 Jan 2025 07:51), Max: 100.5 (28 Jan 2025 00:27)  HR: 66 (28 Jan 2025 11:00) (63 - 78)  BP: 84/53 (28 Jan 2025 11:00) (80/56 - 121/67)  BP(mean): 64 (28 Jan 2025 11:00) (57 - 95)  ABP: --  ABP(mean): --  RR: 20 (28 Jan 2025 11:00) (16 - 25)  SpO2: 98% (28 Jan 2025 11:00) (93% - 100%)          01-27-25 @ 07:01  -  01-28-25 @ 07:00  --------------------------------------------------------  IN: 2650 mL / OUT: 900 mL / NET: 1750 mL    01-28-25 @ 07:01  -  01-28-25 @ 11:55  --------------------------------------------------------  IN: 200 mL / OUT: 350 mL / NET: -150 mL        CAPILLARY BLOOD GLUCOSE          I&O's Summary    27 Jan 2025 07:01  -  28 Jan 2025 07:00  --------------------------------------------------------  IN: 2650 mL / OUT: 900 mL / NET: 1750 mL    28 Jan 2025 07:01  -  28 Jan 2025 11:55  --------------------------------------------------------  IN: 200 mL / OUT: 350 mL / NET: -150 mL        PHYSICAL EXAM:  General: NAD, elderly woman  Neurology: awake and alert, A&Ox4  HEENT: NC/AT  Respiratory: CTA b/l, no rales or rhonchi noted  Cardiovascular: RRR, normal S1S2, no M/R/G, mildly distant heart sounds unchanged  Abdomen: soft, NT/ND, +BS, no palpable masses  Extremities: WWP, no clubbing, cyanosis, or edema. No hematoma noted to Right Groin.  Skin: warm/dry      Meds:      atorvastatin Oral      acetaminophen     Tablet .. Oral PRN  DULoxetine Oral      aspirin enteric coated Oral  clopidogrel Tablet Oral            chlorhexidine 2% Cloths Topical                              10.7   8.11  )-----------( 239      ( 28 Jan 2025 08:28 )             32.6       01-28    135  |  106  |  39[H]  ----------------------------<  212[H]  4.2   |  22  |  2.10[H]    Ca    8.7      28 Jan 2025 08:28  Phos  4.2     01-28  Mg     2.0     01-28    TPro  6.3  /  Alb  2.9[L]  /  TBili  0.5  /  DBili  x   /  AST  53[H]  /  ALT  21  /  AlkPhos  70  01-28      CARDIAC MARKERS ( 28 Jan 2025 08:28 )  x     / x     / x     / x     / 8.1 ng/mL        Urinalysis Basic - ( 28 Jan 2025 08:28 )    Color: x / Appearance: x / SG: x / pH: x  Gluc: 212 mg/dL / Ketone: x  / Bili: x / Urobili: x   Blood: x / Protein: x / Nitrite: x   Leuk Esterase: x / RBC: x / WBC x   Sq Epi: x / Non Sq Epi: x / Bacteria: x      DATA REVIEW    All data personally reviewed.  See above for details    VS trends--low grade temp 100.5, normal HR, low BP  Laboratory results--normal WBC, stable Hb, high Cr, improving trop  Radiology results--repeat echo with unchanged pericardial effusion without signs of tamponade    GLOBAL ISSUE/BEST PRACTICE:  Analgesia: N  Sedation: N  HOB elevation: Y  Stress ulcer prophylaxis: N  VTE prophylaxis: Y  Glycemic control: Y  Nutrition: Y    CODE STATUS: Full Code

## 2025-01-28 NOTE — PROGRESS NOTE ADULT - SUBJECTIVE AND OBJECTIVE BOX
Department of Cardiology                                                                     Division of Interventional Cardiology                                                                  [facility]/ [place] [address]                                                                             Telephone: [phone]                                                    Post- Procedure Note: pt is s/p STEMI, s/p Left Heart Cardiac Catheterization     01/28/25-Pt seen and examined at bedside in ICU earlier this am with Interventional Cardiology attendings. Sitting OOB/ chair, denies any c/o. plan to reecho to evaluate pericardial fluid status. Pt denies any c/o .  SBP 90s will continue to assess.  12:37-Pts BP labile per Intensivist team MAP 50s giving IVF bolus -will expedite repeat TTE at bedside and reassess.    Subjective/ROS:  Denies CP, palpitations, SOB, N/V, fever/chills, dizziness, weakness, numbness/tingling.    TRANSTHORACIC ECHOCARDIOGRAM REPORT  ________________________________________________________________________________                                      _______       Pt. Name:       DOMITILA GRACE Study Date:    1/27/2025  MRN:            [id number]           YOB: 1933  Accession #:    [id number]          Age:           89 years  Account#:       [id number]         Gender:        F  Heart Rate:                        Height:        62.20 in (158.00 cm)  Rhythm:          Weight:        160.93 lb (73.00 kg)  Blood Pressure: 86/57 mmHg         BSA/BMI:       1.75 m² / 29.24 kg/m²  ________________________________________________________________________________________  Referring Physician:    [id number] Carlos Alberto  Interpreting Physician: Braxton Bowens M.D.  Primary Sonographer:    Marlys Queen RDCS    CPT:                ECHO TTE W CON FU LTD - .m  Indication(s):      Shock, unspecified - R57.9  Procedure:          Limited transthoracic echocardiogram.  Ordering Location:  ICU1  Admission Status:   Inpatient  Contrast Injection: Verbal consent was obtained for injection of Ultrasonic                      Enhancing Agent following a discussion of risks and                      benefits.                    Endocardial visualization enhanced with 2 ml of Definity                      Ultrasound enhancing agent (Lot#:[id number] Discarded                      Dose:8ml).  UEA Reaction:       Patient had no adverse reaction after injection of                      Ultrasound Enhancing Agent.    _______________________________________________________________________________________     CONCLUSIONS:      1. Left ventricular systolic function is moderately decreased with an ejection fraction visually estimated at 40 to 45 %.   2. There is no evidence of a left ventricular thrombus.    ________________________________________________________________________________________  FINDINGS:     Left Ventricle:  After obtaining consent, Definity ultrasound enhancing agent was given for enhanced left ventricular opacification and improved delineation of the left ventricular endocardial borders. Left ventricular systolic function is moderately decreased with an ejection fraction visually estimated at 40 to 45%. There is no evidence of a left ventricular thrombus.       HPI:  Pt is a 89y/o F with PMHx HTN, DM2, HLD, depression who presented to the ED with intermittent jaw pain followed by anterior chest pressure radiating to the left shoulder, Pt was found to have elevated troponins and EKG with ST elevations with reciprocal ST-T changes in leads II, III, aVF. Code STEMI was activated and patient was taken to the cath lab where she was found to have 100% occlusion to the diagonal now s/p 1 stent placement. Patient seen and examined in the ICU, reports feeling much better. She denies any current chest pain or jaw pain, and also denies dizziness, headache, numbness/tingling, nausea, vomiting, palpitations, shortness of breath, abdominal pain. She does admit to chronic diarrhea. Prior to this event, patient was in her usual state of health. No other concerns at this time.    ED Course:   Vitals: BP: 190/94, HR: 83, Temp: 97.8, RR: 18, SpO2: 97% on RA  Labs: BUN/Cr 29/1.5, Glucose 300, Troponin 737.1   EKG: ST elevation in lead I with ST changes in II, III, aVF  Received in the ED: aspirin 324, brilinta 180mg, heparin IV   (26 Jan 2025 21:31)      PAST MEDICAL & SURGICAL HISTORY:  HTN (hypertension)      DM (diabetes mellitus)      HLD (hyperlipidemia)      Major depression      No significant past surgical history          MEDICATIONS  (STANDING):  aspirin enteric coated 81 milliGRAM(s) Oral daily  atorvastatin 40 milliGRAM(s) Oral at bedtime  chlorhexidine 2% Cloths 1 Application(s) Topical <User Schedule>  clopidogrel Tablet 75 milliGRAM(s) Oral every 24 hours  DULoxetine 90 milliGRAM(s) Oral daily  heparin   Injectable 5000 Unit(s) SubCutaneous every 8 hours    MEDICATIONS  (PRN):  acetaminophen     Tablet .. 650 milliGRAM(s) Oral every 6 hours PRN Temp greater or equal to 38C (100.4F), Mild Pain (1 - 3)    Allergies: No Known Allergies                            10.7   8.11  )-----------( 239      ( 28 Jan 2025 08:28 )             32.6     01-28    135  |  106  |  39[H]  ----------------------------<  212[H]  4.2   |  22  |  2.10[H]    Ca    8.7      28 Jan 2025 08:28  Phos  4.2     01-28  Mg     2.0     01-28    TPro  6.3  /  Alb  2.9[L]  /  TBili  0.5  /  DBili  x   /  AST  53[H]  /  ALT  21  /  AlkPhos  70  01-28          Vital Signs Last 24 Hrs  T(C): 36.8 (28 Jan 2025 07:51), Max: 38.1 (28 Jan 2025 00:27)  T(F): 98.2 (28 Jan 2025 07:51), Max: 100.5 (28 Jan 2025 00:27)  HR: 66 (28 Jan 2025 11:00) (63 - 78)  BP: 84/53 (28 Jan 2025 11:00) (80/56 - 121/67)  BP(mean): 64 (28 Jan 2025 11:00) (58 - 95)  RR: 20 (28 Jan 2025 11:00) (16 - 25)  SpO2: 98% (28 Jan 2025 11:00) (93% - 100%)           ECG:   Ventricular Rate 64 BPM    Atrial Rate 64 BPM    P-R Interval 166 ms    QRS Duration 82 ms    Q-T Interval 404 ms    QTC Calculation(Bazett) 416 ms    P Axis 80 degrees    R Axis 95 degrees    T Axis 52 degrees    Diagnosis Line Normal sinus rhythmwith sinus arrhythmia  Low voltage QRS  Anterolateral infarct (cited on or before 26-JAN-2025)    Confirmed by ISAAC PALUMBO ([id number]) on 1/27/2025 3:42:41 PM  Constitutional: NAD  Neuro: A+O x 3, non-focal, speech clear and intact  HEENT: NC/AT, PERRL, EOMI, anicteric sclerae, oral mucosa pink and moist  Neck: supple, no JVD  CV: regular rate, regular rhythm, +S1S2, no murmurs or rub  Pulm/chest: lung sounds CTA and equal bilaterally, no accessory muscle use noted  Abd: soft, NT, ND, +BS  Ext: MENDEZ x 4, no C/C/E  Access site: R wrist C/D/I/soft without hematoma, distal motor/neuro/circ intact. R radial pulse 2+.  Skin: warm, well perfused  Psych: calm, appropriate affect

## 2025-01-28 NOTE — PROGRESS NOTE ADULT - SUBJECTIVE AND OBJECTIVE BOX
[facility] --  Jamal Moralez Pannella, Patel, Savella, Cohen  Office # [phone]      Follow Up:  STEMI, hypotension     Subjective/Observations: Patient seen and examined. Events noted. Resting comfortably in bed. No complaints of chest pain, dyspnea, or palpitations reported. No signs of orthopnea or PND.       REVIEW OF SYSTEMS: All other review of systems is negative unless indicated above    PAST MEDICAL & SURGICAL HISTORY:  HTN (hypertension)      DM (diabetes mellitus)      HLD (hyperlipidemia)      Major depression      No significant past surgical history          MEDICATIONS  (STANDING):  aspirin enteric coated 81 milliGRAM(s) Oral daily  atorvastatin 40 milliGRAM(s) Oral at bedtime  chlorhexidine 2% Cloths 1 Application(s) Topical <User Schedule>  clopidogrel Tablet 75 milliGRAM(s) Oral every 24 hours  DULoxetine 90 milliGRAM(s) Oral daily  heparin   Injectable 5000 Unit(s) SubCutaneous every 8 hours    MEDICATIONS  (PRN):  acetaminophen     Tablet .. 650 milliGRAM(s) Oral every 6 hours PRN Temp greater or equal to 38C (100.4F), Mild Pain (1 - 3)      Allergies    No Known Allergies    Intolerances            Vital Signs Last 24 Hrs  T(C): 36.8 (28 Jan 2025 12:00), Max: 38.1 (28 Jan 2025 00:27)  T(F): 98.2 (28 Jan 2025 12:00), Max: 100.5 (28 Jan 2025 00:27)  HR: 70 (28 Jan 2025 13:00) (65 - 78)  BP: 97/57 (28 Jan 2025 13:00) (83/46 - 121/67)  BP(mean): 71 (28 Jan 2025 13:00) (58 - 95)  RR: 24 (28 Jan 2025 13:00) (17 - 25)  SpO2: 100% (28 Jan 2025 13:00) (93% - 100%)        I&O's Summary    27 Jan 2025 07:01  -  28 Jan 2025 07:00  --------------------------------------------------------  IN: 2650 mL / OUT: 900 mL / NET: 1750 mL    28 Jan 2025 07:01  -  28 Jan 2025 14:07  --------------------------------------------------------  IN: 1200 mL / OUT: 650 mL / NET: 550 mL          PHYSICAL EXAM:  TELE:    Constitutional: NAD, awake   HEENT: Moist Mucous Membranes, Anicteric  Pulmonary: Decreased breath sounds b/l. No rales, crackles or wheeze appreciated.   Cardiovascular: Regular, S1 and S2, No murmurs, rubs, gallops or clicks  Gastrointestinal: Bowel Sounds present, soft, nontender.   Lymph: No peripheral edema. No lymphadenopathy.  Skin: No visible rashes or ulcers.  Psych:  Mood & affect appropriate for situation    LABS: All Labs Reviewed:                        10.7   8.11  )-----------( 239      ( 28 Jan 2025 08:28 )             32.6                         10.3   7.25  )-----------( 204      ( 28 Jan 2025 06:18 )             30.8                         11.3   9.50  )-----------( 245      ( 27 Jan 2025 18:00 )             34.0     28 Jan 2025 08:28    135    |  106    |  39     ----------------------------<  212    4.2     |  22     |  2.10   28 Jan 2025 06:18    138    |  108    |  37     ----------------------------<  213    4.1     |  23     |  2.00   27 Jan 2025 06:10    139    |  109    |  28     ----------------------------<  225    4.8     |  25     |  1.50     Ca    8.7        28 Jan 2025 08:28  Ca    8.7        28 Jan 2025 06:18  Ca    9.1        27 Jan 2025 06:10  Phos  4.2       28 Jan 2025 08:28  Phos  4.3       28 Jan 2025 06:18  Phos  4.1       27 Jan 2025 06:10  Mg     2.0       28 Jan 2025 08:28  Mg     2.1       28 Jan 2025 06:18  Mg     1.9       27 Jan 2025 06:10    TPro  6.3    /  Alb  2.9    /  TBili  0.5    /  DBili  x      /  AST  53     /  ALT  21     /  AlkPhos  70     28 Jan 2025 08:28  TPro  6.4    /  Alb  3.1    /  TBili  0.3    /  DBili  x      /  AST  66     /  ALT  18     /  AlkPhos  81     27 Jan 2025 06:10  TPro  7.1    /  Alb  3.4    /  TBili  0.2    /  DBili  x      /  AST  16     /  ALT  14     /  AlkPhos  103    26 Jan 2025 19:45      CARDIAC MARKERS ( 28 Jan 2025 08:28 )  x     / x     / x     / x     / 8.1 ng/mL    - Troponin: <-58629.8, <-06657.5, <-737.1

## 2025-01-28 NOTE — DIETITIAN INITIAL EVALUATION ADULT - NS FNS DIET ORDER
Diet, Consistent Carbohydrate/No Snacks:   DASH/TLC {Sodium & Cholesterol Restricted} (01-27-25 @ 07:43) [Active]

## 2025-01-28 NOTE — DIETITIAN INITIAL EVALUATION ADULT - OTHER INFO
89 y/o F w/ pmh of htn, dm2, hld, presented to [facility] for chest pain radiating into the jaw and L. shoulder, in the ED pt was found to have +trops and ST changes concerning for STEMI. Code STEMI activated pt taken to the cath lab was found to have 100% occlusion to the diagonal now s/p x1 stent placed. No cath lab complication reported and pt admitted to the ICU for post cath lab management.    Pt A+Ox4 at visit s/p cardiac cath with stent placement. Consistent Carbohydrate, Dash/TLC diet rx. Pt reports tolerating breakfast meals well consuming ~50% entree. No report N/V. BM 1/28. Pta lives with /son. Avoids added salt, sugar. Relatively good po intake pta. Hx DM, Hgba1c 7.3%. Metformin pta. Elevated lipid profile. Lipitor rx. UBW ~160lbs. Stable per pt. Possible discharge this afternoon. Declined diet education.

## 2025-01-28 NOTE — PROGRESS NOTE ADULT - PROBLEM SELECTOR PLAN 2
BUN/Cr 29/1.5  - unclear baseline, - GFR similar to 2023 on chart review  - S/p IVF   - monitor renal function,  increased s/p cardiac cath  - Nephro Consult

## 2025-01-28 NOTE — PROGRESS NOTE ADULT - CRITICAL CARE ATTENDING COMMENT
#STEMI- 100% D1 s/p PCI  -- Continue DAPT with ASA 81mg and Brilinta 90mg BId  - Continue Lipitor 40mg  - EF 40-45%, unable to tolerate additional GDMT at this point  - Daily EKGs    #Hypotension  --Patient with hypotension throughout the day 1/27 receiving IV fluids. BP low today  -- Repeat partial TTE this afternoon with EF 40-45% and pericardial effusion similar side, collapsable IVC  --- Challenge with additional IV fluids  --- Does not appear to be in tamponade via echo, no indication for pericardiocentesis at this time and effusion is small  - Hold BB and other afterload reducing medications    #JOHNNY  - Up trending Cr, potentially contrast vs. hypotension  - Hold off on ARB  - Monitor I/O's  - IV fluids as above      Please call interventional cardiologist on call for any change in patient status         Upon my evaluation, this patient is at high risk for imminent or life threatening deterioration due to recent STEMI, pericardial effusion, and hypotension and other active medical issues which require my direct attention, intervention, and personal management.  I have personally spent >30 minutes  of critical care time exclusive of time spent on separate billing procedures. This includes review of laboratory data, radiology results, discussion with primary team\patient, and monitoring for potential decompensation. Interventions were performed as documented above. #STEMI- 100% D1 s/p PCI  -- Continue DAPT with ASA 81mg and Plavix 75mg po   - Continue Lipitor 40mg  - EF 40-45%, unable to tolerate additional GDMT at this point  - Daily EKGs    #Hypotension  --Patient with hypotension throughout the day 1/27 receiving IV fluids. BP low today  -- Repeat partial TTE this afternoon with EF 40-45% and pericardial effusion similar side, collapsable IVC  --- Challenge with additional IV fluids  --- Does not appear to be in tamponade via echo, no indication for pericardiocentesis at this time and effusion is small  - Hold BB and other afterload reducing medications    #JOHNNY  - Up trending Cr, potentially contrast vs. hypotension  - Hold off on ARB  - Monitor I/O's  - IV fluids as above      Please call interventional cardiologist on call for any change in patient status         Upon my evaluation, this patient is at high risk for imminent or life threatening deterioration due to recent STEMI, pericardial effusion, and hypotension and other active medical issues which require my direct attention, intervention, and personal management.  I have personally spent >30 minutes  of critical care time exclusive of time spent on separate billing procedures. This includes review of laboratory data, radiology results, discussion with primary team\patient, and monitoring for potential decompensation. Interventions were performed as documented above.

## 2025-01-28 NOTE — PROGRESS NOTE ADULT - ASSESSMENT
Assessment and Recommendation:   · Assessment	  89 year old female with PMH HTN, DM2, HLD, depression who presented to the ED with intermittent jaw pain followed by anterior chest pressure radiating to the left shoulder, admitted for STEMI. Acute HFrEF based on LV gram    1/26 s/p LHC, s/p MARLENE x 1 to 100% diag          OM and LAD CAD (medical therapy)   Awaiting repeat limited TTE to be performed/ ICU management of hypotension/ IV fluids being given  in ICU for post STEMI/PCI observation, monitoring and care  post cath access site precautions reviewed with pt who verbalized good understanding  activity as tolerated (except access site precautions)  am labs and EKG noted  check TTE  trend troponin to peak  continue dual anti platelet therapy with aspirin AND plavix  Pt education provided/reinforced re: importance of strict adherence to uninterrupted DAPT for minimum of 9-12 months (cardiologist will determine duration)  Patient educated on benefits of Cardiac Rehab Program; referral provided to patient. Referral faxed and copy placed in medical record. Patient given list of locations with phone numbers of local rehab facilities and advised to contact their insurance company for participating providers. Patient educated on need to bring discharge documents including cardiovascular history, medications, and testing/treatments to first appointment.  prescribed statin and beta blocker  will restart home ACEI/ARB if/when creatinine stable  further GDMT for HF deferred to primary cardiologist  diet as tolerated  Lifestyle modifications discussed to reduce cardiovascular risk factors including weight reduction, smoking cessation (referral provided if applicable), medication compliance, and routine follow up with Cardiologist to track your BMI, cholesterol, and glucose levels.   follow-up next week with Dr Parada for post PCI check  follow-up in 2 weeks with Dr Salinas, outpatient cardiologist

## 2025-01-28 NOTE — PROGRESS NOTE ADULT - PROBLEM SELECTOR PLAN 3
Chronic  - glucose elevated to 300 on arrival  - takes metformin at home  - would start PRITESH with FS QAC/QHS  - hypoglycemia protocol  - A1c 7.3

## 2025-01-28 NOTE — DIETITIAN INITIAL EVALUATION ADULT - PROBLEM SELECTOR PLAN 3
Chronic  - glucose elevated to 300 on arrival  - takes metformin at home  - would start PRITESH with FS QAC/QHS  - hypoglycemia protocol  - f/u AM A1c

## 2025-01-28 NOTE — DIETITIAN INITIAL EVALUATION ADULT - PERTINENT LABORATORY DATA
01-28    135  |  106  |  39[H]  ----------------------------<  212[H]  4.2   |  22  |  2.10[H]    Ca    8.7      28 Jan 2025 08:28  Phos  4.2     01-28  Mg     2.0     01-28    TPro  6.3  /  Alb  2.9[L]  /  TBili  0.5  /  DBili  x   /  AST  53[H]  /  ALT  21  /  AlkPhos  70  01-28  A1C with Estimated Average Glucose Result: 7.3 % (01-27-25 @ 06:10)

## 2025-01-29 LAB
ANION GAP SERPL CALC-SCNC: 7 MMOL/L — SIGNIFICANT CHANGE UP (ref 5–17)
BUN SERPL-MCNC: 47 MG/DL — HIGH (ref 7–23)
CALCIUM SERPL-MCNC: 8.5 MG/DL — SIGNIFICANT CHANGE UP (ref 8.5–10.1)
CHLORIDE SERPL-SCNC: 108 MMOL/L — SIGNIFICANT CHANGE UP (ref 96–108)
CO2 SERPL-SCNC: 23 MMOL/L — SIGNIFICANT CHANGE UP (ref 22–31)
CREAT SERPL-MCNC: 2.1 MG/DL — HIGH (ref 0.5–1.3)
CULTURE RESULTS: NO GROWTH — SIGNIFICANT CHANGE UP
EGFR: 22 ML/MIN/1.73M2 — LOW
GLUCOSE SERPL-MCNC: 158 MG/DL — HIGH (ref 70–99)
HCT VFR BLD CALC: 27.8 % — LOW (ref 34.5–45)
HCT VFR BLD CALC: 29.1 % — LOW (ref 34.5–45)
HGB BLD-MCNC: 9.5 G/DL — LOW (ref 11.5–15.5)
HGB BLD-MCNC: 9.5 G/DL — LOW (ref 11.5–15.5)
MAGNESIUM SERPL-MCNC: 2 MG/DL — SIGNIFICANT CHANGE UP (ref 1.6–2.6)
MCHC RBC-ENTMCNC: 29.9 PG — SIGNIFICANT CHANGE UP (ref 27–34)
MCHC RBC-ENTMCNC: 30.8 PG — SIGNIFICANT CHANGE UP (ref 27–34)
MCHC RBC-ENTMCNC: 32.6 G/DL — SIGNIFICANT CHANGE UP (ref 32–36)
MCHC RBC-ENTMCNC: 34.2 G/DL — SIGNIFICANT CHANGE UP (ref 32–36)
MCV RBC AUTO: 90.3 FL — SIGNIFICANT CHANGE UP (ref 80–100)
MCV RBC AUTO: 91.5 FL — SIGNIFICANT CHANGE UP (ref 80–100)
NRBC # BLD: 0 /100 WBCS — SIGNIFICANT CHANGE UP (ref 0–0)
NRBC # BLD: 0 /100 WBCS — SIGNIFICANT CHANGE UP (ref 0–0)
NRBC BLD-RTO: 0 /100 WBCS — SIGNIFICANT CHANGE UP (ref 0–0)
NRBC BLD-RTO: 0 /100 WBCS — SIGNIFICANT CHANGE UP (ref 0–0)
PHOSPHATE SERPL-MCNC: 4.6 MG/DL — HIGH (ref 2.5–4.5)
PLATELET # BLD AUTO: 186 K/UL — SIGNIFICANT CHANGE UP (ref 150–400)
PLATELET # BLD AUTO: 192 K/UL — SIGNIFICANT CHANGE UP (ref 150–400)
POTASSIUM SERPL-MCNC: 4.1 MMOL/L — SIGNIFICANT CHANGE UP (ref 3.5–5.3)
POTASSIUM SERPL-SCNC: 4.1 MMOL/L — SIGNIFICANT CHANGE UP (ref 3.5–5.3)
RBC # BLD: 3.08 M/UL — LOW (ref 3.8–5.2)
RBC # BLD: 3.18 M/UL — LOW (ref 3.8–5.2)
RBC # FLD: 14.3 % — SIGNIFICANT CHANGE UP (ref 10.3–14.5)
RBC # FLD: 14.3 % — SIGNIFICANT CHANGE UP (ref 10.3–14.5)
SODIUM SERPL-SCNC: 138 MMOL/L — SIGNIFICANT CHANGE UP (ref 135–145)
SPECIMEN SOURCE: SIGNIFICANT CHANGE UP
WBC # BLD: 6.88 K/UL — SIGNIFICANT CHANGE UP (ref 3.8–10.5)
WBC # BLD: 7.32 K/UL — SIGNIFICANT CHANGE UP (ref 3.8–10.5)
WBC # FLD AUTO: 6.88 K/UL — SIGNIFICANT CHANGE UP (ref 3.8–10.5)
WBC # FLD AUTO: 7.32 K/UL — SIGNIFICANT CHANGE UP (ref 3.8–10.5)

## 2025-01-29 PROCEDURE — 99233 SBSQ HOSP IP/OBS HIGH 50: CPT

## 2025-01-29 PROCEDURE — 93010 ELECTROCARDIOGRAM REPORT: CPT

## 2025-01-29 PROCEDURE — 99233 SBSQ HOSP IP/OBS HIGH 50: CPT | Mod: GC

## 2025-01-29 PROCEDURE — 99291 CRITICAL CARE FIRST HOUR: CPT

## 2025-01-29 RX ORDER — SODIUM CHLORIDE 9 G/ML
500 INJECTION, SOLUTION INTRAVENOUS ONCE
Refills: 0 | Status: COMPLETED | OUTPATIENT
Start: 2025-01-29 | End: 2025-01-29

## 2025-01-29 RX ADMIN — DULOXETINE 90 MILLIGRAM(S): 20 CAPSULE, DELAYED RELEASE ORAL at 11:05

## 2025-01-29 RX ADMIN — SODIUM CHLORIDE 1000 MILLILITER(S): 9 INJECTION, SOLUTION INTRAVENOUS at 15:20

## 2025-01-29 RX ADMIN — Medication 5000 UNIT(S): at 21:43

## 2025-01-29 RX ADMIN — ATORVASTATIN CALCIUM 40 MILLIGRAM(S): 80 TABLET, FILM COATED ORAL at 21:42

## 2025-01-29 RX ADMIN — ANTISEPTIC SURGICAL SCRUB 1 APPLICATION(S): 0.04 SOLUTION TOPICAL at 05:11

## 2025-01-29 RX ADMIN — ASPIRIN 81 MILLIGRAM(S): 81 TABLET, COATED ORAL at 11:05

## 2025-01-29 RX ADMIN — Medication 75 MILLIGRAM(S): at 06:09

## 2025-01-29 RX ADMIN — Medication 5000 UNIT(S): at 06:09

## 2025-01-29 RX ADMIN — Medication 5000 UNIT(S): at 13:08

## 2025-01-29 NOTE — PROGRESS NOTE ADULT - CRITICAL CARE ATTENDING COMMENT
89 year old female with PMH HTN, DM2, HLD, depression presenting with chest pressure, found to have STEMI.    #STEMI- 100% D1 s/p PCI  - EKG with septal and lateral infarct pattern, TWI anterior lateral and inferior leads  - Troponin downtrending 24,336-->19,858  -- Continue DAPT with ASA 81mg and Plavix 75mg po   - Continue Lipitor 40mg  - EF 40-45%, unlikely to tolerate additional GDMT at this point due to hypotension  - Daily EKGs    #Hypotension- Seems to have improved today after receiving IV fluids this AM  #Anemia  - Hgb lower today, may be dilution from IVF as WBC and Plt also reduced  - Groin access site soft, non-tender, no bruising, no back pain   --- If Hgb continues to decline, would consider non-contrast CT abdomen to eval for RP bleed  - 1/28 partial TTE EF 40-45% and pericardial effusion similar side  --- Does not appear to be in tamponade via echo, no indication for pericardiocentesis at this time and effusion is small  - Hold BB and other afterload reducing medications    #JOHNNY- Cr stabilizing around 2  - Up trending Cr, potentially contrast vs. hypotension  - Hold off on ARB  - Monitor I/O's      Please call interventional cardiologist on call for any change in patient status         Upon my evaluation, this patient is at high risk for imminent or life threatening deterioration due to recent STEMI, pericardial effusion, hypotension, anemia and other active medical issues which require my direct attention, intervention, and personal management.  I have personally spent >30 minutes  of critical care time exclusive of time spent on separate billing procedures. This includes review of laboratory data, radiology results, discussion with primary team\patient, and monitoring for potential decompensation. Interventions were performed as documented above.

## 2025-01-29 NOTE — PROGRESS NOTE ADULT - ASSESSMENT
Assessment and Recommendation:   · Assessment	  89 year old female with PMH HTN, DM2, HLD, depression who presented to the ED with intermittent jaw pain followed by anterior chest pressure radiating to the left shoulder, admitted for STEMI. Acute HFrEF based on LV gram    1/26 s/p LHC, s/p MARLENE x 1 to 100% diag          OM and LAD CAD (medical therapy)   post limited follow up TTE yesterday 1/28- No significant change in the size of the pericardial effusion compared to TTE study from  (1/27/25) is noted.  in ICU for post STEMI/PCI observation, monitoring and care  repeat hgb at noon today  post cath access site precautions reviewed with pt who verbalized good understanding  activity as tolerated (except access site precautions)  am labs and EKG noted  continue dual anti platelet therapy with aspirin AND plavix  Pt education provided/reinforced re: importance of strict adherence to uninterrupted DAPT for minimum of 9-12 months (cardiologist will determine duration)  Patient educated on benefits of Cardiac Rehab Program; referral provided to patient. Referral faxed and copy placed in medical record. Patient given list of locations with phone numbers of local rehab facilities and advised to contact their insurance company for participating providers. Patient educated on need to bring discharge documents including cardiovascular history, medications, and testing/treatments to first appointment.  prescribed statin and beta blocker  will restart home ACEI/ARB if/when creatinine stable  further GDMT for HF deferred to primary cardiologist  diet as tolerated  Lifestyle modifications discussed to reduce cardiovascular risk factors including weight reduction, smoking cessation (referral provided if applicable), medication compliance, and routine follow up with Cardiologist to track your BMI, cholesterol, and glucose levels.   follow-up next week with Dr Parada for post PCI check  follow-up in 2 weeks with Dr Salinas, outpatient cardiologist

## 2025-01-29 NOTE — PROGRESS NOTE ADULT - PROBLEM SELECTOR PLAN 1
- code STEMI called and pt taken emergently to the cath lab where she was found to have 100% occl to the diagonal now s/p 1 stent placement  - transferred to ICU s/p cath. + Hypotension. S/p IVF   - On aspirin and plavix  - On statin, BB  - Reviewed TTE 1/27: EF 40 to 45%  - Cardio Following  - Rest of care per ICU

## 2025-01-29 NOTE — PROGRESS NOTE ADULT - ASSESSMENT
89y/o F with PMHx HTN, DM2, HLD, depression who presented to the ED with intermittent jaw pain followed by anterior chest pressure radiating to the left shoulder, admitted for STEMI.    - s/p code STEMI  - S/P: PCI (1/26/25) with 100% occlusion of diagonal and MARLENE x1  - continue DAPT with ASA 81 QD and brilinta 90 BID  - cont Lipitor 40mg QHS  - TTE 1/27/25 Left ventricular systolic function is moderately decreased with an ejection fraction visually estimated at 40 to 45 %. small pericardial effusion noted.     - Appears compensated from HF POV.   - 1/28 BP has been low, though better this am with ivf  - unable to tolerate BB to date  - monitor BP with ambulation today  - ef 40-45%, with small unchanged pericardial effusion without tamponade    - cr increasing ?2/2 BP and contrast   - Please continue to maintain strict I/Os, monitor daily weights, Cr, and K.   - hold of on ARB/ARNI    - Monitor in ICU.  At risk for abrupt decompensation.   89y/o F with PMHx HTN, DM2, HLD, depression who presented to the ED with intermittent jaw pain followed by anterior chest pressure radiating to the left shoulder, admitted for STEMI.    - s/p code STEMI  - S/P: PCI (1/26/25) with 100% occlusion of diagonal and MARLENE x1  - continue DAPT with ASA 81 QD and plavix  - cont Lipitor 40mg QHS  - TTE 1/27/25 Left ventricular systolic function is moderately decreased with an ejection fraction visually estimated at 40 to 45 %. small pericardial effusion noted.     - Appears compensated from HF POV.   - 1/28 BP has been low, though better this am with ivf  - unable to tolerate BB to date  - monitor BP with ambulation today  - ef 40-45%, with small unchanged pericardial effusion without tamponade    - cr increasing ?2/2 BP and contrast   - Please continue to maintain strict I/Os, monitor daily weights, Cr, and K.   - hold of on ARB/ARNI    - Monitor in ICU.  At risk for abrupt decompensation.

## 2025-01-29 NOTE — CASE MANAGEMENT PROGRESS NOTE - NSCMPROGRESSNOTE_GEN_ALL_CORE
Chart reviewed from a case management perspective.  Remains acute in ICU s/P MI and cardiac cath.  Anticipate transition home with [facility] services when ready.  Will remain available

## 2025-01-29 NOTE — PROGRESS NOTE ADULT - ASSESSMENT
89 y/o F w/ pmh of htn, dm2, hld, presented to [facility] for chest pain radiating into the jaw and L. shoulder, in the ED pt was found to have +trops and ST changes concerning for STEMI. Code STEMI activated pt taken to the cath lab was found to have 100% occlusion to the diagonal now s/p x1 stent placed. No cath lab complication reported and pt admitted to the ICU for post cath lab management.    -Neuro: No acute issues, MS stable. Continue Cymbalta home medication.  -Cardiac: STEMI now s/p x1 MARLENE to the diagonal, cont asa/brilliant, repeat EKGs, TTE x2 revealing mild pericardial effusion, Limited TTE yesterday w/ unchanged pericard effusion, Ef 40-45%. No tamponade. continue statin therapy. Hold BB in setting of hypotensive episodes -> BP's improved this AM. Baseline hypertensive.  -Resp: No acute issues, o2 stable on RA  -GI: Diet CC/DASH  -Renal: JOHNNY likely 2/2 hypotension vs contrast unchanged at this time. cont to monitor along w/ lytes.   -ID: UA negative, Urine Cx pending and Blood Cx NGTD. Afebrile (low grade 100.5 1/28)  -Endo: H/o DM2, A1c 7.3  -Heme: DVT ppx w/ heparin subQ, cont asa/brilliant   89 y/o F w/ pmh of htn, dm2, hld, presented to [facility] for chest pain radiating into the jaw and L. shoulder, in the ED pt was found to have +trops and ST changes concerning for STEMI. Code STEMI activated pt taken to the cath lab was found to have 100% occlusion to the diagonal now s/p x1 stent placed. No cath lab complication reported and pt admitted to the ICU for post cath lab management.    -Neuro: No acute issues, MS stable. Continue Cymbalta home medication.  -Cardiac: STEMI now s/p x1 MARLENE to the diagonal, cont asa/brilliant, repeat EKGs, TTE x2 revealing mild pericardial effusion, Limited TTE yesterday w/ unchanged pericard effusion, Ef 40-45%. No prior h/o HF. No tamponade. Continue statin therapy. Hold BB in setting of hypotensive episodes -> BP's improved this AM. Baseline hypertensive.   -Resp: No acute issues, o2 stable on RA  -GI: Diet CC/DASH  -Renal: JOHNNY likely 2/2 hypotension vs contrast unchanged at this time. cont to monitor along w/ lytes.   -ID: UA negative, Urine Cx pending and Blood Cx NGTD. Afebrile (low grade 100.5 1/28)  -Endo: H/o DM2, A1c 7.3  -Heme: DVT ppx w/ heparin subQ, cont asa/brilliant

## 2025-01-29 NOTE — PROGRESS NOTE ADULT - SUBJECTIVE AND OBJECTIVE BOX
Department of Cardiology                                                                     Division of Interventional Cardiology                                                                  [facility]/ [place] [address]                                                                             Telephone: [phone]                                                    Post- Procedure Note:s/p STEMI/ s/p  Left Heart Cardiac Catheterization     01/29/25-Pt seen and examined this am with Interventional Cardiology Attending Dr Nazario Bryant Pt resting comfortably denies any c/o -106 Hgb 9.5 this am-will repeat at 12 noon.   No overnight events/ Pt feels good. Right groin soft , NT, no bleeding or hematoma detected. No abdominal tenderness or back pain noted.    Subjective/ROS:  Denies CP, palpitations, SOB, N/V, fever/chills, dizziness, weakness, numbness/tingling.      HPI:  Pt is a 89y/o F with PMHx HTN, DM2, HLD, depression who presented to the ED with intermittent jaw pain followed by anterior chest pressure radiating to the left shoulder, Pt was found to have elevated troponins and EKG with ST elevations with reciprocal ST-T changes in leads II, III, aVF. Code STEMI was activated and patient was taken to the cath lab where she was found to have 100% occlusion to the diagonal now s/p 1 stent placement. Patient seen and examined in the ICU, reports feeling much better. She denies any current chest pain or jaw pain, and also denies dizziness, headache, numbness/tingling, nausea, vomiting, palpitations, shortness of breath, abdominal pain. She does admit to chronic diarrhea. Prior to this event, patient was in her usual state of health. No other concerns at this time.    ED Course:   Vitals: BP: 190/94, HR: 83, Temp: 97.8, RR: 18, SpO2: 97% on RA  Labs: BUN/Cr 29/1.5, Glucose 300, Troponin 737.1   EKG: ST elevation in lead I with ST changes in II, III, aVF  Received in the ED: aspirin 324, brilinta 180mg, heparin IV   (26 Jan 2025 21:31)    TRANSTHORACIC ECHOCARDIOGRAM REPORT  ________________________________________________________________________________                                      _______       Pt. Name:       DOMITILA GRACE Study Date:    1/28/2025  MRN:            [id number]           YOB: 1933  Accession #:    [id number]          Age:           89 years  Account#:       [id number]         Gender:        F  Heart Rate:                        Height:        62.20 in (158.00 cm)  Rhythm:          Weight:        160.93 lb (73.00 kg)  Blood Pressure: 93/52 mmHg         BSA/BMI:       1.75 m² / 29.24 kg/m²  ________________________________________________________________________________________  Referring Physician:    [id number] Carlos Alberto  Interpreting Physician: Braxton Bowens M.D.  Primary Sonographer:    Marlys Queen    CPT:               ECHO TTE W/O CON F/U LTD - 55272.m  Indication(s):     ST elevation [STEMI] myocardial infarction of unspecified                     site - I21.3  Procedure:         Limited transthoracic echocardiogram.  Ordering Location: [facility]  Admission Status:  Inpatient    _______________________________________________________________________________________     CONCLUSIONS:      1. Left ventricular systolic function is moderately decreased with an ejection fraction visually estimated at 40 to 45 %.   2. Small pericardial effusion noted adjacent to the right atrium and small pericardial effusion noted adjacent to the right ventricle. The effusion measures 0.82 cm in diastole in the subcostal view adjacent ot the RV.   3. Thickened pericardium.   4. No significant change in the size of the pericardial effusion compared to TTE study from yesterday (1/27/25) is noted.    ________________________________________________________________________________________      PAST MEDICAL & SURGICAL HISTORY:  HTN (hypertension)      DM (diabetes mellitus)      HLD (hyperlipidemia)      Major depression      No significant past surgical history          MEDICATIONS  (STANDING):  aspirin enteric coated 81 milliGRAM(s) Oral daily  atorvastatin 40 milliGRAM(s) Oral at bedtime  chlorhexidine 2% Cloths 1 Application(s) Topical <User Schedule>  clopidogrel Tablet 75 milliGRAM(s) Oral every 24 hours  DULoxetine 90 milliGRAM(s) Oral daily  heparin   Injectable 5000 Unit(s) SubCutaneous every 8 hours    MEDICATIONS  (PRN):  acetaminophen     Tablet .. 650 milliGRAM(s) Oral every 6 hours PRN Temp greater or equal to 38C (100.4F), Mild Pain (1 - 3)    Allergies: No Known Allergies                            9.5    6.88  )-----------( 192      ( 29 Jan 2025 05:55 )             27.8     01-29    138  |  108  |  47[H]  ----------------------------<  158[H]  4.1   |  23  |  2.10[H]    Ca    8.5      29 Jan 2025 05:55  Phos  4.6     01-29  Mg     2.0     01-29    TPro  6.3  /  Alb  2.9[L]  /  TBili  0.5  /  DBili  x   /  AST  53[H]  /  ALT  21  /  AlkPhos  70  01-28          Vital Signs Last 24 Hrs  T(C): 36.9 (29 Jan 2025 08:06), Max: 37.6 (29 Jan 2025 00:22)  T(F): 98.5 (29 Jan 2025 08:06), Max: 99.6 (29 Jan 2025 00:22)  HR: 79 (29 Jan 2025 09:14) (66 - 84)  BP: 121/52 (29 Jan 2025 09:14) (83/46 - 125/82)  BP(mean): 73 (29 Jan 2025 09:14) (58 - 97)  RR: 18 (29 Jan 2025 09:14) (14 - 28)  SpO2: 97% (29 Jan 2025 09:14) (92% - 100%)    Parameters below as of 29 Jan 2025 08:00  Patient On (Oxygen Delivery Method): room air          ECG:   Ventricular Rate 69 BPM    Atrial Rate 69 BPM    P-R Interval 176 ms    QRS Duration 84 ms    Q-T Interval 456 ms    QTC Calculation(Bazett) 488 ms    P Axis 76 degrees    R Axis 80 degrees    T Axis 140 degrees    Diagnosis Line Normal sinus rhythm with sinus arrhythmia  Low voltage QRS  Septal infarct , age undetermined  Lateral infarct , age undetermined  T wave abnormality, consider anterior ischemia  Abnormal ECG  No previous ECGs available  Confirmed by CHAPARRO ELLIS ([id number]) on 1/29/2025 12:19:13 AM    Constitutional: NAD  Neuro: A+O x 3, non-focal, speech clear and intact  HEENT: NC/AT, PERRL, EOMI, anicteric sclerae, oral mucosa pink and moist  Neck: supple, no JVD  CV: regular rate, regular rhythm, +S1S2, no murmurs or rub  Pulm/chest: lung sounds CTA and equal bilaterally, no accessory muscle use noted  Abd: soft, NT, ND, +BS  Ext: MENDEZ x 4, no C/C/E  Access site: R groin C/D/I/soft without hematoma, distal motor/neuro/circ intact. no bleeding or hematoma formation detected  Skin: warm, well perfused  Psych: calm, appropriate affect

## 2025-01-29 NOTE — PROGRESS NOTE ADULT - SUBJECTIVE AND OBJECTIVE BOX
INTERVAL HPI/OVERNIGHT EVENTS: No acute overnight events occurred.      Review of Systems:  Constitutional: No fever, chills, fatigue  Neuro: No headache, numbness, weakness  Resp: No cough, wheezing, shortness of breath  CVS: No chest pain, palpitations, leg swelling  GI: No abdominal pain, nausea, vomiting, diarrhea   : No dysuria, frequency, incontinence  Skin: No itching, burning, rashes, or lesions   Msk: No joint pain or swelling  Psych: No depression, anxiety, mood swings    ICU Vital Signs Last 24 Hrs  T(C): 36.9 (29 Jan 2025 08:06), Max: 37.6 (29 Jan 2025 00:22)  T(F): 98.5 (29 Jan 2025 08:06), Max: 99.6 (29 Jan 2025 00:22)  HR: 79 (29 Jan 2025 09:14) (66 - 84)  BP: 121/52 (29 Jan 2025 09:14) (83/46 - 125/82)  BP(mean): 73 (29 Jan 2025 09:14) (58 - 97)  ABP: --  ABP(mean): --  RR: 18 (29 Jan 2025 09:14) (14 - 28)  SpO2: 97% (29 Jan 2025 09:14) (92% - 100%)    O2 Parameters below as of 29 Jan 2025 08:00  Patient On (Oxygen Delivery Method): room air              01-28-25 @ 07:01  -  01-29-25 @ 07:00  --------------------------------------------------------  IN: 1320 mL / OUT: 1000 mL / NET: 320 mL        CAPILLARY BLOOD GLUCOSE          I&O's Summary    28 Jan 2025 07:01  -  29 Jan 2025 07:00  --------------------------------------------------------  IN: 1320 mL / OUT: 1000 mL / NET: 320 mL        PHYSICAL EXAM:  General: NAD  Neurology: awake and alert  HEENT: NC/AT  Respiratory: CTA b/l, no rales or rhonchi noted  Cardiovascular: RRR, normal S1S2, no M/R/G  Abdomen: soft, NT/ND, +BS, no palpable masses  Extremities: WWP, no clubbing, cyanosis, or edema  Skin: warm/dry      Meds:      atorvastatin Oral      acetaminophen     Tablet .. Oral PRN  DULoxetine Oral      aspirin enteric coated Oral  clopidogrel Tablet Oral  heparin   Injectable SubCutaneous            chlorhexidine 2% Cloths Topical                              9.5    6.88  )-----------( 192      ( 29 Jan 2025 05:55 )             27.8       01-29    138  |  108  |  47[H]  ----------------------------<  158[H]  4.1   |  23  |  2.10[H]    Ca    8.5      29 Jan 2025 05:55  Phos  4.6     01-29  Mg     2.0     01-29    TPro  6.3  /  Alb  2.9[L]  /  TBili  0.5  /  DBili  x   /  AST  53[H]  /  ALT  21  /  AlkPhos  70  01-28      CARDIAC MARKERS ( 28 Jan 2025 13:30 )  x     / x     / x     / x     / 6.9 ng/mL  CARDIAC MARKERS ( 28 Jan 2025 08:28 )  x     / x     / x     / x     / 8.1 ng/mL        Urinalysis Basic - ( 29 Jan 2025 05:55 )    Color: x / Appearance: x / SG: x / pH: x  Gluc: 158 mg/dL / Ketone: x  / Bili: x / Urobili: x   Blood: x / Protein: x / Nitrite: x   Leuk Esterase: x / RBC: x / WBC x   Sq Epi: x / Non Sq Epi: x / Bacteria: x      .Blood BLOOD   No growth at 24 hours -- 01-27 @ 18:10  .Blood BLOOD   No growth at 24 hours -- 01-27 @ 18:00              Radiology:    Bedside Ultrasound:    Tubes/Lines:      GLOBAL ISSUE/BEST PRACTICE:  Analgesia:  Sedation:  HOB elevation: Y  Stress ulcer prophylaxis:  VTE prophylaxis:  Glycemic control:  Nutrition:    CODE STATUS:        INTERVAL HPI/OVERNIGHT EVENTS: No acute overnight events occurred. Patient has no acute complaints. Afebrile. SBP >100 this AM.      Review of Systems:  Constitutional: No fever, chills, fatigue  Neuro: No headache, numbness, weakness  Resp: No cough, wheezing, shortness of breath  CVS: No chest pain, palpitations, leg swelling  GI: No abdominal pain, nausea, vomiting, diarrhea   : No dysuria, frequency, incontinence  Skin: No itching, burning, rashes, or lesions   Msk: No joint pain or swelling  Psych: No depression, anxiety, mood swings    ICU Vital Signs Last 24 Hrs  T(C): 36.9 (29 Jan 2025 08:06), Max: 37.6 (29 Jan 2025 00:22)  T(F): 98.5 (29 Jan 2025 08:06), Max: 99.6 (29 Jan 2025 00:22)  HR: 79 (29 Jan 2025 09:14) (66 - 84)  BP: 121/52 (29 Jan 2025 09:14) (83/46 - 125/82)  BP(mean): 73 (29 Jan 2025 09:14) (58 - 97)  ABP: --  ABP(mean): --  RR: 18 (29 Jan 2025 09:14) (14 - 28)  SpO2: 97% (29 Jan 2025 09:14) (92% - 100%)    O2 Parameters below as of 29 Jan 2025 08:00  Patient On (Oxygen Delivery Method): room air              01-28-25 @ 07:01  -  01-29-25 @ 07:00  --------------------------------------------------------  IN: 1320 mL / OUT: 1000 mL / NET: 320 mL        CAPILLARY BLOOD GLUCOSE          I&O's Summary    28 Jan 2025 07:01  -  29 Jan 2025 07:00  --------------------------------------------------------  IN: 1320 mL / OUT: 1000 mL / NET: 320 mL        PHYSICAL EXAM:  General: NAD  Neurology: awake, A&Ox4  HEENT: NC/AT  Respiratory: CTA b/l, no rales or rhonchi noted  Cardiovascular: RRR, normal S1S2, no M/R/G  Abdomen: soft, NT/ND, +BS, no palpable masses  Extremities: WWP, no clubbing, cyanosis, or edema, Groin access site with surrounding ecchymosis w/o tenderness nor induration.  Skin: warm/dry      Meds:      atorvastatin Oral      acetaminophen     Tablet .. Oral PRN  DULoxetine Oral      aspirin enteric coated Oral  clopidogrel Tablet Oral  heparin   Injectable SubCutaneous            chlorhexidine 2% Cloths Topical                              9.5    6.88  )-----------( 192      ( 29 Jan 2025 05:55 )             27.8       01-29    138  |  108  |  47[H]  ----------------------------<  158[H]  4.1   |  23  |  2.10[H]    Ca    8.5      29 Jan 2025 05:55  Phos  4.6     01-29  Mg     2.0     01-29    TPro  6.3  /  Alb  2.9[L]  /  TBili  0.5  /  DBili  x   /  AST  53[H]  /  ALT  21  /  AlkPhos  70  01-28      CARDIAC MARKERS ( 28 Jan 2025 13:30 )  x     / x     / x     / x     / 6.9 ng/mL  CARDIAC MARKERS ( 28 Jan 2025 08:28 )  x     / x     / x     / x     / 8.1 ng/mL        Urinalysis Basic - ( 29 Jan 2025 05:55 )    Color: x / Appearance: x / SG: x / pH: x  Gluc: 158 mg/dL / Ketone: x  / Bili: x / Urobili: x   Blood: x / Protein: x / Nitrite: x   Leuk Esterase: x / RBC: x / WBC x   Sq Epi: x / Non Sq Epi: x / Bacteria: x      .Blood BLOOD   No growth at 24 hours -- 01-27 @ 18:10  .Blood BLOOD   No growth at 24 hours -- 01-27 @ 18:00      GLOBAL ISSUE/BEST PRACTICE:  Analgesia:N  Sedation:N  HOB elevation: Y  Stress ulcer prophylaxis:N  VTE prophylaxis:Y  Glycemic control:N  Nutrition:Y    CODE STATUS:   Full Code s/p PCI     INTERVAL HPI/OVERNIGHT EVENTS: No acute overnight events occurred. Patient has no acute complaints. Afebrile. SBP >100 this AM.      ICU Vital Signs Last 24 Hrs  T(C): 36.9 (29 Jan 2025 08:06), Max: 37.6 (29 Jan 2025 00:22)  T(F): 98.5 (29 Jan 2025 08:06), Max: 99.6 (29 Jan 2025 00:22)  HR: 79 (29 Jan 2025 09:14) (66 - 84)  BP: 121/52 (29 Jan 2025 09:14) (83/46 - 125/82)  BP(mean): 73 (29 Jan 2025 09:14) (58 - 97)  ABP: --  ABP(mean): --  RR: 18 (29 Jan 2025 09:14) (14 - 28)  SpO2: 97% (29 Jan 2025 09:14) (92% - 100%)    O2 Parameters below as of 29 Jan 2025 08:00  Patient On (Oxygen Delivery Method): room air              01-28-25 @ 07:01  -  01-29-25 @ 07:00  --------------------------------------------------------  IN: 1320 mL / OUT: 1000 mL / NET: 320 mL        CAPILLARY BLOOD GLUCOSE          I&O's Summary    28 Jan 2025 07:01  -  29 Jan 2025 07:00  --------------------------------------------------------  IN: 1320 mL / OUT: 1000 mL / NET: 320 mL        PHYSICAL EXAM:  General: NAD  Neurology: awake, A&Ox4  HEENT: NC/AT  Respiratory: CTA b/l, no rales or rhonchi noted  Cardiovascular: RRR, normal S1S2, no M/R/G  Abdomen: soft, NT/ND, +BS, no palpable masses, RLQ with superficial ecchymosis, nontender, no hematoma  Extremities: WWP, no clubbing, cyanosis, or edema, Groin access site  w/o tenderness, ecchymosis nor induration.  Skin: warm/dry      Meds:      atorvastatin Oral      acetaminophen     Tablet .. Oral PRN  DULoxetine Oral      aspirin enteric coated Oral  clopidogrel Tablet Oral  heparin   Injectable SubCutaneous            chlorhexidine 2% Cloths Topical                              9.5    6.88  )-----------( 192      ( 29 Jan 2025 05:55 )             27.8       01-29    138  |  108  |  47[H]  ----------------------------<  158[H]  4.1   |  23  |  2.10[H]    Ca    8.5      29 Jan 2025 05:55  Phos  4.6     01-29  Mg     2.0     01-29    TPro  6.3  /  Alb  2.9[L]  /  TBili  0.5  /  DBili  x   /  AST  53[H]  /  ALT  21  /  AlkPhos  70  01-28      CARDIAC MARKERS ( 28 Jan 2025 13:30 )  x     / x     / x     / x     / 6.9 ng/mL  CARDIAC MARKERS ( 28 Jan 2025 08:28 )  x     / x     / x     / x     / 8.1 ng/mL        Urinalysis Basic - ( 29 Jan 2025 05:55 )    Color: x / Appearance: x / SG: x / pH: x  Gluc: 158 mg/dL / Ketone: x  / Bili: x / Urobili: x   Blood: x / Protein: x / Nitrite: x   Leuk Esterase: x / RBC: x / WBC x   Sq Epi: x / Non Sq Epi: x / Bacteria: x      .Blood BLOOD   No growth at 24 hours -- 01-27 @ 18:10  .Blood BLOOD   No growth at 24 hours -- 01-27 @ 18:00    DATA REVIEW    All data personally reviewed.  See above for details    VS trends--afebrile, normal HR, mild hypotension but improved  Laboratory results--normal WBC, anemia stable on PM labs, slight drop from yesterday, elevated Cr, normal lytes    GLOBAL ISSUE/BEST PRACTICE:  Analgesia:N  Sedation:N  HOB elevation: Y  Stress ulcer prophylaxis:N  VTE prophylaxis:Y  Glycemic control:N  Nutrition:Y    CODE STATUS:   Full Code s/p PCI

## 2025-01-29 NOTE — PROGRESS NOTE ADULT - SUBJECTIVE AND OBJECTIVE BOX
Patient is a 89y old  Female who presents with a chief complaint of STEMI (28 Jan 2025 14:06)      BRIEF HOSPITAL COURSE:  89 y/o F with a h/o HTN, DM2, HLD, admitted on 1/26 with inferior STEMI s/p PCI to [place]. Hospital course complicated by hypotension, JOHNNY and small pericardial effusion.    Events last 24 hours: BP improving s/p IVF resuscitation. She denies complaints.        PAST MEDICAL & SURGICAL HISTORY:  HTN (hypertension)      DM (diabetes mellitus)      HLD (hyperlipidemia)      Major depression      No significant past surgical history          Review of Systems:  CONSTITUTIONAL: No fever, chills, or fatigue  EYES: No eye pain, visual disturbances, or discharge  ENMT:  No difficulty hearing, tinnitus, vertigo; No sinus or throat pain  NECK: No pain or stiffness  RESPIRATORY: No cough, wheezing, chills or hemoptysis; No shortness of breath  CARDIOVASCULAR: No chest pain, palpitations, dizziness, or leg swelling  GASTROINTESTINAL: No abdominal or epigastric pain. No nausea, vomiting, or hematemesis; No diarrhea or constipation. No melena or hematochezia.  GENITOURINARY: No dysuria, frequency, hematuria, or incontinence  NEUROLOGICAL: No headaches, memory loss, loss of strength, numbness, or tremors  SKIN: No itching, burning, rashes, or lesions   MUSCULOSKELETAL: No joint pain or swelling; No muscle, back, or extremity pain  PSYCHIATRIC: No depression, anxiety, mood swings, or difficulty sleeping      Medications:        acetaminophen     Tablet .. 650 milliGRAM(s) Oral every 6 hours PRN  DULoxetine 90 milliGRAM(s) Oral daily      aspirin enteric coated 81 milliGRAM(s) Oral daily  clopidogrel Tablet 75 milliGRAM(s) Oral every 24 hours  heparin   Injectable 5000 Unit(s) SubCutaneous every 8 hours        atorvastatin 40 milliGRAM(s) Oral at bedtime        chlorhexidine 2% Cloths 1 Application(s) Topical <User Schedule>            ICU Vital Signs Last 24 Hrs  T(C): 36.6 (29 Jan 2025 04:15), Max: 37.6 (29 Jan 2025 00:22)  T(F): 97.9 (29 Jan 2025 04:15), Max: 99.6 (29 Jan 2025 00:22)  HR: 73 (29 Jan 2025 05:00) (66 - 81)  BP: 106/57 (29 Jan 2025 05:00) (83/46 - 125/82)  BP(mean): 71 (29 Jan 2025 05:00) (58 - 97)  ABP: --  ABP(mean): --  RR: 24 (29 Jan 2025 05:00) (17 - 28)  SpO2: 95% (29 Jan 2025 05:00) (92% - 100%)            I&O's Detail    27 Jan 2025 07:01  -  28 Jan 2025 07:00  --------------------------------------------------------  IN:    Lactated Ringers: 500 mL    Lactated Ringers: 1050 mL    Lactated Ringers Bolus: 500 mL    Oral Fluid: 600 mL  Total IN: 2650 mL    OUT:    Voided (mL): 900 mL  Total OUT: 900 mL    Total NET: 1750 mL      28 Jan 2025 07:01  -  29 Jan 2025 05:43  --------------------------------------------------------  IN:    Lactated Ringers Bolus: 1000 mL    Oral Fluid: 320 mL  Total IN: 1320 mL    OUT:    Voided (mL): 1000 mL  Total OUT: 1000 mL    Total NET: 320 mL            LABS:                        10.7   8.11  )-----------( 239      ( 28 Jan 2025 08:28 )             32.6     01-28    138  |  105  |  39[H]  ----------------------------<  185[H]  4.5   |  22  |  2.00[H]    Ca    8.5      28 Jan 2025 13:30  Phos  4.2     01-28  Mg     2.0     01-28    TPro  6.3  /  Alb  2.9[L]  /  TBili  0.5  /  DBili  x   /  AST  53[H]  /  ALT  21  /  AlkPhos  70  01-28      CARDIAC MARKERS ( 28 Jan 2025 13:30 )  x     / x     / x     / x     / 6.9 ng/mL  CARDIAC MARKERS ( 28 Jan 2025 08:28 )  x     / x     / x     / x     / 8.1 ng/mL      CAPILLARY BLOOD GLUCOSE          Urinalysis Basic - ( 28 Jan 2025 13:30 )    Color: x / Appearance: x / SG: x / pH: x  Gluc: 185 mg/dL / Ketone: x  / Bili: x / Urobili: x   Blood: x / Protein: x / Nitrite: x   Leuk Esterase: x / RBC: x / WBC x   Sq Epi: x / Non Sq Epi: x / Bacteria: x      CULTURES:  Culture Results:   No growth at 24 hours (01-27-25 @ 18:10)  Culture Results:   No growth at 24 hours (01-27-25 @ 18:00)        Physical Examination:    General: No acute distress.  Alert, oriented, interactive, nonfocal    HEENT: Pupils equal, reactive to light.  Symmetric.    PULM: Clear to auscultation bilaterally, no significant sputum production    CVS: Regular rate and rhythm, no murmurs, rubs, or gallops    ABD: Soft, nondistended, nontender, normoactive bowel sounds, no masses    EXT: No edema, nontender    SKIN: Warm and well perfused, no rashes noted.    NEURO: A&Ox3, strength 5/5 all extremities, cranial nerves grossly intact, no focal deficits        RADIOLOGY:     < from: Xray Chest 1 View- PORTABLE-Urgent (01.26.25 @ 20:02) >  FINDINGS:    Single frontal view of the chest demonstrates the lungs to be clear. The   cardiomediastinal silhouette is unremarkable. No acute osseous   abnormalities. Overlying EKG leads and wires are noted    IMPRESSION: No acute cardiopulmonary disease process.    < end of copied text >

## 2025-01-29 NOTE — PATIENT PROFILE ADULT - FALL HARM RISK - FACTORS
Frequent toileting needed/Impaired gait/IV and/or equipment tethered to patient/Medication side effects/Other medical problems/Post Op/Post procedure

## 2025-01-29 NOTE — PROGRESS NOTE ADULT - PROBLEM SELECTOR PLAN 3
Chronic  - glucose elevated to 300 on arrival  - takes metformin at home  - c/w PRITESH with FS QAC/QHS  - hypoglycemia protocol  - A1c 7.3

## 2025-01-29 NOTE — PROGRESS NOTE ADULT - ASSESSMENT
Pt is a 89y/o F with PMHx HTN, DM2, HLD, depression who presented to the ED with intermittent jaw pain followed by anterior chest pressure radiating to the left shoulder, admitted for STEMI.

## 2025-01-29 NOTE — PROGRESS NOTE ADULT - SUBJECTIVE AND OBJECTIVE BOX
Patient is a 89y old  Female who presents with a chief complaint of STEMI (29 Jan 2025 11:16)      INTERVAL HPI/OVERNIGHT EVENTS: No acute events overnight. Pt was seen and examined at the bedside this AM. No new acute complaints this AM.    MEDICATIONS  (STANDING):  aspirin enteric coated 81 milliGRAM(s) Oral daily  atorvastatin 40 milliGRAM(s) Oral at bedtime  chlorhexidine 2% Cloths 1 Application(s) Topical <User Schedule>  clopidogrel Tablet 75 milliGRAM(s) Oral every 24 hours  DULoxetine 90 milliGRAM(s) Oral daily  heparin   Injectable 5000 Unit(s) SubCutaneous every 8 hours    MEDICATIONS  (PRN):  acetaminophen     Tablet .. 650 milliGRAM(s) Oral every 6 hours PRN Temp greater or equal to 38C (100.4F), Mild Pain (1 - 3)      Allergies    No Known Allergies    Intolerances        REVIEW OF SYSTEMS:  CONSTITUTIONAL: No fever or chills  HEENT:  No headache, no sore throat  RESPIRATORY: No cough, wheezing, or shortness of breath  CARDIOVASCULAR: No chest pain, palpitations  GASTROINTESTINAL: No abd pain, nausea, vomiting, or diarrhea  GENITOURINARY: No dysuria, frequency, or hematuria  NEUROLOGICAL: no focal weakness or dizziness  MUSCULOSKELETAL: no myalgias   INTEGUMENTARY:     Vital Signs Last 24 Hrs  T(C): 36.3 (29 Jan 2025 11:59), Max: 37.6 (29 Jan 2025 00:22)  T(F): 97.3 (29 Jan 2025 11:59), Max: 99.6 (29 Jan 2025 00:22)  HR: 67 (29 Jan 2025 12:00) (66 - 84)  BP: 112/54 (29 Jan 2025 12:00) (93/59 - 125/82)  BP(mean): 72 (29 Jan 2025 12:00) (63 - 97)  RR: 21 (29 Jan 2025 12:00) (14 - 28)  SpO2: 98% (29 Jan 2025 12:00) (92% - 100%)    Parameters below as of 29 Jan 2025 08:00  Patient On (Oxygen Delivery Method): room air        PHYSICAL EXAM:  General: NAD  Neurology: awake, A&Ox4  HEENT: NC/AT  Respiratory: CTA b/l, no rales or rhonchi noted  Cardiovascular: RRR, normal S1S2, no M/R/G  Abdomen: soft, NT/ND, +BS, no palpable masses  Extremities: WWP, no clubbing, cyanosis, or edema, Groin access site with surrounding ecchymosis w/o tenderness or induration.  Skin: warm and dry      LABS:                        9.5    6.88  )-----------( 192      ( 29 Jan 2025 05:55 )             27.8     CBC Full  -  ( 29 Jan 2025 05:55 )  WBC Count : 6.88 K/uL  Hemoglobin : 9.5 g/dL  Hematocrit : 27.8 %  Platelet Count - Automated : 192 K/uL  Mean Cell Volume : 90.3 fl  Mean Cell Hemoglobin : 30.8 pg  Mean Cell Hemoglobin Concentration : 34.2 g/dL  Auto Neutrophil # : x  Auto Lymphocyte # : x  Auto Monocyte # : x  Auto Eosinophil # : x  Auto Basophil # : x  Auto Neutrophil % : x  Auto Lymphocyte % : x  Auto Monocyte % : x  Auto Eosinophil % : x  Auto Basophil % : x    29 Jan 2025 05:55    138    |  108    |  47     ----------------------------<  158    4.1     |  23     |  2.10     Ca    8.5        29 Jan 2025 05:55  Phos  4.6       29 Jan 2025 05:55  Mg     2.0       29 Jan 2025 05:55        Urinalysis Basic - ( 29 Jan 2025 05:55 )    Color: x / Appearance: x / SG: x / pH: x  Gluc: 158 mg/dL / Ketone: x  / Bili: x / Urobili: x   Blood: x / Protein: x / Nitrite: x   Leuk Esterase: x / RBC: x / WBC x   Sq Epi: x / Non Sq Epi: x / Bacteria: x      CAPILLARY BLOOD GLUCOSE            Culture - Blood (collected 01-27-25 @ 18:10)  Source: .Blood BLOOD  Preliminary Report (01-29-25 @ 01:02):    No growth at 24 hours    Culture - Blood (collected 01-27-25 @ 18:00)  Source: .Blood BLOOD  Preliminary Report (01-29-25 @ 01:02):    No growth at 24 hours        RADIOLOGY & ADDITIONAL TESTS:    Personally reviewed.     Consultant(s) Notes Reviewed:  [x] YES  [ ] NO

## 2025-01-29 NOTE — PROGRESS NOTE ADULT - NSPROGADDITIONALINFOA_GEN_ALL_CORE
Additional Information: I have personally seen and examined the patient.  I fully participated in the care of this patient.  I have made amendments to the documentation where necessary, and agree with the history, physical exam, and plan as documented by the Resident.     Agree with above, edited where appropriate.      Time-based billing (NON-critical care).     40 minutes spent on total encounter. The necessity of the time spent during the encounter on this date of service was due to:     Pt seen + examined. Case discussed with resident. Agree with assessment and plan above (edited by me in detail):  Time spent: 40min. More than 50% of the visit was spent on coordination of care, excluding teaching time.

## 2025-01-29 NOTE — PROGRESS NOTE ADULT - SUBJECTIVE AND OBJECTIVE BOX
[facility] - Jamal Moralez, Roney Villela, Sincere White  Office Number:  [phone]    Patient resting comfortably in bed in NAD.  Laying flat with no respiratory distress.  No complaints of chest pain, dyspnea, palpitations, PND, or orthopnea.  says she is feeling well  bp better overall    ROS: negative unless otherwise mentioned.    Telemetry:  sr    MEDICATIONS  (STANDING):  aspirin enteric coated 81 milliGRAM(s) Oral daily  atorvastatin 40 milliGRAM(s) Oral at bedtime  chlorhexidine 2% Cloths 1 Application(s) Topical <User Schedule>  clopidogrel Tablet 75 milliGRAM(s) Oral every 24 hours  DULoxetine 90 milliGRAM(s) Oral daily  heparin   Injectable 5000 Unit(s) SubCutaneous every 8 hours    MEDICATIONS  (PRN):  acetaminophen     Tablet .. 650 milliGRAM(s) Oral every 6 hours PRN Temp greater or equal to 38C (100.4F), Mild Pain (1 - 3)      Allergies    No Known Allergies    Intolerances        Vital Signs Last 24 Hrs  T(C): 36.9 (29 Jan 2025 08:06), Max: 37.6 (29 Jan 2025 00:22)  T(F): 98.5 (29 Jan 2025 08:06), Max: 99.6 (29 Jan 2025 00:22)  HR: 73 (29 Jan 2025 11:00) (66 - 84)  BP: 112/50 (29 Jan 2025 11:00) (83/46 - 125/82)  BP(mean): 69 (29 Jan 2025 11:00) (58 - 97)  RR: 21 (29 Jan 2025 11:00) (14 - 28)  SpO2: 95% (29 Jan 2025 11:00) (92% - 100%)    Parameters below as of 29 Jan 2025 08:00  Patient On (Oxygen Delivery Method): room air        I&O's Summary    28 Jan 2025 07:01  -  29 Jan 2025 07:00  --------------------------------------------------------  IN: 1320 mL / OUT: 1000 mL / NET: 320 mL        ON EXAM:    Constitutional: NAD, awake   HEENT: Moist Mucous Membranes, Anicteric  Pulmonary: Decreased breath sounds b/l. No rales, crackles or wheeze appreciated.   Cardiovascular: Regular, S1 and S2, No murmurs, rubs, gallops or clicks  Gastrointestinal: Bowel Sounds present, soft, nontender.   Lymph: No peripheral edema. No lymphadenopathy.  Skin: No visible rashes or ulcers.  Psych:  Mood & affect appropriate for situation    LABS: All Labs Reviewed:                        9.5    6.88  )-----------( 192      ( 29 Jan 2025 05:55 )             27.8                         10.7   8.11  )-----------( 239      ( 28 Jan 2025 08:28 )             32.6                         10.3   7.25  )-----------( 204      ( 28 Jan 2025 06:18 )             30.8     29 Jan 2025 05:55    138    |  108    |  47     ----------------------------<  158    4.1     |  23     |  2.10   28 Jan 2025 13:30    138    |  105    |  39     ----------------------------<  185    4.5     |  22     |  2.00   28 Jan 2025 08:28    135    |  106    |  39     ----------------------------<  212    4.2     |  22     |  2.10     Ca    8.5        29 Jan 2025 05:55  Ca    8.5        28 Jan 2025 13:30  Ca    8.7        28 Jan 2025 08:28  Phos  4.6       29 Jan 2025 05:55  Phos  4.2       28 Jan 2025 08:28  Phos  4.3       28 Jan 2025 06:18  Mg     2.0       29 Jan 2025 05:55  Mg     2.0       28 Jan 2025 08:28  Mg     2.1       28 Jan 2025 06:18    TPro  6.3    /  Alb  2.9    /  TBili  0.5    /  DBili  x      /  AST  53     /  ALT  21     /  AlkPhos  70     28 Jan 2025 08:28  TPro  6.4    /  Alb  3.1    /  TBili  0.3    /  DBili  x      /  AST  66     /  ALT  18     /  AlkPhos  81     27 Jan 2025 06:10  TPro  7.1    /  Alb  3.4    /  TBili  0.2    /  DBili  x      /  AST  16     /  ALT  14     /  AlkPhos  103    26 Jan 2025 19:45      CARDIAC MARKERS ( 28 Jan 2025 13:30 )  x     / x     / x     / x     / 6.9 ng/mL  CARDIAC MARKERS ( 28 Jan 2025 08:28 )  x     / x     / x     / x     / 8.1 ng/mL      Blood Culture: Organism --  Gram Stain Blood -- Gram Stain --  Specimen Source .Blood BLOOD  Culture-Blood --    Organism --  Gram Stain Blood -- Gram Stain --  Specimen Source .Blood BLOOD  Culture-Blood --

## 2025-01-29 NOTE — PROGRESS NOTE ADULT - ASSESSMENT
89 y/o F with a h/o HTN, DM2, HLD, with:    # STEMI, s/p PCI to Diagonal  # Hypotension  # JOHNNY    - BP improved s/p 1L crystalloid bolus  - monitor hemodynamics closely, maintain a MAP > 65  - consider gradual introduction of GDMT given new cardiomyopathy (LVEF 40-45%)  - repeat TTE reviewed, pericardial effusion remains small and without evidence of tamponade  - DAPT  - high intensity statin  - JOHNNY likely pre-renal/ischemic ATN, optimize end-organ perfusion as above  - trend BUN/Cr, electrolytes, acid-base balance, monitor hourly UOP (nonoliguric)  - no indication for urgent RRT at this time    36 minutes accounts for the total time spent on this patient encounter, which includes assessing and addressing the problems and their associated risks as mentioned above, reviewing the medical record/laboratory data/imaging studies, interviewing and physically examining the patient, as well as coordinating care with the multidisciplinary team. The date of entry of this note reflects the date of services rendered.

## 2025-01-29 NOTE — PATIENT PROFILE ADULT - FALL HARM RISK - HARM RISK INTERVENTIONS
Assistance with ambulation/Assistance OOB with selected safe patient handling equipment/Communicate Risk of Fall with Harm to all staff/Discuss with provider need for PT consult/Monitor gait and stability/Provide patient with walking aids - walker, cane, crutches/Reinforce activity limits and safety measures with patient and family/Review medications for side effects contributing to fall risk/Sit up slowly, dangle for a short time, stand at bedside before walking/Tailored Fall Risk Interventions/Toileting schedule using arm’s reach rule for commode and bathroom/Use of alarms - bed, chair and/or voice tab/Visual Cue: Yellow wristband and red socks/Bed in lowest position, wheels locked, appropriate side rails in place/Call bell, personal items and telephone in reach/Instruct patient to call for assistance before getting out of bed or chair/Non-slip footwear when patient is out of bed/[place] to call system/Physically safe environment - no spills, clutter or unnecessary equipment/Purposeful Proactive Rounding/Room/bathroom lighting operational, light cord in reach

## 2025-01-30 LAB
ANION GAP SERPL CALC-SCNC: 11 MMOL/L — SIGNIFICANT CHANGE UP (ref 5–17)
BUN SERPL-MCNC: 42 MG/DL — HIGH (ref 7–23)
CALCIUM SERPL-MCNC: 8.5 MG/DL — SIGNIFICANT CHANGE UP (ref 8.5–10.1)
CHLORIDE SERPL-SCNC: 108 MMOL/L — SIGNIFICANT CHANGE UP (ref 96–108)
CO2 SERPL-SCNC: 20 MMOL/L — LOW (ref 22–31)
CREAT SERPL-MCNC: 1.8 MG/DL — HIGH (ref 0.5–1.3)
EGFR: 26 ML/MIN/1.73M2 — LOW
GLUCOSE SERPL-MCNC: 143 MG/DL — HIGH (ref 70–99)
HCT VFR BLD CALC: 29.1 % — LOW (ref 34.5–45)
HGB BLD-MCNC: 9.6 G/DL — LOW (ref 11.5–15.5)
MAGNESIUM SERPL-MCNC: 2.2 MG/DL — SIGNIFICANT CHANGE UP (ref 1.6–2.6)
MCHC RBC-ENTMCNC: 30 PG — SIGNIFICANT CHANGE UP (ref 27–34)
MCHC RBC-ENTMCNC: 33 G/DL — SIGNIFICANT CHANGE UP (ref 32–36)
MCV RBC AUTO: 90.9 FL — SIGNIFICANT CHANGE UP (ref 80–100)
NRBC # BLD: 0 /100 WBCS — SIGNIFICANT CHANGE UP (ref 0–0)
NRBC BLD-RTO: 0 /100 WBCS — SIGNIFICANT CHANGE UP (ref 0–0)
PHOSPHATE SERPL-MCNC: 3.7 MG/DL — SIGNIFICANT CHANGE UP (ref 2.5–4.5)
PLATELET # BLD AUTO: 187 K/UL — SIGNIFICANT CHANGE UP (ref 150–400)
POTASSIUM SERPL-MCNC: 4.4 MMOL/L — SIGNIFICANT CHANGE UP (ref 3.5–5.3)
POTASSIUM SERPL-SCNC: 4.4 MMOL/L — SIGNIFICANT CHANGE UP (ref 3.5–5.3)
RBC # BLD: 3.2 M/UL — LOW (ref 3.8–5.2)
RBC # FLD: 14 % — SIGNIFICANT CHANGE UP (ref 10.3–14.5)
SODIUM SERPL-SCNC: 139 MMOL/L — SIGNIFICANT CHANGE UP (ref 135–145)
WBC # BLD: 6.3 K/UL — SIGNIFICANT CHANGE UP (ref 3.8–10.5)
WBC # FLD AUTO: 6.3 K/UL — SIGNIFICANT CHANGE UP (ref 3.8–10.5)

## 2025-01-30 PROCEDURE — 99233 SBSQ HOSP IP/OBS HIGH 50: CPT | Mod: GC

## 2025-01-30 PROCEDURE — 99233 SBSQ HOSP IP/OBS HIGH 50: CPT

## 2025-01-30 PROCEDURE — 99232 SBSQ HOSP IP/OBS MODERATE 35: CPT | Mod: GC

## 2025-01-30 RX ORDER — SIMVASTATIN 20 MG
1 TABLET ORAL
Refills: 0 | DISCHARGE

## 2025-01-30 RX ORDER — ATORVASTATIN CALCIUM 80 MG/1
1 TABLET, FILM COATED ORAL
Qty: 0 | Refills: 0 | DISCHARGE
Start: 2025-01-30

## 2025-01-30 RX ORDER — ASPIRIN 81 MG/1
1 TABLET, COATED ORAL
Qty: 0 | Refills: 0 | DISCHARGE
Start: 2025-01-30

## 2025-01-30 RX ORDER — FOSINOPRIL SODIUM 40 MG/1
1 TABLET ORAL
Refills: 0 | DISCHARGE

## 2025-01-30 RX ADMIN — Medication 5000 UNIT(S): at 22:20

## 2025-01-30 RX ADMIN — Medication 5000 UNIT(S): at 13:23

## 2025-01-30 RX ADMIN — ANTISEPTIC SURGICAL SCRUB 1 APPLICATION(S): 0.04 SOLUTION TOPICAL at 06:05

## 2025-01-30 RX ADMIN — Medication 75 MILLIGRAM(S): at 06:04

## 2025-01-30 RX ADMIN — DULOXETINE 90 MILLIGRAM(S): 20 CAPSULE, DELAYED RELEASE ORAL at 11:33

## 2025-01-30 RX ADMIN — ASPIRIN 81 MILLIGRAM(S): 81 TABLET, COATED ORAL at 11:33

## 2025-01-30 RX ADMIN — ATORVASTATIN CALCIUM 40 MILLIGRAM(S): 80 TABLET, FILM COATED ORAL at 22:20

## 2025-01-30 RX ADMIN — Medication 5000 UNIT(S): at 06:04

## 2025-01-30 NOTE — PROGRESS NOTE ADULT - SUBJECTIVE AND OBJECTIVE BOX
Patient is a 89y old  Female who presents with a chief complaint of STEMI (30 Jan 2025 08:59)      INTERVAL HPI/OVERNIGHT EVENTS: No acute events overnight. Pt was seen and examined at the bedside this AM. No new acute complaints this AM.     MEDICATIONS  (STANDING):  aspirin enteric coated 81 milliGRAM(s) Oral daily  atorvastatin 40 milliGRAM(s) Oral at bedtime  chlorhexidine 2% Cloths 1 Application(s) Topical <User Schedule>  clopidogrel Tablet 75 milliGRAM(s) Oral every 24 hours  DULoxetine 90 milliGRAM(s) Oral daily  heparin   Injectable 5000 Unit(s) SubCutaneous every 8 hours  influenza  Vaccine (HIGH DOSE) 0.5 milliLiter(s) IntraMuscular once    MEDICATIONS  (PRN):  acetaminophen     Tablet .. 650 milliGRAM(s) Oral every 6 hours PRN Temp greater or equal to 38C (100.4F), Mild Pain (1 - 3)      Allergies    No Known Allergies    Intolerances        REVIEW OF SYSTEMS:  CONSTITUTIONAL: No fever or chills  HEENT:  No headache, no sore throat  RESPIRATORY: No cough, wheezing, or shortness of breath  CARDIOVASCULAR: No chest pain, palpitations  GASTROINTESTINAL: No abd pain, nausea, vomiting, or diarrhea  GENITOURINARY: No dysuria, frequency, or hematuria  NEUROLOGICAL: no focal weakness or dizziness  MUSCULOSKELETAL: no myalgias   INTEGUMENTARY: no new rashes    Vital Signs Last 24 Hrs  T(C): 37 (30 Jan 2025 07:46), Max: 37.2 (29 Jan 2025 19:16)  T(F): 98.6 (30 Jan 2025 07:46), Max: 98.9 (29 Jan 2025 19:16)  HR: 88 (30 Jan 2025 09:00) (61 - 88)  BP: 109/65 (30 Jan 2025 09:00) (90/47 - 128/57)  BP(mean): 77 (30 Jan 2025 09:00) (60 - 113)  RR: 22 (30 Jan 2025 09:00) (13 - 27)  SpO2: 98% (30 Jan 2025 09:00) (92% - 100%)    Parameters below as of 30 Jan 2025 08:00  Patient On (Oxygen Delivery Method): room air        PHYSICAL EXAM:  GENERAL: NAD  HEENT:  anicteric, moist mucous membranes  CHEST/LUNG:  CTA b/l, no rales, wheezes, or rhonchi  HEART:  RRR, S1, S2  ABDOMEN:  BS+, soft, nontender, nondistended  MUSCULOSKELETAL: no edema, cyanosis, or calf tenderness  NEUROLOGIC: answers questions and follows commands appropriately      LABS:                        9.6    6.30  )-----------( 187      ( 30 Jan 2025 05:42 )             29.1     CBC Full  -  ( 30 Jan 2025 05:42 )  WBC Count : 6.30 K/uL  Hemoglobin : 9.6 g/dL  Hematocrit : 29.1 %  Platelet Count - Automated : 187 K/uL  Mean Cell Volume : 90.9 fl  Mean Cell Hemoglobin : 30.0 pg  Mean Cell Hemoglobin Concentration : 33.0 g/dL  Auto Neutrophil # : x  Auto Lymphocyte # : x  Auto Monocyte # : x  Auto Eosinophil # : x  Auto Basophil # : x  Auto Neutrophil % : x  Auto Lymphocyte % : x  Auto Monocyte % : x  Auto Eosinophil % : x  Auto Basophil % : x    30 Jan 2025 05:42    139    |  108    |  42     ----------------------------<  143    4.4     |  20     |  1.80     Ca    8.5        30 Jan 2025 05:42  Phos  3.7       30 Jan 2025 05:42  Mg     2.2       30 Jan 2025 05:42        Urinalysis Basic - ( 30 Jan 2025 05:42 )    Color: x / Appearance: x / SG: x / pH: x  Gluc: 143 mg/dL / Ketone: x  / Bili: x / Urobili: x   Blood: x / Protein: x / Nitrite: x   Leuk Esterase: x / RBC: x / WBC x   Sq Epi: x / Non Sq Epi: x / Bacteria: x      CAPILLARY BLOOD GLUCOSE      POCT Blood Glucose.: 187 mg/dL (29 Jan 2025 21:15)        Culture - Blood (collected 01-28-25 @ 13:30)  Source: .Blood BLOOD  Preliminary Report (01-29-25 @ 20:01):    No growth at 24 hours    Culture - Blood (collected 01-28-25 @ 13:25)  Source: .Blood BLOOD  Preliminary Report (01-29-25 @ 20:01):    No growth at 24 hours    Culture - Urine (collected 01-28-25 @ 12:26)  Source: Clean Catch Clean Catch (Midstream)  Final Report (01-29-25 @ 23:08):    No growth    Culture - Blood (collected 01-27-25 @ 18:10)  Source: .Blood BLOOD  Preliminary Report (01-30-25 @ 01:02):    No growth at 48 Hours    Culture - Blood (collected 01-27-25 @ 18:00)  Source: .Blood BLOOD  Preliminary Report (01-30-25 @ 01:02):    No growth at 48 Hours        RADIOLOGY & ADDITIONAL TESTS:    Personally reviewed.     Consultant(s) Notes Reviewed:  [x] YES  [ ] NO     Patient is a 89y old  Female who presents with a chief complaint of STEMI (30 Jan 2025 08:59)      INTERVAL HPI/OVERNIGHT EVENTS: No acute events overnight. Pt was seen and examined at the bedside this AM. No new acute complaints this AM. Denies chest pain. Feels ready for DC soon - plan is for tomorrow if stable.    MEDICATIONS  (STANDING):  aspirin enteric coated 81 milliGRAM(s) Oral daily  atorvastatin 40 milliGRAM(s) Oral at bedtime  chlorhexidine 2% Cloths 1 Application(s) Topical <User Schedule>  clopidogrel Tablet 75 milliGRAM(s) Oral every 24 hours  DULoxetine 90 milliGRAM(s) Oral daily  heparin   Injectable 5000 Unit(s) SubCutaneous every 8 hours  influenza  Vaccine (HIGH DOSE) 0.5 milliLiter(s) IntraMuscular once    MEDICATIONS  (PRN):  acetaminophen     Tablet .. 650 milliGRAM(s) Oral every 6 hours PRN Temp greater or equal to 38C (100.4F), Mild Pain (1 - 3)      Allergies    No Known Allergies    Intolerances        REVIEW OF SYSTEMS:  CONSTITUTIONAL: No fever or chills  HEENT:  No headache, no sore throat  RESPIRATORY: No cough, wheezing, or shortness of breath  CARDIOVASCULAR: No chest pain, palpitations  GASTROINTESTINAL: No abd pain, nausea, vomiting, or diarrhea  GENITOURINARY: No dysuria, frequency, or hematuria  NEUROLOGICAL: no focal weakness or dizziness  MUSCULOSKELETAL: no myalgias   INTEGUMENTARY: no new rashes    Vital Signs Last 24 Hrs  T(C): 37 (30 Jan 2025 07:46), Max: 37.2 (29 Jan 2025 19:16)  T(F): 98.6 (30 Jan 2025 07:46), Max: 98.9 (29 Jan 2025 19:16)  HR: 88 (30 Jan 2025 09:00) (61 - 88)  BP: 109/65 (30 Jan 2025 09:00) (90/47 - 128/57)  BP(mean): 77 (30 Jan 2025 09:00) (60 - 113)  RR: 22 (30 Jan 2025 09:00) (13 - 27)  SpO2: 98% (30 Jan 2025 09:00) (92% - 100%)    Parameters below as of 30 Jan 2025 08:00  Patient On (Oxygen Delivery Method): room air        PHYSICAL EXAM:  GENERAL: NAD  HEENT:  anicteric, moist mucous membranes  CHEST/LUNG:  CTA b/l, no rales, wheezes, or rhonchi  HEART:  RRR, S1, S2  ABDOMEN:  BS+, soft, nontender, nondistended  MUSCULOSKELETAL: no edema, cyanosis, or calf tenderness  NEUROLOGIC: answers questions and follows commands appropriately      LABS:                        9.6    6.30  )-----------( 187      ( 30 Jan 2025 05:42 )             29.1     CBC Full  -  ( 30 Jan 2025 05:42 )  WBC Count : 6.30 K/uL  Hemoglobin : 9.6 g/dL  Hematocrit : 29.1 %  Platelet Count - Automated : 187 K/uL  Mean Cell Volume : 90.9 fl  Mean Cell Hemoglobin : 30.0 pg  Mean Cell Hemoglobin Concentration : 33.0 g/dL  Auto Neutrophil # : x  Auto Lymphocyte # : x  Auto Monocyte # : x  Auto Eosinophil # : x  Auto Basophil # : x  Auto Neutrophil % : x  Auto Lymphocyte % : x  Auto Monocyte % : x  Auto Eosinophil % : x  Auto Basophil % : x    30 Jan 2025 05:42    139    |  108    |  42     ----------------------------<  143    4.4     |  20     |  1.80     Ca    8.5        30 Jan 2025 05:42  Phos  3.7       30 Jan 2025 05:42  Mg     2.2       30 Jan 2025 05:42        Urinalysis Basic - ( 30 Jan 2025 05:42 )    Color: x / Appearance: x / SG: x / pH: x  Gluc: 143 mg/dL / Ketone: x  / Bili: x / Urobili: x   Blood: x / Protein: x / Nitrite: x   Leuk Esterase: x / RBC: x / WBC x   Sq Epi: x / Non Sq Epi: x / Bacteria: x      CAPILLARY BLOOD GLUCOSE      POCT Blood Glucose.: 187 mg/dL (29 Jan 2025 21:15)        Culture - Blood (collected 01-28-25 @ 13:30)  Source: .Blood BLOOD  Preliminary Report (01-29-25 @ 20:01):    No growth at 24 hours    Culture - Blood (collected 01-28-25 @ 13:25)  Source: .Blood BLOOD  Preliminary Report (01-29-25 @ 20:01):    No growth at 24 hours    Culture - Urine (collected 01-28-25 @ 12:26)  Source: Clean Catch Clean Catch (Midstream)  Final Report (01-29-25 @ 23:08):    No growth    Culture - Blood (collected 01-27-25 @ 18:10)  Source: .Blood BLOOD  Preliminary Report (01-30-25 @ 01:02):    No growth at 48 Hours    Culture - Blood (collected 01-27-25 @ 18:00)  Source: .Blood BLOOD  Preliminary Report (01-30-25 @ 01:02):    No growth at 48 Hours        RADIOLOGY & ADDITIONAL TESTS:    Personally reviewed.     Consultant(s) Notes Reviewed:  [x] YES  [ ] NO

## 2025-01-30 NOTE — PHYSICAL THERAPY INITIAL EVALUATION ADULT - PERTINENT HX OF CURRENT PROBLEM, REHAB EVAL
89y/o F with PMHx HTN, DM2, HLD, depression who presented to the ED with intermittent jaw pain followed by anterior chest pressure radiating to the left shoulder, admitted for STEMI. code STEMI called and pt taken emergently to the cath lab where she was found to have 100% occl to the diagonal now s/p 1 stent placement  (1/26/25) transferred to ICU s/p cath. + Hypotension. S/p IVF

## 2025-01-30 NOTE — PROGRESS NOTE ADULT - ASSESSMENT
Assessment and Recommendation:   · Assessment	  89 year old female with PMH HTN, DM2, HLD, depression who presented to the ED with intermittent jaw pain followed by anterior chest pressure radiating to the left shoulder, admitted for STEMI. Acute HFrEF based on LV gram    1/26 s/p LHC, s/p MARLENE x 1 to 100% diag          OM and LAD CAD (medical therapy)   post limited follow up TTE  1/28- No significant change in the size of the pericardial effusion compared to TTE study from  (1/27/25) is noted.  in ICU for post STEMI/PCI observation, monitoring and care  continue to encourage OOB / mobilization with assist to tolerance  post cath access site precautions reviewed with pt who verbalized good understanding  activity as tolerated (except access site precautions)  am labs and EKG noted  continue dual anti platelet therapy with aspirin AND plavix  Pt education provided/reinforced re: importance of strict adherence to uninterrupted DAPT for minimum of 9-12 months (cardiologist will determine duration)  Patient educated on benefits of Cardiac Rehab Program; referral provided to patient. Referral faxed and copy placed in medical record. Patient given list of locations with phone numbers of local rehab facilities and advised to contact their insurance company for participating providers. Patient educated on need to bring discharge documents including cardiovascular history, medications, and testing/treatments to first appointment.  prescribed statin and beta blocker  will restart home ACEI/ARB if/when creatinine stable  further GDMT for HF deferred to primary cardiologist  diet as tolerated  Lifestyle modifications discussed to reduce cardiovascular risk factors including weight reduction, smoking cessation (referral provided if applicable), medication compliance, and routine follow up with Cardiologist to track your BMI, cholesterol, and glucose levels.   follow-up next week with Dr Parada for post PCI check  follow-up in 2 weeks with Dr Salinas, outpatient cardiologist

## 2025-01-30 NOTE — PROGRESS NOTE ADULT - PROBLEM SELECTOR PLAN 1
- code STEMI called and pt taken emergently to the cath lab where she was found to have 100% occl to the diagonal now s/p 1 stent placement  - transferred to ICU s/p cath. + Hypotension. S/p IVF   - On aspirin and plavix  - On statin, BB  - Reviewed TTE 1/27: EF 40 to 45%  - episodes of hypotension; improving  - GDMT as tolerated by BP and renal function  - Cardio Following  - Rest of care per ICU

## 2025-01-30 NOTE — PROGRESS NOTE ADULT - SUBJECTIVE AND OBJECTIVE BOX
Patient is a 89y old  Female who presents with a chief complaint of STEMI (29 Jan 2025 12:12)      BRIEF HOSPITAL COURSE:  89 y/o F with a h/o HTN, DM2, HLD, admitted on 1/26 with inferior STEMI s/p PCI to [place]. Hospital course complicated by hypotension, JOHNNY and small pericardial effusion.    Events last 24 hours: Periodically hypotensive, however asymptomatic.        PAST MEDICAL & SURGICAL HISTORY:  HTN (hypertension)      DM (diabetes mellitus)      HLD (hyperlipidemia)      Major depression      No significant past surgical history          Review of Systems:  CONSTITUTIONAL: No fever, chills, or fatigue  EYES: No eye pain, visual disturbances, or discharge  ENMT:  No difficulty hearing, tinnitus, vertigo; No sinus or throat pain  NECK: No pain or stiffness  RESPIRATORY: No cough, wheezing, chills or hemoptysis; No shortness of breath  CARDIOVASCULAR: No chest pain, palpitations, dizziness, or leg swelling  GASTROINTESTINAL: No abdominal or epigastric pain. No nausea, vomiting, or hematemesis; No diarrhea or constipation. No melena or hematochezia.  GENITOURINARY: No dysuria, frequency, hematuria, or incontinence  NEUROLOGICAL: No headaches, memory loss, loss of strength, numbness, or tremors  SKIN: No itching, burning, rashes, or lesions   MUSCULOSKELETAL: No joint pain or swelling; No muscle, back, or extremity pain  PSYCHIATRIC: No depression, anxiety, mood swings, or difficulty sleeping      Medications:        acetaminophen     Tablet .. 650 milliGRAM(s) Oral every 6 hours PRN  DULoxetine 90 milliGRAM(s) Oral daily      aspirin enteric coated 81 milliGRAM(s) Oral daily  clopidogrel Tablet 75 milliGRAM(s) Oral every 24 hours  heparin   Injectable 5000 Unit(s) SubCutaneous every 8 hours        atorvastatin 40 milliGRAM(s) Oral at bedtime      influenza  Vaccine (HIGH DOSE) 0.5 milliLiter(s) IntraMuscular once    chlorhexidine 2% Cloths 1 Application(s) Topical <User Schedule>            ICU Vital Signs Last 24 Hrs  T(C): 36.8 (30 Jan 2025 00:22), Max: 37.2 (29 Jan 2025 19:16)  T(F): 98.3 (30 Jan 2025 00:22), Max: 98.9 (29 Jan 2025 19:16)  HR: 64 (30 Jan 2025 04:00) (61 - 84)  BP: 93/45 (30 Jan 2025 04:00) (90/47 - 123/106)  BP(mean): 66 (30 Jan 2025 04:00) (60 - 113)  ABP: --  ABP(mean): --  RR: 22 (30 Jan 2025 04:00) (13 - 27)  SpO2: 98% (30 Jan 2025 04:00) (92% - 100%)    O2 Parameters below as of 30 Jan 2025 04:00  Patient On (Oxygen Delivery Method): room air                I&O's Detail    28 Jan 2025 07:01  -  29 Jan 2025 07:00  --------------------------------------------------------  IN:    Lactated Ringers Bolus: 1000 mL    Oral Fluid: 320 mL  Total IN: 1320 mL    OUT:    Voided (mL): 1000 mL  Total OUT: 1000 mL    Total NET: 320 mL      29 Jan 2025 07:01  -  30 Jan 2025 05:08  --------------------------------------------------------  IN:    Lactated Ringers Bolus: 500 mL    Oral Fluid: 120 mL  Total IN: 620 mL    OUT:  Total OUT: 0 mL    Total NET: 620 mL            LABS:                        9.5    7.32  )-----------( 186      ( 29 Jan 2025 12:15 )             29.1     01-29    138  |  108  |  47[H]  ----------------------------<  158[H]  4.1   |  23  |  2.10[H]    Ca    8.5      29 Jan 2025 05:55  Phos  4.6     01-29  Mg     2.0     01-29    TPro  6.3  /  Alb  2.9[L]  /  TBili  0.5  /  DBili  x   /  AST  53[H]  /  ALT  21  /  AlkPhos  70  01-28      CARDIAC MARKERS ( 28 Jan 2025 13:30 )  x     / x     / x     / x     / 6.9 ng/mL  CARDIAC MARKERS ( 28 Jan 2025 08:28 )  x     / x     / x     / x     / 8.1 ng/mL      CAPILLARY BLOOD GLUCOSE      POCT Blood Glucose.: 187 mg/dL (29 Jan 2025 21:15)      Urinalysis Basic - ( 29 Jan 2025 05:55 )    Color: x / Appearance: x / SG: x / pH: x  Gluc: 158 mg/dL / Ketone: x  / Bili: x / Urobili: x   Blood: x / Protein: x / Nitrite: x   Leuk Esterase: x / RBC: x / WBC x   Sq Epi: x / Non Sq Epi: x / Bacteria: x      CULTURES:  Culture Results:   No growth at 24 hours (01-28-25 @ 13:30)  Culture Results:   No growth at 24 hours (01-28-25 @ 13:25)  Culture Results:   No growth (01-28-25 @ 12:26)  Culture Results:   No growth at 48 Hours (01-27-25 @ 18:10)  Culture Results:   No growth at 48 Hours (01-27-25 @ 18:00)        Physical Examination:    General: No acute distress.  Alert, oriented, interactive, nonfocal    HEENT: Pupils equal, reactive to light.  Symmetric.    PULM: Clear to auscultation bilaterally, no significant sputum production    CVS: Regular rate and rhythm, no murmurs, rubs, or gallops    ABD: Soft, nondistended, nontender, normoactive bowel sounds, no masses    EXT: No edema, nontender    SKIN: Warm and well perfused, no rashes noted.    NEURO: A&Ox3, strength 5/5 all extremities, cranial nerves grossly intact, no focal deficits        RADIOLOGY:     < from: Xray Chest 1 View- PORTABLE-Urgent (01.26.25 @ 20:02) >    FINDINGS:    Single frontal view of the chest demonstrates the lungs to be clear. The   cardiomediastinal silhouette is unremarkable. No acute osseous   abnormalities. Overlying EKG leads and wires are noted    IMPRESSION: No acute cardiopulmonary disease process.    < end of copied text >

## 2025-01-30 NOTE — PROGRESS NOTE ADULT - PROBLEM SELECTOR PLAN 3
Chronic  - glucose elevated to 300 on arrival  - takes metformin at home; hold   - recommend PRITESH with FS QAC/QHS  - hypoglycemia protocol  - A1c 7.3

## 2025-01-30 NOTE — PROGRESS NOTE ADULT - ASSESSMENT
89 y/o F w/ pmh of htn, dm2, hld, presented to [facility] for chest pain radiating into the jaw and L. shoulder, in the ED pt was found to have +trops and ST changes concerning for STEMI. Code STEMI activated pt taken to the cath lab was found to have 100% occlusion to the diagonal now s/p x1 stent placed. No cath lab complication reported and pt admitted to the ICU for post cath lab management.    -Neuro: No acute issues, MS stable. Continue Cymbalta home medication.  -Cardiac: STEMI now s/p x1 MARLENE to the diagonal, cont asa/plavix, repeat EKGs, TTE x2 revealing mild pericardial effusion, Limited TTE yesterday w/ unchanged pericard effusion, Ef 40-45%. No prior h/o HF. No tamponade. Continue statin therapy. Hold BB in setting of hypotensive episodes -> BP's improved x24h w/o use of IVF. Holding home fosinopril. Ambulatory BP normotensive today.  -Resp: No acute issues, o2 stable on RA  -GI: Diet CC/DASH  -Renal: JOHNNY likely 2/2 hypotension vs contrast improving slowly. cont to monitor along w/ lytes.   -ID: UA negative, Urine Cx NGTD and Blood Cx NGTD. Afebrile  -Endo: H/o DM2, A1c 7.3  -Heme: DVT ppx w/ heparin subQ, cont asa/plavix    Dispo:  PT rec Home PT

## 2025-01-30 NOTE — PROGRESS NOTE ADULT - SUBJECTIVE AND OBJECTIVE BOX
Department of Cardiology                                                                     Division of Interventional Cardiology                                                                  [facility]/ [place] [address]                                                                             Telephone: [phone]                                                    Post- Procedure Note: s/p STEMI/ s/pLeft Heart Cardiac Catheterization/ 1/26 s/p LHC, s/p MARLENE x 1 to 100% diag          OM and LAD CAD (medical therapy)     01/30/25-Pt seen and examined with interventional attending in ICU this am-no overnight events--117 denies any c/o CP SOB lightheadedness or dizziness.   Discharge planning per primary team. Pt has f/u appt next week with Dr Parada. Continue DAPT ( ASA & Plavix)     Subjective/ROS:  Denies CP, palpitations, SOB, N/V, fever/chills, dizziness, weakness, numbness/tingling.  TRANSTHORACIC ECHOCARDIOGRAM REPORT  ________________________________________________________________________________                                      _______       Pt. Name:       DOMITILA GRACE Study Date:    1/28/2025  MRN:            [id number]           YOB: 1933  Accession #:    [id number]          Age:           89 years  Account#:       [id number]         Gender:        F  Heart Rate:                        Height:        62.20 in (158.00 cm)  Rhythm:          Weight:        160.93 lb (73.00 kg)  Blood Pressure: 93/52 mmHg         BSA/BMI:       1.75 m² / 29.24 kg/m²  ________________________________________________________________________________________  Referring Physician:    [id number] Carlos Alberto  Interpreting Physician: Braxton Makaryus M.D.  Primary Sonographer:    Marlys Queen    CPT:               ECHO TTE W/O CON F/U LTD - 25964.m  Indication(s):     ST elevation [STEMI] myocardial infarction of unspecified                     site - I21.3  Procedure:         Limited transthoracic echocardiogram.  Ordering Location: ICU1  Admission Status:  Inpatient    _______________________________________________________________________________________     CONCLUSIONS:      1. Left ventricular systolic function is moderately decreased with an ejection fraction visually estimated at 40 to 45 %.   2. Small pericardial effusion noted adjacent to the right atrium and small pericardial effusion noted adjacent to the right ventricle. The effusion measures 0.82 cm in diastole in the subcostal view adjacent ot the RV.   3. Thickened pericardium.   4. No significant change in the size of the pericardial effusion compared to TTE study from yesterday (1/27/25) is noted.    ________________________________________________________________________________________      HPI:  Pt is a 89y/o F with PMHx HTN, DM2, HLD, depression who presented to the ED with intermittent jaw pain followed by anterior chest pressure radiating to the left shoulder, Pt was found to have elevated troponins and EKG with ST elevations with reciprocal ST-T changes in leads II, III, aVF. Code STEMI was activated and patient was taken to the cath lab where she was found to have 100% occlusion to the diagonal now s/p 1 stent placement. Patient seen and examined in the ICU, reports feeling much better. She denies any current chest pain or jaw pain, and also denies dizziness, headache, numbness/tingling, nausea, vomiting, palpitations, shortness of breath, abdominal pain. She does admit to chronic diarrhea. Prior to this event, patient was in her usual state of health. No other concerns at this time.    ED Course:   Vitals: BP: 190/94, HR: 83, Temp: 97.8, RR: 18, SpO2: 97% on RA  Labs: BUN/Cr 29/1.5, Glucose 300, Troponin 737.1   EKG: ST elevation in lead I with ST changes in II, III, aVF  Received in the ED: aspirin 324, brilinta 180mg, heparin IV   (26 Jan 2025 21:31)      PAST MEDICAL & SURGICAL HISTORY:  HTN (hypertension)      DM (diabetes mellitus)      HLD (hyperlipidemia)      Major depression      No significant past surgical history      MEDICATIONS  (STANDING):  aspirin enteric coated 81 milliGRAM(s) Oral daily  atorvastatin 40 milliGRAM(s) Oral at bedtime  chlorhexidine 2% Cloths 1 Application(s) Topical <User Schedule>  clopidogrel Tablet 75 milliGRAM(s) Oral every 24 hours  DULoxetine 90 milliGRAM(s) Oral daily  heparin   Injectable 5000 Unit(s) SubCutaneous every 8 hours  influenza  Vaccine (HIGH DOSE) 0.5 milliLiter(s) IntraMuscular once    MEDICATIONS  (PRN):  acetaminophen     Tablet .. 650 milliGRAM(s) Oral every 6 hours PRN Temp greater or equal to 38C (100.4F), Mild Pain (1 - 3)    Allergies: No Known Allergies                            9.6    6.30  )-----------( 187      ( 30 Jan 2025 05:42 )             29.1     01-30    139  |  108  |  42[H]  ----------------------------<  143[H]  4.4   |  20[L]  |  1.80[H]    Ca    8.5      30 Jan 2025 05:42  Phos  3.7     01-30  Mg     2.2     01-30        Vital Signs Last 24 Hrs  T(C): 37 (30 Jan 2025 07:46), Max: 37.2 (29 Jan 2025 19:16)  T(F): 98.6 (30 Jan 2025 07:46), Max: 98.9 (29 Jan 2025 19:16)  HR: 66 (30 Jan 2025 08:00) (61 - 80)  BP: 117/60 (30 Jan 2025 08:00) (90/47 - 128/57)  BP(mean): 73 (30 Jan 2025 08:00) (60 - 113)  RR: 23 (30 Jan 2025 08:00) (13 - 27)  SpO2: 95% (30 Jan 2025 08:00) (92% - 100%)    Parameters below as of 30 Jan 2025 08:00  Patient On (Oxygen Delivery Method): room air    Ventricular Rate 67 BPM    Atrial Rate 67 BPM    P-R Interval 168 ms    QRS Duration 92 ms    Q-T Interval 458 ms    QTC Calculation(Bazett) 483 ms    P Axis 80 degrees    R Axis 62 degrees    T Axis 234 degrees    Diagnosis Line Normal sinus rhythm  Lateral infarct (cited on or before 26-JAN-2025)  T wave abnormality, consider inferior ischemia  T wave abnormality, consider anterior ischemia  Abnormal ECG  Confirmed by stevan White ([id number]) on 1/29/2025 2:21:15 PM            Constitutional: NAD  Neuro: A+O x 3, non-focal, speech clear and intact  HEENT: NC/AT, PERRL, EOMI, anicteric sclerae, oral mucosa pink and moist  Neck: supple, no JVD  CV: regular rate, regular rhythm, +S1S2, no murmurs or rub  Pulm/chest: lung sounds CTA and equal bilaterally, no accessory muscle use noted  Abd: soft, NT, ND, +BS  Ext: MENDEZ x 4, no C/C/E  Access site: R wrist C/D/I/soft without hematoma, distal motor/neuro/circ intact. R radial pulse 2+.  Skin: warm, well perfused  Psych: calm, appropriate affect      Admit to CPU for overnight observation post PCI  ***Pt is already in-patient, observe overnight in CPU  post cath/PCI routine VS, access site, neuro-vascular monitoring and RUE ***R groin/RLE post access precautions ordered  post cath hydration as ordered  ***post cath hydration not ordered d/t ***elevated LVEDP/AS/ESRD/HFrEF  Bedrest. May get OOB 30 minutes after radial band removed if wrist and hemodynamics remain stable   ***Bedrest. May sit up in 2 hours and get OOB in 4 hours after femoral sheath removed/hemostasis achieved and groin / hemodynamics remain stable   EKG post cath done  f/u labs and EKG in am  continue dual anti platelet therapy with aspirin AND clopidogrel ***ticagrelor  Pt education provided/reinforced re: importance of strict adherence to uninterrupted DAPT for minimum of 3-12 months (cardiologist will determine duration)  continue statin, beta blocker, ACEI/ARB  ***GDMT for HF deferred to primary cardiologist  may resume prior diet  ***may resume DVT prophylaxis tomorrow  Lifestyle modifications discussed to reduce cardiovascular risk factors including weight reduction, smoking cessation (referral provided if applicable), medication compliance, and routine follow up with Cardiologist to track your BMI, cholesterol, and glucose levels.   Discharge in am if stable  follow-up on *** with Dr Parada for post PCI check  follow-up in 2 weeks with outpatient/referring cardiologist  continue home medication regimen as appropriate  ***remainder of plan per primary team  above d/w hospitalist

## 2025-01-30 NOTE — PHYSICAL THERAPY INITIAL EVALUATION ADULT - LEVEL OF INDEPENDENCE: GAIT, REHAB EVAL
June 5, 2020      Mayco Hansen MD  [address]  [facility] [address]           [facility] - Cardiology  [address]  [facility] [address]  Phone: [phone]          Patient: King Dempsey   MR Number: [id number]   YOB: 1983   Date of Visit: 6/5/2020       Dear Dr. Mayco Hansen:    Thank you for referring King Dempsey to me for evaluation. Attached you will find relevant portions of my assessment and plan of care.    If you have questions, please do not hesitate to call me. I look forward to following King Dempsey along with you.    Sincerely,    Eugene Cordero MD    Enclosure  CC:  No Recipients    If you would like to receive this communication electronically, please contact [email or web address] or [phone] to request more information on [facility] access.    For providers and/or their staff who would like to refer a patient to [facility], please contact us through our one-stop-shop provider referral line, [facility], at [phone].    If you feel you have received this communication in error or would no longer like to receive these types of communications, please e-mail [email or web address]          contact guard

## 2025-01-30 NOTE — PROGRESS NOTE ADULT - SUBJECTIVE AND OBJECTIVE BOX
INTERVAL HPI/OVERNIGHT EVENTS: No acute overnight events occurred. No acute complaints. SBP >100 this AM      Review of Systems:  Constitutional: No fever, chills, fatigue  Neuro: No headache, numbness, weakness  Resp: No cough, wheezing, shortness of breath  CVS: No chest pain, palpitations, leg swelling  GI: No abdominal pain, nausea, vomiting, diarrhea   : No dysuria, frequency, incontinence  Skin: No itching, burning, rashes, or lesions   Msk: No joint pain or swelling      ICU Vital Signs Last 24 Hrs  T(C): 36.3 (30 Jan 2025 16:37), Max: 37.2 (29 Jan 2025 19:16)  T(F): 97.4 (30 Jan 2025 16:37), Max: 98.9 (29 Jan 2025 19:16)  HR: 70 (30 Jan 2025 16:00) (61 - 88)  BP: 118/61 (30 Jan 2025 16:00) (93/45 - 149/65)  BP(mean): 78 (30 Jan 2025 16:00) (61 - 95)  ABP: --  ABP(mean): --  RR: 24 (30 Jan 2025 16:00) (17 - 27)  SpO2: 98% (30 Jan 2025 16:00) (88% - 100%)    O2 Parameters below as of 30 Jan 2025 14:00  Patient On (Oxygen Delivery Method): room air              01-29-25 @ 07:01 - 01-30-25 @ 07:00  --------------------------------------------------------  IN: 720 mL / OUT: 0 mL / NET: 720 mL    01-30-25 @ 07:01 - 01-30-25 @ 17:22  --------------------------------------------------------  IN: 840 mL / OUT: 0 mL / NET: 840 mL        CAPILLARY BLOOD GLUCOSE      POCT Blood Glucose.: 158 mg/dL (30 Jan 2025 16:41)      I&O's Summary    29 Jan 2025 07:01  -  30 Jan 2025 07:00  --------------------------------------------------------  IN: 720 mL / OUT: 0 mL / NET: 720 mL    30 Jan 2025 07:01  -  30 Jan 2025 17:22  --------------------------------------------------------  IN: 840 mL / OUT: 0 mL / NET: 840 mL        PHYSICAL EXAM:  General: NAD  Neurology: awake, A&Ox4  HEENT: NC/AT  Respiratory: CTA b/l, no rales or rhonchi noted  Cardiovascular: RRR, normal S1S2, no M/R/G  Abdomen: soft, NT/ND, +BS, no palpable masses, RLQ with superficial ecchymosis improving, nontender, no hematoma  Extremities: WWP, no clubbing, cyanosis, or edema, Groin access site  w/o tenderness, ecchymosis nor induration.  Skin: warm/dry      Meds:      atorvastatin Oral      acetaminophen     Tablet .. Oral PRN  DULoxetine Oral      aspirin enteric coated Oral  clopidogrel Tablet Oral  heparin   Injectable SubCutaneous          influenza  Vaccine (HIGH DOSE) IntraMuscular    chlorhexidine 2% Cloths Topical                              9.6    6.30  )-----------( 187      ( 30 Jan 2025 05:42 )             29.1       01-30    139  |  108  |  42[H]  ----------------------------<  143[H]  4.4   |  20[L]  |  1.80[H]    Ca    8.5      30 Jan 2025 05:42  Phos  3.7     01-30  Mg     2.2     01-30            Urinalysis Basic - ( 30 Jan 2025 05:42 )    Color: x / Appearance: x / SG: x / pH: x  Gluc: 143 mg/dL / Ketone: x  / Bili: x / Urobili: x   Blood: x / Protein: x / Nitrite: x   Leuk Esterase: x / RBC: x / WBC x   Sq Epi: x / Non Sq Epi: x / Bacteria: x      .Blood BLOOD   No growth at 24 hours -- 01-28 @ 13:30  .Blood BLOOD   No growth at 24 hours -- 01-28 @ 13:25  Clean Catch Clean Catch (Midstream)   No growth -- 01-28 @ 12:26  .Blood BLOOD   No growth at 48 Hours -- 01-27 @ 18:10  .Blood BLOOD   No growth at 48 Hours -- 01-27 @ 18:00        GLOBAL ISSUE/BEST PRACTICE:  Analgesia: N  Sedation: N  HOB elevation: Y  Stress ulcer prophylaxis: N  VTE prophylaxis: Y  Glycemic control: Y  Nutrition: Y    CODE STATUS:   Full Code     INTERVAL HPI/OVERNIGHT EVENTS: No acute overnight events occurred. No acute complaints. SBP >100 this AM      ICU Vital Signs Last 24 Hrs  T(C): 36.3 (30 Jan 2025 16:37), Max: 37.2 (29 Jan 2025 19:16)  T(F): 97.4 (30 Jan 2025 16:37), Max: 98.9 (29 Jan 2025 19:16)  HR: 70 (30 Jan 2025 16:00) (61 - 88)  BP: 118/61 (30 Jan 2025 16:00) (93/45 - 149/65)  BP(mean): 78 (30 Jan 2025 16:00) (61 - 95)  ABP: --  ABP(mean): --  RR: 24 (30 Jan 2025 16:00) (17 - 27)  SpO2: 98% (30 Jan 2025 16:00) (88% - 100%)    O2 Parameters below as of 30 Jan 2025 14:00  Patient On (Oxygen Delivery Method): room air              01-29-25 @ 07:01 - 01-30-25 @ 07:00  --------------------------------------------------------  IN: 720 mL / OUT: 0 mL / NET: 720 mL    01-30-25 @ 07:01 - 01-30-25 @ 17:22  --------------------------------------------------------  IN: 840 mL / OUT: 0 mL / NET: 840 mL        CAPILLARY BLOOD GLUCOSE      POCT Blood Glucose.: 158 mg/dL (30 Jan 2025 16:41)      I&O's Summary    29 Jan 2025 07:01  -  30 Jan 2025 07:00  --------------------------------------------------------  IN: 720 mL / OUT: 0 mL / NET: 720 mL    30 Jan 2025 07:01  -  30 Jan 2025 17:22  --------------------------------------------------------  IN: 840 mL / OUT: 0 mL / NET: 840 mL        PHYSICAL EXAM:  General: NAD  Neurology: awake, A&Ox4  HEENT: NC/AT  Respiratory: CTA b/l, no rales or rhonchi noted  Cardiovascular: RRR, normal S1S2, no M/R/G  Abdomen: soft, NT/ND, +BS, no palpable masses, RLQ with superficial ecchymosis improving, nontender, no hematoma  Extremities: WWP, no clubbing, cyanosis, or edema, Groin access site  w/o tenderness, ecchymosis nor induration.  Skin: warm/dry      Meds:      atorvastatin Oral      acetaminophen     Tablet .. Oral PRN  DULoxetine Oral      aspirin enteric coated Oral  clopidogrel Tablet Oral  heparin   Injectable SubCutaneous          influenza  Vaccine (HIGH DOSE) IntraMuscular    chlorhexidine 2% Cloths Topical                              9.6    6.30  )-----------( 187      ( 30 Jan 2025 05:42 )             29.1       01-30    139  |  108  |  42[H]  ----------------------------<  143[H]  4.4   |  20[L]  |  1.80[H]    Ca    8.5      30 Jan 2025 05:42  Phos  3.7     01-30  Mg     2.2     01-30            Urinalysis Basic - ( 30 Jan 2025 05:42 )    Color: x / Appearance: x / SG: x / pH: x  Gluc: 143 mg/dL / Ketone: x  / Bili: x / Urobili: x   Blood: x / Protein: x / Nitrite: x   Leuk Esterase: x / RBC: x / WBC x   Sq Epi: x / Non Sq Epi: x / Bacteria: x      .Blood BLOOD   No growth at 24 hours -- 01-28 @ 13:30  .Blood BLOOD   No growth at 24 hours -- 01-28 @ 13:25  Clean Catch Clean Catch (Midstream)   No growth -- 01-28 @ 12:26  .Blood BLOOD   No growth at 48 Hours -- 01-27 @ 18:10  .Blood BLOOD   No growth at 48 Hours -- 01-27 @ 18:00    DATA REVIEW    All data personally reviewed.  See above for details    VS trends--afebrile, BP improved, HR stable  Laboratory results--normal WBC, stable Hb, normal platelets, improving Cr, normal lytes    GLOBAL ISSUE/BEST PRACTICE:  Analgesia: N  Sedation: N  HOB elevation: Y  Stress ulcer prophylaxis: N  VTE prophylaxis: Y  Glycemic control: Y  Nutrition: Y    CODE STATUS:   Full Code

## 2025-01-30 NOTE — PHYSICAL THERAPY INITIAL EVALUATION ADULT - ADDITIONAL COMMENTS
Pt lives in a house w/ stairlifts.  Pt is primarily a household ambulator however will go out occasionally.

## 2025-01-30 NOTE — PROGRESS NOTE ADULT - ASSESSMENT
89y/o F with PMHx HTN, DM2, HLD, depression who presented to the ED with intermittent jaw pain followed by anterior chest pressure radiating to the left shoulder, admitted for STEMI.    - s/p code STEMI  - S/P: PCI (1/26/25) with 100% occlusion of diagonal and MARLENE x1  - continue DAPT with ASA 81 QD and plavix  - cont Lipitor 40mg QHS  - TTE 1/27/25 Left ventricular systolic function is moderately decreased with an ejection fraction visually estimated at 40 to 45 %. small pericardial effusion noted.     - Appears compensated from HF POV.   - over the last 48h BP has been low, though better this am   - unable to tolerate BB to date  - monitor BP with ambulation today  - ef 40-45%, with small unchanged pericardial effusion without tamponade    - cr increasing ?2/2 BP and contrast   - Please continue to maintain strict I/Os, monitor daily weights, Cr, and K.   - hold off on ARB/ARNI    - Monitor in ICU.  At risk of decompensation.

## 2025-01-30 NOTE — PROGRESS NOTE ADULT - SUBJECTIVE AND OBJECTIVE BOX
[facility]    Kirt, Jamal, Manohar, Roney, Christopher, Sincere      [phone]    CHIEF COMPLAINT: Patient is a 89y old  Female who presents with a chief complaint of STEMI (30 Jan 2025 10:22)      Follow Up: s/p stemi, hypotension    Interim history: The patient reports no new symptoms.  Denies chest discomfort and shortness of breath.  No abdominal pain.  No new neurologic symptoms.      MEDICATIONS  (STANDING):  aspirin enteric coated 81 milliGRAM(s) Oral daily  atorvastatin 40 milliGRAM(s) Oral at bedtime  chlorhexidine 2% Cloths 1 Application(s) Topical <User Schedule>  clopidogrel Tablet 75 milliGRAM(s) Oral every 24 hours  DULoxetine 90 milliGRAM(s) Oral daily  heparin   Injectable 5000 Unit(s) SubCutaneous every 8 hours  influenza  Vaccine (HIGH DOSE) 0.5 milliLiter(s) IntraMuscular once    MEDICATIONS  (PRN):  acetaminophen     Tablet .. 650 milliGRAM(s) Oral every 6 hours PRN Temp greater or equal to 38C (100.4F), Mild Pain (1 - 3)      REVIEW OF SYSTEMS:  eye, ent, GI, , allergic, dermatologic, musculoskeletal and neurologic are negative except as described above    Vital Signs Last 24 Hrs  T(C): 36.6 (30 Jan 2025 11:38), Max: 37.2 (29 Jan 2025 19:16)  T(F): 97.9 (30 Jan 2025 11:38), Max: 98.9 (29 Jan 2025 19:16)  HR: 71 (30 Jan 2025 11:38) (61 - 88)  BP: 114/54 (30 Jan 2025 11:00) (90/47 - 149/65)  BP(mean): 72 (30 Jan 2025 11:00) (60 - 113)  RR: 24 (30 Jan 2025 11:38) (13 - 27)  SpO2: 99% (30 Jan 2025 11:38) (88% - 100%)    Parameters below as of 30 Jan 2025 10:55  Patient On (Oxygen Delivery Method): room air        I&O's Summary    29 Jan 2025 07:01  -  30 Jan 2025 07:00  --------------------------------------------------------  IN: 720 mL / OUT: 0 mL / NET: 720 mL    30 Jan 2025 07:01  -  30 Jan 2025 12:40  --------------------------------------------------------  IN: 480 mL / OUT: 0 mL / NET: 480 mL        Telemetry past 24h: sr    PHYSICAL EXAM:    Constitutional: well-nourished, well-developed, NAD   HEENT:  MMM, sclerae anicteric, conjunctivae clear, no oral cyanosis.  Pulmonary: Non-labored, breath sounds are clear bilaterally, No wheezing, rales or rhonchi  Cardiovascular: Regular, S1 and S2.  No murmur.  No rubs, gallops or clicks  Gastrointestinal: Bowel Sounds present, soft, nontender.   Lymph: No peripheral edema.   Neurological: Alert, no focal deficits  Skin: No rashes.  Psych:  Mood & affect appropriate    LABS: All Labs Reviewed:                        9.6    6.30  )-----------( 187      ( 30 Jan 2025 05:42 )             29.1                         9.5    7.32  )-----------( 186      ( 29 Jan 2025 12:15 )             29.1                         9.5    6.88  )-----------( 192      ( 29 Jan 2025 05:55 )             27.8     30 Jan 2025 05:42    139    |  108    |  42     ----------------------------<  143    4.4     |  20     |  1.80   29 Jan 2025 05:55    138    |  108    |  47     ----------------------------<  158    4.1     |  23     |  2.10   28 Jan 2025 13:30    138    |  105    |  39     ----------------------------<  185    4.5     |  22     |  2.00     Ca    8.5        30 Jan 2025 05:42  Ca    8.5        29 Jan 2025 05:55  Ca    8.5        28 Jan 2025 13:30  Phos  3.7       30 Jan 2025 05:42  Phos  4.6       29 Jan 2025 05:55  Phos  4.2       28 Jan 2025 08:28  Mg     2.2       30 Jan 2025 05:42  Mg     2.0       29 Jan 2025 05:55  Mg     2.0       28 Jan 2025 08:28    TPro  6.3    /  Alb  2.9    /  TBili  0.5    /  DBili  x      /  AST  53     /  ALT  21     /  AlkPhos  70     28 Jan 2025 08:28      CARDIAC MARKERS ( 28 Jan 2025 13:30 )  x     / x     / x     / x     / 6.9 ng/mL      Blood Culture: Organism --  Gram Stain Blood -- Gram Stain --  Specimen Source .Blood BLOOD  Culture-Blood --    Organism --  Gram Stain Blood -- Gram Stain --  Specimen Source .Blood BLOOD  Culture-Blood --    Organism --  Gram Stain Blood -- Gram Stain --  Specimen Source Clean Catch Clean Catch (Midstream)  Culture-Blood --    Organism --  Gram Stain Blood -- Gram Stain --  Specimen Source .Blood BLOOD  Culture-Blood --    Organism --  Gram Stain Blood -- Gram Stain --  Specimen Source .Blood BLOOD  Culture-Blood --           RADIOLOGY:    EKG:    Echo:  < from: TTE Limited W or WO Ultrasound Enhancing Agent (01.28.25 @ 12:52) >    TRANSTHORACIC ECHOCARDIOGRAM REPORT  ________________________________________________________________________________                                      _______       Pt. Name:       DOMITILA GRACE Study Date:    1/28/2025  MRN:            [id number]           YOB: 1933  Accession #:    [id number]          Age:           89 years  Account#:       [id number]         Gender:        F  Heart Rate:                        Height:        62.20 in (158.00 cm)  Rhythm:          Weight:        160.93 lb (73.00 kg)  Blood Pressure: 93/52 mmHg         BSA/BMI:       1.75 m² / 29.24 kg/m²  ________________________________________________________________________________________  Referring Physician:    [id number] Carlos Alberto  Interpreting Physician: Braxton Bowens M.D.  Primary Sonographer:    Marlys Queen    CPT:               ECHO TTE W/O CON F/U LTD - 97328.m  Indication(s):     ST elevation [STEMI] myocardial infarction of unspecified                     site - I21.3  Procedure:         Limited transthoracic echocardiogram.  Ordering Location: [facility]  Admission Status:  Inpatient    _______________________________________________________________________________________     CONCLUSIONS:      1. Left ventricular systolic function is moderately decreased with an ejection fraction visually estimated at 40 to 45 %.   2. Small pericardial effusion noted adjacent to the right atrium and small pericardial effusion noted adjacent to the right ventricle. The effusion measures 0.82 cm in diastole in the subcostal view adjacent ot the RV.   3. Thickened pericardium.   4. No significant change in the size of the pericardial effusion compared to TTE study from yesterday (1/27/25) is noted.    ________________________________________________________________________________________  FINDINGS:     Left Ventricle:  Left ventricular systolic function is moderately decreased with an ejection fraction visually estimated at 40 to 45%.     Right Ventricle:  Right ventricular systolic function is normal.     Left Atrium:  The left atrium is dilated.     Mitral Valve:  There is moderate calcification of the mitral valve annulus.     Pericardium:  The pericardium is thickened. There is a small pericardial effusionnoted adjacent to the right atrium and a small pericardial effusion noted adjacent to the right ventricle.     Systemic Veins:  The inferior vena cava is normal in size measuring 1.83 cm in diameter, (normal <2.1cm) with normal inspiratory collapse (normal >50%) consistent with normal right atrial pressure (~3, range 0-5mmHg).  ____________________________________________________________________  QUANTITATIVE DATA:  Left Ventricle Measurements: (Indexed to BSA)     Visualized LV EF%: 40 to 45%       ________________________________________________________________________________________  Electronically signed on 1/28/2025 at 2:21:15 PM by Braxton Bowens M.D.         *** Final ***    < end of copied text >

## 2025-01-30 NOTE — PROGRESS NOTE ADULT - ASSESSMENT
Pt is a 89y/o F with PMHx HTN, DM2, HLD, depression who presented to the ED with intermittent jaw pain followed by anterior chest pressure radiating to the left shoulder, admitted for STEMI. 89y/o F with PMHx HTN, DM2, HLD, depression who presented to the ED with intermittent jaw pain followed by anterior chest pressure radiating to the left shoulder, admitted for STEMI.

## 2025-01-30 NOTE — PROGRESS NOTE ADULT - ASSESSMENT
89 y/o F with a h/o HTN, DM2, HLD, with:    # STEMI, s/p PCI to Diagonal  # Hypotension  # JOHNNY    - concern for hypovolemia in the setting of progressive pre-renal JOHNNY and labile BP  - continue cautious volume resuscitation as indicated  - monitor hemodynamics closely, maintain a MAP > 65  - will need gradual introduction of GDMT given new cardiomyopathy (LVEF 40-45%)  - repeat TTE reviewed, pericardial effusion remains small and without evidence of tamponade  - DAPT  - high intensity statin  - JOHNNY likely pre-renal/ischemic ATN, optimize end-organ perfusion as above  - trend BUN/Cr, electrolytes, acid-base balance, monitor hourly UOP (nonoliguric)  - no indication for urgent RRT at this time    35 minutes accounts for the total time spent on this patient encounter, which includes assessing and addressing the problems and their associated risks as mentioned above, reviewing the medical record/laboratory data/imaging studies, interviewing and physically examining the patient, as well as coordinating care with the multidisciplinary team. The date of entry of this note reflects the date of services rendered.

## 2025-01-31 LAB
ALBUMIN SERPL ELPH-MCNC: 2.7 G/DL — LOW (ref 3.3–5)
ALP SERPL-CCNC: 70 U/L — SIGNIFICANT CHANGE UP (ref 40–120)
ALT FLD-CCNC: 15 U/L — SIGNIFICANT CHANGE UP (ref 12–78)
ANION GAP SERPL CALC-SCNC: 7 MMOL/L — SIGNIFICANT CHANGE UP (ref 5–17)
AST SERPL-CCNC: 21 U/L — SIGNIFICANT CHANGE UP (ref 15–37)
BASOPHILS # BLD AUTO: 0.01 K/UL — SIGNIFICANT CHANGE UP (ref 0–0.2)
BASOPHILS NFR BLD AUTO: 0.1 % — SIGNIFICANT CHANGE UP (ref 0–2)
BILIRUB SERPL-MCNC: 0.6 MG/DL — SIGNIFICANT CHANGE UP (ref 0.2–1.2)
BUN SERPL-MCNC: 40 MG/DL — HIGH (ref 7–23)
CALCIUM SERPL-MCNC: 9 MG/DL — SIGNIFICANT CHANGE UP (ref 8.5–10.1)
CHLORIDE SERPL-SCNC: 108 MMOL/L — SIGNIFICANT CHANGE UP (ref 96–108)
CO2 SERPL-SCNC: 24 MMOL/L — SIGNIFICANT CHANGE UP (ref 22–31)
CREAT SERPL-MCNC: 1.7 MG/DL — HIGH (ref 0.5–1.3)
EGFR: 28 ML/MIN/1.73M2 — LOW
EOSINOPHIL # BLD AUTO: 0.09 K/UL — SIGNIFICANT CHANGE UP (ref 0–0.5)
EOSINOPHIL NFR BLD AUTO: 1.3 % — SIGNIFICANT CHANGE UP (ref 0–6)
FLUAV AG NPH QL: DETECTED
FLUBV AG NPH QL: SIGNIFICANT CHANGE UP
GLUCOSE SERPL-MCNC: 156 MG/DL — HIGH (ref 70–99)
HCT VFR BLD CALC: 29.9 % — LOW (ref 34.5–45)
HGB BLD-MCNC: 9.8 G/DL — LOW (ref 11.5–15.5)
IMM GRANULOCYTES NFR BLD AUTO: 0.4 % — SIGNIFICANT CHANGE UP (ref 0–0.9)
LYMPHOCYTES # BLD AUTO: 0.76 K/UL — LOW (ref 1–3.3)
LYMPHOCYTES # BLD AUTO: 11 % — LOW (ref 13–44)
MAGNESIUM SERPL-MCNC: 2.5 MG/DL — SIGNIFICANT CHANGE UP (ref 1.6–2.6)
MCHC RBC-ENTMCNC: 30.2 PG — SIGNIFICANT CHANGE UP (ref 27–34)
MCHC RBC-ENTMCNC: 32.8 G/DL — SIGNIFICANT CHANGE UP (ref 32–36)
MCV RBC AUTO: 92 FL — SIGNIFICANT CHANGE UP (ref 80–100)
MONOCYTES # BLD AUTO: 0.74 K/UL — SIGNIFICANT CHANGE UP (ref 0–0.9)
MONOCYTES NFR BLD AUTO: 10.7 % — SIGNIFICANT CHANGE UP (ref 2–14)
NEUTROPHILS # BLD AUTO: 5.3 K/UL — SIGNIFICANT CHANGE UP (ref 1.8–7.4)
NEUTROPHILS NFR BLD AUTO: 76.5 % — SIGNIFICANT CHANGE UP (ref 43–77)
NRBC # BLD: 0 /100 WBCS — SIGNIFICANT CHANGE UP (ref 0–0)
NRBC BLD-RTO: 0 /100 WBCS — SIGNIFICANT CHANGE UP (ref 0–0)
PHOSPHATE SERPL-MCNC: 3.5 MG/DL — SIGNIFICANT CHANGE UP (ref 2.5–4.5)
PLATELET # BLD AUTO: 231 K/UL — SIGNIFICANT CHANGE UP (ref 150–400)
POTASSIUM SERPL-MCNC: 4.5 MMOL/L — SIGNIFICANT CHANGE UP (ref 3.5–5.3)
POTASSIUM SERPL-SCNC: 4.5 MMOL/L — SIGNIFICANT CHANGE UP (ref 3.5–5.3)
PROT SERPL-MCNC: 6.3 G/DL — SIGNIFICANT CHANGE UP (ref 6–8.3)
RBC # BLD: 3.25 M/UL — LOW (ref 3.8–5.2)
RBC # FLD: 13.9 % — SIGNIFICANT CHANGE UP (ref 10.3–14.5)
RSV RNA NPH QL NAA+NON-PROBE: SIGNIFICANT CHANGE UP
SARS-COV-2 RNA SPEC QL NAA+PROBE: SIGNIFICANT CHANGE UP
SODIUM SERPL-SCNC: 139 MMOL/L — SIGNIFICANT CHANGE UP (ref 135–145)
WBC # BLD: 6.93 K/UL — SIGNIFICANT CHANGE UP (ref 3.8–10.5)
WBC # FLD AUTO: 6.93 K/UL — SIGNIFICANT CHANGE UP (ref 3.8–10.5)

## 2025-01-31 PROCEDURE — 99233 SBSQ HOSP IP/OBS HIGH 50: CPT | Mod: GC

## 2025-01-31 PROCEDURE — 71045 X-RAY EXAM CHEST 1 VIEW: CPT | Mod: 26

## 2025-01-31 PROCEDURE — 99291 CRITICAL CARE FIRST HOUR: CPT

## 2025-01-31 PROCEDURE — 99232 SBSQ HOSP IP/OBS MODERATE 35: CPT

## 2025-01-31 RX ORDER — METOPROLOL SUCCINATE 25 MG
12.5 TABLET, EXTENDED RELEASE 24 HR ORAL
Refills: 0
Start: 2025-01-31

## 2025-01-31 RX ORDER — DM/PSEUDOEPHED/ACETAMINOPHEN 10-30-250
15 CAPSULE ORAL ONCE
Refills: 0 | Status: DISCONTINUED | OUTPATIENT
Start: 2025-01-31 | End: 2025-02-12

## 2025-01-31 RX ORDER — SODIUM CHLORIDE 0.4 %
1 AEROSOL, SPRAY (ML) NASAL
Refills: 0 | Status: DISCONTINUED | OUTPATIENT
Start: 2025-01-31 | End: 2025-02-12

## 2025-01-31 RX ORDER — METOPROLOL SUCCINATE 25 MG
12.5 TABLET, EXTENDED RELEASE 24 HR ORAL
Refills: 0 | Status: DISCONTINUED | OUTPATIENT
Start: 2025-01-31 | End: 2025-02-02

## 2025-01-31 RX ORDER — DM/PSEUDOEPHED/ACETAMINOPHEN 10-30-250
25 CAPSULE ORAL ONCE
Refills: 0 | Status: DISCONTINUED | OUTPATIENT
Start: 2025-01-31 | End: 2025-02-12

## 2025-01-31 RX ORDER — ATORVASTATIN CALCIUM 80 MG/1
1 TABLET, FILM COATED ORAL
Qty: 90 | Refills: 0
Start: 2025-01-31 | End: 2025-04-30

## 2025-01-31 RX ORDER — SODIUM CHLORIDE 9 G/ML
1000 INJECTION, SOLUTION INTRAVENOUS
Refills: 0 | Status: DISCONTINUED | OUTPATIENT
Start: 2025-01-31 | End: 2025-02-12

## 2025-01-31 RX ORDER — NYSTATIN 100000 U/G
1 POWDER TOPICAL
Refills: 0 | Status: DISCONTINUED | OUTPATIENT
Start: 2025-01-31 | End: 2025-02-12

## 2025-01-31 RX ORDER — GLUCAGON 3 MG/1
1 POWDER NASAL ONCE
Refills: 0 | Status: DISCONTINUED | OUTPATIENT
Start: 2025-01-31 | End: 2025-02-12

## 2025-01-31 RX ORDER — OSELTAMIVIR PHOSPHATE 75 MG/1
30 CAPSULE ORAL DAILY
Refills: 0 | Status: COMPLETED | OUTPATIENT
Start: 2025-01-31 | End: 2025-02-05

## 2025-01-31 RX ORDER — ASPIRIN 81 MG/1
1 TABLET, COATED ORAL
Qty: 90 | Refills: 0
Start: 2025-01-31 | End: 2025-04-30

## 2025-01-31 RX ORDER — INSULIN LISPRO 100/ML
VIAL (ML) SUBCUTANEOUS
Refills: 0 | Status: DISCONTINUED | OUTPATIENT
Start: 2025-01-31 | End: 2025-02-12

## 2025-01-31 RX ORDER — METOPROLOL SUCCINATE 25 MG
0.5 TABLET, EXTENDED RELEASE 24 HR ORAL
Qty: 90 | Refills: 0
Start: 2025-01-31 | End: 2025-04-30

## 2025-01-31 RX ORDER — OSELTAMIVIR PHOSPHATE 75 MG/1
75 CAPSULE ORAL
Refills: 0 | Status: DISCONTINUED | OUTPATIENT
Start: 2025-01-31 | End: 2025-01-31

## 2025-01-31 RX ORDER — DM/PSEUDOEPHED/ACETAMINOPHEN 10-30-250
12.5 CAPSULE ORAL ONCE
Refills: 0 | Status: DISCONTINUED | OUTPATIENT
Start: 2025-01-31 | End: 2025-02-12

## 2025-01-31 RX ADMIN — Medication 5000 UNIT(S): at 21:42

## 2025-01-31 RX ADMIN — ACETAMINOPHEN 650 MILLIGRAM(S): 160 SUSPENSION ORAL at 22:26

## 2025-01-31 RX ADMIN — Medication 75 MILLIGRAM(S): at 05:35

## 2025-01-31 RX ADMIN — ATORVASTATIN CALCIUM 40 MILLIGRAM(S): 80 TABLET, FILM COATED ORAL at 21:26

## 2025-01-31 RX ADMIN — DULOXETINE 90 MILLIGRAM(S): 20 CAPSULE, DELAYED RELEASE ORAL at 13:01

## 2025-01-31 RX ADMIN — ACETAMINOPHEN 650 MILLIGRAM(S): 160 SUSPENSION ORAL at 21:26

## 2025-01-31 RX ADMIN — Medication 2: at 22:16

## 2025-01-31 RX ADMIN — Medication 20 MILLIGRAM(S): at 16:34

## 2025-01-31 RX ADMIN — Medication 12.5 MILLIGRAM(S): at 18:46

## 2025-01-31 RX ADMIN — Medication 5000 UNIT(S): at 14:14

## 2025-01-31 RX ADMIN — Medication 1 SPRAY(S): at 08:56

## 2025-01-31 RX ADMIN — ASPIRIN 81 MILLIGRAM(S): 81 TABLET, COATED ORAL at 13:01

## 2025-01-31 RX ADMIN — Medication 5000 UNIT(S): at 05:35

## 2025-01-31 RX ADMIN — Medication 12.5 MILLIGRAM(S): at 08:57

## 2025-01-31 RX ADMIN — OSELTAMIVIR PHOSPHATE 30 MILLIGRAM(S): 75 CAPSULE ORAL at 21:26

## 2025-01-31 RX ADMIN — ANTISEPTIC SURGICAL SCRUB 1 APPLICATION(S): 0.04 SOLUTION TOPICAL at 05:35

## 2025-01-31 RX ADMIN — Medication 1 SPRAY(S): at 21:27

## 2025-01-31 NOTE — PROGRESS NOTE ADULT - PROBLEM SELECTOR PLAN 2
BUN/Cr 29/1.5  - unclear baseline, - GFR similar to 2023 on chart review  - S/p IVF - Cr improving and now at 1.7  - monitor renal function, increased s/p cardiac cath - likely ATN from hypotension and AIN from contrast

## 2025-01-31 NOTE — PROGRESS NOTE ADULT - ASSESSMENT
89y/o F with PMHx HTN, DM2, HLD, depression who presented to the ED with intermittent jaw pain followed by anterior chest pressure radiating to the left shoulder, admitted for STEMI.

## 2025-01-31 NOTE — PROGRESS NOTE ADULT - SUBJECTIVE AND OBJECTIVE BOX
INTERVAL HPI/OVERNIGHT EVENTS: No acute overnight events occurred. No acute complaints from patient. She is eager to go home.      Review of Systems:  Constitutional: No fever, chills, fatigue  Neuro: No headache, numbness, weakness  Resp: No cough, wheezing, shortness of breath  CVS: No chest pain, palpitations, leg swelling  GI: No abdominal pain, nausea, vomiting, diarrhea   : No dysuria, frequency, incontinence  Skin: No itching, burning, rashes, or lesions   Msk: No joint pain or swelling  Psych: No depression, anxiety, mood swings    ICU Vital Signs Last 24 Hrs  T(C): 36.7 (31 Jan 2025 08:02), Max: 37.5 (31 Jan 2025 04:20)  T(F): 98 (31 Jan 2025 08:02), Max: 99.5 (31 Jan 2025 04:20)  HR: 99 (31 Jan 2025 11:00) (67 - 99)  BP: 127/63 (31 Jan 2025 10:30) (110/56 - 148/61)  BP(mean): 81 (31 Jan 2025 10:30) (66 - 98)  ABP: --  ABP(mean): --  RR: 18 (31 Jan 2025 10:30) (12 - 27)  SpO2: 94% (31 Jan 2025 10:30) (92% - 100%)    O2 Parameters below as of 31 Jan 2025 08:00  Patient On (Oxygen Delivery Method): room air              01-30-25 @ 07:01  -  01-31-25 @ 07:00  --------------------------------------------------------  IN: 1160 mL / OUT: 400 mL / NET: 760 mL        CAPILLARY BLOOD GLUCOSE      POCT Blood Glucose.: 162 mg/dL (31 Jan 2025 08:38)      I&O's Summary    30 Jan 2025 07:01  -  31 Jan 2025 07:00  --------------------------------------------------------  IN: 1160 mL / OUT: 400 mL / NET: 760 mL        PHYSICAL EXAM:  General: NAD, appears stated age  Neurology: awake, A&Ox4  HEENT: NC/AT  Respiratory: CTA b/l, no rales or rhonchi noted  Cardiovascular: RRR, normal S1S2, no M/R/G  Abdomen: soft, NT/ND, +BS  Extremities: WWP, no clubbing, cyanosis, or edema, Groin access site c/d/i  w/o tenderness, ecchymosis nor induration,  Skin: warm/dry        Meds:    metoprolol tartrate Oral    atorvastatin Oral      acetaminophen     Tablet .. Oral PRN  DULoxetine Oral      aspirin enteric coated Oral  clopidogrel Tablet Oral  heparin   Injectable SubCutaneous          influenza  Vaccine (HIGH DOSE) IntraMuscular    chlorhexidine 2% Cloths Topical  sodium chloride 0.65% Nasal Both Nostrils PRN                              9.8    6.93  )-----------( 231      ( 31 Jan 2025 06:15 )             29.9       01-31    139  |  108  |  40[H]  ----------------------------<  156[H]  4.5   |  24  |  1.70[H]    Ca    9.0      31 Jan 2025 06:15  Phos  3.5     01-31  Mg     2.5     01-31    TPro  6.3  /  Alb  2.7[L]  /  TBili  0.6  /  DBili  x   /  AST  21  /  ALT  15  /  AlkPhos  70  01-31            Urinalysis Basic - ( 31 Jan 2025 06:15 )    Color: x / Appearance: x / SG: x / pH: x  Gluc: 156 mg/dL / Ketone: x  / Bili: x / Urobili: x   Blood: x / Protein: x / Nitrite: x   Leuk Esterase: x / RBC: x / WBC x   Sq Epi: x / Non Sq Epi: x / Bacteria: x      .Blood BLOOD   No growth at 48 Hours -- 01-28 @ 13:30  .Blood BLOOD   No growth at 48 Hours -- 01-28 @ 13:25  Clean Catch Clean Catch (Midstream)   No growth -- 01-28 @ 12:26  .Blood BLOOD   No growth at 72 Hours -- 01-27 @ 18:10  .Blood BLOOD   No growth at 72 Hours -- 01-27 @ 18:00        GLOBAL ISSUE/BEST PRACTICE:  Analgesia: N  Sedation:N  HOB elevation: Y  Stress ulcer prophylaxis:N  VTE prophylaxis:Y  Glycemic control:N  Nutrition:Y    CODE STATUS:   Full Code     INTERVAL HPI/OVERNIGHT EVENTS: No acute overnight events occurred. States she has had intermittent cough since late yesterday. She is eager to go home.      Review of Systems:  Constitutional: No fever, chills, fatigue  Neuro: No headache, numbness, weakness  Resp: +cough, no wheezing, shortness of breath  CVS: No chest pain, palpitations, leg swelling  GI: No abdominal pain, nausea, vomiting, diarrhea   : No dysuria, frequency, incontinence  Skin: No itching, burning, rashes, or lesions   Msk: No joint pain or swelling      ICU Vital Signs Last 24 Hrs  T(C): 36.7 (31 Jan 2025 08:02), Max: 37.5 (31 Jan 2025 04:20)  T(F): 98 (31 Jan 2025 08:02), Max: 99.5 (31 Jan 2025 04:20)  HR: 99 (31 Jan 2025 11:00) (67 - 99)  BP: 127/63 (31 Jan 2025 10:30) (110/56 - 148/61)  BP(mean): 81 (31 Jan 2025 10:30) (66 - 98)  ABP: --  ABP(mean): --  RR: 18 (31 Jan 2025 10:30) (12 - 27)  SpO2: 94% (31 Jan 2025 10:30) (92% - 100%)    O2 Parameters below as of 31 Jan 2025 08:00  Patient On (Oxygen Delivery Method): room air              01-30-25 @ 07:01  -  01-31-25 @ 07:00  --------------------------------------------------------  IN: 1160 mL / OUT: 400 mL / NET: 760 mL        CAPILLARY BLOOD GLUCOSE      POCT Blood Glucose.: 162 mg/dL (31 Jan 2025 08:38)      I&O's Summary    30 Jan 2025 07:01  -  31 Jan 2025 07:00  --------------------------------------------------------  IN: 1160 mL / OUT: 400 mL / NET: 760 mL        PHYSICAL EXAM:  General: NAD, appears stated age  Neurology: awake, A&Ox4  HEENT: NC/AT  Respiratory: CTA b/l, no rales or rhonchi noted  Cardiovascular: RRR, normal S1S2, no M/R/G  Abdomen: soft, NT/ND, +BS  Extremities: WWP, no clubbing, cyanosis, or edema, Groin access site c/d/i  w/o tenderness, ecchymosis nor induration,  Skin: warm/dry        Meds:    metoprolol tartrate Oral    atorvastatin Oral      acetaminophen     Tablet .. Oral PRN  DULoxetine Oral      aspirin enteric coated Oral  clopidogrel Tablet Oral  heparin   Injectable SubCutaneous          influenza  Vaccine (HIGH DOSE) IntraMuscular    chlorhexidine 2% Cloths Topical  sodium chloride 0.65% Nasal Both Nostrils PRN                              9.8    6.93  )-----------( 231      ( 31 Jan 2025 06:15 )             29.9       01-31    139  |  108  |  40[H]  ----------------------------<  156[H]  4.5   |  24  |  1.70[H]    Ca    9.0      31 Jan 2025 06:15  Phos  3.5     01-31  Mg     2.5     01-31    TPro  6.3  /  Alb  2.7[L]  /  TBili  0.6  /  DBili  x   /  AST  21  /  ALT  15  /  AlkPhos  70  01-31            Urinalysis Basic - ( 31 Jan 2025 06:15 )    Color: x / Appearance: x / SG: x / pH: x  Gluc: 156 mg/dL / Ketone: x  / Bili: x / Urobili: x   Blood: x / Protein: x / Nitrite: x   Leuk Esterase: x / RBC: x / WBC x   Sq Epi: x / Non Sq Epi: x / Bacteria: x      .Blood BLOOD   No growth at 48 Hours -- 01-28 @ 13:30  .Blood BLOOD   No growth at 48 Hours -- 01-28 @ 13:25  Clean Catch Clean Catch (Midstream)   No growth -- 01-28 @ 12:26  .Blood BLOOD   No growth at 72 Hours -- 01-27 @ 18:10  .Blood BLOOD   No growth at 72 Hours -- 01-27 @ 18:00        GLOBAL ISSUE/BEST PRACTICE:  Analgesia: N  Sedation:N  HOB elevation: Y  Stress ulcer prophylaxis:N  VTE prophylaxis:Y  Glycemic control:N  Nutrition:Y    CODE STATUS:   Full Code     INTERVAL HPI/OVERNIGHT EVENTS: No acute overnight events occurred. States she has had intermittent cough since late yesterday. Patient now endorsing she did have a mild degree of SOB with ambulation. She is eager to go home.      Review of Systems:  Constitutional: No fever, chills, fatigue  Neuro: No headache, numbness, weakness  Resp: +cough, no wheezing, shortness of breath  CVS: No chest pain, palpitations, leg swelling  GI: No abdominal pain, nausea, vomiting, diarrhea   : No dysuria, frequency, incontinence  Skin: No itching, burning, rashes, or lesions   Msk: No joint pain or swelling      ICU Vital Signs Last 24 Hrs  T(C): 36.7 (31 Jan 2025 08:02), Max: 37.5 (31 Jan 2025 04:20)  T(F): 98 (31 Jan 2025 08:02), Max: 99.5 (31 Jan 2025 04:20)  HR: 99 (31 Jan 2025 11:00) (67 - 99)  BP: 127/63 (31 Jan 2025 10:30) (110/56 - 148/61)  BP(mean): 81 (31 Jan 2025 10:30) (66 - 98)  ABP: --  ABP(mean): --  RR: 18 (31 Jan 2025 10:30) (12 - 27)  SpO2: 94% (31 Jan 2025 10:30) (92% - 100%)    O2 Parameters below as of 31 Jan 2025 08:00  Patient On (Oxygen Delivery Method): room air              01-30-25 @ 07:01  -  01-31-25 @ 07:00  --------------------------------------------------------  IN: 1160 mL / OUT: 400 mL / NET: 760 mL        CAPILLARY BLOOD GLUCOSE      POCT Blood Glucose.: 162 mg/dL (31 Jan 2025 08:38)      I&O's Summary    30 Jan 2025 07:01  -  31 Jan 2025 07:00  --------------------------------------------------------  IN: 1160 mL / OUT: 400 mL / NET: 760 mL        PHYSICAL EXAM:  General: NAD, appears stated age  Neurology: awake, A&Ox4  HEENT: NC/AT  Respiratory: soft rales noted at B/L bases, no wheezing nor rhonchi  Cardiovascular: RRR, normal S1S2, no M/R/G  Abdomen: soft, NT/ND, +BS  Extremities: WWP, no clubbing, cyanosis, or edema, Groin access site c/d/i  w/o tenderness, ecchymosis nor induration,  Skin: warm/dry        Meds:    metoprolol tartrate Oral    atorvastatin Oral      acetaminophen     Tablet .. Oral PRN  DULoxetine Oral      aspirin enteric coated Oral  clopidogrel Tablet Oral  heparin   Injectable SubCutaneous          influenza  Vaccine (HIGH DOSE) IntraMuscular    chlorhexidine 2% Cloths Topical  sodium chloride 0.65% Nasal Both Nostrils PRN                              9.8    6.93  )-----------( 231      ( 31 Jan 2025 06:15 )             29.9       01-31    139  |  108  |  40[H]  ----------------------------<  156[H]  4.5   |  24  |  1.70[H]    Ca    9.0      31 Jan 2025 06:15  Phos  3.5     01-31  Mg     2.5     01-31    TPro  6.3  /  Alb  2.7[L]  /  TBili  0.6  /  DBili  x   /  AST  21  /  ALT  15  /  AlkPhos  70  01-31            Urinalysis Basic - ( 31 Jan 2025 06:15 )    Color: x / Appearance: x / SG: x / pH: x  Gluc: 156 mg/dL / Ketone: x  / Bili: x / Urobili: x   Blood: x / Protein: x / Nitrite: x   Leuk Esterase: x / RBC: x / WBC x   Sq Epi: x / Non Sq Epi: x / Bacteria: x      .Blood BLOOD   No growth at 48 Hours -- 01-28 @ 13:30  .Blood BLOOD   No growth at 48 Hours -- 01-28 @ 13:25  Clean Catch Clean Catch (Midstream)   No growth -- 01-28 @ 12:26  .Blood BLOOD   No growth at 72 Hours -- 01-27 @ 18:10  .Blood BLOOD   No growth at 72 Hours -- 01-27 @ 18:00        GLOBAL ISSUE/BEST PRACTICE:  Analgesia: N  Sedation:N  HOB elevation: Y  Stress ulcer prophylaxis:N  VTE prophylaxis:Y  Glycemic control:N  Nutrition:Y    CODE STATUS:   Full Code     INTERVAL HPI/OVERNIGHT EVENTS: No acute overnight events occurred. States she has had intermittent cough since late yesterday. Patient now endorsing she did have a mild degree of SOB with ambulation. She is eager to go home.      ICU Vital Signs Last 24 Hrs  T(C): 36.7 (31 Jan 2025 08:02), Max: 37.5 (31 Jan 2025 04:20)  T(F): 98 (31 Jan 2025 08:02), Max: 99.5 (31 Jan 2025 04:20)  HR: 99 (31 Jan 2025 11:00) (67 - 99)  BP: 127/63 (31 Jan 2025 10:30) (110/56 - 148/61)  BP(mean): 81 (31 Jan 2025 10:30) (66 - 98)  ABP: --  ABP(mean): --  RR: 18 (31 Jan 2025 10:30) (12 - 27)  SpO2: 94% (31 Jan 2025 10:30) (92% - 100%)    O2 Parameters below as of 31 Jan 2025 08:00  Patient On (Oxygen Delivery Method): room air              01-30-25 @ 07:01  -  01-31-25 @ 07:00  --------------------------------------------------------  IN: 1160 mL / OUT: 400 mL / NET: 760 mL        CAPILLARY BLOOD GLUCOSE      POCT Blood Glucose.: 162 mg/dL (31 Jan 2025 08:38)      I&O's Summary    30 Jan 2025 07:01  -  31 Jan 2025 07:00  --------------------------------------------------------  IN: 1160 mL / OUT: 400 mL / NET: 760 mL        PHYSICAL EXAM:  General: NAD, appears stated age  Neurology: awake, A&Ox4  HEENT: NC/AT  Respiratory: soft rales noted at B/L bases, no wheezing nor rhonchi  Cardiovascular: RRR, normal S1S2, no M/R/G  Abdomen: soft, NT/ND, +BS  Extremities: WWP, no clubbing, cyanosis, or edema, Groin access site c/d/i  w/o tenderness, ecchymosis nor induration,  Skin: warm/dry        Meds:    metoprolol tartrate Oral    atorvastatin Oral      acetaminophen     Tablet .. Oral PRN  DULoxetine Oral      aspirin enteric coated Oral  clopidogrel Tablet Oral  heparin   Injectable SubCutaneous          influenza  Vaccine (HIGH DOSE) IntraMuscular    chlorhexidine 2% Cloths Topical  sodium chloride 0.65% Nasal Both Nostrils PRN                              9.8    6.93  )-----------( 231      ( 31 Jan 2025 06:15 )             29.9       01-31    139  |  108  |  40[H]  ----------------------------<  156[H]  4.5   |  24  |  1.70[H]    Ca    9.0      31 Jan 2025 06:15  Phos  3.5     01-31  Mg     2.5     01-31    TPro  6.3  /  Alb  2.7[L]  /  TBili  0.6  /  DBili  x   /  AST  21  /  ALT  15  /  AlkPhos  70  01-31            Urinalysis Basic - ( 31 Jan 2025 06:15 )    Color: x / Appearance: x / SG: x / pH: x  Gluc: 156 mg/dL / Ketone: x  / Bili: x / Urobili: x   Blood: x / Protein: x / Nitrite: x   Leuk Esterase: x / RBC: x / WBC x   Sq Epi: x / Non Sq Epi: x / Bacteria: x      .Blood BLOOD   No growth at 48 Hours -- 01-28 @ 13:30  .Blood BLOOD   No growth at 48 Hours -- 01-28 @ 13:25  Clean Catch Clean Catch (Midstream)   No growth -- 01-28 @ 12:26  .Blood BLOOD   No growth at 72 Hours -- 01-27 @ 18:10  .Blood BLOOD   No growth at 72 Hours -- 01-27 @ 18:00    DATA REVIEW    All data personally reviewed.  See above for details    VS trends--afebrile, normal HR and BP, mild hypoxemia now on NC  Laboratory results--normal WBC, stable Hb, improving Cr, normal lytes  Radiology results--CXR with pulmonary edema and ?R effusion (my read)  Microbio results-- neg BCx    GLOBAL ISSUE/BEST PRACTICE:  Analgesia: N  Sedation:N  HOB elevation: Y  Stress ulcer prophylaxis:N  VTE prophylaxis:Y  Glycemic control:N  Nutrition:Y    CODE STATUS:   Full Code

## 2025-01-31 NOTE — CASE MANAGEMENT PROGRESS NOTE - NSCMPROGRESSNOTE_GEN_ALL_CORE
Patient discussed during rounds and remains acute with complaints for SOB at this time. Patient for possible weekend discharge per Dr. Artis. CM will continue to collaborate with interdisciplinary team and remain available to assist.

## 2025-01-31 NOTE — PROGRESS NOTE ADULT - PROBLEM SELECTOR PLAN 1
- s/p PCI with 1 stent placement to 100% occl to the diagonal  - transferred to ICU s/p cath. + Hypotension. S/p IVF after hypotension ?related to early BB use  - On aspirin/plavix & atorvastatin  - With acute systolic congestive heart failure - to initiate GDMT with BB (to transition to toprol XL in outpatient realm) - possible SGLT-2i on DC  - Reviewed TTE 1/27: EF 40 to 45%  - GDMT as tolerated by BP and renal function  - Cardio Following  - Anemia noted - stable on DAPT - hgb 9.8 today  - DC planning to home with HCPT

## 2025-01-31 NOTE — PROGRESS NOTE ADULT - ASSESSMENT
89 y/o F w/ pmh of htn, dm2, hld, presented to [facility] for chest pain radiating into the jaw and L. shoulder, in the ED pt was found to have +trops and ST changes concerning for STEMI. Code STEMI activated pt taken to the cath lab was found to have 100% occlusion to the diagonal now s/p x1 stent placed. No cath lab complication reported and pt admitted to the ICU for post cath lab management.    -Neuro: No acute issues, MS stable. Continue Cymbalta home medication.  -Cardiac: STEMI now s/p x1 MARLENE to the diagonal, cont asa/plavix, repeat EKGs, TTE x2 revealing mild pericardial effusion, Limited TTE follow up w/ unchanged pericard effusion, acute Ef 40-45%. No prior h/o HF. No tamponade. Continue statin therapy. Holding home fosinopril. Ambulatory BP normotensive and patient asymptomatic. BB per goal directed therapy; will trial again today before discharge to confirm patient is asymptomatic, w/o hypotension.  -Resp: No acute issues, o2 stable on RA  -GI: Diet CC/DASH  -Renal: JOHNNY likely 2/2 hypotension vs contrast improving slowly. cont to monitor along w/ lytes.   -ID: UA negative, Urine Cx NGTD and Blood Cx NGTD. Afebrile  -Endo: H/o DM2, A1c 7.3, euglycemic  -Heme: DVT ppx w/ heparin subQ, cont asa/plavix    Dispo:  Discharge to home w/ home PT 89 y/o F w/ pmh of htn, dm2, hld, presented to [facility] for chest pain radiating into the jaw and L. shoulder, in the ED pt was found to have +trops and ST changes concerning for STEMI. Code STEMI activated pt taken to the cath lab was found to have 100% occlusion to the diagonal now s/p x1 stent placed. No cath lab complication reported and pt admitted to the ICU for post cath lab management.    -Neuro: No acute issues, MS stable. Continue Cymbalta home medication.  -Cardiac: STEMI now s/p x1 MARLENE to the diagonal, cont asa/plavix, repeat EKGs, TTE x2 revealing mild pericardial effusion, Limited TTE follow up w/ unchanged pericard effusion, acute Ef 40-45%. No prior h/o HF. No tamponade. Continue statin therapy. Holding home fosinopril. Ambulatory BP normotensive and patient asymptomatic. BB per goal directed therapy; will trial again today before discharge to confirm patient is asymptomatic, w/o hypotension.   -Resp: No acute issues, o2 stable on RA  -GI: Diet CC/DASH  -Renal: JOHNNY likely 2/2 hypotension vs contrast improving slowly. cont to monitor along w/ lytes.   -ID: UA negative, Urine Cx NGTD and Blood Cx NGTD. Afebrile  -Endo: H/o DM2, A1c 7.3, euglycemic  -Heme: DVT ppx w/ heparin subQ, cont asa/plavix    Dispo:  PT rec home PT

## 2025-01-31 NOTE — PROGRESS NOTE ADULT - PROBLEM SELECTOR PLAN 3
Chronic, DM2  - glucose elevated to 300 on arrival  - takes metformin at home; hold   - recommend PRITESH with FS QAC/QHS  - hypoglycemia protocol  - Return to home on metformin + farxiga  - A1c 7.3

## 2025-01-31 NOTE — PROGRESS NOTE ADULT - ASSESSMENT
89y/o F with PMHx HTN, DM2, HLD, depression who presented to the ED with intermittent jaw pain followed by anterior chest pressure radiating to the left shoulder, admitted for STEMI.    - s/p code STEMI  - S/P: PCI (1/26/25) with 100% occlusion of diagonal and MARLENE x1  - continue DAPT with ASA 81 QD and plavix  - cont Lipitor 40mg QHS  - TTE 1/27/25 Left ventricular systolic function is moderately decreased with an ejection fraction visually estimated at 40 to 45 %. small pericardial effusion noted.     - Appears compensated from HF POV.   - Previously with hypotension after getting BB dosing  - Now re-attempting very low dose Lopressor 12.5mg BID and if can tolerate this, would switch to succinate and dc on very low dose BB  - ef 40-45%, with small unchanged pericardial effusion without tamponade  - Would hold on further fluid boluses and starting to get trace LE edema    - cr downtrending   - Please continue to maintain strict I/Os, monitor daily weights, Cr, and K.   - hold off on ARB/ARNI    - Monitor in ICU.  At risk of decompensation.

## 2025-01-31 NOTE — PROGRESS NOTE ADULT - ASSESSMENT
89 y/o F with a h/o HTN, DM2, HLD, with:    # STEMI, s/p PCI to Diagonal  # Hypotension  # JOHNNY    - BP improved and consistently stable, would trial low dose beta blocker today  - then would proceed with further gradual introduction of GDMT given new cardiomyopathy (LVEF 40-45%)  - monitor hemodynamics closely, maintain a MAP > 65  - repeat TTE reviewed, pericardial effusion remains small and without evidence of tamponade  - DAPT  - high intensity statin  - cardiology input appreciated  - JOHNNY likely pre-renal/ischemic ATN, optimize end-organ perfusion as above  - trend BUN/Cr, electrolytes, acid-base balance, monitor hourly UOP (nonoliguric)  - no indication for urgent RRT at this time    Patient is stable for transfer to the medical telemetry floor.    35 minutes accounts for the total time spent on this patient encounter, which includes assessing and addressing the problems and their associated risks as mentioned above, reviewing the medical record/laboratory data/imaging studies, interviewing and physically examining the patient, as well as coordinating care with the multidisciplinary team. The date of entry of this note reflects the date of services rendered.

## 2025-01-31 NOTE — PROGRESS NOTE ADULT - CRITICAL CARE ATTENDING COMMENT
Upon my evaluation, this patient is at high risk for imminent or life threatening deterioration due to STEMI, hypotension  and other active medical issues which require my direct attention, intervention, and personal management.  I have personally spent >30 minutes  of critical care time exclusive of time spent on separate billing procedures. This includes review of laboratory data, radiology results, discussion with primary team\patient, and monitoring for potential decompensation. Interventions were performed as documented above.

## 2025-01-31 NOTE — PROGRESS NOTE ADULT - SUBJECTIVE AND OBJECTIVE BOX
Patient is a 89y old  Female who presents with a chief complaint of STEMI (31 Jan 2025 06:04)      FROM ADMISSION H+P:   HPI:  Pt is a 89y/o F with PMHx HTN, DM2, HLD, depression who presented to the ED with intermittent jaw pain followed by anterior chest pressure radiating to the left shoulder, Pt was found to have elevated troponins and EKG with ST elevations with reciprocal ST-T changes in leads II, III, aVF. Code STEMI was activated and patient was taken to the cath lab where she was found to have 100% occlusion to the diagonal now s/p 1 stent placement. Patient seen and examined in the ICU, reports feeling much better. She denies any current chest pain or jaw pain, and also denies dizziness, headache, numbness/tingling, nausea, vomiting, palpitations, shortness of breath, abdominal pain. She does admit to chronic diarrhea. Prior to this event, patient was in her usual state of health. No other concerns at this time.    ED Course:   Vitals: BP: 190/94, HR: 83, Temp: 97.8, RR: 18, SpO2: 97% on RA  Labs: BUN/Cr 29/1.5, Glucose 300, Troponin 737.1   EKG: ST elevation in lead I with ST changes in II, III, aVF  Received in the ED: aspirin 324, brilinta 180mg, heparin IV   (26 Jan 2025 21:31)      INTERVAL HPI/OVERNIGHT EVENTS: Patient was seen and examined. No acute events occurred overnight. No new complaints. Eating breakfast, which she says is so-so. BP elevated at bedside - 149/91, HR 80s-100s. To re-trial BB as per cardiology. Patient states no chest pain since admission and feels well - ready for discharge.    I&O's Summary    30 Jan 2025 07:01  -  31 Jan 2025 07:00  --------------------------------------------------------  IN: 1160 mL / OUT: 400 mL / NET: 760 mL        PAST MEDICAL & SURGICAL HISTORY:  HTN (hypertension)      DM (diabetes mellitus)      HLD (hyperlipidemia)      Major depression      No significant past surgical history          LABS:                        9.8    6.93  )-----------( 231      ( 31 Jan 2025 06:15 )             29.9     01-31    139  |  108  |  40[H]  ----------------------------<  156[H]  4.5   |  24  |  1.70[H]    Ca    9.0      31 Jan 2025 06:15  Phos  3.5     01-31  Mg     2.5     01-31    TPro  6.3  /  Alb  2.7[L]  /  TBili  0.6  /  DBili  x   /  AST  21  /  ALT  15  /  AlkPhos  70  01-31      Urinalysis Basic - ( 31 Jan 2025 06:15 )    Color: x / Appearance: x / SG: x / pH: x  Gluc: 156 mg/dL / Ketone: x  / Bili: x / Urobili: x   Blood: x / Protein: x / Nitrite: x   Leuk Esterase: x / RBC: x / WBC x   Sq Epi: x / Non Sq Epi: x / Bacteria: x      CAPILLARY BLOOD GLUCOSE      POCT Blood Glucose.: 162 mg/dL (31 Jan 2025 08:38)  POCT Blood Glucose.: 168 mg/dL (30 Jan 2025 23:20)  POCT Blood Glucose.: 158 mg/dL (30 Jan 2025 16:41)  POCT Blood Glucose.: 193 mg/dL (30 Jan 2025 11:45)        Urinalysis Basic - ( 31 Jan 2025 06:15 )    Color: x / Appearance: x / SG: x / pH: x  Gluc: 156 mg/dL / Ketone: x  / Bili: x / Urobili: x   Blood: x / Protein: x / Nitrite: x   Leuk Esterase: x / RBC: x / WBC x   Sq Epi: x / Non Sq Epi: x / Bacteria: x        MEDICATIONS  (STANDING):  aspirin enteric coated 81 milliGRAM(s) Oral daily  atorvastatin 40 milliGRAM(s) Oral at bedtime  chlorhexidine 2% Cloths 1 Application(s) Topical <User Schedule>  clopidogrel Tablet 75 milliGRAM(s) Oral every 24 hours  DULoxetine 90 milliGRAM(s) Oral daily  heparin   Injectable 5000 Unit(s) SubCutaneous every 8 hours  influenza  Vaccine (HIGH DOSE) 0.5 milliLiter(s) IntraMuscular once  metoprolol tartrate 12.5 milliGRAM(s) Oral two times a day    MEDICATIONS  (PRN):  acetaminophen     Tablet .. 650 milliGRAM(s) Oral every 6 hours PRN Temp greater or equal to 38C (100.4F), Mild Pain (1 - 3)  sodium chloride 0.65% Nasal 1 Spray(s) Both Nostrils two times a day PRN Nasal Congestion      REVIEW OF SYSTEMS:  CONSTITUTIONAL: No fever or chills  HEENT:  No headache, no sore throat  RESPIRATORY: No cough, wheezing, or shortness of breath  CARDIOVASCULAR: No chest pain, palpitations  GASTROINTESTINAL: No abdominal pain, nausea, vomiting, or diarrhea  GENITOURINARY: No dysuria, frequency, or hematuria  NEUROLOGICAL: no focal weakness or dizziness  MUSCULOSKELETAL: no myalgias  PSYCH: no recent changes in mood    RADIOLOGY & ADDITIONAL TESTS:    Imaging Personally Reviewed:  [x] YES  [ ] NO    Consultant(s) Notes Reviewed:  [x] YES  [ ] NO    PHYSICAL EXAM:  T(C): 36.7 (01-31-25 @ 08:02), Max: 37.5 (01-31-25 @ 04:20)  HR: 77 (01-31-25 @ 07:00) (67 - 88)  BP: 127/56 (01-31-25 @ 07:00) (110/56 - 149/65)  RR: 21 (01-31-25 @ 07:00) (12 - 25)  SpO2: 95% (01-31-25 @ 07:00) (88% - 100%)    GENERAL: NAD, elderly female  HEENT:  anicteric, moist mucous membranes  CHEST/LUNG:  CTA b/l, no rales, wheezes, or rhonchi  HEART:  RRR, S1, S2  ABDOMEN:  BS+, soft, nontender, nondistended  EXTREMITIES: no edema, cyanosis, or calf tenderness  NERVOUS SYSTEM: answers questions and follows commands appropriately  PSYCH: normal affect    Care Discussed with Consultants/Other Providers [x] YES  [ ] NO

## 2025-01-31 NOTE — PROGRESS NOTE ADULT - SUBJECTIVE AND OBJECTIVE BOX
Patient is a 89y old  Female who presents with a chief complaint of STEMI (30 Jan 2025 17:21)      BRIEF HOSPITAL COURSE:  89 y/o F with a h/o HTN, DM2, HLD, admitted on 1/26 with inferior STEMI s/p PCI to [facility]. Hospital course complicated by hypotension, JOHNNY and small pericardial effusion.    Events last 24 hours: Hemodynamics improved. Denies complaints.        PAST MEDICAL & SURGICAL HISTORY:  HTN (hypertension)      DM (diabetes mellitus)      HLD (hyperlipidemia)      Major depression      No significant past surgical history          Review of Systems:  CONSTITUTIONAL: No fever, chills, or fatigue  EYES: No eye pain, visual disturbances, or discharge  ENMT:  No difficulty hearing, tinnitus, vertigo; No sinus or throat pain  NECK: No pain or stiffness  RESPIRATORY: No cough, wheezing, chills or hemoptysis; No shortness of breath  CARDIOVASCULAR: No chest pain, palpitations, dizziness, or leg swelling  GASTROINTESTINAL: No abdominal or epigastric pain. No nausea, vomiting, or hematemesis; No diarrhea or constipation. No melena or hematochezia.  GENITOURINARY: No dysuria, frequency, hematuria, or incontinence  NEUROLOGICAL: No headaches, memory loss, loss of strength, numbness, or tremors  SKIN: No itching, burning, rashes, or lesions   MUSCULOSKELETAL: No joint pain or swelling; No muscle, back, or extremity pain  PSYCHIATRIC: No depression, anxiety, mood swings, or difficulty sleeping      Medications:        acetaminophen     Tablet .. 650 milliGRAM(s) Oral every 6 hours PRN  DULoxetine 90 milliGRAM(s) Oral daily      aspirin enteric coated 81 milliGRAM(s) Oral daily  clopidogrel Tablet 75 milliGRAM(s) Oral every 24 hours  heparin   Injectable 5000 Unit(s) SubCutaneous every 8 hours        atorvastatin 40 milliGRAM(s) Oral at bedtime      influenza  Vaccine (HIGH DOSE) 0.5 milliLiter(s) IntraMuscular once    chlorhexidine 2% Cloths 1 Application(s) Topical <User Schedule>  sodium chloride 0.65% Nasal 1 Spray(s) Both Nostrils two times a day PRN            ICU Vital Signs Last 24 Hrs  T(C): 37.5 (31 Jan 2025 04:20), Max: 37.5 (31 Jan 2025 04:20)  T(F): 99.5 (31 Jan 2025 04:20), Max: 99.5 (31 Jan 2025 04:20)  HR: 82 (31 Jan 2025 05:00) (64 - 88)  BP: 129/61 (31 Jan 2025 05:00) (105/50 - 149/65)  BP(mean): 81 (31 Jan 2025 05:00) (66 - 88)  ABP: --  ABP(mean): --  RR: 19 (31 Jan 2025 05:00) (17 - 25)  SpO2: 94% (31 Jan 2025 05:00) (88% - 100%)    O2 Parameters below as of 31 Jan 2025 05:00  Patient On (Oxygen Delivery Method): room air                I&O's Detail    29 Jan 2025 07:01  -  30 Jan 2025 07:00  --------------------------------------------------------  IN:    Lactated Ringers Bolus: 500 mL    Oral Fluid: 220 mL  Total IN: 720 mL    OUT:  Total OUT: 0 mL    Total NET: 720 mL      30 Jan 2025 07:01  -  31 Jan 2025 06:04  --------------------------------------------------------  IN:    Oral Fluid: 1060 mL  Total IN: 1060 mL    OUT:  Total OUT: 0 mL    Total NET: 1060 mL            LABS:                        9.6    6.30  )-----------( 187      ( 30 Jan 2025 05:42 )             29.1     01-30    139  |  108  |  42[H]  ----------------------------<  143[H]  4.4   |  20[L]  |  1.80[H]    Ca    8.5      30 Jan 2025 05:42  Phos  3.7     01-30  Mg     2.2     01-30            CAPILLARY BLOOD GLUCOSE      POCT Blood Glucose.: 168 mg/dL (30 Jan 2025 23:20)      Urinalysis Basic - ( 30 Jan 2025 05:42 )    Color: x / Appearance: x / SG: x / pH: x  Gluc: 143 mg/dL / Ketone: x  / Bili: x / Urobili: x   Blood: x / Protein: x / Nitrite: x   Leuk Esterase: x / RBC: x / WBC x   Sq Epi: x / Non Sq Epi: x / Bacteria: x      CULTURES:  Culture Results:   No growth at 48 Hours (01-28-25 @ 13:30)  Culture Results:   No growth at 48 Hours (01-28-25 @ 13:25)  Culture Results:   No growth (01-28-25 @ 12:26)  Culture Results:   No growth at 72 Hours (01-27-25 @ 18:10)  Culture Results:   No growth at 72 Hours (01-27-25 @ 18:00)        Physical Examination:    General: No acute distress.  Alert, oriented, interactive, nonfocal    HEENT: Pupils equal, reactive to light.  Symmetric.    PULM: Clear to auscultation bilaterally, no significant sputum production    CVS: Regular rate and rhythm, no murmurs, rubs, or gallops    ABD: Soft, nondistended, nontender, normoactive bowel sounds, no masses    EXT: No edema, nontender    SKIN: Warm and well perfused, no rashes noted.    NEURO: A&Ox3, strength 5/5 all extremities, cranial nerves grossly intact, no focal deficits        RADIOLOGY:     < from: Xray Chest 1 View- PORTABLE-Urgent (01.26.25 @ 20:02) >  FINDINGS:    Single frontal view of the chest demonstrates the lungs to be clear. The   cardiomediastinal silhouette is unremarkable. No acute osseous   abnormalities. Overlying EKG leads and wires are noted    IMPRESSION: No acute cardiopulmonary disease process.    < end of copied text >

## 2025-01-31 NOTE — PROGRESS NOTE ADULT - SUBJECTIVE AND OBJECTIVE BOX
[facility] - Jamal Moralez, Roney Villela, Sincere White  Office Number:  [phone]    Patient resting comfortably in bed in NAD.  Laying flat with no respiratory distress.  No complaints of chest pain, dyspnea, palpitations, PND, or orthopnea.    ROS: negative unless otherwise mentioned.    Telemetry: SR    MEDICATIONS  (STANDING):  aspirin enteric coated 81 milliGRAM(s) Oral daily  atorvastatin 40 milliGRAM(s) Oral at bedtime  chlorhexidine 2% Cloths 1 Application(s) Topical <User Schedule>  clopidogrel Tablet 75 milliGRAM(s) Oral every 24 hours  DULoxetine 90 milliGRAM(s) Oral daily  heparin   Injectable 5000 Unit(s) SubCutaneous every 8 hours  influenza  Vaccine (HIGH DOSE) 0.5 milliLiter(s) IntraMuscular once  metoprolol tartrate 12.5 milliGRAM(s) Oral two times a day    MEDICATIONS  (PRN):  acetaminophen     Tablet .. 650 milliGRAM(s) Oral every 6 hours PRN Temp greater or equal to 38C (100.4F), Mild Pain (1 - 3)  sodium chloride 0.65% Nasal 1 Spray(s) Both Nostrils two times a day PRN Nasal Congestion      Allergies    No Known Allergies    Intolerances        Vital Signs Last 24 Hrs  T(C): 36.7 (31 Jan 2025 08:02), Max: 37.5 (31 Jan 2025 04:20)  T(F): 98 (31 Jan 2025 08:02), Max: 99.5 (31 Jan 2025 04:20)  HR: 77 (31 Jan 2025 07:00) (67 - 84)  BP: 127/56 (31 Jan 2025 07:00) (110/56 - 139/85)  BP(mean): 77 (31 Jan 2025 07:00) (66 - 88)  RR: 21 (31 Jan 2025 07:00) (12 - 25)  SpO2: 95% (31 Jan 2025 07:00) (92% - 100%)    Parameters below as of 31 Jan 2025 07:00  Patient On (Oxygen Delivery Method): room air        I&O's Summary    30 Jan 2025 07:01  -  31 Jan 2025 07:00  --------------------------------------------------------  IN: 1160 mL / OUT: 400 mL / NET: 760 mL        ON EXAM:    General: NAD, awake and alert, oriented x 3  HEENT: Mucous membranes are moist, anicteric  Lungs: Non-labored, breath sounds are clear bilaterally, No wheezing, rales or rhonchi  Cardiovascular: Regular, S1 and S2, no murmurs, rubs, or gallops  Gastrointestinal: Bowel Sounds present, soft, nontender.   Lymph: No peripheral edema. No lymphadenopathy.  Skin: No rashes or ulcers  Psych:  Mood & affect appropriate    LABS: All Labs Reviewed:                        9.8    6.93  )-----------( 231      ( 31 Jan 2025 06:15 )             29.9                         9.6    6.30  )-----------( 187      ( 30 Jan 2025 05:42 )             29.1                         9.5    7.32  )-----------( 186      ( 29 Jan 2025 12:15 )             29.1     31 Jan 2025 06:15    139    |  108    |  40     ----------------------------<  156    4.5     |  24     |  1.70   30 Jan 2025 05:42    139    |  108    |  42     ----------------------------<  143    4.4     |  20     |  1.80   29 Jan 2025 05:55    138    |  108    |  47     ----------------------------<  158    4.1     |  23     |  2.10     Ca    9.0        31 Jan 2025 06:15  Ca    8.5        30 Jan 2025 05:42  Ca    8.5        29 Jan 2025 05:55  Phos  3.5       31 Jan 2025 06:15  Phos  3.7       30 Jan 2025 05:42  Phos  4.6       29 Jan 2025 05:55  Mg     2.5       31 Jan 2025 06:15  Mg     2.2       30 Jan 2025 05:42  Mg     2.0       29 Jan 2025 05:55    TPro  6.3    /  Alb  2.7    /  TBili  0.6    /  DBili  x      /  AST  21     /  ALT  15     /  AlkPhos  70     31 Jan 2025 06:15          Blood Culture: Organism --  Gram Stain Blood -- Gram Stain --  Specimen Source .Blood BLOOD  Culture-Blood --    Organism --  Gram Stain Blood -- Gram Stain --  Specimen Source .Blood BLOOD  Culture-Blood --    Organism --  Gram Stain Blood -- Gram Stain --  Specimen Source Clean Catch Clean Catch (Midstream)  Culture-Blood --    Organism --  Gram Stain Blood -- Gram Stain --  Specimen Source .Blood BLOOD  Culture-Blood --    Organism --  Gram Stain Blood -- Gram Stain --  Specimen Source .Blood BLOOD  Culture-Blood --

## 2025-02-01 LAB
ANION GAP SERPL CALC-SCNC: 7 MMOL/L — SIGNIFICANT CHANGE UP (ref 5–17)
BUN SERPL-MCNC: 46 MG/DL — HIGH (ref 7–23)
CALCIUM SERPL-MCNC: 8.4 MG/DL — LOW (ref 8.5–10.1)
CHLORIDE SERPL-SCNC: 108 MMOL/L — SIGNIFICANT CHANGE UP (ref 96–108)
CO2 SERPL-SCNC: 23 MMOL/L — SIGNIFICANT CHANGE UP (ref 22–31)
CREAT SERPL-MCNC: 2 MG/DL — HIGH (ref 0.5–1.3)
EGFR: 23 ML/MIN/1.73M2 — LOW
GLUCOSE SERPL-MCNC: 152 MG/DL — HIGH (ref 70–99)
HCT VFR BLD CALC: 27.5 % — LOW (ref 34.5–45)
HGB BLD-MCNC: 8.9 G/DL — LOW (ref 11.5–15.5)
MAGNESIUM SERPL-MCNC: 2.3 MG/DL — SIGNIFICANT CHANGE UP (ref 1.6–2.6)
MCHC RBC-ENTMCNC: 29.7 PG — SIGNIFICANT CHANGE UP (ref 27–34)
MCHC RBC-ENTMCNC: 32.4 G/DL — SIGNIFICANT CHANGE UP (ref 32–36)
MCV RBC AUTO: 91.7 FL — SIGNIFICANT CHANGE UP (ref 80–100)
NRBC # BLD: 0 /100 WBCS — SIGNIFICANT CHANGE UP (ref 0–0)
NRBC BLD-RTO: 0 /100 WBCS — SIGNIFICANT CHANGE UP (ref 0–0)
PHOSPHATE SERPL-MCNC: 3.4 MG/DL — SIGNIFICANT CHANGE UP (ref 2.5–4.5)
PLATELET # BLD AUTO: 210 K/UL — SIGNIFICANT CHANGE UP (ref 150–400)
POTASSIUM SERPL-MCNC: 4.2 MMOL/L — SIGNIFICANT CHANGE UP (ref 3.5–5.3)
POTASSIUM SERPL-SCNC: 4.2 MMOL/L — SIGNIFICANT CHANGE UP (ref 3.5–5.3)
RBC # BLD: 3 M/UL — LOW (ref 3.8–5.2)
RBC # FLD: 14 % — SIGNIFICANT CHANGE UP (ref 10.3–14.5)
SODIUM SERPL-SCNC: 138 MMOL/L — SIGNIFICANT CHANGE UP (ref 135–145)
WBC # BLD: 5.59 K/UL — SIGNIFICANT CHANGE UP (ref 3.8–10.5)
WBC # FLD AUTO: 5.59 K/UL — SIGNIFICANT CHANGE UP (ref 3.8–10.5)

## 2025-02-01 PROCEDURE — 99233 SBSQ HOSP IP/OBS HIGH 50: CPT | Mod: GC

## 2025-02-01 PROCEDURE — 99291 CRITICAL CARE FIRST HOUR: CPT

## 2025-02-01 PROCEDURE — 99232 SBSQ HOSP IP/OBS MODERATE 35: CPT | Mod: GC

## 2025-02-01 RX ORDER — ALBUTEROL 90 MCG
2.5 AEROSOL REFILL (GRAM) INHALATION EVERY 6 HOURS
Refills: 0 | Status: DISCONTINUED | OUTPATIENT
Start: 2025-02-01 | End: 2025-02-12

## 2025-02-01 RX ORDER — ACETAMINOPHEN 160 MG/5ML
1000 SUSPENSION ORAL ONCE
Refills: 0 | Status: COMPLETED | OUTPATIENT
Start: 2025-02-01 | End: 2025-02-01

## 2025-02-01 RX ORDER — ALPRAZOLAM 2 MG
0.25 TABLET ORAL ONCE
Refills: 0 | Status: DISCONTINUED | OUTPATIENT
Start: 2025-02-01 | End: 2025-02-01

## 2025-02-01 RX ADMIN — Medication 3: at 21:26

## 2025-02-01 RX ADMIN — ANTISEPTIC SURGICAL SCRUB 1 APPLICATION(S): 0.04 SOLUTION TOPICAL at 05:52

## 2025-02-01 RX ADMIN — Medication 5000 UNIT(S): at 14:13

## 2025-02-01 RX ADMIN — Medication 12.5 MILLIGRAM(S): at 05:52

## 2025-02-01 RX ADMIN — Medication 2.5 MILLIGRAM(S): at 14:53

## 2025-02-01 RX ADMIN — Medication 2: at 12:08

## 2025-02-01 RX ADMIN — Medication 40 MILLIGRAM(S): at 12:08

## 2025-02-01 RX ADMIN — Medication 12.5 MILLIGRAM(S): at 17:12

## 2025-02-01 RX ADMIN — ACETAMINOPHEN 400 MILLIGRAM(S): 160 SUSPENSION ORAL at 16:25

## 2025-02-01 RX ADMIN — Medication 2.5 MILLIGRAM(S): at 19:36

## 2025-02-01 RX ADMIN — NYSTATIN 1 APPLICATION(S): 100000 POWDER TOPICAL at 05:52

## 2025-02-01 RX ADMIN — Medication 5000 UNIT(S): at 05:52

## 2025-02-01 RX ADMIN — Medication 1 SPRAY(S): at 05:53

## 2025-02-01 RX ADMIN — NYSTATIN 1 APPLICATION(S): 100000 POWDER TOPICAL at 17:32

## 2025-02-01 RX ADMIN — OSELTAMIVIR PHOSPHATE 30 MILLIGRAM(S): 75 CAPSULE ORAL at 12:08

## 2025-02-01 RX ADMIN — ATORVASTATIN CALCIUM 40 MILLIGRAM(S): 80 TABLET, FILM COATED ORAL at 21:05

## 2025-02-01 RX ADMIN — ASPIRIN 81 MILLIGRAM(S): 81 TABLET, COATED ORAL at 12:08

## 2025-02-01 RX ADMIN — ACETAMINOPHEN 1000 MILLIGRAM(S): 160 SUSPENSION ORAL at 16:42

## 2025-02-01 RX ADMIN — Medication 75 MILLIGRAM(S): at 05:52

## 2025-02-01 RX ADMIN — Medication 5000 UNIT(S): at 21:05

## 2025-02-01 RX ADMIN — Medication 0.25 MILLIGRAM(S): at 10:21

## 2025-02-01 RX ADMIN — DULOXETINE 90 MILLIGRAM(S): 20 CAPSULE, DELAYED RELEASE ORAL at 12:08

## 2025-02-01 NOTE — CASE MANAGEMENT PROGRESS NOTE - NSCMPROGRESSNOTE_GEN_ALL_CORE
WEEKEND TASK FOLLOW UP: Per Dr Artis pt remains acute, pt tested positive for influenza overnight, pt c/o SOB, IV Lasix, [id number]. CM to remain available

## 2025-02-01 NOTE — PROGRESS NOTE ADULT - SUBJECTIVE AND OBJECTIVE BOX
Patient is a 89y old  Female who presents with a chief complaint of STEMI (01 Feb 2025 14:32)      INTERVAL HPI/OVERNIGHT EVENTS: Patient seen and examined at bedside. Tmax 100.2 oral overnight, noted to be +flu, started on tamiflu. Patient notes she has a wet cough but otherwise no acute complaints. has no complaints at this time. Denies fevers, chills, headache, lightheadedness, chest pain, dyspnea, abdominal pain, n/v/d/c.    MEDICATIONS  (STANDING):  albuterol    0.083% 2.5 milliGRAM(s) Nebulizer every 6 hours  aspirin enteric coated 81 milliGRAM(s) Oral daily  atorvastatin 40 milliGRAM(s) Oral at bedtime  chlorhexidine 2% Cloths 1 Application(s) Topical <User Schedule>  clopidogrel Tablet 75 milliGRAM(s) Oral every 24 hours  dextrose 5%. 1000 milliLiter(s) (50 mL/Hr) IV Continuous <Continuous>  dextrose 5%. 1000 milliLiter(s) (100 mL/Hr) IV Continuous <Continuous>  dextrose 50% Injectable 25 Gram(s) IV Push once  dextrose 50% Injectable 12.5 Gram(s) IV Push once  dextrose 50% Injectable 25 Gram(s) IV Push once  DULoxetine 90 milliGRAM(s) Oral daily  glucagon  Injectable 1 milliGRAM(s) IntraMuscular once  heparin   Injectable 5000 Unit(s) SubCutaneous every 8 hours  influenza  Vaccine (HIGH DOSE) 0.5 milliLiter(s) IntraMuscular once  insulin lispro (ADMELOG) corrective regimen sliding scale   SubCutaneous Before meals and at bedtime  metoprolol tartrate 12.5 milliGRAM(s) Oral two times a day  nystatin Powder 1 Application(s) Topical two times a day  oseltamivir 30 milliGRAM(s) Oral daily    MEDICATIONS  (PRN):  acetaminophen     Tablet .. 650 milliGRAM(s) Oral every 6 hours PRN Temp greater or equal to 38C (100.4F), Mild Pain (1 - 3)  dextrose Oral Gel 15 Gram(s) Oral once PRN Blood Glucose LESS THAN 70 milliGRAM(s)/deciliter  sodium chloride 0.65% Nasal 1 Spray(s) Both Nostrils two times a day PRN Nasal Congestion      Allergies    No Known Allergies    Intolerances        REVIEW OF SYSTEMS:  CONSTITUTIONAL: No fever or chills  HEENT:  No headache, no sore throat  RESPIRATORY: +cough, no wheezing, or shortness of breath  CARDIOVASCULAR: No chest pain, palpitations  GASTROINTESTINAL: No abd pain, nausea, vomiting, or diarrhea  GENITOURINARY: No dysuria, frequency, or hematuria  NEUROLOGICAL: no focal weakness or dizziness  MUSCULOSKELETAL: no myalgias     Vital Signs Last 24 Hrs  T(C): 37.1 (01 Feb 2025 12:38), Max: 37.9 (31 Jan 2025 20:00)  T(F): 98.7 (01 Feb 2025 12:38), Max: 100.2 (31 Jan 2025 20:00)  HR: 76 (01 Feb 2025 13:00) (57 - 88)  BP: 136/64 (01 Feb 2025 13:00) (77/51 - 156/72)  BP(mean): 87 (01 Feb 2025 13:00) (59 - 111)  RR: 22 (01 Feb 2025 13:00) (18 - 30)  SpO2: 99% (01 Feb 2025 13:00) (90% - 100%)    Parameters below as of 01 Feb 2025 11:00  Patient On (Oxygen Delivery Method): nasal cannula    O2 Concentration (%): 2    PHYSICAL EXAM:  GENERAL: NAD  HEENT:  anicteric, moist mucous membranes  CHEST/LUNG:  CTA b/l, no rales, wheezes, or rhonchi  HEART:  RRR, S1, S2  ABDOMEN:  BS+, soft, nontender, nondistended  EXTREMITIES: no edema, cyanosis, or calf tenderness  NERVOUS SYSTEM: answers questions and follows commands appropriately    LABS:                        8.9    5.59  )-----------( 210      ( 01 Feb 2025 05:30 )             27.5     CBC Full  -  ( 01 Feb 2025 05:30 )  WBC Count : 5.59 K/uL  Hemoglobin : 8.9 g/dL  Hematocrit : 27.5 %  Platelet Count - Automated : 210 K/uL  Mean Cell Volume : 91.7 fl  Mean Cell Hemoglobin : 29.7 pg  Mean Cell Hemoglobin Concentration : 32.4 g/dL  Auto Neutrophil # : x  Auto Lymphocyte # : x  Auto Monocyte # : x  Auto Eosinophil # : x  Auto Basophil # : x  Auto Neutrophil % : x  Auto Lymphocyte % : x  Auto Monocyte % : x  Auto Eosinophil % : x  Auto Basophil % : x    01 Feb 2025 05:30    138    |  108    |  46     ----------------------------<  152    4.2     |  23     |  2.00     Ca    8.4        01 Feb 2025 05:30  Phos  3.4       01 Feb 2025 05:30  Mg     2.3       01 Feb 2025 05:30        Urinalysis Basic - ( 01 Feb 2025 05:30 )    Color: x / Appearance: x / SG: x / pH: x  Gluc: 152 mg/dL / Ketone: x  / Bili: x / Urobili: x   Blood: x / Protein: x / Nitrite: x   Leuk Esterase: x / RBC: x / WBC x   Sq Epi: x / Non Sq Epi: x / Bacteria: x      CAPILLARY BLOOD GLUCOSE      POCT Blood Glucose.: 242 mg/dL (01 Feb 2025 12:05)  POCT Blood Glucose.: 132 mg/dL (01 Feb 2025 08:26)  POCT Blood Glucose.: 232 mg/dL (31 Jan 2025 21:35)  POCT Blood Glucose.: 243 mg/dL (31 Jan 2025 17:38)        Culture - Blood (collected 01-28-25 @ 13:30)  Source: .Blood BLOOD  Preliminary Report (01-31-25 @ 20:01):    No growth at 72 Hours    Culture - Blood (collected 01-28-25 @ 13:25)  Source: .Blood BLOOD  Preliminary Report (01-31-25 @ 20:01):    No growth at 72 Hours    Culture - Urine (collected 01-28-25 @ 12:26)  Source: Clean Catch Clean Catch (Midstream)  Final Report (01-29-25 @ 23:08):    No growth    Culture - Blood (collected 01-27-25 @ 18:10)  Source: .Blood BLOOD  Preliminary Report (02-01-25 @ 01:00):    No growth at 4 days    Culture - Blood (collected 01-27-25 @ 18:00)  Source: .Blood BLOOD  Preliminary Report (02-01-25 @ 01:00):    No growth at 4 days        RADIOLOGY & ADDITIONAL TESTS:      ACC: [id number] EXAM:  XR CHEST PORTABLE URGENT 1V   ORDERED BY:  DARLEEN INFANTE     PROCEDURE DATE:  01/31/2025          INTERPRETATION:  AP chest.    CLINICAL INDICATION: Shortness of breath.    IMPRESSION: The heart is normal in size. Small bilateral pleural   effusions with mild pulmonary edema. Bibasilar atelectasis.    Personally reviewed.     Consultant(s) Notes Reviewed:  [x] YES  [ ] NO     Patient is a 89y old  Female who presents with a chief complaint of STEMI (01 Feb 2025 14:32)      INTERVAL HPI/OVERNIGHT EVENTS: Patient seen and examined at bedside. Tmax 100.2 oral overnight, noted to be +flu, started on tamiflu. Patient notes she has a wet cough but otherwise no acute complaints. has no complaints at this time. Denies fevers, chills, headache, lightheadedness, chest pain, dyspnea, abdominal pain, n/v/d/c.    states she got flu from daughter. icu following and to downgrade.    MEDICATIONS  (STANDING):  albuterol    0.083% 2.5 milliGRAM(s) Nebulizer every 6 hours  aspirin enteric coated 81 milliGRAM(s) Oral daily  atorvastatin 40 milliGRAM(s) Oral at bedtime  chlorhexidine 2% Cloths 1 Application(s) Topical <User Schedule>  clopidogrel Tablet 75 milliGRAM(s) Oral every 24 hours  dextrose 5%. 1000 milliLiter(s) (50 mL/Hr) IV Continuous <Continuous>  dextrose 5%. 1000 milliLiter(s) (100 mL/Hr) IV Continuous <Continuous>  dextrose 50% Injectable 25 Gram(s) IV Push once  dextrose 50% Injectable 12.5 Gram(s) IV Push once  dextrose 50% Injectable 25 Gram(s) IV Push once  DULoxetine 90 milliGRAM(s) Oral daily  glucagon  Injectable 1 milliGRAM(s) IntraMuscular once  heparin   Injectable 5000 Unit(s) SubCutaneous every 8 hours  influenza  Vaccine (HIGH DOSE) 0.5 milliLiter(s) IntraMuscular once  insulin lispro (ADMELOG) corrective regimen sliding scale   SubCutaneous Before meals and at bedtime  metoprolol tartrate 12.5 milliGRAM(s) Oral two times a day  nystatin Powder 1 Application(s) Topical two times a day  oseltamivir 30 milliGRAM(s) Oral daily    MEDICATIONS  (PRN):  acetaminophen     Tablet .. 650 milliGRAM(s) Oral every 6 hours PRN Temp greater or equal to 38C (100.4F), Mild Pain (1 - 3)  dextrose Oral Gel 15 Gram(s) Oral once PRN Blood Glucose LESS THAN 70 milliGRAM(s)/deciliter  sodium chloride 0.65% Nasal 1 Spray(s) Both Nostrils two times a day PRN Nasal Congestion      Allergies    No Known Allergies    Intolerances        REVIEW OF SYSTEMS:  CONSTITUTIONAL: No fever or chills  HEENT:  No headache, no sore throat  RESPIRATORY: +cough, no wheezing, or shortness of breath  CARDIOVASCULAR: No chest pain, palpitations  GASTROINTESTINAL: No abd pain, nausea, vomiting, or diarrhea  GENITOURINARY: No dysuria, frequency, or hematuria  NEUROLOGICAL: no focal weakness or dizziness  MUSCULOSKELETAL: no myalgias     Vital Signs Last 24 Hrs  T(C): 37.1 (01 Feb 2025 12:38), Max: 37.9 (31 Jan 2025 20:00)  T(F): 98.7 (01 Feb 2025 12:38), Max: 100.2 (31 Jan 2025 20:00)  HR: 76 (01 Feb 2025 13:00) (57 - 88)  BP: 136/64 (01 Feb 2025 13:00) (77/51 - 156/72)  BP(mean): 87 (01 Feb 2025 13:00) (59 - 111)  RR: 22 (01 Feb 2025 13:00) (18 - 30)  SpO2: 99% (01 Feb 2025 13:00) (90% - 100%)    Parameters below as of 01 Feb 2025 11:00  Patient On (Oxygen Delivery Method): nasal cannula    O2 Concentration (%): 2    PHYSICAL EXAM:  GENERAL: NAD  HEENT:  anicteric, moist mucous membranes  CHEST/LUNG:  CTA b/l, no rales, wheezes, or rhonchi  HEART:  RRR, S1, S2  ABDOMEN:  BS+, soft, nontender, nondistended  EXTREMITIES: no edema, cyanosis, or calf tenderness  NERVOUS SYSTEM: answers questions and follows commands appropriately    LABS:                        8.9    5.59  )-----------( 210      ( 01 Feb 2025 05:30 )             27.5     CBC Full  -  ( 01 Feb 2025 05:30 )  WBC Count : 5.59 K/uL  Hemoglobin : 8.9 g/dL  Hematocrit : 27.5 %  Platelet Count - Automated : 210 K/uL  Mean Cell Volume : 91.7 fl  Mean Cell Hemoglobin : 29.7 pg  Mean Cell Hemoglobin Concentration : 32.4 g/dL  Auto Neutrophil # : x  Auto Lymphocyte # : x  Auto Monocyte # : x  Auto Eosinophil # : x  Auto Basophil # : x  Auto Neutrophil % : x  Auto Lymphocyte % : x  Auto Monocyte % : x  Auto Eosinophil % : x  Auto Basophil % : x    01 Feb 2025 05:30    138    |  108    |  46     ----------------------------<  152    4.2     |  23     |  2.00     Ca    8.4        01 Feb 2025 05:30  Phos  3.4       01 Feb 2025 05:30  Mg     2.3       01 Feb 2025 05:30        Urinalysis Basic - ( 01 Feb 2025 05:30 )    Color: x / Appearance: x / SG: x / pH: x  Gluc: 152 mg/dL / Ketone: x  / Bili: x / Urobili: x   Blood: x / Protein: x / Nitrite: x   Leuk Esterase: x / RBC: x / WBC x   Sq Epi: x / Non Sq Epi: x / Bacteria: x      CAPILLARY BLOOD GLUCOSE      POCT Blood Glucose.: 242 mg/dL (01 Feb 2025 12:05)  POCT Blood Glucose.: 132 mg/dL (01 Feb 2025 08:26)  POCT Blood Glucose.: 232 mg/dL (31 Jan 2025 21:35)  POCT Blood Glucose.: 243 mg/dL (31 Jan 2025 17:38)        Culture - Blood (collected 01-28-25 @ 13:30)  Source: .Blood BLOOD  Preliminary Report (01-31-25 @ 20:01):    No growth at 72 Hours    Culture - Blood (collected 01-28-25 @ 13:25)  Source: .Blood BLOOD  Preliminary Report (01-31-25 @ 20:01):    No growth at 72 Hours    Culture - Urine (collected 01-28-25 @ 12:26)  Source: Clean Catch Clean Catch (Midstream)  Final Report (01-29-25 @ 23:08):    No growth    Culture - Blood (collected 01-27-25 @ 18:10)  Source: .Blood BLOOD  Preliminary Report (02-01-25 @ 01:00):    No growth at 4 days    Culture - Blood (collected 01-27-25 @ 18:00)  Source: .Blood BLOOD  Preliminary Report (02-01-25 @ 01:00):    No growth at 4 days        RADIOLOGY & ADDITIONAL TESTS:      ACC: [id number] EXAM:  XR CHEST PORTABLE URGENT 1V   ORDERED BY:  DARLEEN INFANTE     PROCEDURE DATE:  01/31/2025          INTERPRETATION:  AP chest.    CLINICAL INDICATION: Shortness of breath.    IMPRESSION: The heart is normal in size. Small bilateral pleural   effusions with mild pulmonary edema. Bibasilar atelectasis.    Personally reviewed.     Consultant(s) Notes Reviewed:  [x] YES  [ ] NO

## 2025-02-01 NOTE — PROGRESS NOTE ADULT - SUBJECTIVE AND OBJECTIVE BOX
JOHNNY on CKDIII. JOHNNY appear to plateau. H/o suggest ischemic ATN (Hypotension) and nephrotoxic ATN (Contrast). Urine studies. Consult to follow in AM

## 2025-02-01 NOTE — PROGRESS NOTE ADULT - ASSESSMENT
89y/o F with PMHx HTN, DM2, HLD, depression who presented to the ED with intermittent jaw pain followed by anterior chest pressure radiating to the left shoulder, admitted for STEMI.    - s/p code STEMI  - S/P: PCI (1/26/25) with 100% occlusion of diagonal and MARLENE x1  - continue DAPT with ASA 81 QD and plavix  - cont Lipitor 40mg QHS  - TTE 1/27/25 Left ventricular systolic function is moderately decreased with an ejection fraction visually estimated at 40 to 45 %. small pericardial effusion noted.     - Appears compensated from HF POV.   - Previously with hypotension after getting BB dosing  - Now re-attempting very low dose Lopressor 12.5mg BID and if can tolerate this, would switch to succinate and dc on very low dose BB  - ef 40-45%, with small unchanged pericardial effusion without tamponade  - Would hold on further fluid boluses and starting to get trace LE edema    - cr stable  - Please continue to maintain strict I/Os, monitor daily weights, Cr, and K.   - hold off on ARB/ARNI    - Monitor in ICU.  At risk of decompensation.

## 2025-02-01 NOTE — PROGRESS NOTE ADULT - PROBLEM SELECTOR PLAN 1
- s/p PCI with 1 stent placement to 100% occl to the diagonal  - transferred to ICU s/p cath. + Hypotension. S/p IVF after hypotension ?related to early BB use  - On aspirin/plavix & atorvastatin  - With acute systolic congestive heart failure - to initiate GDMT with BB (to transition to toprol XL in outpatient realm) - possible SGLT-2i on DC  - Reviewed TTE 1/27: EF 40 to 45%  - GDMT as tolerated by BP and renal function  - Cardio Following  - Anemia noted - stable on DAPT  - DC planning to home with HCPT

## 2025-02-01 NOTE — PROGRESS NOTE ADULT - ASSESSMENT
89 y/o F w/ pmh of htn, dm2, hld, presented to [facility] for chest pain radiating into the jaw and L. shoulder, in the ED pt was found to have +trops and ST changes concerning for STEMI. Code STEMI activated pt taken to the cath lab was found to have 100% occlusion to the diagonal now s/p x1 stent placed. No cath lab complication reported and pt admitted to the ICU for post cath lab management.    Neuro:   -No acute issues, MS stable. Continue Cymbalta home medication.    Cardiac:   -STEMI now s/p x1 MARLENE to the diagonal  -cont asa/plavix and statin  -repeat EKGs, TTE x2 revealing mild pericardial effusion, Limited TTE follow up w/ unchanged pericard effusion, acute Ef 40-45%. No prior h/o HF. No tamponade.   -ordered lasix 40mg po 1x   -holding home fosinopril  -restarted home lopressor 12.5mg po BID   -Ambulatory BP normotensive and patient asymptomatic. BB per goal directed therapy; will trial again today before discharge to confirm patient is asymptomatic, w/o hypotension.     Resp:   -No acute issues, o2 stable on RA    GI:   -Diet CC/DASH    Renal:   -JOHNNY likely 2/2 hypotension vs contrast improving slowly. cont to monitor along w/ lytes.     ID:   -UA negative, Urine Cx NGTD and Blood Cx NGTD. Afebrile  -flu positive, started on tamiflu  -albuterol nebulizer q6    Endo:   -H/o DM2, A1c 7.3, euglycemic    Heme:   -DVT ppx w/ heparin subQ, cont asa/plavix    Dispo:  PT rec home PT, transfer to remote tele

## 2025-02-01 NOTE — PROGRESS NOTE ADULT - ASSESSMENT
89y/o F with PMHx HTN, DM2, HLD, depression who presented to the ED with intermittent jaw pain followed by anterior chest pressure radiating to the left shoulder, admitted for STEMI. 89y/o F with PMHx HTN, DM2, HLD, depression who presented to the ED with intermittent jaw pain followed by anterior chest pressure radiating to the left shoulder, admitted for STEMI. Course c/b acute hypoxic respiratory failure secondary to acute HFrEF and influenza. Also with JOHNNY.

## 2025-02-01 NOTE — PROGRESS NOTE ADULT - SUBJECTIVE AND OBJECTIVE BOX
INTERVAL HPI/OVERNIGHT EVENTS: No acute overnight events occurred. States she has had intermittent cough since two days ago and was SOB wile ambulating. She is now flu positive. Underwent a panic attack during the day, where she was tachypneic with BP rising to196/87. Panic attack aborted with home xanax 0.25mg.       ICU Vital Signs Last 24 Hrs  T(C): 37.1 (01 Feb 2025 12:38), Max: 37.9 (31 Jan 2025 20:00)  T(F): 98.7 (01 Feb 2025 12:38), Max: 100.2 (31 Jan 2025 20:00)  HR: 76 (01 Feb 2025 13:00) (57 - 88)  BP: 136/64 (01 Feb 2025 13:00) (77/51 - 156/72)  BP(mean): 87 (01 Feb 2025 13:00) (59 - 111)  ABP: --  ABP(mean): --  RR: 22 (01 Feb 2025 13:00) (18 - 30)  SpO2: 99% (01 Feb 2025 13:00) (90% - 100%)    O2 Parameters below as of 01 Feb 2025 11:00  Patient On (Oxygen Delivery Method): nasal cannula    O2 Concentration (%): 2      01-30-25 @ 07:01  -  01-31-25 @ 07:00  --------------------------------------------------------  IN: 1160 mL / OUT: 400 mL / NET: 760 mL        CAPILLARY BLOOD GLUCOSE      POCT Blood Glucose.: 162 mg/dL (31 Jan 2025 08:38)      I&O's Summary    30 Jan 2025 07:01  -  31 Jan 2025 07:00  --------------------------------------------------------  IN: 1160 mL / OUT: 400 mL / NET: 760 mL        PHYSICAL EXAM:  General: NAD, appears stated age  Neurology: awake, A&Ox4  HEENT: NC/AT  Respiratory: light wheezing notable on anterior lung fields  Cardiovascular: RRR, normal S1S2  Abdomen: soft, NT/ND  Extremities: WWP, no clubbing, cyanosis, or edema, Groin access site c/d/i  w/o tenderness, ecchymosis nor induration   Skin: warm/dry        Meds:    metoprolol tartrate Oral    atorvastatin Oral      acetaminophen     Tablet .. Oral PRN  DULoxetine Oral      aspirin enteric coated Oral  clopidogrel Tablet Oral  heparin   Injectable SubCutaneous          influenza  Vaccine (HIGH DOSE) IntraMuscular    chlorhexidine 2% Cloths Topical  sodium chloride 0.65% Nasal Both Nostrils PRN        LABS:  cret                        8.9    5.59  )-----------( 210      ( 01 Feb 2025 05:30 )             27.5     02-01    138  |  108  |  46[H]  ----------------------------<  152[H]  4.2   |  23  |  2.00[H]    Ca    8.4[L]      01 Feb 2025 05:30  Phos  3.4     02-01  Mg     2.3     02-01    TPro  6.3  /  Alb  2.7[L]  /  TBili  0.6  /  DBili  x   /  AST  21  /  ALT  15  /  AlkPhos  70  01-31          Urinalysis Basic - ( 31 Jan 2025 06:15 )    Color: x / Appearance: x / SG: x / pH: x  Gluc: 156 mg/dL / Ketone: x  / Bili: x / Urobili: x   Blood: x / Protein: x / Nitrite: x   Leuk Esterase: x / RBC: x / WBC x   Sq Epi: x / Non Sq Epi: x / Bacteria: x      .Blood BLOOD   No growth at 48 Hours -- 01-28 @ 13:30  .Blood BLOOD   No growth at 48 Hours -- 01-28 @ 13:25  Clean Catch Clean Catch (Midstream)   No growth -- 01-28 @ 12:26  .Blood BLOOD   No growth at 72 Hours -- 01-27 @ 18:10  .Blood BLOOD   No growth at 72 Hours -- 01-27 @ 18:00    DATA REVIEW    All data personally reviewed.  See above for details    VS trends--afebrile, normal HR and BP, mild hypoxemia now on NC  Laboratory results--normal WBC, stable Hb, improving Cr, normal lytes  Radiology results--CXR with pulmonary edema and ?R effusion (my read)  Microbio results-- neg BCx    GLOBAL ISSUE/BEST PRACTICE:  Analgesia: N  Sedation:N  HOB elevation: Y  Stress ulcer prophylaxis:N  VTE prophylaxis:Y  Glycemic control:N  Nutrition:Y    CODE STATUS:   Full Code     INTERVAL HPI/OVERNIGHT EVENTS: No acute overnight events occurred. States she has had intermittent cough since two days ago and was SOB wile ambulating. She is now flu positive, exposed to daughter who tested positive. Underwent a panic attack during the day, where she was tachypneic with BP rising to196/87. Panic attack aborted with home xanax 0.25mg.       ICU Vital Signs Last 24 Hrs  T(C): 37.1 (01 Feb 2025 12:38), Max: 37.9 (31 Jan 2025 20:00)  T(F): 98.7 (01 Feb 2025 12:38), Max: 100.2 (31 Jan 2025 20:00)  HR: 76 (01 Feb 2025 13:00) (57 - 88)  BP: 136/64 (01 Feb 2025 13:00) (77/51 - 156/72)  BP(mean): 87 (01 Feb 2025 13:00) (59 - 111)  ABP: --  ABP(mean): --  RR: 22 (01 Feb 2025 13:00) (18 - 30)  SpO2: 99% (01 Feb 2025 13:00) (90% - 100%)    O2 Parameters below as of 01 Feb 2025 11:00  Patient On (Oxygen Delivery Method): nasal cannula    O2 Concentration (%): 2      01-30-25 @ 07:01  -  01-31-25 @ 07:00  --------------------------------------------------------  IN: 1160 mL / OUT: 400 mL / NET: 760 mL        CAPILLARY BLOOD GLUCOSE      POCT Blood Glucose.: 162 mg/dL (31 Jan 2025 08:38)      I&O's Summary    30 Jan 2025 07:01  -  31 Jan 2025 07:00  --------------------------------------------------------  IN: 1160 mL / OUT: 400 mL / NET: 760 mL        PHYSICAL EXAM:  General: NAD, appears stated age  Neurology: awake, A&Ox4  HEENT: NC/AT  Respiratory: light wheezing notable on anterior lung fields  Cardiovascular: RRR, normal S1S2  Abdomen: soft, NT/ND  Extremities: WWP, no clubbing, cyanosis, or edema, Groin access site c/d/i  w/o tenderness, ecchymosis nor induration   Skin: warm/dry        Meds:    metoprolol tartrate Oral    atorvastatin Oral      acetaminophen     Tablet .. Oral PRN  DULoxetine Oral      aspirin enteric coated Oral  clopidogrel Tablet Oral  heparin   Injectable SubCutaneous          influenza  Vaccine (HIGH DOSE) IntraMuscular    chlorhexidine 2% Cloths Topical  sodium chloride 0.65% Nasal Both Nostrils PRN        LABS:  cret                        8.9    5.59  )-----------( 210      ( 01 Feb 2025 05:30 )             27.5     02-01    138  |  108  |  46[H]  ----------------------------<  152[H]  4.2   |  23  |  2.00[H]    Ca    8.4[L]      01 Feb 2025 05:30  Phos  3.4     02-01  Mg     2.3     02-01    TPro  6.3  /  Alb  2.7[L]  /  TBili  0.6  /  DBili  x   /  AST  21  /  ALT  15  /  AlkPhos  70  01-31          Urinalysis Basic - ( 31 Jan 2025 06:15 )    Color: x / Appearance: x / SG: x / pH: x  Gluc: 156 mg/dL / Ketone: x  / Bili: x / Urobili: x   Blood: x / Protein: x / Nitrite: x   Leuk Esterase: x / RBC: x / WBC x   Sq Epi: x / Non Sq Epi: x / Bacteria: x      .Blood BLOOD   No growth at 48 Hours -- 01-28 @ 13:30  .Blood BLOOD   No growth at 48 Hours -- 01-28 @ 13:25  Clean Catch Clean Catch (Midstream)   No growth -- 01-28 @ 12:26  .Blood BLOOD   No growth at 72 Hours -- 01-27 @ 18:10  .Blood BLOOD   No growth at 72 Hours -- 01-27 @ 18:00    DATA REVIEW    All data personally reviewed.  See above for details    VS trends--low grade temp 100.2, normal HR and BP, mild hypoxemia now on NC  Laboratory results--normal WBC, stable Hb, improving Cr, normal lytes  Radiology results--CXR with pulmonary edema and ?R effusion (my read)  Microbio results-- Flu A +    GLOBAL ISSUE/BEST PRACTICE:  Analgesia: N  Sedation:N  HOB elevation: Y  Stress ulcer prophylaxis:N  VTE prophylaxis:Y  Glycemic control:N  Nutrition:Y    CODE STATUS:   Full Code

## 2025-02-01 NOTE — PROGRESS NOTE ADULT - PROBLEM SELECTOR PLAN 2
- unclear baseline, - GFR similar to 2023 on chart review  - S/p IVF - Cr 2 today  - monitor renal function, increased s/p cardiac cath - likely ATN from hypotension and AIN from contrast, received IV Lasix yesterday for vol overload which may be contributing as well - unclear baseline, - GFR similar to 2023 on chart review  - S/p IVF - Cr 2 today  - monitor renal function, increased s/p cardiac cath - likely ATN from hypotension and CARLO from contrast, received IV Lasix yesterday for vol overload which may be contributing as well

## 2025-02-01 NOTE — PROGRESS NOTE ADULT - SUBJECTIVE AND OBJECTIVE BOX
[facility] - Jamal Moralez, Manohar, Roney, Christopher, Sincere Cooley  Office Number: [phone]    Patient resting comfortably in bed in NAD.  Laying flat with no respiratory distress.  No complaints of chest pain, dyspnea, palpitations, PND, or orthopnea.    ROS: negative unless otherwise mentioned.    Telemetry: SR    PAST MEDICAL & SURGICAL HISTORY:  HTN (hypertension)      DM (diabetes mellitus)      HLD (hyperlipidemia)      Major depression      No significant past surgical history          SOCIAL HISTORY:  No tobacco, ethanol, or drug abuse.    FAMILY HISTORY:    No family history of acute MI or sudden cardiac death.    MEDICATIONS  (STANDING):  albuterol    0.083% 2.5 milliGRAM(s) Nebulizer every 6 hours  aspirin enteric coated 81 milliGRAM(s) Oral daily  atorvastatin 40 milliGRAM(s) Oral at bedtime  chlorhexidine 2% Cloths 1 Application(s) Topical <User Schedule>  clopidogrel Tablet 75 milliGRAM(s) Oral every 24 hours  dextrose 5%. 1000 milliLiter(s) (100 mL/Hr) IV Continuous <Continuous>  dextrose 5%. 1000 milliLiter(s) (50 mL/Hr) IV Continuous <Continuous>  dextrose 50% Injectable 25 Gram(s) IV Push once  dextrose 50% Injectable 12.5 Gram(s) IV Push once  dextrose 50% Injectable 25 Gram(s) IV Push once  DULoxetine 90 milliGRAM(s) Oral daily  furosemide    Tablet 40 milliGRAM(s) Oral once  glucagon  Injectable 1 milliGRAM(s) IntraMuscular once  heparin   Injectable 5000 Unit(s) SubCutaneous every 8 hours  influenza  Vaccine (HIGH DOSE) 0.5 milliLiter(s) IntraMuscular once  insulin lispro (ADMELOG) corrective regimen sliding scale   SubCutaneous Before meals and at bedtime  metoprolol tartrate 12.5 milliGRAM(s) Oral two times a day  nystatin Powder 1 Application(s) Topical two times a day  oseltamivir 30 milliGRAM(s) Oral daily    MEDICATIONS  (PRN):  acetaminophen     Tablet .. 650 milliGRAM(s) Oral every 6 hours PRN Temp greater or equal to 38C (100.4F), Mild Pain (1 - 3)  dextrose Oral Gel 15 Gram(s) Oral once PRN Blood Glucose LESS THAN 70 milliGRAM(s)/deciliter  sodium chloride 0.65% Nasal 1 Spray(s) Both Nostrils two times a day PRN Nasal Congestion      Allergies    No Known Allergies    Intolerances        VITAL SIGNS:   Vital Signs Last 24 Hrs  T(C): 37.5 (01 Feb 2025 08:00), Max: 37.9 (31 Jan 2025 20:00)  T(F): 99.5 (01 Feb 2025 08:00), Max: 100.2 (31 Jan 2025 20:00)  HR: 81 (01 Feb 2025 11:00) (57 - 87)  BP: 139/96 (01 Feb 2025 09:00) (77/51 - 156/72)  BP(mean): 111 (01 Feb 2025 09:00) (59 - 111)  RR: 29 (01 Feb 2025 11:00) (18 - 30)  SpO2: 90% (01 Feb 2025 10:00) (90% - 100%)    Parameters below as of 01 Feb 2025 07:00  Patient On (Oxygen Delivery Method): nasal cannula  O2 Flow (L/min): 2      I&O's Summary    31 Jan 2025 07:01  -  01 Feb 2025 07:00  --------------------------------------------------------  IN: 900 mL / OUT: 700 mL / NET: 200 mL        On Exam:  Constitutional: NAD, awake and alert, well-developed  HEENT: Moist Mucous Membranes, Anicteric  Pulmonary: Non-labored, breath sounds are clear bilaterally, No wheezing, rales or rhonchi  Cardiovascular: Regular, S1 and S2, No murmurs, rubs, gallops or clicks  Gastrointestinal: Bowel Sounds present, soft, nontender.   Lymph: No peripheral edema. No lymphadenopathy.  Skin: No visible rashes or ulcers.  Psych:  Mood & affect appropriate    LABS: All Labs Reviewed:                        8.9    5.59  )-----------( 210      ( 01 Feb 2025 05:30 )             27.5                         9.8    6.93  )-----------( 231      ( 31 Jan 2025 06:15 )             29.9                         9.6    6.30  )-----------( 187      ( 30 Jan 2025 05:42 )             29.1     01 Feb 2025 05:30    138    |  108    |  46     ----------------------------<  152    4.2     |  23     |  2.00   31 Jan 2025 06:15    139    |  108    |  40     ----------------------------<  156    4.5     |  24     |  1.70   30 Jan 2025 05:42    139    |  108    |  42     ----------------------------<  143    4.4     |  20     |  1.80     Ca    8.4        01 Feb 2025 05:30  Ca    9.0        31 Jan 2025 06:15  Ca    8.5        30 Jan 2025 05:42  Phos  3.4       01 Feb 2025 05:30  Phos  3.5       31 Jan 2025 06:15  Phos  3.7       30 Jan 2025 05:42  Mg     2.3       01 Feb 2025 05:30  Mg     2.5       31 Jan 2025 06:15  Mg     2.2       30 Jan 2025 05:42    TPro  6.3    /  Alb  2.7    /  TBili  0.6    /  DBili  x      /  AST  21     /  ALT  15     /  AlkPhos  70     31 Jan 2025 06:15          Blood Culture: Organism --  Gram Stain Blood -- Gram Stain --  Specimen Source .Blood BLOOD  Culture-Blood --    Organism --  Gram Stain Blood -- Gram Stain --  Specimen Source .Blood BLOOD  Culture-Blood --    Organism --  Gram Stain Blood -- Gram Stain --  Specimen Source Clean Catch Clean Catch (Midstream)  Culture-Blood --    Organism --  Gram Stain Blood -- Gram Stain --  Specimen Source .Blood BLOOD  Culture-Blood --    Organism --  Gram Stain Blood -- Gram Stain --  Specimen Source .Blood BLOOD  Culture-Blood --              TRANSTHORACIC ECHOCARDIOGRAM REPORT  ________________________________________________________________________________                                      _______       Pt. Name:       DOMITILA GRACE Study Date:    1/28/2025  MRN:            [id number]           YOB: 1933  Accession #:    [id number]          Age:           89 years  Account#:       [id number]         Gender:        F  Heart Rate:                        Height:        62.20 in (158.00 cm)  Rhythm:          Weight:        160.93 lb (73.00 kg)  Blood Pressure: 93/52 mmHg         BSA/BMI:       1.75 m² / 29.24 kg/m²  ________________________________________________________________________________________  Referring Physician:    [id number] Carlos Alberto  Interpreting Physician: Braxton Bowens M.D.  Primary Sonographer:    Marlys Queen    CPT:               ECHO TTE W/O CON F/U LTD - 84052.m  Indication(s):     ST elevation [STEMI] myocardial infarction of unspecified                     site - I21.3  Procedure:         Limited transthoracic echocardiogram.  Ordering Location: [facility]  Admission Status:  Inpatient    _______________________________________________________________________________________     CONCLUSIONS:      1. Left ventricular systolic function is moderately decreased with an ejection fraction visually estimated at 40 to 45 %.   2. Small pericardial effusion noted adjacent to the right atrium and small pericardial effusion noted adjacent to the right ventricle. The effusion measures 0.82 cm in diastole in the subcostal view adjacent ot the RV.   3. Thickened pericardium.   4. No significant change in the size of the pericardial effusion compared to TTE study from yesterday (1/27/25) is noted.    ________________________________________________________________________________________  FINDINGS:     Left Ventricle:  Left ventricular systolic function is moderately decreased with an ejection fraction visually estimated at 40 to 45%.     Right Ventricle:  Right ventricular systolic function is normal.     Left Atrium:  The left atrium is dilated.     Mitral Valve:  There is moderate calcification of the mitral valve annulus.     Pericardium:  The pericardium is thickened. There is a small pericardial effusionnoted adjacent to the right atrium and a small pericardial effusion noted adjacent to the right ventricle.     Systemic Veins:  The inferior vena cava is normal in size measuring 1.83 cm in diameter, (normal <2.1cm) with normal inspiratory collapse (normal >50%) consistent with normal right atrial pressure (~3, range 0-5mmHg).  ____________________________________________________________________  QUANTITATIVE DATA:  Left Ventricle Measurements: (Indexed to BSA)     Visualized LV EF%: 40 to 45%       ________________________________________________________________________________________  Electronically signed on 1/28/2025 at 2:21:15 PM by Braxton Bowens M.D.         *** Final ***

## 2025-02-02 DIAGNOSIS — J10.00 INFLUENZA DUE TO OTHER IDENTIFIED INFLUENZA VIRUS WITH UNSPECIFIED TYPE OF PNEUMONIA: ICD-10-CM

## 2025-02-02 DIAGNOSIS — J10.1 INFLUENZA DUE TO OTHER IDENTIFIED INFLUENZA VIRUS WITH OTHER RESPIRATORY MANIFESTATIONS: ICD-10-CM

## 2025-02-02 LAB
ALBUMIN SERPL ELPH-MCNC: 2.4 G/DL — LOW (ref 3.3–5)
ALP SERPL-CCNC: 74 U/L — SIGNIFICANT CHANGE UP (ref 40–120)
ALT FLD-CCNC: 18 U/L — SIGNIFICANT CHANGE UP (ref 12–78)
ANION GAP SERPL CALC-SCNC: 10 MMOL/L — SIGNIFICANT CHANGE UP (ref 5–17)
AST SERPL-CCNC: 23 U/L — SIGNIFICANT CHANGE UP (ref 15–37)
BASOPHILS # BLD AUTO: 0.01 K/UL — SIGNIFICANT CHANGE UP (ref 0–0.2)
BASOPHILS NFR BLD AUTO: 0.2 % — SIGNIFICANT CHANGE UP (ref 0–2)
BILIRUB SERPL-MCNC: 0.5 MG/DL — SIGNIFICANT CHANGE UP (ref 0.2–1.2)
BUN SERPL-MCNC: 41 MG/DL — HIGH (ref 7–23)
CALCIUM SERPL-MCNC: 8.5 MG/DL — SIGNIFICANT CHANGE UP (ref 8.5–10.1)
CHLORIDE SERPL-SCNC: 107 MMOL/L — SIGNIFICANT CHANGE UP (ref 96–108)
CO2 SERPL-SCNC: 21 MMOL/L — LOW (ref 22–31)
CREAT ?TM UR-MCNC: 45 MG/DL — SIGNIFICANT CHANGE UP
CREAT SERPL-MCNC: 2 MG/DL — HIGH (ref 0.5–1.3)
CULTURE RESULTS: SIGNIFICANT CHANGE UP
EGFR: 23 ML/MIN/1.73M2 — LOW
EOSINOPHIL # BLD AUTO: 0.07 K/UL — SIGNIFICANT CHANGE UP (ref 0–0.5)
EOSINOPHIL NFR BLD AUTO: 1.1 % — SIGNIFICANT CHANGE UP (ref 0–6)
GLUCOSE SERPL-MCNC: 159 MG/DL — HIGH (ref 70–99)
HCT VFR BLD CALC: 27.9 % — LOW (ref 34.5–45)
HGB BLD-MCNC: 9.1 G/DL — LOW (ref 11.5–15.5)
IMM GRANULOCYTES NFR BLD AUTO: 0.7 % — SIGNIFICANT CHANGE UP (ref 0–0.9)
LYMPHOCYTES # BLD AUTO: 0.58 K/UL — LOW (ref 1–3.3)
LYMPHOCYTES # BLD AUTO: 9.4 % — LOW (ref 13–44)
MAGNESIUM SERPL-MCNC: 2.1 MG/DL — SIGNIFICANT CHANGE UP (ref 1.6–2.6)
MCHC RBC-ENTMCNC: 30.1 PG — SIGNIFICANT CHANGE UP (ref 27–34)
MCHC RBC-ENTMCNC: 32.6 G/DL — SIGNIFICANT CHANGE UP (ref 32–36)
MCV RBC AUTO: 92.4 FL — SIGNIFICANT CHANGE UP (ref 80–100)
MONOCYTES # BLD AUTO: 0.65 K/UL — SIGNIFICANT CHANGE UP (ref 0–0.9)
MONOCYTES NFR BLD AUTO: 10.6 % — SIGNIFICANT CHANGE UP (ref 2–14)
NEUTROPHILS # BLD AUTO: 4.79 K/UL — SIGNIFICANT CHANGE UP (ref 1.8–7.4)
NEUTROPHILS NFR BLD AUTO: 78 % — HIGH (ref 43–77)
NRBC # BLD: 0 /100 WBCS — SIGNIFICANT CHANGE UP (ref 0–0)
NRBC BLD-RTO: 0 /100 WBCS — SIGNIFICANT CHANGE UP (ref 0–0)
OSMOLALITY UR: 453 MOSM/KG — SIGNIFICANT CHANGE UP (ref 50–1200)
PHOSPHATE 24H UR-MCNC: 30 MG/DL — SIGNIFICANT CHANGE UP
PHOSPHATE SERPL-MCNC: 3.8 MG/DL — SIGNIFICANT CHANGE UP (ref 2.5–4.5)
PLATELET # BLD AUTO: 251 K/UL — SIGNIFICANT CHANGE UP (ref 150–400)
POTASSIUM SERPL-MCNC: 4.1 MMOL/L — SIGNIFICANT CHANGE UP (ref 3.5–5.3)
POTASSIUM SERPL-SCNC: 4.1 MMOL/L — SIGNIFICANT CHANGE UP (ref 3.5–5.3)
POTASSIUM UR-SCNC: 38.3 MMOL/L — SIGNIFICANT CHANGE UP
PROT ?TM UR-MCNC: 28 MG/DL — HIGH (ref 0–12)
PROT SERPL-MCNC: 6.5 G/DL — SIGNIFICANT CHANGE UP (ref 6–8.3)
RBC # BLD: 3.02 M/UL — LOW (ref 3.8–5.2)
RBC # FLD: 14.3 % — SIGNIFICANT CHANGE UP (ref 10.3–14.5)
SODIUM SERPL-SCNC: 138 MMOL/L — SIGNIFICANT CHANGE UP (ref 135–145)
SODIUM UR-SCNC: 111 MMOL/L — SIGNIFICANT CHANGE UP
SPECIMEN SOURCE: SIGNIFICANT CHANGE UP
UUN UR-MCNC: 392 MG/DL — SIGNIFICANT CHANGE UP
WBC # BLD: 6.14 K/UL — SIGNIFICANT CHANGE UP (ref 3.8–10.5)
WBC # FLD AUTO: 6.14 K/UL — SIGNIFICANT CHANGE UP (ref 3.8–10.5)

## 2025-02-02 PROCEDURE — 71045 X-RAY EXAM CHEST 1 VIEW: CPT | Mod: 26

## 2025-02-02 PROCEDURE — 99233 SBSQ HOSP IP/OBS HIGH 50: CPT | Mod: GC

## 2025-02-02 PROCEDURE — 99291 CRITICAL CARE FIRST HOUR: CPT

## 2025-02-02 RX ORDER — ALPRAZOLAM 2 MG
0.25 TABLET ORAL ONCE
Refills: 0 | Status: DISCONTINUED | OUTPATIENT
Start: 2025-02-02 | End: 2025-02-02

## 2025-02-02 RX ORDER — METOPROLOL SUCCINATE 25 MG
25 TABLET, EXTENDED RELEASE 24 HR ORAL DAILY
Refills: 0 | Status: DISCONTINUED | OUTPATIENT
Start: 2025-02-02 | End: 2025-02-02

## 2025-02-02 RX ORDER — ALBUTEROL 90 MCG
2.5 AEROSOL REFILL (GRAM) INHALATION ONCE
Refills: 0 | Status: COMPLETED | OUTPATIENT
Start: 2025-02-02 | End: 2025-02-02

## 2025-02-02 RX ORDER — ALPRAZOLAM 2 MG
0.25 TABLET ORAL
Refills: 0 | Status: DISCONTINUED | OUTPATIENT
Start: 2025-02-02 | End: 2025-02-02

## 2025-02-02 RX ORDER — BACTERIOSTATIC SODIUM CHLORIDE 0.9 %
4 VIAL (ML) INJECTION EVERY 12 HOURS
Refills: 0 | Status: DISCONTINUED | OUTPATIENT
Start: 2025-02-02 | End: 2025-02-12

## 2025-02-02 RX ADMIN — Medication 2.5 MILLIGRAM(S): at 08:16

## 2025-02-02 RX ADMIN — Medication 2: at 22:34

## 2025-02-02 RX ADMIN — Medication 2.5 MILLIGRAM(S): at 13:48

## 2025-02-02 RX ADMIN — Medication 1: at 12:29

## 2025-02-02 RX ADMIN — Medication 2.5 MILLIGRAM(S): at 18:25

## 2025-02-02 RX ADMIN — Medication 4 MILLILITER(S): at 18:27

## 2025-02-02 RX ADMIN — Medication 2.5 MILLIGRAM(S): at 01:07

## 2025-02-02 RX ADMIN — Medication 0.25 MILLIGRAM(S): at 18:25

## 2025-02-02 RX ADMIN — DULOXETINE 90 MILLIGRAM(S): 20 CAPSULE, DELAYED RELEASE ORAL at 12:22

## 2025-02-02 RX ADMIN — ANTISEPTIC SURGICAL SCRUB 1 APPLICATION(S): 0.04 SOLUTION TOPICAL at 05:42

## 2025-02-02 RX ADMIN — Medication 1: at 07:58

## 2025-02-02 RX ADMIN — Medication 5000 UNIT(S): at 22:04

## 2025-02-02 RX ADMIN — Medication 5000 UNIT(S): at 16:00

## 2025-02-02 RX ADMIN — Medication 5000 UNIT(S): at 05:39

## 2025-02-02 RX ADMIN — NYSTATIN 1 APPLICATION(S): 100000 POWDER TOPICAL at 06:06

## 2025-02-02 RX ADMIN — Medication 1: at 16:46

## 2025-02-02 RX ADMIN — OSELTAMIVIR PHOSPHATE 30 MILLIGRAM(S): 75 CAPSULE ORAL at 12:22

## 2025-02-02 RX ADMIN — ASPIRIN 81 MILLIGRAM(S): 81 TABLET, COATED ORAL at 12:22

## 2025-02-02 RX ADMIN — Medication 75 MILLIGRAM(S): at 05:39

## 2025-02-02 RX ADMIN — ATORVASTATIN CALCIUM 40 MILLIGRAM(S): 80 TABLET, FILM COATED ORAL at 22:04

## 2025-02-02 NOTE — CONSULT NOTE ADULT - ASSESSMENT
JOHNNY/CKDIII  STEMI s/p MARLENE  HTN    -CKDIII baseline with Cr 1.5 on admission, possibly due to hypertensive nephrosclerosis   -JOHNNY clinically due to CARLO +/- hypotension induced ATN  -Urine studies  -JOHNNY appear to plateau, monitor. Will not start IVF  -No kidney biopsy indicated   -Will benefit from RAAS blockade +/- SGLT2i in the future for CKD

## 2025-02-02 NOTE — PROGRESS NOTE ADULT - PROBLEM SELECTOR PLAN 2
- s/p PCI with 1 stent placement to 100% occl to the diagonal  - transferred to ICU s/p cath. + Hypotension. S/p IVF after hypotension ?related to early BB use  - On aspirin/plavix & atorvastatin  - With acute systolic congestive heart failure - to initiate GDMT with BB (to transition to toprol XL in outpatient realm) - possible SGLT-2i on DC  - Reviewed TTE 1/27: EF 40 to 45%  - GDMT as tolerated by BP and renal function  - Cardio Following  - Anemia noted - stable on DAPT  - DC planning to home with HCPT - s/p PCI with 1 stent placement to 100% occl to the diagonal  - transferred to ICU s/p cath. + Hypotension. S/p IVF after hypotension ?related to early BB use  - On aspirin/plavix & atorvastatin  - With acute systolic congestive heart failure - to initiate GDMT with BB (to transition to toprol XL in outpatient realm) - possible SGLT-2i on DC  - Reviewed TTE 1/27: EF 40 to 45%  - GDMT as tolerated by BP and renal function - planned to start toprol tmrw per ICU - unclear if her blood pressure can tolerate beta blocker. May need to hold and diurese with albumin.  - Cardio Following  - Anemia noted - stable on DAPT  - DC planning to home with HCPT - on hold due to AHRF/ADHF & influenza

## 2025-02-02 NOTE — PROGRESS NOTE ADULT - PROBLEM SELECTOR PLAN 1
Found to be Flu A +  Continue tamiflu for 5 days  Now with secretions, will start saline nebs along with nebulizers  Hold off IVF given heart failure   Monitor for fevers or signs of worsening respiratory function, on 3L NC still Found to be Flu A +  Continue tamiflu for 5 days  Now with secretions, will start saline nebs along with nebulizers  Acute hypoxic respiratory failure secondary to acute HFrEF and influenza - continue O2 supplementation  Hold off IVF given heart failure - may need diuretics with albumin  Monitor for fevers or signs of worsening respiratory function, on 3L NC still  CXR 2/2 worse - may need albumin assisted diuresis  Pulmonary evaluation - ? role for steroids

## 2025-02-02 NOTE — PROGRESS NOTE ADULT - PROBLEM SELECTOR PLAN 5
Chronic  - elevated on arrival likely in setting of STEMI; now improved  - takes fosinopril at home; previously on HCTZ however pt states she hasn't been taking this recently  - hold home antihypertensives given episodes of hypotension and JOHNNY  - Continue low dose BB 12.5mg BID per cardiology recs, has been missed for hypotension  - monitor routine hemodynamics Chronic  - elevated on arrival likely in setting of STEMI; now improved  - takes fosinopril at home; previously on HCTZ however pt states she hasn't been taking this recently  - hold home antihypertensives given episodes of hypotension and JOHNNY  - being switched to toprol XL for GDMT - unclear if patient can tolerate this  - monitor routine hemodynamics

## 2025-02-02 NOTE — PROGRESS NOTE ADULT - ASSESSMENT
89y/o F with PMHx HTN, DM2, HLD, depression who presented to the ED with intermittent jaw pain followed by anterior chest pressure radiating to the left shoulder, admitted for STEMI.    - s/p code STEMI  - S/P: PCI (1/26/25) with 100% occlusion of diagonal and MARLENE x1  - continue DAPT with ASA 81 QD and plavix  - cont Lipitor 40mg QHS  - TTE 1/27/25 Left ventricular systolic function is moderately decreased with an ejection fraction visually estimated at 40 to 45 %. small pericardial effusion noted.     - Appears compensated from HF POV.   - Previously with hypotension after getting BB dosing  - Now re-attempting very low dose Lopressor 12.5mg BID and if can tolerate this, would switch to succinate and dc on very low dose BB  - ef 40-45%, with small unchanged pericardial effusion without tamponade  - Would hold on further fluid boluses and starting to get trace LE edema    - cr stable  - Please continue to maintain strict I/Os, monitor daily weights, Cr, and K.   - hold off on ARB/ARNI    - now flu+    - Monitor in ICU.  At risk of decompensation.

## 2025-02-02 NOTE — PROGRESS NOTE ADULT - ASSESSMENT
89 y/o F w/ pmh of htn, dm2, hld, presented to [facility] for chest pain radiating into the jaw and L. shoulder, in the ED pt was found to have +trops and ST changes concerning for STEMI. Code STEMI activated pt taken to the cath lab was found to have 100% occlusion to the diagonal now s/p x1 stent placed. No cath lab complication reported and pt admitted to the ICU for post cath lab management.    Neuro:   -No acute issues, MS stable. Continue Cymbalta home medication.    Cardiac:   -STEMI now s/p x1 MARLENE to the diagonal  -cont asa/plavix and statin  -repeat EKGs, TTE x2 revealing mild pericardial effusion, Limited TTE follow up w/ unchanged pericard effusion, acute Ef 40-45%. No prior h/o HF. No tamponade.   -ordered lasix 40mg po 1x   -holding home fosinopril  -restarted home lopressor 12.5mg po BID   -Ambulatory BP normotensive and patient asymptomatic. BB per goal directed therapy; will trial again today before discharge to confirm patient is asymptomatic, w/o hypotension.     Resp:   -No acute issues, o2 stable on RA    GI:   -Diet CC/DASH    Renal:   -JOHNNY likely 2/2 hypotension vs contrast improving slowly. cont to monitor along w/ lytes.     ID:   -UA negative, Urine Cx NGTD and Blood Cx NGTD. Afebrile  -flu positive, started on tamiflu  -albuterol nebulizer q6    Endo:   -H/o DM2, A1c 7.3, euglycemic    Heme:   -DVT ppx w/ heparin subQ, cont asa/plavix    Dispo:  PT rec home PT, transfer to remote tele  89 y/o F w/ pmh of htn, dm2, hld, presented to [facility] for chest pain radiating into the jaw and L. shoulder, in the ED pt was found to have +trops and ST changes concerning for STEMI. Code STEMI activated pt taken to the cath lab was found to have 100% occlusion to the diagonal now s/p x1 stent placed. No cath lab complication reported and pt admitted to the ICU for post cath lab management.    Neuro:   -No acute issues, MS stable. Continue Cymbalta home medication.    Cardiac:   -STEMI now s/p x1 MARLENE to the diagonal  -cont asa/plavix and statin  -repeat EKGs, TTE x2 revealing mild pericardial effusion, Limited TTE follow up w/ unchanged pericard effusion, acute Ef 40-45%. No prior h/o HF. No tamponade.   -ordered lasix 40mg po 1x   -Start Toprol 25mg PO  -Ambulatory BP normotensive.    Resp:   -No acute issues, o2 stable on NC, wean as tolerated    GI:   -Diet CC/DASH    Renal:   -JOHNNY likely 2/2 hypotension vs contrast improving slowly. cont to monitor along w/ lytes.   -Urine studies suggesting Intrinsic kidney injury > ATN. FeNA 3.6    ID:   -UA negative, Urine Cx NGTD and Blood Cx NGTD. Afebrile  -flu positive, started on tamiflu  -albuterol nebulizer q6h    Endo:   -H/o DM2, A1c 7.3, euglycemic    Heme:   -DVT ppx w/ heparin subQ, cont asa/plavix    Dispo:  PT rec home PT, transfer to remote tele

## 2025-02-02 NOTE — CONSULT NOTE ADULT - SUBJECTIVE AND OBJECTIVE BOX
[facility]                             Nephrology and Hypertension                             Timur Payan                                          [phone]     Patient is a 89y old  Female who presents with a chief complaint of STEMI (2025 08:42)       HPI:  Pt is a 89y/o F with PMHx HTN, DM2, HLD, depression who presented to the ED with intermittent jaw pain followed by anterior chest pressure radiating to the left shoulder, Pt was found to have elevated troponins and EKG with ST elevations with reciprocal ST-T changes in leads II, III, aVF. Code STEMI was activated and patient was taken to the cath lab where she was found to have 100% occlusion to the diagonal now s/p 1 stent placement. Patient seen and examined in the ICU, reports feeling much better. She denies any current chest pain or jaw pain, and also denies dizziness, headache, numbness/tingling, nausea, vomiting, palpitations, shortness of breath, abdominal pain. She does admit to chronic diarrhea. Prior to this event, patient was in her usual state of health. No other concerns at this time.    Renal consulted on 25 for JOHNNY/CKD. S/p ICU course with STEMI Code STEMI, 100% occlusion to the diagonal now s/p x1 stent placed. No cath lab complication reported and pt admitted to the ICU for post cath. Patient denies any knowledge of prior renal issues but Cr was 1.5 on admission     PAST MEDICAL & SURGICAL HISTORY:  HTN (hypertension)      DM (diabetes mellitus)      HLD (hyperlipidemia)      Major depression      No significant past surgical history           FAMILY HISTORY:  NC    Social History:Non smoker    MEDICATIONS  (STANDING):  albuterol    0.083% 2.5 milliGRAM(s) Nebulizer every 6 hours  aspirin enteric coated 81 milliGRAM(s) Oral daily  atorvastatin 40 milliGRAM(s) Oral at bedtime  chlorhexidine 2% Cloths 1 Application(s) Topical <User Schedule>  clopidogrel Tablet 75 milliGRAM(s) Oral every 24 hours  dextrose 5%. 1000 milliLiter(s) (50 mL/Hr) IV Continuous <Continuous>  dextrose 5%. 1000 milliLiter(s) (100 mL/Hr) IV Continuous <Continuous>  dextrose 50% Injectable 25 Gram(s) IV Push once  dextrose 50% Injectable 12.5 Gram(s) IV Push once  dextrose 50% Injectable 25 Gram(s) IV Push once  DULoxetine 90 milliGRAM(s) Oral daily  glucagon  Injectable 1 milliGRAM(s) IntraMuscular once  heparin   Injectable 5000 Unit(s) SubCutaneous every 8 hours  influenza  Vaccine (HIGH DOSE) 0.5 milliLiter(s) IntraMuscular once  insulin lispro (ADMELOG) corrective regimen sliding scale   SubCutaneous Before meals and at bedtime  metoprolol tartrate 12.5 milliGRAM(s) Oral two times a day  nystatin Powder 1 Application(s) Topical two times a day  oseltamivir 30 milliGRAM(s) Oral daily    MEDICATIONS  (PRN):  acetaminophen     Tablet .. 650 milliGRAM(s) Oral every 6 hours PRN Temp greater or equal to 38C (100.4F), Mild Pain (1 - 3)  dextrose Oral Gel 15 Gram(s) Oral once PRN Blood Glucose LESS THAN 70 milliGRAM(s)/deciliter  sodium chloride 0.65% Nasal 1 Spray(s) Both Nostrils two times a day PRN Nasal Congestion   Meds reviewed    Allergies    No Known Allergies    Intolerances         REVIEW OF SYSTEMS: As per HPI, otherwise negative       Vital Signs Last 24 Hrs  T(C): 37.1 (2025 08:43), Max: 39.2 (2025 16:00)  T(F): 98.8 (2025 08:43), Max: 102.6 (2025 16:00)  HR: 93 (2025 09:00) (67 - 93)  BP: 118/54 (2025 09:00) (93/50 - 145/70)  BP(mean): 72 (2025 09:00) (62 - 112)  RR: 26 (2025 09:00) (16 - 27)  SpO2: 95% (2025 09:00) (90% - 100%)    Parameters below as of 2025 08:00  Patient On (Oxygen Delivery Method): nasal cannula  O2 Flow (L/min): 3    Daily     Daily Weight in k.9 (2025 04:19)    PHYSICAL EXAM:    GENERAL: NAD  HEAD:  Atraumatic, Normocephalic  EYES: EOMI, conjunctiva and sclera clear  ENMT: No Drainage from nares, No drainage from ears  NECK: Supple, neck  veins full  NERVOUS SYSTEM:  Awake and Alert  CHEST/LUNG: Clear to percussion bilaterally; No rales, rhonchi, wheezing, or rubs  HEART: Regular rate and rhythm; No murmurs, rubs, or gallops  ABDOMEN: Soft, Nontender, Nondistended; Bowel sounds present  EXTREMITIES:  No Edema  SKIN: No rashes No obvious ecchymosis      LABS:                        9.1    6.14  )-----------( 251      ( 2025 05:30 )             27.9         138  |  107  |  41[H]  ----------------------------<  159[H]  4.1   |  21[L]  |  2.00[H]    Ca    8.5      2025 05:30  Phos  3.8       Mg     2.1         TPro  6.5  /  Alb  2.4[L]  /  TBili  0.5  /  DBili  x   /  AST  23  /  ALT  18  /  AlkPhos  74        Urinalysis Basic - ( 2025 05:30 )    Color: x / Appearance: x / SG: x / pH: x  Gluc: 159 mg/dL / Ketone: x  / Bili: x / Urobili: x   Blood: x / Protein: x / Nitrite: x   Leuk Esterase: x / RBC: x / WBC x   Sq Epi: x / Non Sq Epi: x / Bacteria: x      Magnesium: 2.1 mg/dL ( @ 05:30)  Phosphorus: 3.8 mg/dL ( @ 05:30)          RADIOLOGY & ADDITIONAL TESTS:

## 2025-02-02 NOTE — PROGRESS NOTE ADULT - ASSESSMENT
89y/o F with PMHx HTN, DM2, HLD, depression who presented to the ED with intermittent jaw pain followed by anterior chest pressure radiating to the left shoulder, admitted for STEMI. Course c/b acute hypoxic respiratory failure secondary to acute HFrEF and influenza. Also with JOHNNY.

## 2025-02-02 NOTE — PROGRESS NOTE ADULT - PROBLEM SELECTOR PLAN 7
Chronic  - pt takes duloxetine 90mg daily  - would continue home medications  - pt uses alprazolam 0.25mg PRN, does not take often Chronic  - pt takes duloxetine 90mg daily  - would continue home medications  - pt uses alprazolam 0.25mg PRN - start given panic attacks

## 2025-02-02 NOTE — PROGRESS NOTE ADULT - SUBJECTIVE AND OBJECTIVE BOX
Patient is a 89y old  Female who presents with a chief complaint of STEMI (02 Feb 2025 09:36)      INTERVAL HPI/OVERNIGHT EVENTS: No acute overnight events. Pt was seen and examined at bedside. Pt states that she has persistent cough and reports congestion, seeking ability to cough up secretions.   Pt denies headache, dizziness, lightheadedness, fever, chills, body aches, CP, SOB, palpitations.  No other complaints at this time.     MEDICATIONS  (STANDING):  albuterol    0.083% 2.5 milliGRAM(s) Nebulizer every 6 hours  aspirin enteric coated 81 milliGRAM(s) Oral daily  atorvastatin 40 milliGRAM(s) Oral at bedtime  chlorhexidine 2% Cloths 1 Application(s) Topical <User Schedule>  clopidogrel Tablet 75 milliGRAM(s) Oral every 24 hours  dextrose 5%. 1000 milliLiter(s) (50 mL/Hr) IV Continuous <Continuous>  dextrose 5%. 1000 milliLiter(s) (100 mL/Hr) IV Continuous <Continuous>  dextrose 50% Injectable 25 Gram(s) IV Push once  dextrose 50% Injectable 12.5 Gram(s) IV Push once  dextrose 50% Injectable 25 Gram(s) IV Push once  DULoxetine 90 milliGRAM(s) Oral daily  glucagon  Injectable 1 milliGRAM(s) IntraMuscular once  heparin   Injectable 5000 Unit(s) SubCutaneous every 8 hours  influenza  Vaccine (HIGH DOSE) 0.5 milliLiter(s) IntraMuscular once  insulin lispro (ADMELOG) corrective regimen sliding scale   SubCutaneous Before meals and at bedtime  metoprolol tartrate 12.5 milliGRAM(s) Oral two times a day  nystatin Powder 1 Application(s) Topical two times a day  oseltamivir 30 milliGRAM(s) Oral daily  sodium chloride 3%  Inhalation 4 milliLiter(s) Inhalation every 12 hours    MEDICATIONS  (PRN):  acetaminophen     Tablet .. 650 milliGRAM(s) Oral every 6 hours PRN Temp greater or equal to 38C (100.4F), Mild Pain (1 - 3)  dextrose Oral Gel 15 Gram(s) Oral once PRN Blood Glucose LESS THAN 70 milliGRAM(s)/deciliter  sodium chloride 0.65% Nasal 1 Spray(s) Both Nostrils two times a day PRN Nasal Congestion      Allergies    No Known Allergies    Intolerances        REVIEW OF SYSTEMS:  see hpi    Vital Signs Last 24 Hrs  T(C): 37.1 (02 Feb 2025 08:43), Max: 39.2 (01 Feb 2025 16:00)  T(F): 98.8 (02 Feb 2025 08:43), Max: 102.6 (01 Feb 2025 16:00)  HR: 86 (02 Feb 2025 11:00) (67 - 93)  BP: 115/52 (02 Feb 2025 11:00) (93/50 - 145/70)  BP(mean): 71 (02 Feb 2025 11:00) (62 - 112)  RR: 22 (02 Feb 2025 11:00) (16 - 27)  SpO2: 97% (02 Feb 2025 11:00) (90% - 100%)    Parameters below as of 02 Feb 2025 10:00  Patient On (Oxygen Delivery Method): nasal cannula  O2 Flow (L/min): 3      PHYSICAL EXAM:  GENERAL: NAD, on 3L N  HEENT:  anicteric, moist mucous membranes  CHEST/LUNG: Bilateral ronchi, no respiratory distress, no wheezing or rales  HEART:  RRR, S1, S2  ABDOMEN:  soft, nontender, nondistended  EXTREMITIES: trace LE pitting edema  NERVOUS SYSTEM: answers questions and follows commands appropriately    LABS:                        9.1    6.14  )-----------( 251      ( 02 Feb 2025 05:30 )             27.9     CBC Full  -  ( 02 Feb 2025 05:30 )  WBC Count : 6.14 K/uL  Hemoglobin : 9.1 g/dL  Hematocrit : 27.9 %  Platelet Count - Automated : 251 K/uL  Mean Cell Volume : 92.4 fl  Mean Cell Hemoglobin : 30.1 pg  Mean Cell Hemoglobin Concentration : 32.6 g/dL  Auto Neutrophil # : 4.79 K/uL  Auto Lymphocyte # : 0.58 K/uL  Auto Monocyte # : 0.65 K/uL  Auto Eosinophil # : 0.07 K/uL  Auto Basophil # : 0.01 K/uL  Auto Neutrophil % : 78.0 %  Auto Lymphocyte % : 9.4 %  Auto Monocyte % : 10.6 %  Auto Eosinophil % : 1.1 %  Auto Basophil % : 0.2 %    02 Feb 2025 05:30    138    |  107    |  41     ----------------------------<  159    4.1     |  21     |  2.00     Ca    8.5        02 Feb 2025 05:30  Phos  3.8       02 Feb 2025 05:30  Mg     2.1       02 Feb 2025 05:30    TPro  6.5    /  Alb  2.4    /  TBili  0.5    /  DBili  x      /  AST  23     /  ALT  18     /  AlkPhos  74     02 Feb 2025 05:30      Urinalysis Basic - ( 02 Feb 2025 05:30 )    Color: x / Appearance: x / SG: x / pH: x  Gluc: 159 mg/dL / Ketone: x  / Bili: x / Urobili: x   Blood: x / Protein: x / Nitrite: x   Leuk Esterase: x / RBC: x / WBC x   Sq Epi: x / Non Sq Epi: x / Bacteria: x      CAPILLARY BLOOD GLUCOSE      POCT Blood Glucose.: 165 mg/dL (02 Feb 2025 07:50)  POCT Blood Glucose.: 282 mg/dL (01 Feb 2025 21:14)  POCT Blood Glucose.: 150 mg/dL (01 Feb 2025 17:01)  POCT Blood Glucose.: 242 mg/dL (01 Feb 2025 12:05)        Culture - Blood (collected 01-28-25 @ 13:30)  Source: .Blood BLOOD  Preliminary Report (02-01-25 @ 20:01):    No growth at 4 days    Culture - Blood (collected 01-28-25 @ 13:25)  Source: .Blood BLOOD  Preliminary Report (02-01-25 @ 20:01):    No growth at 4 days    Culture - Urine (collected 01-28-25 @ 12:26)  Source: Clean Catch Clean Catch (Midstream)  Final Report (01-29-25 @ 23:08):    No growth    Culture - Blood (collected 01-27-25 @ 18:10)  Source: .Blood BLOOD  Final Report (02-02-25 @ 01:01):    No growth at 5 days    Culture - Blood (collected 01-27-25 @ 18:00)  Source: .Blood BLOOD  Final Report (02-02-25 @ 01:01):    No growth at 5 days        RADIOLOGY & ADDITIONAL TESTS: none    Personally reviewed.     Consultant(s) Notes Reviewed:  [x] YES  [ ] NO     Patient is a 89y old  Female who presents with a chief complaint of STEMI (02 Feb 2025 09:36)      INTERVAL HPI/OVERNIGHT EVENTS: No acute overnight events. Pt was seen and examined at bedside. Pt states that she has persistent cough and reports congestion, seeking ability to cough up secretions.   Pt denies headache, dizziness, lightheadedness, fever, chills, body aches, CP, palpitations.    Admits general malaise and SOB.    Seen in ICU reports feeling "lousy" had nebs and coughed some sputum up - said she thought it may have been red.    Seen again on telemetry floor - suffering from panic attack similar to episode yesterday. Short of breath and hypoxemic on O2. Nebulizer given and CXR obtained.    MEDICATIONS  (STANDING):  albuterol    0.083% 2.5 milliGRAM(s) Nebulizer every 6 hours  aspirin enteric coated 81 milliGRAM(s) Oral daily  atorvastatin 40 milliGRAM(s) Oral at bedtime  chlorhexidine 2% Cloths 1 Application(s) Topical <User Schedule>  clopidogrel Tablet 75 milliGRAM(s) Oral every 24 hours  dextrose 5%. 1000 milliLiter(s) (50 mL/Hr) IV Continuous <Continuous>  dextrose 5%. 1000 milliLiter(s) (100 mL/Hr) IV Continuous <Continuous>  dextrose 50% Injectable 25 Gram(s) IV Push once  dextrose 50% Injectable 12.5 Gram(s) IV Push once  dextrose 50% Injectable 25 Gram(s) IV Push once  DULoxetine 90 milliGRAM(s) Oral daily  glucagon  Injectable 1 milliGRAM(s) IntraMuscular once  heparin   Injectable 5000 Unit(s) SubCutaneous every 8 hours  influenza  Vaccine (HIGH DOSE) 0.5 milliLiter(s) IntraMuscular once  insulin lispro (ADMELOG) corrective regimen sliding scale   SubCutaneous Before meals and at bedtime  metoprolol tartrate 12.5 milliGRAM(s) Oral two times a day  nystatin Powder 1 Application(s) Topical two times a day  oseltamivir 30 milliGRAM(s) Oral daily  sodium chloride 3%  Inhalation 4 milliLiter(s) Inhalation every 12 hours    MEDICATIONS  (PRN):  acetaminophen     Tablet .. 650 milliGRAM(s) Oral every 6 hours PRN Temp greater or equal to 38C (100.4F), Mild Pain (1 - 3)  dextrose Oral Gel 15 Gram(s) Oral once PRN Blood Glucose LESS THAN 70 milliGRAM(s)/deciliter  sodium chloride 0.65% Nasal 1 Spray(s) Both Nostrils two times a day PRN Nasal Congestion      Allergies    No Known Allergies    Intolerances        REVIEW OF SYSTEMS:  see hpi    Vital Signs Last 24 Hrs  T(C): 37.1 (02 Feb 2025 08:43), Max: 39.2 (01 Feb 2025 16:00)  T(F): 98.8 (02 Feb 2025 08:43), Max: 102.6 (01 Feb 2025 16:00)  HR: 86 (02 Feb 2025 11:00) (67 - 93)  BP: 115/52 (02 Feb 2025 11:00) (93/50 - 145/70)  BP(mean): 71 (02 Feb 2025 11:00) (62 - 112)  RR: 22 (02 Feb 2025 11:00) (16 - 27)  SpO2: 97% (02 Feb 2025 11:00) (90% - 100%)    Parameters below as of 02 Feb 2025 10:00  Patient On (Oxygen Delivery Method): nasal cannula  O2 Flow (L/min): 3      PHYSICAL EXAM:  GENERAL: NAD, on 3L N  HEENT:  anicteric, moist mucous membranes  CHEST/LUNG: RUL rhonchi, rales bibasilarly  HEART:  RRR, S1, S2  ABDOMEN:  soft, nontender, nondistended  EXTREMITIES: trace LE pitting edema  NERVOUS SYSTEM: answers questions and follows commands appropriately    LABS:                        9.1    6.14  )-----------( 251      ( 02 Feb 2025 05:30 )             27.9     CBC Full  -  ( 02 Feb 2025 05:30 )  WBC Count : 6.14 K/uL  Hemoglobin : 9.1 g/dL  Hematocrit : 27.9 %  Platelet Count - Automated : 251 K/uL  Mean Cell Volume : 92.4 fl  Mean Cell Hemoglobin : 30.1 pg  Mean Cell Hemoglobin Concentration : 32.6 g/dL  Auto Neutrophil # : 4.79 K/uL  Auto Lymphocyte # : 0.58 K/uL  Auto Monocyte # : 0.65 K/uL  Auto Eosinophil # : 0.07 K/uL  Auto Basophil # : 0.01 K/uL  Auto Neutrophil % : 78.0 %  Auto Lymphocyte % : 9.4 %  Auto Monocyte % : 10.6 %  Auto Eosinophil % : 1.1 %  Auto Basophil % : 0.2 %    02 Feb 2025 05:30    138    |  107    |  41     ----------------------------<  159    4.1     |  21     |  2.00     Ca    8.5        02 Feb 2025 05:30  Phos  3.8       02 Feb 2025 05:30  Mg     2.1       02 Feb 2025 05:30    TPro  6.5    /  Alb  2.4    /  TBili  0.5    /  DBili  x      /  AST  23     /  ALT  18     /  AlkPhos  74     02 Feb 2025 05:30      Urinalysis Basic - ( 02 Feb 2025 05:30 )    Color: x / Appearance: x / SG: x / pH: x  Gluc: 159 mg/dL / Ketone: x  / Bili: x / Urobili: x   Blood: x / Protein: x / Nitrite: x   Leuk Esterase: x / RBC: x / WBC x   Sq Epi: x / Non Sq Epi: x / Bacteria: x      CAPILLARY BLOOD GLUCOSE      POCT Blood Glucose.: 165 mg/dL (02 Feb 2025 07:50)  POCT Blood Glucose.: 282 mg/dL (01 Feb 2025 21:14)  POCT Blood Glucose.: 150 mg/dL (01 Feb 2025 17:01)  POCT Blood Glucose.: 242 mg/dL (01 Feb 2025 12:05)        Culture - Blood (collected 01-28-25 @ 13:30)  Source: .Blood BLOOD  Preliminary Report (02-01-25 @ 20:01):    No growth at 4 days    Culture - Blood (collected 01-28-25 @ 13:25)  Source: .Blood BLOOD  Preliminary Report (02-01-25 @ 20:01):    No growth at 4 days    Culture - Urine (collected 01-28-25 @ 12:26)  Source: Clean Catch Clean Catch (Midstream)  Final Report (01-29-25 @ 23:08):    No growth    Culture - Blood (collected 01-27-25 @ 18:10)  Source: .Blood BLOOD  Final Report (02-02-25 @ 01:01):    No growth at 5 days    Culture - Blood (collected 01-27-25 @ 18:00)  Source: .Blood BLOOD  Final Report (02-02-25 @ 01:01):    No growth at 5 days        RADIOLOGY & ADDITIONAL TESTS: none    Personally reviewed.     Consultant(s) Notes Reviewed:  [x] YES  [ ] NO

## 2025-02-02 NOTE — PROGRESS NOTE ADULT - SUBJECTIVE AND OBJECTIVE BOX
INTERVAL HPI/OVERNIGHT EVENTS: No acute overnight events occurred. Patient still complaining of nasal congestion and nonproductive cough. Last fever 102.6 yesterday. NC @ 3L/min      Review of Systems:  Constitutional: No fever, chills, fatigue  Neuro: No headache, numbness, weakness  Resp: +cough, no wheezing, shortness of breath  CVS: No chest pain, palpitations, leg swelling  GI: No abdominal pain, nausea, vomiting, diarrhea   : No dysuria, frequency, incontinence  Skin: No itching, burning, rashes, or lesions   Msk: No joint pain or swelling  Psych: No depression, anxiety, mood swings    ICU Vital Signs Last 24 Hrs  T(C): 36.9 (02 Feb 2025 16:16), Max: 38.1 (01 Feb 2025 18:00)  T(F): 98.4 (02 Feb 2025 16:16), Max: 100.6 (01 Feb 2025 18:00)  HR: 82 (02 Feb 2025 16:00) (67 - 93)  BP: 119/50 (02 Feb 2025 16:00) (93/50 - 125/60)  BP(mean): 69 (02 Feb 2025 16:00) (62 - 112)  ABP: --  ABP(mean): --  RR: 26 (02 Feb 2025 16:00) (16 - 38)  SpO2: 100% (02 Feb 2025 16:00) (90% - 100%)    O2 Parameters below as of 02 Feb 2025 14:01  Patient On (Oxygen Delivery Method): nasal cannula              02-01-25 @ 07:01  -  02-02-25 @ 07:00  --------------------------------------------------------  IN: 400 mL / OUT: 1260 mL / NET: -860 mL        CAPILLARY BLOOD GLUCOSE      POCT Blood Glucose.: 189 mg/dL (02 Feb 2025 12:26)      I&O's Summary    01 Feb 2025 07:01  -  02 Feb 2025 07:00  --------------------------------------------------------  IN: 400 mL / OUT: 1260 mL / NET: -860 mL        PHYSICAL EXAM:  General: NAD  Neurology: awake and alert  HEENT: NC/AT  Respiratory: CTA b/l, no rales or rhonchi noted  Cardiovascular: RRR, normal S1S2, no M/R/G  Abdomen: soft, NT/ND, +BS, no palpable masses  Extremities: WWP, no clubbing, cyanosis, or edema  Skin: warm/dry      Meds:  oseltamivir Oral    metoprolol succinate ER Oral    atorvastatin Oral  dextrose 50% Injectable IV Push  dextrose 50% Injectable IV Push  dextrose 50% Injectable IV Push  dextrose Oral Gel Oral PRN  glucagon  Injectable IntraMuscular  insulin lispro (ADMELOG) corrective regimen sliding scale SubCutaneous    albuterol    0.083% Nebulizer  sodium chloride 3%  Inhalation Inhalation    acetaminophen     Tablet .. Oral PRN  DULoxetine Oral      aspirin enteric coated Oral  clopidogrel Tablet Oral  heparin   Injectable SubCutaneous        dextrose 5%. IV Continuous  dextrose 5%. IV Continuous    influenza  Vaccine (HIGH DOSE) IntraMuscular    chlorhexidine 2% Cloths Topical  nystatin Powder Topical  sodium chloride 0.65% Nasal Both Nostrils PRN                              9.1    6.14  )-----------( 251      ( 02 Feb 2025 05:30 )             27.9       02-02    138  |  107  |  41[H]  ----------------------------<  159[H]  4.1   |  21[L]  |  2.00[H]    Ca    8.5      02 Feb 2025 05:30  Phos  3.8     02-02  Mg     2.1     02-02    TPro  6.5  /  Alb  2.4[L]  /  TBili  0.5  /  DBili  x   /  AST  23  /  ALT  18  /  AlkPhos  74  02-02            Urinalysis Basic - ( 02 Feb 2025 05:30 )    Color: x / Appearance: x / SG: x / pH: x  Gluc: 159 mg/dL / Ketone: x  / Bili: x / Urobili: x   Blood: x / Protein: x / Nitrite: x   Leuk Esterase: x / RBC: x / WBC x   Sq Epi: x / Non Sq Epi: x / Bacteria: x                  Radiology:    Bedside Ultrasound:    Tubes/Lines:      GLOBAL ISSUE/BEST PRACTICE:  Analgesia:  Sedation:  HOB elevation: Y  Stress ulcer prophylaxis:  VTE prophylaxis:  Glycemic control:  Nutrition:    CODE STATUS:        INTERVAL HPI/OVERNIGHT EVENTS: No acute overnight events occurred. Patient still complaining of nasal congestion and nonproductive cough. Last fever 102.6 yesterday. NC @ 3L/min    Review of Systems:  Constitutional: No fever, chills, fatigue  Neuro: No headache, numbness, weakness  Resp: +cough, no wheezing, shortness of breath  CVS: No chest pain, palpitations, leg swelling  GI: No abdominal pain, nausea, vomiting, diarrhea   : No dysuria, frequency, incontinence  Skin: No itching, burning, rashes, or lesions   Msk: No joint pain or swelling    ICU Vital Signs Last 24 Hrs  T(C): 36.9 (02 Feb 2025 16:16), Max: 38.1 (01 Feb 2025 18:00)  T(F): 98.4 (02 Feb 2025 16:16), Max: 100.6 (01 Feb 2025 18:00)  HR: 82 (02 Feb 2025 16:00) (67 - 93)  BP: 119/50 (02 Feb 2025 16:00) (93/50 - 125/60)  BP(mean): 69 (02 Feb 2025 16:00) (62 - 112)  ABP: --  ABP(mean): --  RR: 26 (02 Feb 2025 16:00) (16 - 38)  SpO2: 100% (02 Feb 2025 16:00) (90% - 100%)    O2 Parameters below as of 02 Feb 2025 14:01  Patient On (Oxygen Delivery Method): nasal cannula              02-01-25 @ 07:01  -  02-02-25 @ 07:00  --------------------------------------------------------  IN: 400 mL / OUT: 1260 mL / NET: -860 mL        CAPILLARY BLOOD GLUCOSE      POCT Blood Glucose.: 189 mg/dL (02 Feb 2025 12:26)      I&O's Summary    01 Feb 2025 07:01  -  02 Feb 2025 07:00  --------------------------------------------------------  IN: 400 mL / OUT: 1260 mL / NET: -860 mL        PHYSICAL EXAM:  General: NAD  Neurology: awake and alert&Ox4  HEENT: NC/AT  Respiratory: Coarse breath sounds noted to RML and RUL, Rales noted to b/l bases.  Cardiovascular: RRR, normal S1S2, no M/R/G  Abdomen: soft, NT/ND, +BS, no palpable masses  Extremities: WWP, no clubbing, cyanosis, nor edema  Skin: warm/dry      Meds:  oseltamivir Oral    metoprolol succinate ER Oral    atorvastatin Oral  dextrose 50% Injectable IV Push  dextrose 50% Injectable IV Push  dextrose 50% Injectable IV Push  dextrose Oral Gel Oral PRN  glucagon  Injectable IntraMuscular  insulin lispro (ADMELOG) corrective regimen sliding scale SubCutaneous    albuterol    0.083% Nebulizer  sodium chloride 3%  Inhalation Inhalation    acetaminophen     Tablet .. Oral PRN  DULoxetine Oral      aspirin enteric coated Oral  clopidogrel Tablet Oral  heparin   Injectable SubCutaneous        dextrose 5%. IV Continuous  dextrose 5%. IV Continuous    influenza  Vaccine (HIGH DOSE) IntraMuscular    chlorhexidine 2% Cloths Topical  nystatin Powder Topical  sodium chloride 0.65% Nasal Both Nostrils PRN                              9.1    6.14  )-----------( 251      ( 02 Feb 2025 05:30 )             27.9       02-02    138  |  107  |  41[H]  ----------------------------<  159[H]  4.1   |  21[L]  |  2.00[H]    Ca    8.5      02 Feb 2025 05:30  Phos  3.8     02-02  Mg     2.1     02-02    TPro  6.5  /  Alb  2.4[L]  /  TBili  0.5  /  DBili  x   /  AST  23  /  ALT  18  /  AlkPhos  74  02-02            Urinalysis Basic - ( 02 Feb 2025 05:30 )    Color: x / Appearance: x / SG: x / pH: x  Gluc: 159 mg/dL / Ketone: x  / Bili: x / Urobili: x   Blood: x / Protein: x / Nitrite: x   Leuk Esterase: x / RBC: x / WBC x   Sq Epi: x / Non Sq Epi: x / Bacteria: x      GLOBAL ISSUE/BEST PRACTICE:  Analgesia:N  Sedation:N  HOB elevation: Y  Stress ulcer prophylaxis:N  VTE prophylaxis:Y  Glycemic control:Y  Nutrition:Y    CODE STATUS:   Full Code     INTERVAL HPI/OVERNIGHT EVENTS: No acute overnight events occurred. Patient still complaining of nasal congestion and nonproductive cough. Last fever 102.6 yesterday. NC @ 3L/min      ICU Vital Signs Last 24 Hrs  T(C): 36.9 (02 Feb 2025 16:16), Max: 38.1 (01 Feb 2025 18:00)  T(F): 98.4 (02 Feb 2025 16:16), Max: 100.6 (01 Feb 2025 18:00)  HR: 82 (02 Feb 2025 16:00) (67 - 93)  BP: 119/50 (02 Feb 2025 16:00) (93/50 - 125/60)  BP(mean): 69 (02 Feb 2025 16:00) (62 - 112)  ABP: --  ABP(mean): --  RR: 26 (02 Feb 2025 16:00) (16 - 38)  SpO2: 100% (02 Feb 2025 16:00) (90% - 100%)    O2 Parameters below as of 02 Feb 2025 14:01  Patient On (Oxygen Delivery Method): nasal cannula              02-01-25 @ 07:01  -  02-02-25 @ 07:00  --------------------------------------------------------  IN: 400 mL / OUT: 1260 mL / NET: -860 mL        CAPILLARY BLOOD GLUCOSE      POCT Blood Glucose.: 189 mg/dL (02 Feb 2025 12:26)      I&O's Summary    01 Feb 2025 07:01  -  02 Feb 2025 07:00  --------------------------------------------------------  IN: 400 mL / OUT: 1260 mL / NET: -860 mL        PHYSICAL EXAM:  General: NAD  Neurology: awake and alert&Ox4  HEENT: NC/AT  Respiratory: Coarse breath sounds noted to RML and RUL, Rales noted to b/l bases.  Cardiovascular: RRR, normal S1S2, no M/R/G  Abdomen: soft, NT/ND, +BS, no palpable masses  Extremities: WWP, no clubbing, cyanosis, nor edema  Skin: warm/dry      Meds:  oseltamivir Oral    metoprolol succinate ER Oral    atorvastatin Oral  dextrose 50% Injectable IV Push  dextrose 50% Injectable IV Push  dextrose 50% Injectable IV Push  dextrose Oral Gel Oral PRN  glucagon  Injectable IntraMuscular  insulin lispro (ADMELOG) corrective regimen sliding scale SubCutaneous    albuterol    0.083% Nebulizer  sodium chloride 3%  Inhalation Inhalation    acetaminophen     Tablet .. Oral PRN  DULoxetine Oral      aspirin enteric coated Oral  clopidogrel Tablet Oral  heparin   Injectable SubCutaneous        dextrose 5%. IV Continuous  dextrose 5%. IV Continuous    influenza  Vaccine (HIGH DOSE) IntraMuscular    chlorhexidine 2% Cloths Topical  nystatin Powder Topical  sodium chloride 0.65% Nasal Both Nostrils PRN                              9.1    6.14  )-----------( 251      ( 02 Feb 2025 05:30 )             27.9       02-02    138  |  107  |  41[H]  ----------------------------<  159[H]  4.1   |  21[L]  |  2.00[H]    Ca    8.5      02 Feb 2025 05:30  Phos  3.8     02-02  Mg     2.1     02-02    TPro  6.5  /  Alb  2.4[L]  /  TBili  0.5  /  DBili  x   /  AST  23  /  ALT  18  /  AlkPhos  74  02-02            Urinalysis Basic - ( 02 Feb 2025 05:30 )    Color: x / Appearance: x / SG: x / pH: x  Gluc: 159 mg/dL / Ketone: x  / Bili: x / Urobili: x   Blood: x / Protein: x / Nitrite: x   Leuk Esterase: x / RBC: x / WBC x   Sq Epi: x / Non Sq Epi: x / Bacteria: x      DATA REVIEW    All data personally reviewed.  See above for details    VS trends--Tm 102.6 yesterday, normal HR and BP, on NC  Laboratory results--normal WBC, stable Hb, Cr unchanged, lytes ok  Microbio results-- flu A    GLOBAL ISSUE/BEST PRACTICE:  Analgesia:N  Sedation:N  HOB elevation: Y  Stress ulcer prophylaxis:N  VTE prophylaxis:Y  Glycemic control:Y  Nutrition:Y    CODE STATUS:   Full Code

## 2025-02-02 NOTE — PROGRESS NOTE ADULT - PROBLEM SELECTOR PLAN 3
- unclear baseline, - GFR similar to 2023 on chart review  - S/p IVF - Cr 2 today, stable  - monitor renal function, increased s/p cardiac cath - likely ATN from hypotension - unclear baseline, - GFR similar to 2023 on chart review  - S/p IVF - Cr 2 today, stable  - monitor renal function, increased s/p cardiac cath - likely ATN from hypotension and CARLO

## 2025-02-02 NOTE — PROGRESS NOTE ADULT - SUBJECTIVE AND OBJECTIVE BOX
[facility] - Jamal Moralez, Manohar, Roney, Christopher, Sincere Cooley  Office Number: [phone]    Patient resting comfortably in bed in NAD.  Laying flat with no respiratory distress.  No complaints of chest pain, dyspnea, palpitations, PND, or orthopnea.    ROS: negative unless otherwise mentioned.    Telemetry: SR    PAST MEDICAL & SURGICAL HISTORY:  HTN (hypertension)      DM (diabetes mellitus)      HLD (hyperlipidemia)      Major depression      No significant past surgical history          SOCIAL HISTORY:  No tobacco, ethanol, or drug abuse.    FAMILY HISTORY:    No family history of acute MI or sudden cardiac death.    MEDICATIONS  (STANDING):  albuterol    0.083% 2.5 milliGRAM(s) Nebulizer every 6 hours  aspirin enteric coated 81 milliGRAM(s) Oral daily  atorvastatin 40 milliGRAM(s) Oral at bedtime  chlorhexidine 2% Cloths 1 Application(s) Topical <User Schedule>  clopidogrel Tablet 75 milliGRAM(s) Oral every 24 hours  dextrose 5%. 1000 milliLiter(s) (50 mL/Hr) IV Continuous <Continuous>  dextrose 5%. 1000 milliLiter(s) (100 mL/Hr) IV Continuous <Continuous>  dextrose 50% Injectable 25 Gram(s) IV Push once  dextrose 50% Injectable 12.5 Gram(s) IV Push once  dextrose 50% Injectable 25 Gram(s) IV Push once  DULoxetine 90 milliGRAM(s) Oral daily  glucagon  Injectable 1 milliGRAM(s) IntraMuscular once  heparin   Injectable 5000 Unit(s) SubCutaneous every 8 hours  influenza  Vaccine (HIGH DOSE) 0.5 milliLiter(s) IntraMuscular once  insulin lispro (ADMELOG) corrective regimen sliding scale   SubCutaneous Before meals and at bedtime  metoprolol tartrate 12.5 milliGRAM(s) Oral two times a day  nystatin Powder 1 Application(s) Topical two times a day  oseltamivir 30 milliGRAM(s) Oral daily    MEDICATIONS  (PRN):  acetaminophen     Tablet .. 650 milliGRAM(s) Oral every 6 hours PRN Temp greater or equal to 38C (100.4F), Mild Pain (1 - 3)  dextrose Oral Gel 15 Gram(s) Oral once PRN Blood Glucose LESS THAN 70 milliGRAM(s)/deciliter  sodium chloride 0.65% Nasal 1 Spray(s) Both Nostrils two times a day PRN Nasal Congestion      Allergies    No Known Allergies    Intolerances        VITAL SIGNS:   Vital Signs Last 24 Hrs  T(C): 37.1 (02 Feb 2025 04:19), Max: 39.2 (01 Feb 2025 16:00)  T(F): 98.7 (02 Feb 2025 04:19), Max: 102.6 (01 Feb 2025 16:00)  HR: 75 (02 Feb 2025 08:00) (67 - 88)  BP: 114/53 (02 Feb 2025 08:00) (93/50 - 145/70)  BP(mean): 72 (02 Feb 2025 08:00) (62 - 112)  RR: 23 (02 Feb 2025 08:00) (16 - 27)  SpO2: 99% (02 Feb 2025 08:00) (90% - 100%)    Parameters below as of 02 Feb 2025 08:00  Patient On (Oxygen Delivery Method): nasal cannula  O2 Flow (L/min): 3      I&O's Summary    01 Feb 2025 07:01  -  02 Feb 2025 07:00  --------------------------------------------------------  IN: 400 mL / OUT: 1260 mL / NET: -860 mL        On Exam:  Constitutional: NAD, awake and alert, well-developed  HEENT: Moist Mucous Membranes, Anicteric  Pulmonary: Non-labored, breath sounds are clear bilaterally, No wheezing, rales or rhonchi  Cardiovascular: Regular, S1 and S2, No murmurs, rubs, gallops or clicks  Gastrointestinal: Bowel Sounds present, soft, nontender.   Lymph: No peripheral edema. No lymphadenopathy.  Skin: No visible rashes or ulcers.  Psych:  Mood & affect appropriate    LABS: All Labs Reviewed:                        9.1    6.14  )-----------( 251      ( 02 Feb 2025 05:30 )             27.9                         8.9    5.59  )-----------( 210      ( 01 Feb 2025 05:30 )             27.5                         9.8    6.93  )-----------( 231      ( 31 Jan 2025 06:15 )             29.9     02 Feb 2025 05:30    138    |  107    |  41     ----------------------------<  159    4.1     |  21     |  2.00   01 Feb 2025 05:30    138    |  108    |  46     ----------------------------<  152    4.2     |  23     |  2.00   31 Jan 2025 06:15    139    |  108    |  40     ----------------------------<  156    4.5     |  24     |  1.70     Ca    8.5        02 Feb 2025 05:30  Ca    8.4        01 Feb 2025 05:30  Ca    9.0        31 Jan 2025 06:15  Phos  3.8       02 Feb 2025 05:30  Phos  3.4       01 Feb 2025 05:30  Phos  3.5       31 Jan 2025 06:15  Mg     2.1       02 Feb 2025 05:30  Mg     2.3       01 Feb 2025 05:30  Mg     2.5       31 Jan 2025 06:15    TPro  6.5    /  Alb  2.4    /  TBili  0.5    /  DBili  x      /  AST  23     /  ALT  18     /  AlkPhos  74     02 Feb 2025 05:30  TPro  6.3    /  Alb  2.7    /  TBili  0.6    /  DBili  x      /  AST  21     /  ALT  15     /  AlkPhos  70     31 Jan 2025 06:15          Blood Culture: Organism --  Gram Stain Blood -- Gram Stain --  Specimen Source .Blood BLOOD  Culture-Blood --    Organism --  Gram Stain Blood -- Gram Stain --  Specimen Source .Blood BLOOD  Culture-Blood --    Organism --  Gram Stain Blood -- Gram Stain --  Specimen Source Clean Catch Clean Catch (Midstream)  Culture-Blood --              TRANSTHORACIC ECHOCARDIOGRAM REPORT  ________________________________________________________________________________                                      _______       Pt. Name:       DOMITILA GRACE Study Date:    1/28/2025  MRN:            [id number]           YOB: 1933  Accession #:    [id number]          Age:           89 years  Account#:       [id number]         Gender:        F  Heart Rate:                        Height:        62.20 in (158.00 cm)  Rhythm:          Weight:        160.93 lb (73.00 kg)  Blood Pressure: 93/52 mmHg         BSA/BMI:       1.75 m² / 29.24 kg/m²  ________________________________________________________________________________________  Referring Physician:    [id number] Carlos Alberto  Interpreting Physician: Braxton Bowens M.D.  Primary Sonographer:    Marlys Queen    CPT:               ECHO TTE W/O CON F/U LTD - 00622.m  Indication(s):     ST elevation [STEMI] myocardial infarction of unspecified                     site - I21.3  Procedure:         Limited transthoracic echocardiogram.  Ordering Location: [facility]  Admission Status:  Inpatient    _______________________________________________________________________________________     CONCLUSIONS:      1. Left ventricular systolic function is moderately decreased with an ejection fraction visually estimated at 40 to 45 %.   2. Small pericardial effusion noted adjacent to the right atrium and small pericardial effusion noted adjacent to the right ventricle. The effusion measures 0.82 cm in diastole in the subcostal view adjacent ot the RV.   3. Thickened pericardium.   4. No significant change in the size of the pericardial effusion compared to TTE study from yesterday (1/27/25) is noted.    ________________________________________________________________________________________  FINDINGS:     Left Ventricle:  Left ventricular systolic function is moderately decreased with an ejection fraction visually estimated at 40 to 45%.     Right Ventricle:  Right ventricular systolic function is normal.     Left Atrium:  The left atrium is dilated.     Mitral Valve:  There is moderate calcification of the mitral valve annulus.     Pericardium:  The pericardium is thickened. There is a small pericardial effusionnoted adjacent to the right atrium and a small pericardial effusion noted adjacent to the right ventricle.     Systemic Veins:  The inferior vena cava is normal in size measuring 1.83 cm in diameter, (normal <2.1cm) with normal inspiratory collapse (normal >50%) consistent with normal right atrial pressure (~3, range 0-5mmHg).  ____________________________________________________________________  QUANTITATIVE DATA:  Left Ventricle Measurements: (Indexed to BSA)     Visualized LV EF%: 40 to 45%       ________________________________________________________________________________________  Electronically signed on 1/28/2025 at 2:21:15 PM by Braxton Bowens M.D.         *** Final ***

## 2025-02-03 LAB
ALBUMIN SERPL ELPH-MCNC: 2.3 G/DL — LOW (ref 3.3–5)
ALP SERPL-CCNC: 76 U/L — SIGNIFICANT CHANGE UP (ref 40–120)
ALT FLD-CCNC: 19 U/L — SIGNIFICANT CHANGE UP (ref 12–78)
ANION GAP SERPL CALC-SCNC: 6 MMOL/L — SIGNIFICANT CHANGE UP (ref 5–17)
AST SERPL-CCNC: 23 U/L — SIGNIFICANT CHANGE UP (ref 15–37)
BASE EXCESS BLDV CALC-SCNC: -5.2 MMOL/L — LOW (ref -2–3)
BASOPHILS # BLD AUTO: 0.01 K/UL — SIGNIFICANT CHANGE UP (ref 0–0.2)
BASOPHILS NFR BLD AUTO: 0.2 % — SIGNIFICANT CHANGE UP (ref 0–2)
BILIRUB SERPL-MCNC: 0.5 MG/DL — SIGNIFICANT CHANGE UP (ref 0.2–1.2)
BLOOD GAS COMMENTS, VENOUS: SIGNIFICANT CHANGE UP
BUN SERPL-MCNC: 47 MG/DL — HIGH (ref 7–23)
CALCIUM SERPL-MCNC: 8.7 MG/DL — SIGNIFICANT CHANGE UP (ref 8.5–10.1)
CHLORIDE SERPL-SCNC: 109 MMOL/L — HIGH (ref 96–108)
CO2 SERPL-SCNC: 23 MMOL/L — SIGNIFICANT CHANGE UP (ref 22–31)
CREAT ?TM UR-MCNC: 87 MG/DL — SIGNIFICANT CHANGE UP
CREAT SERPL-MCNC: 2 MG/DL — HIGH (ref 0.5–1.3)
EGFR: 23 ML/MIN/1.73M2 — LOW
EOSINOPHIL # BLD AUTO: 0.1 K/UL — SIGNIFICANT CHANGE UP (ref 0–0.5)
EOSINOPHIL NFR BLD AUTO: 1.5 % — SIGNIFICANT CHANGE UP (ref 0–6)
GAS PNL BLDV: SIGNIFICANT CHANGE UP
GLUCOSE SERPL-MCNC: 164 MG/DL — HIGH (ref 70–99)
HCO3 BLDV-SCNC: 20 MMOL/L — LOW (ref 22–29)
HCT VFR BLD CALC: 27.1 % — LOW (ref 34.5–45)
HGB BLD-MCNC: 8.7 G/DL — LOW (ref 11.5–15.5)
IMM GRANULOCYTES NFR BLD AUTO: 0.9 % — SIGNIFICANT CHANGE UP (ref 0–0.9)
LYMPHOCYTES # BLD AUTO: 0.68 K/UL — LOW (ref 1–3.3)
LYMPHOCYTES # BLD AUTO: 10.4 % — LOW (ref 13–44)
MAGNESIUM SERPL-MCNC: 2.4 MG/DL — SIGNIFICANT CHANGE UP (ref 1.6–2.6)
MCHC RBC-ENTMCNC: 29.6 PG — SIGNIFICANT CHANGE UP (ref 27–34)
MCHC RBC-ENTMCNC: 32.1 G/DL — SIGNIFICANT CHANGE UP (ref 32–36)
MCV RBC AUTO: 92.2 FL — SIGNIFICANT CHANGE UP (ref 80–100)
MONOCYTES # BLD AUTO: 0.63 K/UL — SIGNIFICANT CHANGE UP (ref 0–0.9)
MONOCYTES NFR BLD AUTO: 9.6 % — SIGNIFICANT CHANGE UP (ref 2–14)
NEUTROPHILS # BLD AUTO: 5.05 K/UL — SIGNIFICANT CHANGE UP (ref 1.8–7.4)
NEUTROPHILS NFR BLD AUTO: 77.4 % — HIGH (ref 43–77)
NRBC # BLD: 0 /100 WBCS — SIGNIFICANT CHANGE UP (ref 0–0)
NRBC BLD-RTO: 0 /100 WBCS — SIGNIFICANT CHANGE UP (ref 0–0)
NT-PROBNP SERPL-SCNC: HIGH PG/ML (ref 0–450)
OSMOLALITY UR: 580 MOSM/KG — SIGNIFICANT CHANGE UP (ref 50–1200)
PCO2 BLDV: 37 MMHG — LOW (ref 39–42)
PH BLDV: 7.35 — SIGNIFICANT CHANGE UP (ref 7.32–7.43)
PHOSPHATE SERPL-MCNC: 3.7 MG/DL — SIGNIFICANT CHANGE UP (ref 2.5–4.5)
PLATELET # BLD AUTO: 261 K/UL — SIGNIFICANT CHANGE UP (ref 150–400)
PO2 BLDV: 75 MMHG — HIGH (ref 25–45)
POTASSIUM SERPL-MCNC: 3.9 MMOL/L — SIGNIFICANT CHANGE UP (ref 3.5–5.3)
POTASSIUM SERPL-SCNC: 3.9 MMOL/L — SIGNIFICANT CHANGE UP (ref 3.5–5.3)
POTASSIUM UR-SCNC: 52.8 MMOL/L — SIGNIFICANT CHANGE UP
PROT ?TM UR-MCNC: 44 MG/DL — HIGH (ref 0–12)
PROT SERPL-MCNC: 6.2 G/DL — SIGNIFICANT CHANGE UP (ref 6–8.3)
RBC # BLD: 2.94 M/UL — LOW (ref 3.8–5.2)
RBC # FLD: 14.3 % — SIGNIFICANT CHANGE UP (ref 10.3–14.5)
SAO2 % BLDV: 96.7 % — HIGH (ref 67–88)
SODIUM SERPL-SCNC: 138 MMOL/L — SIGNIFICANT CHANGE UP (ref 135–145)
SODIUM UR-SCNC: 85 MMOL/L — SIGNIFICANT CHANGE UP
UUN UR-MCNC: 673 MG/DL — SIGNIFICANT CHANGE UP
WBC # BLD: 6.53 K/UL — SIGNIFICANT CHANGE UP (ref 3.8–10.5)
WBC # FLD AUTO: 6.53 K/UL — SIGNIFICANT CHANGE UP (ref 3.8–10.5)

## 2025-02-03 PROCEDURE — 99233 SBSQ HOSP IP/OBS HIGH 50: CPT

## 2025-02-03 PROCEDURE — 71250 CT THORAX DX C-: CPT | Mod: 26

## 2025-02-03 PROCEDURE — 99233 SBSQ HOSP IP/OBS HIGH 50: CPT | Mod: GC

## 2025-02-03 RX ORDER — ALBUMIN HUMAN 50 G/1000ML
250 SOLUTION INTRAVENOUS ONCE
Refills: 0 | Status: COMPLETED | OUTPATIENT
Start: 2025-02-03 | End: 2025-02-03

## 2025-02-03 RX ADMIN — NYSTATIN 1 APPLICATION(S): 100000 POWDER TOPICAL at 05:02

## 2025-02-03 RX ADMIN — Medication 5000 UNIT(S): at 12:47

## 2025-02-03 RX ADMIN — ATORVASTATIN CALCIUM 40 MILLIGRAM(S): 80 TABLET, FILM COATED ORAL at 21:31

## 2025-02-03 RX ADMIN — Medication 75 MILLIGRAM(S): at 05:02

## 2025-02-03 RX ADMIN — Medication 2.5 MILLIGRAM(S): at 20:06

## 2025-02-03 RX ADMIN — Medication 2.5 MILLIGRAM(S): at 14:08

## 2025-02-03 RX ADMIN — Medication 4 MILLILITER(S): at 08:01

## 2025-02-03 RX ADMIN — Medication 5000 UNIT(S): at 21:31

## 2025-02-03 RX ADMIN — ANTISEPTIC SURGICAL SCRUB 1 APPLICATION(S): 0.04 SOLUTION TOPICAL at 05:03

## 2025-02-03 RX ADMIN — Medication 2: at 12:45

## 2025-02-03 RX ADMIN — ALBUMIN HUMAN 125 MILLILITER(S): 50 SOLUTION INTRAVENOUS at 18:47

## 2025-02-03 RX ADMIN — Medication 5000 UNIT(S): at 05:02

## 2025-02-03 RX ADMIN — NYSTATIN 1 APPLICATION(S): 100000 POWDER TOPICAL at 00:07

## 2025-02-03 RX ADMIN — Medication 1: at 08:37

## 2025-02-03 RX ADMIN — Medication 40 MILLIGRAM(S): at 18:47

## 2025-02-03 RX ADMIN — OSELTAMIVIR PHOSPHATE 30 MILLIGRAM(S): 75 CAPSULE ORAL at 12:46

## 2025-02-03 RX ADMIN — Medication 1: at 17:48

## 2025-02-03 RX ADMIN — Medication 2.5 MILLIGRAM(S): at 08:01

## 2025-02-03 RX ADMIN — Medication 2.5 MILLIGRAM(S): at 00:43

## 2025-02-03 RX ADMIN — Medication 4 MILLILITER(S): at 20:06

## 2025-02-03 RX ADMIN — ASPIRIN 81 MILLIGRAM(S): 81 TABLET, COATED ORAL at 12:46

## 2025-02-03 RX ADMIN — DULOXETINE 90 MILLIGRAM(S): 20 CAPSULE, DELAYED RELEASE ORAL at 12:46

## 2025-02-03 NOTE — PROGRESS NOTE ADULT - SUBJECTIVE AND OBJECTIVE BOX
[facility]                             Nephrology and Hypertension                             Timur Payan                                          [phone]     Patient is a 89y old  Female who presents with a chief complaint of STEMI (02 Feb 2025 08:42)       HPI:  Pt is a 89y/o F with PMHx HTN, DM2, HLD, depression who presented to the ED with intermittent jaw pain followed by anterior chest pressure radiating to the left shoulder, Pt was found to have elevated troponins and EKG with ST elevations with reciprocal ST-T changes in leads II, III, aVF. Code STEMI was activated and patient was taken to the cath lab where she was found to have 100% occlusion to the diagonal now s/p 1 stent placement. Patient seen and examined in the ICU, reports feeling much better. She denies any current chest pain or jaw pain, and also denies dizziness, headache, numbness/tingling, nausea, vomiting, palpitations, shortness of breath, abdominal pain. She does admit to chronic diarrhea. Prior to this event, patient was in her usual state of health. No other concerns at this time.    Renal consulted on 2/2/25 for JOHNNY/CKD. S/p ICU course with STEMI Code STEMI, 100% occlusion to the diagonal now s/p x1 stent placed. No cath lab complication reported and pt admitted to the ICU for post cath. Patient denies any knowledge of prior renal issues but Cr was 1.5 on admission     PAST MEDICAL & SURGICAL HISTORY:  HTN (hypertension)      DM (diabetes mellitus)      HLD (hyperlipidemia)      Major depression      No significant past surgical history           FAMILY HISTORY:  NC    Social History:Non smoker    MEDICATIONS  (STANDING):  albuterol    0.083% 2.5 milliGRAM(s) Nebulizer every 6 hours  aspirin enteric coated 81 milliGRAM(s) Oral daily  atorvastatin 40 milliGRAM(s) Oral at bedtime  chlorhexidine 2% Cloths 1 Application(s) Topical <User Schedule>  clopidogrel Tablet 75 milliGRAM(s) Oral every 24 hours  dextrose 5%. 1000 milliLiter(s) (50 mL/Hr) IV Continuous <Continuous>  dextrose 5%. 1000 milliLiter(s) (100 mL/Hr) IV Continuous <Continuous>  dextrose 50% Injectable 25 Gram(s) IV Push once  dextrose 50% Injectable 12.5 Gram(s) IV Push once  dextrose 50% Injectable 25 Gram(s) IV Push once  DULoxetine 90 milliGRAM(s) Oral daily  glucagon  Injectable 1 milliGRAM(s) IntraMuscular once  heparin   Injectable 5000 Unit(s) SubCutaneous every 8 hours  influenza  Vaccine (HIGH DOSE) 0.5 milliLiter(s) IntraMuscular once  insulin lispro (ADMELOG) corrective regimen sliding scale   SubCutaneous Before meals and at bedtime  metoprolol tartrate 12.5 milliGRAM(s) Oral two times a day  nystatin Powder 1 Application(s) Topical two times a day  oseltamivir 30 milliGRAM(s) Oral daily    MEDICATIONS  (PRN):  acetaminophen     Tablet .. 650 milliGRAM(s) Oral every 6 hours PRN Temp greater or equal to 38C (100.4F), Mild Pain (1 - 3)  dextrose Oral Gel 15 Gram(s) Oral once PRN Blood Glucose LESS THAN 70 milliGRAM(s)/deciliter  sodium chloride 0.65% Nasal 1 Spray(s) Both Nostrils two times a day PRN Nasal Congestion   Meds reviewed    Allergies    No Known Allergies    Intolerances         REVIEW OF SYSTEMS: As per HPI, otherwise negative     Vital Signs Last 24 Hrs  T(C): 36.8 (03 Feb 2025 11:05), Max: 37.2 (02 Feb 2025 20:49)  T(F): 98.3 (03 Feb 2025 11:05), Max: 98.9 (02 Feb 2025 20:49)  HR: 82 (03 Feb 2025 11:05) (69 - 105)  BP: 115/68 (03 Feb 2025 11:05) (107/66 - 122/51)  BP(mean): 70 (02 Feb 2025 17:00) (69 - 70)  RR: 18 (03 Feb 2025 11:05) (18 - 26)  SpO2: 95% (03 Feb 2025 11:05) (91% - 100%)    Parameters below as of 03 Feb 2025 11:05  Patient On (Oxygen Delivery Method): nasal cannula  O2 Flow (L/min): 2      PHYSICAL EXAM:    GENERAL: NAD  HEAD:  Atraumatic, Normocephalic  EYES: EOMI, conjunctiva and sclera clear  ENMT: No Drainage from nares, No drainage from ears  NECK: Supple, neck  veins full  NERVOUS SYSTEM:  Awake and Alert  CHEST/LUNG: Clear to percussion bilaterally; No rales, rhonchi, wheezing, or rubs  HEART: Regular rate and rhythm; No murmurs, rubs, or gallops  ABDOMEN: Soft, Nontender, Nondistended; Bowel sounds present  EXTREMITIES:  No Edema  SKIN: No rashes No obvious ecchymosis      LABS:                        8.7    6.53  )-----------( 261      ( 03 Feb 2025 08:30 )             27.1     02-03    138  |  109[H]  |  47[H]  ----------------------------<  164[H]  3.9   |  23  |  2.00[H]    Ca    8.7      03 Feb 2025 08:30  Phos  3.7     02-03  Mg     2.4     02-03    TPro  6.2  /  Alb  2.3[L]  /  TBili  0.5  /  DBili  x   /  AST  23  /  ALT  19  /  AlkPhos  76  02-03      Urinalysis Basic - ( 03 Feb 2025 08:30 )    Color: x / Appearance: x / SG: x / pH: x  Gluc: 164 mg/dL / Ketone: x  / Bili: x / Urobili: x   Blood: x / Protein: x / Nitrite: x   Leuk Esterase: x / RBC: x / WBC x   Sq Epi: x / Non Sq Epi: x / Bacteria: x                  RADIOLOGY & ADDITIONAL TESTS:

## 2025-02-03 NOTE — CASE MANAGEMENT PROGRESS NOTE - NSCMPROGRESSNOTE_GEN_ALL_CORE
Discussed patient on rounds this morning and chart reviewed. Transfer from the ICU 2/2/25. Requested bedside RN to titrate patient off of supplemental O2. CMS STAR patient. CM remains available for transition planning when medically cleared.

## 2025-02-03 NOTE — PROGRESS NOTE ADULT - PROBLEM SELECTOR PLAN 2
- s/p PCI with 1 stent placement to 100% occl to the diagonal  - transferred to ICU s/p cath. + Hypotension. S/p IVF after hypotension ?related to early BB use  - On aspirin/plavix & atorvastatin  - With acute systolic congestive heart failure - to initiate GDMT with BB (to transition to toprol XL in outpatient realm) - possible SGLT-2i on DC  - Reviewed TTE 1/27: EF 40 to 45%  - GDMT as tolerated by BP and renal function - planned to start toprol tmrw per ICU - unclear if her blood pressure can tolerate beta blocker. May need to hold and diurese with albumin.  - Cardio Following  - Anemia noted - stable on DAPT  - DC planning to home with HCPT - on hold due to AHRF/ADHF & influenza  - ProBNP ~92826 this AM - s/p PCI with 1 stent placement to 100% occl to the diagonal  - transferred to ICU s/p cath. + Hypotension. S/p IVF after hypotension ?related to early BB use  - On aspirin/plavix & atorvastatin  - With acute systolic congestive heart failure - to initiate GDMT with BB (to transition to toprol XL in outpatient realm) - possible SGLT-2i on DC  - Reviewed TTE 1/27: EF 40 to 45%  - GDMT as tolerated by BP and renal function - will hold off beta blockade until BP recovers  - Cardio Following  - Anemia noted - stable on DAPT  - DC planning to home with HCPT - on hold due to AHRF/ADHF & influenza  - ProBNP ~76201 this AM

## 2025-02-03 NOTE — CONSULT NOTE ADULT - SUBJECTIVE AND OBJECTIVE BOX
Date/Time Patient Seen:  		  Referring MD:   Data Reviewed	       Patient is a 89y old  Female who presents with a chief complaint of STEMI (02 Feb 2025 17:31)      Subjective/HPI   Pt is a 89y/o F with PMHx HTN, DM2, HLD, depression who presented to the ED with intermittent jaw pain followed by anterior chest pressure radiating to the left shoulder, Pt was found to have elevated troponins and EKG with ST elevations with reciprocal ST-T changes in leads II, III, aVF. Code STEMI was activated and patient was taken to the cath lab where she was found to have 100% occlusion to the diagonal now s/p 1 stent placement. Patient seen and examined in the ICU, reports feeling much better. She denies any current chest pain or jaw pain, and also denies dizziness, headache, numbness/tingling, nausea, vomiting, palpitations, shortness of breath, abdominal pain. She does admit to chronic diarrhea. Prior to this event, patient was in her usual state of health. No other concerns at this time.    ED Course:   Vitals: BP: 190/94, HR: 83, Temp: 97.8, RR: 18, SpO2: 97% on RA  Labs: BUN/Cr 29/1.5, Glucose 300, Troponin 737.1   EKG: ST elevation in lead I with ST changes in II, III, aVF  Received in the ED: aspirin 324, brilinta 180mg, heparin IV      PAST MEDICAL & SURGICAL HISTORY:  HTN (hypertension)    DM (diabetes mellitus)    HLD (hyperlipidemia)    Major depression    No significant past surgical history    ED Course:   Vitals: BP: 190/94, HR: 83, Temp: 97.8, RR: 18, SpO2: 97% on RA  Labs: BUN/Cr 29/1.5, Glucose 300, Troponin 737.1   EKG: ST elevation in lead I with ST changes in II, III, aVF  Received in the ED: aspirin 324, brilinta 180mg, heparin IV         Review of Systems:  Review of Systems: CONSTITUTIONAL: denies fever, chills, fatigue, weakness  HEENT: denies blurred vision, sore throat  CARDIOVASCULAR: denies chest pain, chest pressure, palpitations  RESPIRATORY: denies shortness of breath, sputum production  GASTROINTESTINAL: denies nausea, vomiting, abdominal pain  NEUROLOGICAL: denies numbness, headache, focal weakness  MUSCULOSKELETAL: denies new joint pain, muscle aches  HEMATOLOGIC: denies gross bleeding, bruising      Allergies and Intolerances:        Allergies:  	No Known Allergies:     Home Medications:   * Patient Currently Takes Medications as of 26-Jan-2025 23:01 documented in Structured Notes  · 	metFORMIN 500 mg oral tablet: Last Dose Taken:  , 2 tab(s) orally once a day with dinner  · 	ALPRAZolam 0.25 mg oral tablet: Last Dose Taken:  , 1 tab(s) orally once a day as needed for  anxiety  · 	fosinopril 40 mg oral tablet: Last Dose Taken:  , 1 tab(s) orally once a day  · 	simvastatin 40 mg oral tablet: Last Dose Taken:  , 1 tab(s) orally once a day (at bedtime)  · 	metFORMIN 500 mg oral tablet, extended release: Last Dose Taken:  , 1 tab(s) orally once a day with breakfast  · 	melatonin 5 mg oral tablet: Last Dose Taken:  , 1 tab(s) orally once a day (at bedtime)  · 	DULoxetine 30 mg oral delayed release capsule: Last Dose Taken:  , 3 cap(s) orally once a day    .    Patient History:    Past Medical, Past Surgical, and Family History:  PAST MEDICAL HISTORY:  DM (diabetes mellitus)     HLD (hyperlipidemia)     HTN (hypertension)     Major depression.     PAST SURGICAL HISTORY:  No significant past surgical history.     Social History:  · Substance use	No  · Social History (marital status, living situation, occupation, and sexual history)	Lives: with   ADLs: independent  Diet: no restrictions  Alcohol Use: denies  Tobacco Use: denies  Recreational Drug Use: denies     Tobacco Screening:  · Core Measure Site	Yes  · Has the patient used tobacco in the past 30 days?	No    Risk Assessment:    Present on Admission:  Deep Venous Thrombosis	no  Pulmonary Embolus	no     HIV Screening:  · In accordance with [place] State law, we offer every patient who comes to our ED an HIV test. Would you like to be tested today?	Opt out     Hepatitis C Test Questions:  · In accordance with [place] State Law, we offer every patient a Hepatitis C test. Would you like to be tested today?	Opt out        Medication list         MEDICATIONS  (STANDING):  albuterol    0.083% 2.5 milliGRAM(s) Nebulizer every 6 hours  aspirin enteric coated 81 milliGRAM(s) Oral daily  atorvastatin 40 milliGRAM(s) Oral at bedtime  chlorhexidine 2% Cloths 1 Application(s) Topical <User Schedule>  clopidogrel Tablet 75 milliGRAM(s) Oral every 24 hours  dextrose 5%. 1000 milliLiter(s) (50 mL/Hr) IV Continuous <Continuous>  dextrose 5%. 1000 milliLiter(s) (100 mL/Hr) IV Continuous <Continuous>  dextrose 50% Injectable 25 Gram(s) IV Push once  dextrose 50% Injectable 12.5 Gram(s) IV Push once  dextrose 50% Injectable 25 Gram(s) IV Push once  DULoxetine 90 milliGRAM(s) Oral daily  glucagon  Injectable 1 milliGRAM(s) IntraMuscular once  heparin   Injectable 5000 Unit(s) SubCutaneous every 8 hours  influenza  Vaccine (HIGH DOSE) 0.5 milliLiter(s) IntraMuscular once  insulin lispro (ADMELOG) corrective regimen sliding scale   SubCutaneous Before meals and at bedtime  nystatin Powder 1 Application(s) Topical two times a day  oseltamivir 30 milliGRAM(s) Oral daily  sodium chloride 3%  Inhalation 4 milliLiter(s) Inhalation every 12 hours    MEDICATIONS  (PRN):  acetaminophen     Tablet .. 650 milliGRAM(s) Oral every 6 hours PRN Temp greater or equal to 38C (100.4F), Mild Pain (1 - 3)  ALPRAZolam 0.25 milliGRAM(s) Oral two times a day PRN anxiety or panic attack  dextrose Oral Gel 15 Gram(s) Oral once PRN Blood Glucose LESS THAN 70 milliGRAM(s)/deciliter  sodium chloride 0.65% Nasal 1 Spray(s) Both Nostrils two times a day PRN Nasal Congestion         Vitals log        ICU Vital Signs Last 24 Hrs  T(C): 36.6 (03 Feb 2025 04:54), Max: 37.4 (02 Feb 2025 13:27)  T(F): 97.8 (03 Feb 2025 04:54), Max: 99.3 (02 Feb 2025 13:27)  HR: 73 (03 Feb 2025 04:54) (73 - 105)  BP: 113/65 (03 Feb 2025 04:54) (102/70 - 125/60)  BP(mean): 70 (02 Feb 2025 17:00) (68 - 82)  ABP: --  ABP(mean): --  RR: 18 (03 Feb 2025 04:54) (16 - 38)  SpO2: 91% (03 Feb 2025 04:54) (91% - 100%)    O2 Parameters below as of 03 Feb 2025 04:54  Patient On (Oxygen Delivery Method): nasal cannula  O2 Flow (L/min): 2               Input and Output:  I&O's Detail    01 Feb 2025 07:01  -  02 Feb 2025 07:00  --------------------------------------------------------  IN:    Oral Fluid: 400 mL  Total IN: 400 mL    OUT:    Incontinent per Collection Bag (mL): 1260 mL  Total OUT: 1260 mL    Total NET: -860 mL      02 Feb 2025 07:01  -  03 Feb 2025 05:51  --------------------------------------------------------  IN:  Total IN: 0 mL    OUT:    Voided (mL): 1000 mL  Total OUT: 1000 mL    Total NET: -1000 mL          Lab Data                        9.1    6.14  )-----------( 251      ( 02 Feb 2025 05:30 )             27.9     02-02    138  |  107  |  41[H]  ----------------------------<  159[H]  4.1   |  21[L]  |  2.00[H]    Ca    8.5      02 Feb 2025 05:30  Phos  3.8     02-02  Mg     2.1     02-02    TPro  6.5  /  Alb  2.4[L]  /  TBili  0.5  /  DBili  x   /  AST  23  /  ALT  18  /  AlkPhos  74  02-02            Review of Systems	      Objective     Physical Examination        Pertinent Lab findings & Imaging      Mery:  NO   Adequate UO     I&O's Detail    01 Feb 2025 07:01  -  02 Feb 2025 07:00  --------------------------------------------------------  IN:    Oral Fluid: 400 mL  Total IN: 400 mL    OUT:    Incontinent per Collection Bag (mL): 1260 mL  Total OUT: 1260 mL    Total NET: -860 mL      02 Feb 2025 07:01  -  03 Feb 2025 05:51  --------------------------------------------------------  IN:  Total IN: 0 mL    OUT:    Voided (mL): 1000 mL  Total OUT: 1000 mL    Total NET: -1000 mL               Discussed with:     Cultures:	        Radiology      ACC: [id number] EXAM:  XR CHEST PORTABLE URGENT 1V   ORDERED BY:  DARLEEN INFANTE     PROCEDURE DATE:  01/31/2025          INTERPRETATION:  AP chest.    CLINICAL INDICATION: Shortness of breath.    IMPRESSION: The heart is normal in size. Small bilateral pleural   effusions with mild pulmonary edema. Bibasilar atelectasis.    --- End of Report ---            SADAF BEY MD; Attending Radiologist  This document has been electronically signed. Feb 1 2025 12:41PM                         Date/Time Patient Seen:  		  Referring MD:   Data Reviewed	       Patient is a 89y old  Female who presents with a chief complaint of STEMI (02 Feb 2025 17:31)      Subjective/HPI   Pt is a 89y/o F with PMHx HTN, DM2, HLD, depression who presented to the ED with intermittent jaw pain followed by anterior chest pressure radiating to the left shoulder, Pt was found to have elevated troponins and EKG with ST elevations with reciprocal ST-T changes in leads II, III, aVF. Code STEMI was activated and patient was taken to the cath lab where she was found to have 100% occlusion to the diagonal now s/p 1 stent placement. Patient seen and examined in the ICU, reports feeling much better. She denies any current chest pain or jaw pain, and also denies dizziness, headache, numbness/tingling, nausea, vomiting, palpitations, shortness of breath, abdominal pain. She does admit to chronic diarrhea. Prior to this event, patient was in her usual state of health. No other concerns at this time.    ED Course:   Vitals: BP: 190/94, HR: 83, Temp: 97.8, RR: 18, SpO2: 97% on RA  Labs: BUN/Cr 29/1.5, Glucose 300, Troponin 737.1   EKG: ST elevation in lead I with ST changes in II, III, aVF  Received in the ED: aspirin 324, brilinta 180mg, heparin IV      PAST MEDICAL & SURGICAL HISTORY:  HTN (hypertension)    DM (diabetes mellitus)    HLD (hyperlipidemia)    Major depression    No significant past surgical history    ED Course:   Vitals: BP: 190/94, HR: 83, Temp: 97.8, RR: 18, SpO2: 97% on RA  Labs: BUN/Cr 29/1.5, Glucose 300, Troponin 737.1   EKG: ST elevation in lead I with ST changes in II, III, aVF  Received in the ED: aspirin 324, brilinta 180mg, heparin IV         Review of Systems:  Review of Systems: CONSTITUTIONAL: denies fever, chills, fatigue, weakness  HEENT: denies blurred vision, sore throat  CARDIOVASCULAR: denies chest pain, chest pressure, palpitations  RESPIRATORY: denies shortness of breath, sputum production  GASTROINTESTINAL: denies nausea, vomiting, abdominal pain  NEUROLOGICAL: denies numbness, headache, focal weakness  MUSCULOSKELETAL: denies new joint pain, muscle aches  HEMATOLOGIC: denies gross bleeding, bruising      Allergies and Intolerances:        Allergies:  	No Known Allergies:     Home Medications:   * Patient Currently Takes Medications as of 26-Jan-2025 23:01 documented in Structured Notes  · 	metFORMIN 500 mg oral tablet: Last Dose Taken:  , 2 tab(s) orally once a day with dinner  · 	ALPRAZolam 0.25 mg oral tablet: Last Dose Taken:  , 1 tab(s) orally once a day as needed for  anxiety  · 	fosinopril 40 mg oral tablet: Last Dose Taken:  , 1 tab(s) orally once a day  · 	simvastatin 40 mg oral tablet: Last Dose Taken:  , 1 tab(s) orally once a day (at bedtime)  · 	metFORMIN 500 mg oral tablet, extended release: Last Dose Taken:  , 1 tab(s) orally once a day with breakfast  · 	melatonin 5 mg oral tablet: Last Dose Taken:  , 1 tab(s) orally once a day (at bedtime)  · 	DULoxetine 30 mg oral delayed release capsule: Last Dose Taken:  , 3 cap(s) orally once a day    .    Patient History:    Past Medical, Past Surgical, and Family History:  PAST MEDICAL HISTORY:  DM (diabetes mellitus)     HLD (hyperlipidemia)     HTN (hypertension)     Major depression.     PAST SURGICAL HISTORY:  No significant past surgical history.     Social History:  · Substance use	No  · Social History (marital status, living situation, occupation, and sexual history)	Lives: with   ADLs: independent  Diet: no restrictions  Alcohol Use: denies  Tobacco Use: denies  Recreational Drug Use: denies     Tobacco Screening:  · Core Measure Site	Yes  · Has the patient used tobacco in the past 30 days?	No    Risk Assessment:    Present on Admission:  Deep Venous Thrombosis	no  Pulmonary Embolus	no     HIV Screening:  · In accordance with [place] law, we offer every patient who comes to our ED an HIV test. Would you like to be tested today?	Opt out     Hepatitis C Test Questions:  · In accordance with [place] State Law, we offer every patient a Hepatitis C test. Would you like to be tested today?	Opt out        Medication list         MEDICATIONS  (STANDING):  albuterol    0.083% 2.5 milliGRAM(s) Nebulizer every 6 hours  aspirin enteric coated 81 milliGRAM(s) Oral daily  atorvastatin 40 milliGRAM(s) Oral at bedtime  chlorhexidine 2% Cloths 1 Application(s) Topical <User Schedule>  clopidogrel Tablet 75 milliGRAM(s) Oral every 24 hours  dextrose 5%. 1000 milliLiter(s) (50 mL/Hr) IV Continuous <Continuous>  dextrose 5%. 1000 milliLiter(s) (100 mL/Hr) IV Continuous <Continuous>  dextrose 50% Injectable 25 Gram(s) IV Push once  dextrose 50% Injectable 12.5 Gram(s) IV Push once  dextrose 50% Injectable 25 Gram(s) IV Push once  DULoxetine 90 milliGRAM(s) Oral daily  glucagon  Injectable 1 milliGRAM(s) IntraMuscular once  heparin   Injectable 5000 Unit(s) SubCutaneous every 8 hours  influenza  Vaccine (HIGH DOSE) 0.5 milliLiter(s) IntraMuscular once  insulin lispro (ADMELOG) corrective regimen sliding scale   SubCutaneous Before meals and at bedtime  nystatin Powder 1 Application(s) Topical two times a day  oseltamivir 30 milliGRAM(s) Oral daily  sodium chloride 3%  Inhalation 4 milliLiter(s) Inhalation every 12 hours    MEDICATIONS  (PRN):  acetaminophen     Tablet .. 650 milliGRAM(s) Oral every 6 hours PRN Temp greater or equal to 38C (100.4F), Mild Pain (1 - 3)  ALPRAZolam 0.25 milliGRAM(s) Oral two times a day PRN anxiety or panic attack  dextrose Oral Gel 15 Gram(s) Oral once PRN Blood Glucose LESS THAN 70 milliGRAM(s)/deciliter  sodium chloride 0.65% Nasal 1 Spray(s) Both Nostrils two times a day PRN Nasal Congestion         Vitals log        ICU Vital Signs Last 24 Hrs  T(C): 36.6 (03 Feb 2025 04:54), Max: 37.4 (02 Feb 2025 13:27)  T(F): 97.8 (03 Feb 2025 04:54), Max: 99.3 (02 Feb 2025 13:27)  HR: 73 (03 Feb 2025 04:54) (73 - 105)  BP: 113/65 (03 Feb 2025 04:54) (102/70 - 125/60)  BP(mean): 70 (02 Feb 2025 17:00) (68 - 82)  ABP: --  ABP(mean): --  RR: 18 (03 Feb 2025 04:54) (16 - 38)  SpO2: 91% (03 Feb 2025 04:54) (91% - 100%)    O2 Parameters below as of 03 Feb 2025 04:54  Patient On (Oxygen Delivery Method): nasal cannula  O2 Flow (L/min): 2               Input and Output:  I&O's Detail    01 Feb 2025 07:01  -  02 Feb 2025 07:00  --------------------------------------------------------  IN:    Oral Fluid: 400 mL  Total IN: 400 mL    OUT:    Incontinent per Collection Bag (mL): 1260 mL  Total OUT: 1260 mL    Total NET: -860 mL      02 Feb 2025 07:01  -  03 Feb 2025 05:51  --------------------------------------------------------  IN:  Total IN: 0 mL    OUT:    Voided (mL): 1000 mL  Total OUT: 1000 mL    Total NET: -1000 mL          Lab Data                        9.1    6.14  )-----------( 251      ( 02 Feb 2025 05:30 )             27.9     02-02    138  |  107  |  41[H]  ----------------------------<  159[H]  4.1   |  21[L]  |  2.00[H]    Ca    8.5      02 Feb 2025 05:30  Phos  3.8     02-02  Mg     2.1     02-02    TPro  6.5  /  Alb  2.4[L]  /  TBili  0.5  /  DBili  x   /  AST  23  /  ALT  18  /  AlkPhos  74  02-02            Review of Systems	  o2 support  weakness  all systems reviewed      Objective     Physical Examination      heart s1s2  lung dec bS  head nc  head at  abd soft  weakness    Pertinent Lab findings & Imaging      Mery:  NO   Adequate UO     I&O's Detail    01 Feb 2025 07:01  -  02 Feb 2025 07:00  --------------------------------------------------------  IN:    Oral Fluid: 400 mL  Total IN: 400 mL    OUT:    Incontinent per Collection Bag (mL): 1260 mL  Total OUT: 1260 mL    Total NET: -860 mL      02 Feb 2025 07:01  -  03 Feb 2025 05:51  --------------------------------------------------------  IN:  Total IN: 0 mL    OUT:    Voided (mL): 1000 mL  Total OUT: 1000 mL    Total NET: -1000 mL               Discussed with:     Cultures:	        Radiology      ACC: [id number] EXAM:  XR CHEST PORTABLE URGENT 1V   ORDERED BY:  DARLEEN INFANTE     PROCEDURE DATE:  01/31/2025          INTERPRETATION:  AP chest.    CLINICAL INDICATION: Shortness of breath.    IMPRESSION: The heart is normal in size. Small bilateral pleural   effusions with mild pulmonary edema. Bibasilar atelectasis.    --- End of Report ---            SADAF BEY MD; Attending Radiologist  This document has been electronically signed. Feb 1 2025 12:41PM

## 2025-02-03 NOTE — PROGRESS NOTE ADULT - NS ATTEST RISK PROBLEM GEN_ALL_CORE FT
acute hypoxic respiratory failure, viral PNA, s/p STEMI, acute systolic congestive heart failure, relative hypotension, ATN

## 2025-02-03 NOTE — CONSULT NOTE ADULT - ASSESSMENT
89y/o F with PMHx HTN, DM2, HLD, depression who presented to the ED with intermittent jaw pain followed by anterior chest pressure radiating to the left shoulder, Pt was found to have elevated troponins and EKG with ST elevations with reciprocal ST-T changes in leads II, III, aVF. Code STEMI was activated and patient was taken to the cath lab where she was found to have 100% occlusion to the diagonal now s/p 1 stent placement. Patient seen and examined in the ICU, reports feeling much better.    flu  malaise  weakness  STEMI  OP  OA  HTN  DM  HLD   89y/o F with PMHx HTN, DM2, HLD, depression who presented to the ED with intermittent jaw pain followed by anterior chest pressure radiating to the left shoulder, Pt was found to have elevated troponins and EKG with ST elevations with reciprocal ST-T changes in leads II, III, aVF. Code STEMI was activated and patient was taken to the cath lab where she was found to have 100% occlusion to the diagonal now s/p 1 stent placement. Patient seen and examined in the ICU, reports feeling much better.    flu  malaise  weakness  STEMI  OP  OA  HTN  DM  HLD    monitor for pulm edema - CHF ex -   Cardio f/u   I and O  replete lytes  cvs rx regimen optimization and HD monitoring  TTE 1/27/25 Left ventricular systolic function is moderately decreased with an ejection fraction visually estimated at 40 to 45 %. small pericardial effusion noted  FLU management - isol precs  nebs - mucolytics  cough rx regimen  I velma  fio2 wean as tolerated  goal sat > 88 pct  oral hygiene  skin care  DM care  s/p ICU stay -   chest imaging reviewed - eff - atelectasis - pulm edema - will benefit from repeat

## 2025-02-03 NOTE — PROGRESS NOTE ADULT - SUBJECTIVE AND OBJECTIVE BOX
Patient is a 89y old  Female who presents with a chief complaint of STEMI (03 Feb 2025 09:29)      INTERVAL HPI/OVERNIGHT EVENTS: Patient seen and examined at bedside. Patient states she is feeling like she has a cough with congestion. She is on 3L NC at this time. Denies fevers, chills, headache, lightheadedness, chest pain, dyspnea, abdominal pain, n/v/d/c.    MEDICATIONS  (STANDING):  albuterol    0.083% 2.5 milliGRAM(s) Nebulizer every 6 hours  aspirin enteric coated 81 milliGRAM(s) Oral daily  atorvastatin 40 milliGRAM(s) Oral at bedtime  chlorhexidine 2% Cloths 1 Application(s) Topical <User Schedule>  clopidogrel Tablet 75 milliGRAM(s) Oral every 24 hours  dextrose 5%. 1000 milliLiter(s) (50 mL/Hr) IV Continuous <Continuous>  dextrose 5%. 1000 milliLiter(s) (100 mL/Hr) IV Continuous <Continuous>  dextrose 50% Injectable 25 Gram(s) IV Push once  dextrose 50% Injectable 12.5 Gram(s) IV Push once  dextrose 50% Injectable 25 Gram(s) IV Push once  DULoxetine 90 milliGRAM(s) Oral daily  glucagon  Injectable 1 milliGRAM(s) IntraMuscular once  heparin   Injectable 5000 Unit(s) SubCutaneous every 8 hours  influenza  Vaccine (HIGH DOSE) 0.5 milliLiter(s) IntraMuscular once  insulin lispro (ADMELOG) corrective regimen sliding scale   SubCutaneous Before meals and at bedtime  nystatin Powder 1 Application(s) Topical two times a day  oseltamivir 30 milliGRAM(s) Oral daily  sodium chloride 3%  Inhalation 4 milliLiter(s) Inhalation every 12 hours    MEDICATIONS  (PRN):  acetaminophen     Tablet .. 650 milliGRAM(s) Oral every 6 hours PRN Temp greater or equal to 38C (100.4F), Mild Pain (1 - 3)  ALPRAZolam 0.25 milliGRAM(s) Oral two times a day PRN anxiety or panic attack  dextrose Oral Gel 15 Gram(s) Oral once PRN Blood Glucose LESS THAN 70 milliGRAM(s)/deciliter  sodium chloride 0.65% Nasal 1 Spray(s) Both Nostrils two times a day PRN Nasal Congestion      Allergies    No Known Allergies    Intolerances        REVIEW OF SYSTEMS:  CONSTITUTIONAL: No fever or chills  HEENT:  No headache, no sore throat  RESPIRATORY: + cough and congestion, denies sob at this time  CARDIOVASCULAR: No chest pain, palpitations  GASTROINTESTINAL: No abd pain, nausea, vomiting, or diarrhea  GENITOURINARY: No dysuria, frequency, or hematuria  NEUROLOGICAL: no focal weakness or dizziness  MUSCULOSKELETAL: no myalgias     Vital Signs Last 24 Hrs  T(C): 36.8 (03 Feb 2025 11:05), Max: 37.4 (02 Feb 2025 13:27)  T(F): 98.3 (03 Feb 2025 11:05), Max: 99.3 (02 Feb 2025 13:27)  HR: 82 (03 Feb 2025 11:05) (69 - 105)  BP: 115/68 (03 Feb 2025 11:05) (107/66 - 125/60)  BP(mean): 70 (02 Feb 2025 17:00) (69 - 82)  RR: 18 (03 Feb 2025 11:05) (18 - 38)  SpO2: 95% (03 Feb 2025 11:05) (91% - 100%)    Parameters below as of 03 Feb 2025 11:05  Patient On (Oxygen Delivery Method): nasal cannula  O2 Flow (L/min): 2      PHYSICAL EXAM:  GENERAL: NAD, on 3L NC  HEENT:  anicteric, moist mucous membranes  CHEST/LUNG:  + Bilateral rhonchi mild  HEART:  RRR, S1, S2  ABDOMEN:  BS+, soft, nontender, nondistended  EXTREMITIES: no edema, cyanosis, or calf tenderness  NERVOUS SYSTEM: answers questions and follows commands appropriately    LABS:                        8.7    6.53  )-----------( 261      ( 03 Feb 2025 08:30 )             27.1     CBC Full  -  ( 03 Feb 2025 08:30 )  WBC Count : 6.53 K/uL  Hemoglobin : 8.7 g/dL  Hematocrit : 27.1 %  Platelet Count - Automated : 261 K/uL  Mean Cell Volume : 92.2 fl  Mean Cell Hemoglobin : 29.6 pg  Mean Cell Hemoglobin Concentration : 32.1 g/dL  Auto Neutrophil # : 5.05 K/uL  Auto Lymphocyte # : 0.68 K/uL  Auto Monocyte # : 0.63 K/uL  Auto Eosinophil # : 0.10 K/uL  Auto Basophil # : 0.01 K/uL  Auto Neutrophil % : 77.4 %  Auto Lymphocyte % : 10.4 %  Auto Monocyte % : 9.6 %  Auto Eosinophil % : 1.5 %  Auto Basophil % : 0.2 %    03 Feb 2025 08:30    138    |  109    |  47     ----------------------------<  164    3.9     |  23     |  2.00     Ca    8.7        03 Feb 2025 08:30  Phos  3.7       03 Feb 2025 08:30  Mg     2.4       03 Feb 2025 08:30    TPro  6.2    /  Alb  2.3    /  TBili  0.5    /  DBili  x      /  AST  23     /  ALT  19     /  AlkPhos  76     03 Feb 2025 08:30      Urinalysis Basic - ( 03 Feb 2025 08:30 )    Color: x / Appearance: x / SG: x / pH: x  Gluc: 164 mg/dL / Ketone: x  / Bili: x / Urobili: x   Blood: x / Protein: x / Nitrite: x   Leuk Esterase: x / RBC: x / WBC x   Sq Epi: x / Non Sq Epi: x / Bacteria: x      CAPILLARY BLOOD GLUCOSE      POCT Blood Glucose.: 172 mg/dL (03 Feb 2025 08:13)  POCT Blood Glucose.: 238 mg/dL (02 Feb 2025 22:33)  POCT Blood Glucose.: 189 mg/dL (02 Feb 2025 12:26)        Culture - Blood (collected 01-28-25 @ 13:30)  Source: .Blood BLOOD  Final Report (02-02-25 @ 20:00):    No growth at 5 days    Culture - Blood (collected 01-28-25 @ 13:25)  Source: .Blood BLOOD  Final Report (02-02-25 @ 20:00):    No growth at 5 days    Culture - Urine (collected 01-28-25 @ 12:26)  Source: Clean Catch Clean Catch (Midstream)  Final Report (01-29-25 @ 23:08):    No growth    Culture - Blood (collected 01-27-25 @ 18:10)  Source: .Blood BLOOD  Final Report (02-02-25 @ 01:01):    No growth at 5 days    Culture - Blood (collected 01-27-25 @ 18:00)  Source: .Blood BLOOD  Final Report (02-02-25 @ 01:01):    No growth at 5 days        RADIOLOGY & ADDITIONAL TESTS:    Personally reviewed.     Consultant(s) Notes Reviewed:  [x] YES  [ ] NO     Patient is a 89y old  Female who presents with a chief complaint of STEMI (03 Feb 2025 09:29)      INTERVAL HPI/OVERNIGHT EVENTS: Patient seen and examined at bedside. Patient states she is feeling like she has a cough with congestion. She is on 3L NC at this time. Denies fevers, chills, headache, lightheadedness, chest pain, dyspnea, abdominal pain, n/v/d/c. Bringing some sputum up with her cough - says she usually just swallows it though.    MEDICATIONS  (STANDING):  albuterol    0.083% 2.5 milliGRAM(s) Nebulizer every 6 hours  aspirin enteric coated 81 milliGRAM(s) Oral daily  atorvastatin 40 milliGRAM(s) Oral at bedtime  chlorhexidine 2% Cloths 1 Application(s) Topical <User Schedule>  clopidogrel Tablet 75 milliGRAM(s) Oral every 24 hours  dextrose 5%. 1000 milliLiter(s) (50 mL/Hr) IV Continuous <Continuous>  dextrose 5%. 1000 milliLiter(s) (100 mL/Hr) IV Continuous <Continuous>  dextrose 50% Injectable 25 Gram(s) IV Push once  dextrose 50% Injectable 12.5 Gram(s) IV Push once  dextrose 50% Injectable 25 Gram(s) IV Push once  DULoxetine 90 milliGRAM(s) Oral daily  glucagon  Injectable 1 milliGRAM(s) IntraMuscular once  heparin   Injectable 5000 Unit(s) SubCutaneous every 8 hours  influenza  Vaccine (HIGH DOSE) 0.5 milliLiter(s) IntraMuscular once  insulin lispro (ADMELOG) corrective regimen sliding scale   SubCutaneous Before meals and at bedtime  nystatin Powder 1 Application(s) Topical two times a day  oseltamivir 30 milliGRAM(s) Oral daily  sodium chloride 3%  Inhalation 4 milliLiter(s) Inhalation every 12 hours    MEDICATIONS  (PRN):  acetaminophen     Tablet .. 650 milliGRAM(s) Oral every 6 hours PRN Temp greater or equal to 38C (100.4F), Mild Pain (1 - 3)  ALPRAZolam 0.25 milliGRAM(s) Oral two times a day PRN anxiety or panic attack  dextrose Oral Gel 15 Gram(s) Oral once PRN Blood Glucose LESS THAN 70 milliGRAM(s)/deciliter  sodium chloride 0.65% Nasal 1 Spray(s) Both Nostrils two times a day PRN Nasal Congestion      Allergies    No Known Allergies    Intolerances        REVIEW OF SYSTEMS:  CONSTITUTIONAL: No fever or chills  HEENT:  No headache, no sore throat  RESPIRATORY: + cough and congestion, denies sob at this time  CARDIOVASCULAR: No chest pain, palpitations  GASTROINTESTINAL: No abd pain, nausea, vomiting, or diarrhea  GENITOURINARY: No dysuria, frequency, or hematuria  NEUROLOGICAL: no focal weakness or dizziness  MUSCULOSKELETAL: no myalgias     Vital Signs Last 24 Hrs  T(C): 36.8 (03 Feb 2025 11:05), Max: 37.4 (02 Feb 2025 13:27)  T(F): 98.3 (03 Feb 2025 11:05), Max: 99.3 (02 Feb 2025 13:27)  HR: 82 (03 Feb 2025 11:05) (69 - 105)  BP: 115/68 (03 Feb 2025 11:05) (107/66 - 125/60)  BP(mean): 70 (02 Feb 2025 17:00) (69 - 82)  RR: 18 (03 Feb 2025 11:05) (18 - 38)  SpO2: 95% (03 Feb 2025 11:05) (91% - 100%)    Parameters below as of 03 Feb 2025 11:05  Patient On (Oxygen Delivery Method): nasal cannula  O2 Flow (L/min): 2      PHYSICAL EXAM:  GENERAL: NAD, on 3L NC  HEENT:  anicteric, moist mucous membranes  CHEST/LUNG:  + Bilateral rhonchi  HEART:  RRR, S1, S2  ABDOMEN:  BS+, soft, nontender, nondistended  EXTREMITIES: no edema, cyanosis, or calf tenderness  NERVOUS SYSTEM: answers questions and follows commands appropriately    LABS:                        8.7    6.53  )-----------( 261      ( 03 Feb 2025 08:30 )             27.1     CBC Full  -  ( 03 Feb 2025 08:30 )  WBC Count : 6.53 K/uL  Hemoglobin : 8.7 g/dL  Hematocrit : 27.1 %  Platelet Count - Automated : 261 K/uL  Mean Cell Volume : 92.2 fl  Mean Cell Hemoglobin : 29.6 pg  Mean Cell Hemoglobin Concentration : 32.1 g/dL  Auto Neutrophil # : 5.05 K/uL  Auto Lymphocyte # : 0.68 K/uL  Auto Monocyte # : 0.63 K/uL  Auto Eosinophil # : 0.10 K/uL  Auto Basophil # : 0.01 K/uL  Auto Neutrophil % : 77.4 %  Auto Lymphocyte % : 10.4 %  Auto Monocyte % : 9.6 %  Auto Eosinophil % : 1.5 %  Auto Basophil % : 0.2 %    03 Feb 2025 08:30    138    |  109    |  47     ----------------------------<  164    3.9     |  23     |  2.00     Ca    8.7        03 Feb 2025 08:30  Phos  3.7       03 Feb 2025 08:30  Mg     2.4       03 Feb 2025 08:30    TPro  6.2    /  Alb  2.3    /  TBili  0.5    /  DBili  x      /  AST  23     /  ALT  19     /  AlkPhos  76     03 Feb 2025 08:30      Urinalysis Basic - ( 03 Feb 2025 08:30 )    Color: x / Appearance: x / SG: x / pH: x  Gluc: 164 mg/dL / Ketone: x  / Bili: x / Urobili: x   Blood: x / Protein: x / Nitrite: x   Leuk Esterase: x / RBC: x / WBC x   Sq Epi: x / Non Sq Epi: x / Bacteria: x      CAPILLARY BLOOD GLUCOSE      POCT Blood Glucose.: 172 mg/dL (03 Feb 2025 08:13)  POCT Blood Glucose.: 238 mg/dL (02 Feb 2025 22:33)  POCT Blood Glucose.: 189 mg/dL (02 Feb 2025 12:26)        Culture - Blood (collected 01-28-25 @ 13:30)  Source: .Blood BLOOD  Final Report (02-02-25 @ 20:00):    No growth at 5 days    Culture - Blood (collected 01-28-25 @ 13:25)  Source: .Blood BLOOD  Final Report (02-02-25 @ 20:00):    No growth at 5 days    Culture - Urine (collected 01-28-25 @ 12:26)  Source: Clean Catch Clean Catch (Midstream)  Final Report (01-29-25 @ 23:08):    No growth    Culture - Blood (collected 01-27-25 @ 18:10)  Source: .Blood BLOOD  Final Report (02-02-25 @ 01:01):    No growth at 5 days    Culture - Blood (collected 01-27-25 @ 18:00)  Source: .Blood BLOOD  Final Report (02-02-25 @ 01:01):    No growth at 5 days        RADIOLOGY & ADDITIONAL TESTS:    Personally reviewed.     Consultant(s) Notes Reviewed:  [x] YES  [ ] NO

## 2025-02-03 NOTE — PROGRESS NOTE ADULT - PROBLEM SELECTOR PLAN 7
Chronic  - pt takes duloxetine 90mg daily  - would continue home medications  - pt uses alprazolam 0.25mg PRN - start given panic attacks

## 2025-02-03 NOTE — PROGRESS NOTE ADULT - NS ATTEST RISKLEV GEN_ALL_CORE
Problem: MOBILITY - ADULT  Goal: Maintain or return to baseline ADL function  Description: INTERVENTIONS:  -  Assess patient's ability to carry out ADLs; assess patient's baseline for ADL function and identify physical deficits which impact ability to perform ADLs (bathing, care of mouth/teeth, toileting, grooming, dressing, etc )  - Assess/evaluate cause of self-care deficits   - Assess range of motion  - Assess patient's mobility; develop plan if impaired  - Assess patient's need for assistive devices and provide as appropriate  - Encourage maximum independence but intervene and supervise when necessary  - Involve family in performance of ADLs  - Assess for home care needs following discharge   - Consider OT consult to assist with ADL evaluation and planning for discharge  - Provide patient education as appropriate  Outcome: Progressing  Goal: Maintains/Returns to pre admission functional level  Description: INTERVENTIONS:  - Perform BMAT or MOVE assessment daily    - Set and communicate daily mobility goal to care team and patient/family/caregiver     - Collaborate with rehabilitation services on mobility goals if consulted  - Out of bed for toileting  - Record patient progress and toleration of activity level   Outcome: Progressing     Problem: Prexisting or High Potential for Compromised Skin Integrity  Goal: Skin integrity is maintained or improved  Description: INTERVENTIONS:  - Identify patients at risk for skin breakdown  - Assess and monitor skin integrity  - Assess and monitor nutrition and hydration status  - Monitor labs   - Assess for incontinence   - Turn and reposition patient  - Assist with mobility/ambulation  - Relieve pressure over bony prominences  - Avoid friction and shearing  - Provide appropriate hygiene as needed including keeping skin clean and dry  - Evaluate need for skin moisturizer/barrier cream  - Collaborate with interdisciplinary team   - Patient/family teaching  - Consider wound care consult   Outcome: Progressing     Problem: CARDIOVASCULAR - ADULT  Goal: Maintains optimal cardiac output and hemodynamic stability  Description: INTERVENTIONS:  - Monitor I/O, vital signs and rhythm  - Monitor for S/S and trends of decreased cardiac output  - Administer and titrate ordered vasoactive medications to optimize hemodynamic stability  - Assess quality of pulses, skin color and temperature  - Assess for signs of decreased coronary artery perfusion  - Instruct patient to report change in severity of symptoms  Outcome: Progressing  Goal: Absence of cardiac dysrhythmias or at baseline rhythm  Description: INTERVENTIONS:  - Continuous cardiac monitoring, vital signs, obtain 12 lead EKG if ordered  - Administer antiarrhythmic and heart rate control medications as ordered  - Monitor electrolytes and administer replacement therapy as ordered  Outcome: Progressing     Problem: RESPIRATORY - ADULT  Goal: Achieves optimal ventilation and oxygenation  Description: INTERVENTIONS:  - Assess for changes in respiratory status  - Assess for changes in mentation and behavior  - Position to facilitate oxygenation and minimize respiratory effort  - Oxygen administered by appropriate delivery if ordered  - Initiate smoking cessation education as indicated  - Encourage broncho-pulmonary hygiene including cough, deep breathe, Incentive Spirometry  - Assess the need for suctioning and aspirate as needed  - Assess and instruct to report SOB or any respiratory difficulty  - Respiratory Therapy support as indicated  Outcome: Progressing     Problem: GENITOURINARY - ADULT  Goal: Maintains or returns to baseline urinary function  Description: INTERVENTIONS:  - Assess urinary function  - Encourage oral fluids to ensure adequate hydration if ordered  - Administer IV fluids as ordered to ensure adequate hydration  - Administer ordered medications as needed  - Offer frequent toileting  - Follow urinary retention protocol if ordered  Outcome: Progressing  Goal: Absence of urinary retention  Description: INTERVENTIONS:  - Assess patient’s ability to void and empty bladder  - Monitor I/O  - Bladder scan as needed  - Discuss with physician/AP medications to alleviate retention as needed  - Discuss catheterization for long term situations as appropriate  Outcome: Progressing  Goal: Urinary catheter remains patent  Description: INTERVENTIONS:  - Assess patency of urinary catheter  - If patient has a chronic hernandez, consider changing catheter if non-functioning  - Follow guidelines for intermittent irrigation of non-functioning urinary catheter  Outcome: Progressing     Problem: METABOLIC, FLUID AND ELECTROLYTES - ADULT  Goal: Electrolytes maintained within normal limits  Description: INTERVENTIONS:  - Monitor labs and assess patient for signs and symptoms of electrolyte imbalances  - Administer electrolyte replacement as ordered  - Monitor response to electrolyte replacements, including repeat lab results as appropriate  - Instruct patient on fluid and nutrition as appropriate  Outcome: Progressing  Goal: Fluid balance maintained  Description: INTERVENTIONS:  - Monitor labs   - Monitor I/O and WT  - Instruct patient on fluid and nutrition as appropriate  - Assess for signs & symptoms of volume excess or deficit  Outcome: Progressing  Goal: Glucose maintained within target range  Description: INTERVENTIONS:  - Monitor Blood Glucose as ordered  - Assess for signs and symptoms of hyperglycemia and hypoglycemia  - Administer ordered medications to maintain glucose within target range  - Assess nutritional intake and initiate nutrition service referral as needed  Outcome: Progressing     Problem: SKIN/TISSUE INTEGRITY - ADULT  Goal: Skin Integrity remains intact(Skin Breakdown Prevention)  Description: Assess:  -Inspect skin when repositioning, toileting, and assisting with ADLS  -Assess extremities for adequate circulation and sensation     Bed Management:  -Have minimal linens on bed & keep smooth, unwrinkled  -Change linens as needed when moist or perspiring    Toileting:  -Offer bedside commode    Activity:  -Encourage activity and walks on unit  -Encourage or provide ROM exercises   -Use appropriate equipment to lift or move patient in bed    Skin Care:  -Avoid use of baby powder, tape, friction and shearing, hot water or constrictive clothing  -Do not massage red bony areas    Next Steps:  Outcome: Progressing  Goal: Incision(s), wounds(s) or drain site(s) healing without S/S of infection  Description: INTERVENTIONS  - Assess and document dressing, incision, wound bed, drain sites and surrounding tissue  - Provide patient and family education  Outcome: Progressing  Goal: Pressure injury heals and does not worsen  Description: Interventions:  - Implement low air loss mattress or specialty surface (Criteria met)  - Apply silicone foam dressing  - Apply fecal or urinary incontinence containment device   - Utilize friction reducing device or surface for transfers   - Consider nutrition services referral as needed  Outcome: Progressing     Problem: HEMATOLOGIC - ADULT  Goal: Maintains hematologic stability  Description: INTERVENTIONS  - Assess for signs and symptoms of bleeding or hemorrhage  - Monitor labs  - Administer supportive blood products/factors as ordered and appropriate  Outcome: Progressing High

## 2025-02-03 NOTE — PROGRESS NOTE ADULT - ASSESSMENT
89y/o F with PMHx HTN, DM2, HLD, depression who presented to the ED with intermittent jaw pain followed by anterior chest pressure radiating to the left shoulder, admitted for STEMI.    - s/p code STEMI  - S/P: PCI (1/26/25) with 100% occlusion of diagonal and MARLENE x1  - continue DAPT with ASA 81 QD and plavix  - cont Lipitor 40mg QHS  - TTE 1/27/25 Left ventricular systolic function is moderately decreased with an ejection fraction visually estimated at 40 to 45 %. small pericardial effusion noted.     - Appears compensated from HF POV.   - Previously with hypotension after getting BB dosing sbp still on soft side would hold BB for today (2/3) and reattmept low dose BB on 2/4   - s/p  Lopressor 12.5mg BID attempt  - ef 40-45%, with small unchanged pericardial effusion without tamponade  - Would hold on further fluid boluses and starting to get trace LE edema    - cr stable  - Please continue to maintain strict I/Os, monitor daily weights, Cr, and K.   - hold off on ARB/ARNI    - now flu+    - Monitor creatinine and electrolytes. Keep K>4, Mg>2  - Further cardiac workup will depend on clinical course.   - All other workup per primary team  Thank you for the consult  Will continue to follow  Chandler Frost DNP, AGAP-BC  Cardiology   Call Teams

## 2025-02-03 NOTE — PROGRESS NOTE ADULT - SUBJECTIVE AND OBJECTIVE BOX
[facility] -- Jamal Moralez Pannella, Patel, Savella, Goodger, Cohen: Office # [phone]    Follow Up:  STEMI    Subjective/Observations: No events overnight resting comfortably in bed.  No complaints of chest pain, dyspnea, or palpitations reported. No signs of orthopnea or PND. Tolerating room air     REVIEW OF SYSTEMS: All other review of systems are negative unless indicated above    PAST MEDICAL & SURGICAL HISTORY:  HTN (hypertension)      DM (diabetes mellitus)      HLD (hyperlipidemia)      Major depression      No significant past surgical history          MEDICATIONS  (STANDING):  albuterol    0.083% 2.5 milliGRAM(s) Nebulizer every 6 hours  aspirin enteric coated 81 milliGRAM(s) Oral daily  atorvastatin 40 milliGRAM(s) Oral at bedtime  chlorhexidine 2% Cloths 1 Application(s) Topical <User Schedule>  clopidogrel Tablet 75 milliGRAM(s) Oral every 24 hours  dextrose 5%. 1000 milliLiter(s) (50 mL/Hr) IV Continuous <Continuous>  dextrose 5%. 1000 milliLiter(s) (100 mL/Hr) IV Continuous <Continuous>  dextrose 50% Injectable 25 Gram(s) IV Push once  dextrose 50% Injectable 12.5 Gram(s) IV Push once  dextrose 50% Injectable 25 Gram(s) IV Push once  DULoxetine 90 milliGRAM(s) Oral daily  glucagon  Injectable 1 milliGRAM(s) IntraMuscular once  heparin   Injectable 5000 Unit(s) SubCutaneous every 8 hours  influenza  Vaccine (HIGH DOSE) 0.5 milliLiter(s) IntraMuscular once  insulin lispro (ADMELOG) corrective regimen sliding scale   SubCutaneous Before meals and at bedtime  nystatin Powder 1 Application(s) Topical two times a day  oseltamivir 30 milliGRAM(s) Oral daily  sodium chloride 3%  Inhalation 4 milliLiter(s) Inhalation every 12 hours    MEDICATIONS  (PRN):  acetaminophen     Tablet .. 650 milliGRAM(s) Oral every 6 hours PRN Temp greater or equal to 38C (100.4F), Mild Pain (1 - 3)  ALPRAZolam 0.25 milliGRAM(s) Oral two times a day PRN anxiety or panic attack  dextrose Oral Gel 15 Gram(s) Oral once PRN Blood Glucose LESS THAN 70 milliGRAM(s)/deciliter  sodium chloride 0.65% Nasal 1 Spray(s) Both Nostrils two times a day PRN Nasal Congestion    Allergies    No Known Allergies    Intolerances      Vital Signs Last 24 Hrs  T(C): 36.6 (03 Feb 2025 04:54), Max: 37.4 (02 Feb 2025 13:27)  T(F): 97.8 (03 Feb 2025 04:54), Max: 99.3 (02 Feb 2025 13:27)  HR: 69 (03 Feb 2025 08:01) (69 - 105)  BP: 113/65 (03 Feb 2025 04:54) (107/66 - 125/60)  BP(mean): 70 (02 Feb 2025 17:00) (68 - 82)  RR: 18 (03 Feb 2025 04:54) (18 - 38)  SpO2: 98% (03 Feb 2025 08:01) (91% - 100%)    Parameters below as of 03 Feb 2025 08:01  Patient On (Oxygen Delivery Method): nasal cannula, 3L      I&O's Summary    02 Feb 2025 07:01  -  03 Feb 2025 07:00  --------------------------------------------------------  IN: 0 mL / OUT: 1000 mL / NET: -1000 mL          TELE: SR  PHYSICAL EXAM:  Constitutional: NAD, awake and alert  HEENT: Moist Mucous Membranes, Anicteric  Pulmonary:  Non-labored, breath sounds bilateral rhonchi throughout , No wheezing, crackles .   Cardiovascular: Regular, S1 and S2, No murmurs, No rubs, gallops or clicks  Gastrointestinal:  soft, nontender, nondistended   Lymph: No peripheral edema. No lymphadenopathy.   Skin: No visible rashes or ulcers.  Psych:  Mood & affect appropriate      LABS: All Labs Reviewed:                        8.7    6.53  )-----------( 261      ( 03 Feb 2025 08:30 )             27.1                         9.1    6.14  )-----------( 251      ( 02 Feb 2025 05:30 )             27.9                         8.9    5.59  )-----------( 210      ( 01 Feb 2025 05:30 )             27.5     03 Feb 2025 08:30    138    |  109    |  47     ----------------------------<  164    3.9     |  23     |  2.00   02 Feb 2025 05:30    138    |  107    |  41     ----------------------------<  159    4.1     |  21     |  2.00   01 Feb 2025 05:30    138    |  108    |  46     ----------------------------<  152    4.2     |  23     |  2.00     Ca    8.7        03 Feb 2025 08:30  Ca    8.5        02 Feb 2025 05:30  Ca    8.4        01 Feb 2025 05:30  Phos  3.7       03 Feb 2025 08:30  Phos  3.8       02 Feb 2025 05:30  Phos  3.4       01 Feb 2025 05:30  Mg     2.4       03 Feb 2025 08:30  Mg     2.1       02 Feb 2025 05:30  Mg     2.3       01 Feb 2025 05:30    TPro  6.2    /  Alb  2.3    /  TBili  0.5    /  DBili  x      /  AST  23     /  ALT  19     /  AlkPhos  76     03 Feb 2025 08:30  TPro  6.5    /  Alb  2.4    /  TBili  0.5    /  DBili  x      /  AST  23     /  ALT  18     /  AlkPhos  74     02 Feb 2025 05:30   LIVER FUNCTIONS - ( 03 Feb 2025 08:30 )  Alb: 2.3 g/dL / Pro: 6.2 g/dL / ALK PHOS: 76 U/L / ALT: 19 U/L / AST: 23 U/L / GGT: x           Troponin I, High Sensitivity Result: 64106.4 ng/L (01-28-25 @ 13:30)  Troponin I, High Sensitivity Result: 37024.8 ng/L (01-28-25 @ 06:18)  Troponin I, High Sensitivity Result: 41916.5 ng/L (01-27-25 @ 06:10)  Troponin I, High Sensitivity Result: 737.1 ng/L (01-26-25 @ 19:45)  Cholesterol: 261 mg/dL (01-27-25 @ 06:10)  HDL Cholesterol: 43 mg/dL (01-27-25 @ 06:10)  Triglycerides, Serum: 239 mg/dL (01-27-25 @ 06:10)    12 Lead ECG:   Ventricular Rate 67 BPM    Atrial Rate 67 BPM    P-R Interval 168 ms    QRS Duration 92 ms    Q-T Interval 458 ms    QTC Calculation(Bazett) 483 ms    P Axis 80 degrees    R Axis 62 degrees    T Axis 234 degrees    Diagnosis Line Normal sinus rhythm  Lateral infarct (cited on or before 26-JAN-2025)  T wave abnormality, consider inferior ischemia  T wave abnormality, consider anterior ischemia  Abnormal ECG  Confirmed by stevan White ([id number]) on 1/29/2025 2:21:15 PM (01-29-25 @ 11:04)      TRANSTHORACIC ECHOCARDIOGRAM REPORT  ________________________________________________________________________________                                      _______       Pt. Name:       DOMITILA GRACE Study Date:    1/28/2025  MRN:            [id number]           YOB: 1933  Accession #:    [id number]          Age:           89 years  Account#:       [id number]         Gender:        F  Heart Rate:                        Height:        62.20 in (158.00 cm)  Rhythm:          Weight:        160.93 lb (73.00 kg)  Blood Pressure: 93/52 mmHg         BSA/BMI:       1.75 m² / 29.24 kg/m²  ________________________________________________________________________________________  Referring Physician:    [id number] Carlos Alberto  Interpreting Physician: Braxton Bowens M.D.  Primary Sonographer:    Marlys Queen    CPT:               ECHO TTE W/O CON F/U LTD - 78553.m  Indication(s):     ST elevation [STEMI] myocardial infarction of unspecified                     site - I21.3  Procedure:         Limited transthoracic echocardiogram.  Ordering Location: ICU1  Admission Status:  Inpatient    _______________________________________________________________________________________     CONCLUSIONS:      1. Left ventricular systolic function is moderately decreased with an ejection fraction visually estimated at 40 to 45 %.   2. Small pericardial effusion noted adjacent to the right atrium and small pericardial effusion noted adjacent to the right ventricle. The effusion measures 0.82 cm in diastole in the subcostal view adjacent ot the RV.   3. Thickened pericardium.   4. No significant change in the size of the pericardial effusion compared to TTE study from yesterday (1/27/25) is noted.    ________________________________________________________________________________________  FINDINGS:     Left Ventricle:  Left ventricular systolic function is moderately decreased with an ejection fraction visually estimated at 40 to 45%.     Right Ventricle:  Right ventricular systolic function is normal.     Left Atrium:  The left atrium is dilated.     Mitral Valve:  There is moderate calcification of the mitral valve annulus.     Pericardium:  The pericardium is thickened. There is a small pericardial effusionnoted adjacent to the right atrium and a small pericardial effusion noted adjacent to the right ventricle.     Systemic Veins:  The inferior vena cava is normal in size measuring 1.83 cm in diameter, (normal <2.1cm) with normal inspiratory collapse (normal >50%) consistent with normal right atrial pressure (~3, range 0-5mmHg).  ____________________________________________________________________  QUANTITATIVE DATA:  Left Ventricle Measurements: (Indexed to BSA)     Visualized LV EF%: 40 to 45%       ________________________________________________________________________________________  Electronically signed on 1/28/2025 at 2:21:15 PM by Braxton Bowens M.D.         *** Final ***

## 2025-02-03 NOTE — PROGRESS NOTE ADULT - PROBLEM SELECTOR PLAN 3
- unclear baseline, - GFR similar to 2023 on chart review  - S/p IVF - Cr 2 today, stable 2/3  - monitor renal function, increased s/p cardiac cath - likely ATN from hypotension and CARLO - unclear baseline, - GFR similar to 2023 on chart review  - S/p IVF - Cr 2 today, stable 2/3  - monitor renal function, increased s/p cardiac cath - likely ATN from hypotension and CARLO  - avoid hypotension - albumin assisted diuresis planned

## 2025-02-03 NOTE — PROGRESS NOTE ADULT - PROBLEM SELECTOR PLAN 1
Found to be Flu A +  Continue tamiflu for 5 days  Now with secretions, will start saline nebs along with nebulizers  Acute hypoxic respiratory failure secondary to acute HFrEF and influenza - continue O2 supplementation  Hold off IVF given heart failure - may need diuretics with albumin  Monitor for fevers or signs of worsening respiratory function, on 3L NC still  CXR 2/2 worse - may need albumin assisted diuresis  Pulmonary recs repeat imaging Found to be Flu A +  Continue tamiflu for 5 days  Now with secretions, will start saline nebs along with nebulizers  Acute hypoxic respiratory failure secondary to acute HFrEF and influenza - continue O2 supplementation  Hold off IVF given heart failure - may need diuretics with albumin  Monitor for fevers or signs of worsening respiratory function, on 3L NC still  CXR 2/2 worse - may need albumin assisted diuresis  Pulmonary recs repeat imaging  VBG 7.35 pH, retaining pO2 75 Found to be Flu A +  Continue tamiflu for 5 days  Now with secretions, will start saline nebs along with nebulizers  Acute hypoxic respiratory failure secondary to acute HFrEF and influenza - continue O2 supplementation  Hold off IVF given heart failure - may need diuretics with albumin  Monitor for fevers or signs of worsening respiratory function, on 3L NC still  VBG 7.35 pH, retaining pO2 75  Given IV lasix with albumin 2/3 after CT Chest non-con demonstrated excess fluid, re-assess tomorrow Acute hypoxic respiratory failure secondary to acute HFrEF and influenza viral PNA  continue O2 supplementation and wean as tolerated  Hold off IVF given heart failure - may need diuretics with albumin  Found to be Flu A +  Continue tamiflu for 5 days  Now with secretions, will start saline nebs along with nebulizers  Monitor for fevers or signs of worsening respiratory function, on 3L NC still  VBG 7.35 pH, retaining pO2 75  Given IV lasix with albumin 2/3 - CT chest reviewed with asymmetric pulm edema, b/l effusions L>R, viral PN, atelectasis - awaiting official read

## 2025-02-03 NOTE — PROGRESS NOTE ADULT - ASSESSMENT
89y/o F with PMHx HTN, DM2, HLD, depression who presented to the ED with intermittent jaw pain followed by anterior chest pressure radiating to the left shoulder, admitted for STEMI. Course c/b acute hypoxic respiratory failure secondary to acute HFrEF and influenza. Also with JOHNNY.

## 2025-02-03 NOTE — PROGRESS NOTE ADULT - ASSESSMENT
JOHNNY/CKDIII  STEMI s/p MARLENE  HTN    -CKDIII baseline with Cr 1.5 on admission, possibly due to hypertensive nephrosclerosis   -JOHNNY clinically due to CARLO +/- hypotension induced ATN  -Urine studies reviewed   -JOHNNY appear to plateau, monitor. Will not start IVF  -No indication for RRT   -No kidney biopsy indicated   -Will benefit from RAAS blockade +/- SGLT2i in the future for CKD

## 2025-02-03 NOTE — PROGRESS NOTE ADULT - PROBLEM SELECTOR PLAN 5
Chronic  - elevated on arrival likely in setting of STEMI; now improved  - takes fosinopril at home; previously on HCTZ however pt states she hasn't been taking this recently  - hold home antihypertensives given episodes of hypotension and JOHNNY  - being switched to toprol XL for GDMT - unclear if patient can tolerate this  - monitor routine hemodynamics Chronic  - elevated on arrival likely in setting of STEMI; now improved  - takes fosinopril at home; previously on HCTZ however pt states she hasn't been taking this recently  - hold home antihypertensives given episodes of hypotension and JOHNNY  - being switched to toprol XL for GDMT - unclear if patient can tolerate this - will assess to start 2/4 potentially  - monitor routine hemodynamics

## 2025-02-04 DIAGNOSIS — I48.91 UNSPECIFIED ATRIAL FIBRILLATION: ICD-10-CM

## 2025-02-04 DIAGNOSIS — R93.89 ABNORMAL FINDINGS ON DIAGNOSTIC IMAGING OF OTHER SPECIFIED BODY STRUCTURES: ICD-10-CM

## 2025-02-04 LAB
ALBUMIN SERPL ELPH-MCNC: 2.5 G/DL — LOW (ref 3.3–5)
ALP SERPL-CCNC: 77 U/L — SIGNIFICANT CHANGE UP (ref 40–120)
ALT FLD-CCNC: 21 U/L — SIGNIFICANT CHANGE UP (ref 12–78)
ANION GAP SERPL CALC-SCNC: 8 MMOL/L — SIGNIFICANT CHANGE UP (ref 5–17)
AST SERPL-CCNC: 28 U/L — SIGNIFICANT CHANGE UP (ref 15–37)
BASOPHILS # BLD AUTO: 0.01 K/UL — SIGNIFICANT CHANGE UP (ref 0–0.2)
BASOPHILS NFR BLD AUTO: 0.1 % — SIGNIFICANT CHANGE UP (ref 0–2)
BILIRUB SERPL-MCNC: 0.6 MG/DL — SIGNIFICANT CHANGE UP (ref 0.2–1.2)
BUN SERPL-MCNC: 47 MG/DL — HIGH (ref 7–23)
CALCIUM SERPL-MCNC: 9 MG/DL — SIGNIFICANT CHANGE UP (ref 8.5–10.1)
CHLORIDE SERPL-SCNC: 108 MMOL/L — SIGNIFICANT CHANGE UP (ref 96–108)
CO2 SERPL-SCNC: 24 MMOL/L — SIGNIFICANT CHANGE UP (ref 22–31)
CREAT SERPL-MCNC: 2 MG/DL — HIGH (ref 0.5–1.3)
EGFR: 23 ML/MIN/1.73M2 — LOW
EOSINOPHIL # BLD AUTO: 0.18 K/UL — SIGNIFICANT CHANGE UP (ref 0–0.5)
EOSINOPHIL NFR BLD AUTO: 2.5 % — SIGNIFICANT CHANGE UP (ref 0–6)
GLUCOSE SERPL-MCNC: 178 MG/DL — HIGH (ref 70–99)
HCT VFR BLD CALC: 27.2 % — LOW (ref 34.5–45)
HGB BLD-MCNC: 9 G/DL — LOW (ref 11.5–15.5)
IMM GRANULOCYTES NFR BLD AUTO: 1 % — HIGH (ref 0–0.9)
LYMPHOCYTES # BLD AUTO: 0.62 K/UL — LOW (ref 1–3.3)
LYMPHOCYTES # BLD AUTO: 8.6 % — LOW (ref 13–44)
MAGNESIUM SERPL-MCNC: 2.3 MG/DL — SIGNIFICANT CHANGE UP (ref 1.6–2.6)
MCHC RBC-ENTMCNC: 30.7 PG — SIGNIFICANT CHANGE UP (ref 27–34)
MCHC RBC-ENTMCNC: 33.1 G/DL — SIGNIFICANT CHANGE UP (ref 32–36)
MCV RBC AUTO: 92.8 FL — SIGNIFICANT CHANGE UP (ref 80–100)
MONOCYTES # BLD AUTO: 0.52 K/UL — SIGNIFICANT CHANGE UP (ref 0–0.9)
MONOCYTES NFR BLD AUTO: 7.2 % — SIGNIFICANT CHANGE UP (ref 2–14)
NEUTROPHILS # BLD AUTO: 5.84 K/UL — SIGNIFICANT CHANGE UP (ref 1.8–7.4)
NEUTROPHILS NFR BLD AUTO: 80.6 % — HIGH (ref 43–77)
NRBC # BLD: 0 /100 WBCS — SIGNIFICANT CHANGE UP (ref 0–0)
NRBC BLD-RTO: 0 /100 WBCS — SIGNIFICANT CHANGE UP (ref 0–0)
PHOSPHATE SERPL-MCNC: 3.6 MG/DL — SIGNIFICANT CHANGE UP (ref 2.5–4.5)
PLATELET # BLD AUTO: 315 K/UL — SIGNIFICANT CHANGE UP (ref 150–400)
POTASSIUM SERPL-MCNC: 4.4 MMOL/L — SIGNIFICANT CHANGE UP (ref 3.5–5.3)
POTASSIUM SERPL-SCNC: 4.4 MMOL/L — SIGNIFICANT CHANGE UP (ref 3.5–5.3)
PROT SERPL-MCNC: 6.6 G/DL — SIGNIFICANT CHANGE UP (ref 6–8.3)
RBC # BLD: 2.93 M/UL — LOW (ref 3.8–5.2)
RBC # FLD: 14.3 % — SIGNIFICANT CHANGE UP (ref 10.3–14.5)
SODIUM SERPL-SCNC: 140 MMOL/L — SIGNIFICANT CHANGE UP (ref 135–145)
WBC # BLD: 7.24 K/UL — SIGNIFICANT CHANGE UP (ref 3.8–10.5)
WBC # FLD AUTO: 7.24 K/UL — SIGNIFICANT CHANGE UP (ref 3.8–10.5)

## 2025-02-04 PROCEDURE — 99233 SBSQ HOSP IP/OBS HIGH 50: CPT

## 2025-02-04 PROCEDURE — 93010 ELECTROCARDIOGRAM REPORT: CPT

## 2025-02-04 PROCEDURE — 93010 ELECTROCARDIOGRAM REPORT: CPT | Mod: 77

## 2025-02-04 PROCEDURE — 99233 SBSQ HOSP IP/OBS HIGH 50: CPT | Mod: GC

## 2025-02-04 RX ORDER — METOPROLOL SUCCINATE 25 MG
2.5 TABLET, EXTENDED RELEASE 24 HR ORAL ONCE
Refills: 0 | Status: COMPLETED | OUTPATIENT
Start: 2025-02-04 | End: 2025-02-04

## 2025-02-04 RX ORDER — ENOXAPARIN SODIUM 100 MG/ML
70 INJECTION SUBCUTANEOUS EVERY 24 HOURS
Refills: 0 | Status: DISCONTINUED | OUTPATIENT
Start: 2025-02-04 | End: 2025-02-05

## 2025-02-04 RX ORDER — METOPROLOL SUCCINATE 25 MG
12.5 TABLET, EXTENDED RELEASE 24 HR ORAL
Refills: 0 | Status: DISCONTINUED | OUTPATIENT
Start: 2025-02-04 | End: 2025-02-07

## 2025-02-04 RX ORDER — ALBUMIN HUMAN 50 G/1000ML
250 SOLUTION INTRAVENOUS ONCE
Refills: 0 | Status: COMPLETED | OUTPATIENT
Start: 2025-02-04 | End: 2025-02-04

## 2025-02-04 RX ADMIN — Medication 2.5 MILLIGRAM(S): at 16:23

## 2025-02-04 RX ADMIN — Medication 2.5 MILLIGRAM(S): at 01:11

## 2025-02-04 RX ADMIN — Medication 12.5 MILLIGRAM(S): at 21:30

## 2025-02-04 RX ADMIN — NYSTATIN 1 APPLICATION(S): 100000 POWDER TOPICAL at 08:18

## 2025-02-04 RX ADMIN — ATORVASTATIN CALCIUM 40 MILLIGRAM(S): 80 TABLET, FILM COATED ORAL at 21:30

## 2025-02-04 RX ADMIN — Medication 1: at 12:32

## 2025-02-04 RX ADMIN — Medication 2.5 MILLIGRAM(S): at 12:42

## 2025-02-04 RX ADMIN — DULOXETINE 90 MILLIGRAM(S): 20 CAPSULE, DELAYED RELEASE ORAL at 12:30

## 2025-02-04 RX ADMIN — Medication 2.5 MILLIGRAM(S): at 19:38

## 2025-02-04 RX ADMIN — Medication 75 MILLIGRAM(S): at 06:44

## 2025-02-04 RX ADMIN — Medication 4 MILLILITER(S): at 06:52

## 2025-02-04 RX ADMIN — Medication 12.5 MILLIGRAM(S): at 08:52

## 2025-02-04 RX ADMIN — ALBUMIN HUMAN 125 MILLILITER(S): 50 SOLUTION INTRAVENOUS at 12:28

## 2025-02-04 RX ADMIN — Medication 1: at 08:44

## 2025-02-04 RX ADMIN — Medication 4 MILLILITER(S): at 19:38

## 2025-02-04 RX ADMIN — OSELTAMIVIR PHOSPHATE 30 MILLIGRAM(S): 75 CAPSULE ORAL at 12:30

## 2025-02-04 RX ADMIN — ENOXAPARIN SODIUM 70 MILLIGRAM(S): 100 INJECTION SUBCUTANEOUS at 12:29

## 2025-02-04 RX ADMIN — Medication 2: at 17:16

## 2025-02-04 RX ADMIN — Medication 1: at 22:18

## 2025-02-04 RX ADMIN — Medication 2.5 MILLIGRAM(S): at 06:52

## 2025-02-04 RX ADMIN — NYSTATIN 1 APPLICATION(S): 100000 POWDER TOPICAL at 17:17

## 2025-02-04 RX ADMIN — Medication 40 MILLIGRAM(S): at 12:30

## 2025-02-04 RX ADMIN — Medication 5000 UNIT(S): at 06:44

## 2025-02-04 RX ADMIN — ANTISEPTIC SURGICAL SCRUB 1 APPLICATION(S): 0.04 SOLUTION TOPICAL at 06:44

## 2025-02-04 NOTE — PROGRESS NOTE ADULT - PROBLEM SELECTOR PLAN 5
New afib on telemetry, sustained  - Starting 12.5 BID metoprolol   - Starting full dose lovenox renally dosed, consider switching to DOAC when JOHNNY improves for dc  - Cardio following  - Monitor on telemetry  - Replete electrolytes as needed

## 2025-02-04 NOTE — PROGRESS NOTE ADULT - PROBLEM SELECTOR PLAN 2
- s/p PCI with 1 stent placement to 100% occl to the diagonal  - transferred to ICU s/p cath. + Hypotension. S/p IVF after hypotension ?related to early BB use  - On aspirin/plavix & atorvastatin  - With acute systolic congestive heart failure - to initiate GDMT with BB (to transition to toprol XL in outpatient realm) - possible SGLT-2i on DC  - Reviewed TTE 1/27: EF 40 to 45%, probnp about 17k  - GDMT as tolerated by BP and renal function - will hold off beta blockade until BP recovers  - Cardio Following  - Anemia noted - stable on DAPT  - DC planning to home with HCPT - on hold due to AHRF/ADHF & influenza - s/p PCI with 1 stent placement to 100% occl to the diagonal  - transferred to ICU s/p cath. + Hypotension. S/p IVF after hypotension ?related to early BB use  - On aspirin/plavix & atorvastatin - with new afib continue AC + plavix, stop ASA  - With acute systolic congestive heart failure - to initiate GDMT with BB (to transition to toprol XL in outpatient realm) - possible SGLT-2i on DC  - Reviewed TTE 1/27: EF 40 to 45%, probnp about 17k  - Diurese PRN - IV lasix 40mg x 1 on 2/4  - GDMT as tolerated by BP and renal function  - Cardio Following  - Anemia noted - stable on DAPT - watch as starting AC  - DC planning to home with HCPT - on hold due to AHRF/ADHF & influenza

## 2025-02-04 NOTE — PROGRESS NOTE ADULT - PROBLEM SELECTOR PLAN 6
Chronic  - elevated on arrival likely in setting of STEMI; now improved  - takes fosinopril at home; previously on HCTZ however pt states she hasn't been taking this recently  - hold home antihypertensives given episodes of hypotension and JOHNNY  - being switched to toprol XL for GDMT - unclear if patient can tolerate this - will assess to start 2/4 potentially  - monitor routine hemodynamics Chronic  - elevated on arrival likely in setting of STEMI; now improved  - takes fosinopril at home; previously on HCTZ however pt states she hasn't been taking this recently  - hold home antihypertensives given episodes of hypotension and JOHNNY  - continue lopressor for rate control with plans to change to toprol  - monitor routine hemodynamics

## 2025-02-04 NOTE — PROGRESS NOTE ADULT - PROBLEM SELECTOR PLAN 7
Thyroid nodules on CT Chest w tracheal deviation  - Recommend to follow up outpatient as non-emergent in discharge instructions

## 2025-02-04 NOTE — PROGRESS NOTE ADULT - ASSESSMENT
89y/o F with PMHx HTN, DM2, HLD, depression who presented to the ED with intermittent jaw pain followed by anterior chest pressure radiating to the left shoulder, Pt was found to have elevated troponins and EKG with ST elevations with reciprocal ST-T changes in leads II, III, aVF. Code STEMI was activated and patient was taken to the cath lab where she was found to have 100% occlusion to the diagonal now s/p 1 stent placement. Patient seen and examined in the ICU, reports feeling much better.    flu  malaise  weakness  STEMI  OP  OA  HTN  DM  HLD    check RA sat  vs noted  meds reviewed  CM f/u    monitor for pulm edema - CHF ex -   Cardio f/u   I and O  replete lytes  cvs rx regimen optimization and HD monitoring  TTE 1/27/25 Left ventricular systolic function is moderately decreased with an ejection fraction visually estimated at 40 to 45 %. small pericardial effusion noted  FLU management - isol precs  nebs - mucolytics  cough rx regimen  I velma  fio2 wean as tolerated  goal sat > 88 pct  oral hygiene  skin care  DM care  s/p ICU stay -   chest imaging reviewed - eff - atelectasis - pulm edema - will benefit from repeat

## 2025-02-04 NOTE — PROGRESS NOTE ADULT - PROBLEM SELECTOR PLAN 10
VTE ppx: SC UFH    Downgraded from ICU 2/2/25 VTE ppx: SC full dose LMWH    Downgraded from ICU 2/2/25

## 2025-02-04 NOTE — PROGRESS NOTE ADULT - SUBJECTIVE AND OBJECTIVE BOX
[facility]                             Nephrology and Hypertension                             Timur Payan                                          [phone]     Patient is a 89y old  Female who presents with a chief complaint of STEMI (02 Feb 2025 08:42)       HPI:  Pt is a 89y/o F with PMHx HTN, DM2, HLD, depression who presented to the ED with intermittent jaw pain followed by anterior chest pressure radiating to the left shoulder, Pt was found to have elevated troponins and EKG with ST elevations with reciprocal ST-T changes in leads II, III, aVF. Code STEMI was activated and patient was taken to the cath lab where she was found to have 100% occlusion to the diagonal now s/p 1 stent placement. Patient seen and examined in the ICU, reports feeling much better. She denies any current chest pain or jaw pain, and also denies dizziness, headache, numbness/tingling, nausea, vomiting, palpitations, shortness of breath, abdominal pain. She does admit to chronic diarrhea. Prior to this event, patient was in her usual state of health. No other concerns at this time.    Renal consulted on 2/2/25 for JOHNNY/CKD. S/p ICU course with STEMI Code STEMI, 100% occlusion to the diagonal now s/p x1 stent placed. No cath lab complication reported and pt admitted to the ICU for post cath. Patient denies any knowledge of prior renal issues but Cr was 1.5 on admission     PAST MEDICAL & SURGICAL HISTORY:  HTN (hypertension)      DM (diabetes mellitus)      HLD (hyperlipidemia)      Major depression      No significant past surgical history           FAMILY HISTORY:  NC    Social History:Non smoker    MEDICATIONS  (STANDING):  albuterol    0.083% 2.5 milliGRAM(s) Nebulizer every 6 hours  aspirin enteric coated 81 milliGRAM(s) Oral daily  atorvastatin 40 milliGRAM(s) Oral at bedtime  chlorhexidine 2% Cloths 1 Application(s) Topical <User Schedule>  clopidogrel Tablet 75 milliGRAM(s) Oral every 24 hours  dextrose 5%. 1000 milliLiter(s) (50 mL/Hr) IV Continuous <Continuous>  dextrose 5%. 1000 milliLiter(s) (100 mL/Hr) IV Continuous <Continuous>  dextrose 50% Injectable 25 Gram(s) IV Push once  dextrose 50% Injectable 12.5 Gram(s) IV Push once  dextrose 50% Injectable 25 Gram(s) IV Push once  DULoxetine 90 milliGRAM(s) Oral daily  glucagon  Injectable 1 milliGRAM(s) IntraMuscular once  heparin   Injectable 5000 Unit(s) SubCutaneous every 8 hours  influenza  Vaccine (HIGH DOSE) 0.5 milliLiter(s) IntraMuscular once  insulin lispro (ADMELOG) corrective regimen sliding scale   SubCutaneous Before meals and at bedtime  metoprolol tartrate 12.5 milliGRAM(s) Oral two times a day  nystatin Powder 1 Application(s) Topical two times a day  oseltamivir 30 milliGRAM(s) Oral daily    MEDICATIONS  (PRN):  acetaminophen     Tablet .. 650 milliGRAM(s) Oral every 6 hours PRN Temp greater or equal to 38C (100.4F), Mild Pain (1 - 3)  dextrose Oral Gel 15 Gram(s) Oral once PRN Blood Glucose LESS THAN 70 milliGRAM(s)/deciliter  sodium chloride 0.65% Nasal 1 Spray(s) Both Nostrils two times a day PRN Nasal Congestion   Meds reviewed    Allergies    No Known Allergies    Intolerances         REVIEW OF SYSTEMS: As per HPI, otherwise negative     Vital Signs Last 24 Hrs  T(C): 37.1 (04 Feb 2025 11:26), Max: 37.6 (04 Feb 2025 05:32)  T(F): 98.7 (04 Feb 2025 11:26), Max: 99.7 (04 Feb 2025 05:32)  HR: 108 (04 Feb 2025 11:26) (70 - 108)  BP: 103/60 (04 Feb 2025 11:26) (103/60 - 126/72)  BP(mean): --  RR: 18 (04 Feb 2025 11:26) (18 - 18)  SpO2: 86% (04 Feb 2025 11:26) (86% - 100%)    Parameters below as of 04 Feb 2025 11:26  Patient On (Oxygen Delivery Method): nasal cannula  O2 Flow (L/min): 2        PHYSICAL EXAM:    GENERAL: NAD  HEAD:  Atraumatic, Normocephalic  EYES: EOMI, conjunctiva and sclera clear  ENMT: No Drainage from nares, No drainage from ears  NECK: Supple, neck  veins full  NERVOUS SYSTEM:  Awake and Alert  CHEST/LUNG: Clear to percussion bilaterally; No rales, rhonchi, wheezing, or rubs  HEART: Regular rate and rhythm; No murmurs, rubs, or gallops  ABDOMEN: Soft, Nontender, Nondistended; Bowel sounds present  EXTREMITIES:  No Edema  SKIN: No rashes No obvious ecchymosis      LABS:                        9.0    7.24  )-----------( 315      ( 04 Feb 2025 08:53 )             27.2     02-04    140  |  108  |  47[H]  ----------------------------<  178[H]  4.4   |  24  |  2.00[H]    Ca    9.0      04 Feb 2025 08:53  Phos  3.6     02-04  Mg     2.3     02-04    TPro  6.6  /  Alb  2.5[L]  /  TBili  0.6  /  DBili  x   /  AST  28  /  ALT  21  /  AlkPhos  77  02-04      Urinalysis Basic - ( 04 Feb 2025 08:53 )    Color: x / Appearance: x / SG: x / pH: x  Gluc: 178 mg/dL / Ketone: x  / Bili: x / Urobili: x   Blood: x / Protein: x / Nitrite: x   Leuk Esterase: x / RBC: x / WBC x   Sq Epi: x / Non Sq Epi: x / Bacteria: x

## 2025-02-04 NOTE — PROGRESS NOTE ADULT - SUBJECTIVE AND OBJECTIVE BOX
[facility] -- Jamal Moralez Pannella, Patel, Savella, Goodger, Cohen: Office # [phone]    Follow Up:  STEMI    Subjective/Observations: No events overnight resting comfortably in bed.  No complaints of chest pain, dyspnea, or palpitations reported. No signs of orthopnea or PND. Wearing nasal cannula 4L    REVIEW OF SYSTEMS: All other review of systems are negative unless indicated above    PAST MEDICAL & SURGICAL HISTORY:  HTN (hypertension)    DM (diabetes mellitus)    HLD (hyperlipidemia)    Major depression    No significant past surgical history          MEDICATIONS  (STANDING):  albuterol    0.083% 2.5 milliGRAM(s) Nebulizer every 6 hours  aspirin enteric coated 81 milliGRAM(s) Oral daily  atorvastatin 40 milliGRAM(s) Oral at bedtime  chlorhexidine 2% Cloths 1 Application(s) Topical <User Schedule>  clopidogrel Tablet 75 milliGRAM(s) Oral every 24 hours  dextrose 5%. 1000 milliLiter(s) (50 mL/Hr) IV Continuous <Continuous>  dextrose 5%. 1000 milliLiter(s) (100 mL/Hr) IV Continuous <Continuous>  dextrose 50% Injectable 25 Gram(s) IV Push once  dextrose 50% Injectable 12.5 Gram(s) IV Push once  dextrose 50% Injectable 25 Gram(s) IV Push once  DULoxetine 90 milliGRAM(s) Oral daily  enoxaparin Injectable 70 milliGRAM(s) SubCutaneous every 24 hours  glucagon  Injectable 1 milliGRAM(s) IntraMuscular once  influenza  Vaccine (HIGH DOSE) 0.5 milliLiter(s) IntraMuscular once  insulin lispro (ADMELOG) corrective regimen sliding scale   SubCutaneous Before meals and at bedtime  metoprolol tartrate 12.5 milliGRAM(s) Oral two times a day  nystatin Powder 1 Application(s) Topical two times a day  oseltamivir 30 milliGRAM(s) Oral daily  sodium chloride 3%  Inhalation 4 milliLiter(s) Inhalation every 12 hours    MEDICATIONS  (PRN):  acetaminophen     Tablet .. 650 milliGRAM(s) Oral every 6 hours PRN Temp greater or equal to 38C (100.4F), Mild Pain (1 - 3)  ALPRAZolam 0.25 milliGRAM(s) Oral two times a day PRN anxiety or panic attack  dextrose Oral Gel 15 Gram(s) Oral once PRN Blood Glucose LESS THAN 70 milliGRAM(s)/deciliter  sodium chloride 0.65% Nasal 1 Spray(s) Both Nostrils two times a day PRN Nasal Congestion    Allergies    No Known Allergies    Intolerances      Vital Signs Last 24 Hrs  T(C): 36.9 (04 Feb 2025 08:28), Max: 37.6 (04 Feb 2025 05:32)  T(F): 98.4 (04 Feb 2025 08:28), Max: 99.7 (04 Feb 2025 05:32)  HR: 103 (04 Feb 2025 08:28) (70 - 103)  BP: 118/67 (04 Feb 2025 08:28) (109/66 - 126/72)  BP(mean): --  RR: 18 (04 Feb 2025 08:28) (18 - 18)  SpO2: 95% (04 Feb 2025 08:28) (91% - 100%)    Parameters below as of 04 Feb 2025 08:28  Patient On (Oxygen Delivery Method): nasal cannula  O2 Flow (L/min): 1    I&O's Summary    03 Feb 2025 07:01  -  04 Feb 2025 07:00  --------------------------------------------------------  IN: 0 mL / OUT: 401 mL / NET: -401 mL          TELE: Afib   PHYSICAL EXAM:  Constitutional: NAD, awake and alert  HEENT: Moist Mucous Membranes, Anicteric  Pulmonary:  Non-labored, breath sounds bilateral rhonchi throughout w/ wheeze , No   crackles .   Cardiovascular: Irregular, S1 and S2, No murmurs, No rubs, gallops or clicks  Gastrointestinal:  soft, nontender, nondistended   Lymph: No peripheral edema. No lymphadenopathy.   Skin: No visible rashes or ulcers.  Psych:  Mood & affect appropriate      LABS: All Labs Reviewed:                        9.0    7.24  )-----------( 315      ( 04 Feb 2025 08:53 )             27.2                         8.7    6.53  )-----------( 261      ( 03 Feb 2025 08:30 )             27.1                         9.1    6.14  )-----------( 251      ( 02 Feb 2025 05:30 )             27.9     03 Feb 2025 08:30    138    |  109    |  47     ----------------------------<  164    3.9     |  23     |  2.00   02 Feb 2025 05:30    138    |  107    |  41     ----------------------------<  159    4.1     |  21     |  2.00     Ca    8.7        03 Feb 2025 08:30  Ca    8.5        02 Feb 2025 05:30  Phos  3.7       03 Feb 2025 08:30  Phos  3.8       02 Feb 2025 05:30  Mg     2.4       03 Feb 2025 08:30  Mg     2.1       02 Feb 2025 05:30    TPro  6.2    /  Alb  2.3    /  TBili  0.5    /  DBili  x      /  AST  23     /  ALT  19     /  AlkPhos  76     03 Feb 2025 08:30  TPro  6.5    /  Alb  2.4    /  TBili  0.5    /  DBili  x      /  AST  23     /  ALT  18     /  AlkPhos  74     02 Feb 2025 05:30   LIVER FUNCTIONS - ( 03 Feb 2025 08:30 )  Alb: 2.3 g/dL / Pro: 6.2 g/dL / ALK PHOS: 76 U/L / ALT: 19 U/L / AST: 23 U/L / GGT: x           Troponin I, High Sensitivity Result: 90668.4 ng/L (01-28-25 @ 13:30)  Troponin I, High Sensitivity Result: 87852.8 ng/L (01-28-25 @ 06:18)  Troponin I, High Sensitivity Result: 45764.5 ng/L (01-27-25 @ 06:10)  Troponin I, High Sensitivity Result: 737.1 ng/L (01-26-25 @ 19:45)  Cholesterol: 261 mg/dL (01-27-25 @ 06:10)  HDL Cholesterol: 43 mg/dL (01-27-25 @ 06:10)  Triglycerides, Serum: 239 mg/dL (01-27-25 @ 06:10)    12 Lead ECG:   Ventricular Rate 67 BPM    Atrial Rate 67 BPM    P-R Interval 168 ms    QRS Duration 92 ms    Q-T Interval 458 ms    QTC Calculation(Bazett) 483 ms    P Axis 80 degrees    R Axis 62 degrees    T Axis 234 degrees    Diagnosis Line Normal sinus rhythm  Lateral infarct (cited on or before 26-JAN-2025)  T wave abnormality, consider inferior ischemia  T wave abnormality, consider anterior ischemia  Abnormal ECG  Confirmed by stevan White ([id number]) on 1/29/2025 2:21:15 PM (01-29-25 @ 11:04)      TRANSTHORACIC ECHOCARDIOGRAM REPORT  ________________________________________________________________________________                                      _______       Pt. Name:       DOMITILA GRACE Study Date:    1/28/2025  MRN:            [id number]           YOB: 1933  Accession #:    [id number]          Age:           89 years  Account#:       [id number]         Gender:        F  Heart Rate:                        Height:        62.20 in (158.00 cm)  Rhythm:          Weight:        160.93 lb (73.00 kg)  Blood Pressure: 93/52 mmHg         BSA/BMI:       1.75 m² / 29.24 kg/m²  ________________________________________________________________________________________  Referring Physician:    [id number] Carlos Alberto  Interpreting Physician: Braxton Bowens M.D.  Primary Sonographer:    Marlys Queen    CPT:               ECHO TTE W/O CON F/U LTD - 97518.m  Indication(s):     ST elevation [STEMI] myocardial infarction of unspecified                     site - I21.3  Procedure:         Limited transthoracic echocardiogram.  Ordering Location: ICU1  Admission Status:  Inpatient    _______________________________________________________________________________________     CONCLUSIONS:      1. Left ventricular systolic function is moderately decreased with an ejection fraction visually estimated at 40 to 45 %.   2. Small pericardial effusion noted adjacent to the right atrium and small pericardial effusion noted adjacent to the right ventricle. The effusion measures 0.82 cm in diastole in the subcostal view adjacent ot the RV.   3. Thickened pericardium.   4. No significant change in the size of the pericardial effusion compared to TTE study from yesterday (1/27/25) is noted.    ________________________________________________________________________________________  FINDINGS:     Left Ventricle:  Left ventricular systolic function is moderately decreased with an ejection fraction visually estimated at 40 to 45%.     Right Ventricle:  Right ventricular systolic function is normal.     Left Atrium:  The left atrium is dilated.     Mitral Valve:  There is moderate calcification of the mitral valve annulus.     Pericardium:  The pericardium is thickened. There is a small pericardial effusionnoted adjacent to the right atrium and a small pericardial effusion noted adjacent to the right ventricle.     Systemic Veins:  The inferior vena cava is normal in size measuring 1.83 cm in diameter, (normal <2.1cm) with normal inspiratory collapse (normal >50%) consistent with normal right atrial pressure (~3, range 0-5mmHg).  ____________________________________________________________________  QUANTITATIVE DATA:  Left Ventricle Measurements: (Indexed to BSA)     Visualized LV EF%: 40 to 45%       ________________________________________________________________________________________  Electronically signed on 1/28/2025 at 2:21:15 PM by Braxton Bowens M.D.         *** Final ***

## 2025-02-04 NOTE — PROGRESS NOTE ADULT - SUBJECTIVE AND OBJECTIVE BOX
Patient is a 89y old  Female who presents with a chief complaint of STEMI (04 Feb 2025 09:48)      INTERVAL HPI/OVERNIGHT EVENTS: Patient seen and examined at bedside. Patient has new afib this morning. Patient states she does not know about having afib in the past or any arrhythmia. Patient denies chest pain or palpitations. She was in afib when seen around 10am on tele. She complains that dyspnea improvement is little to none after lasix. She is visibly coughing a lot and complains of some red-brown tinge in the sputum.     MEDICATIONS  (STANDING):  albuterol    0.083% 2.5 milliGRAM(s) Nebulizer every 6 hours  atorvastatin 40 milliGRAM(s) Oral at bedtime  chlorhexidine 2% Cloths 1 Application(s) Topical <User Schedule>  clopidogrel Tablet 75 milliGRAM(s) Oral every 24 hours  dextrose 5%. 1000 milliLiter(s) (50 mL/Hr) IV Continuous <Continuous>  dextrose 5%. 1000 milliLiter(s) (100 mL/Hr) IV Continuous <Continuous>  dextrose 50% Injectable 25 Gram(s) IV Push once  dextrose 50% Injectable 12.5 Gram(s) IV Push once  dextrose 50% Injectable 25 Gram(s) IV Push once  DULoxetine 90 milliGRAM(s) Oral daily  enoxaparin Injectable 70 milliGRAM(s) SubCutaneous every 24 hours  glucagon  Injectable 1 milliGRAM(s) IntraMuscular once  influenza  Vaccine (HIGH DOSE) 0.5 milliLiter(s) IntraMuscular once  insulin lispro (ADMELOG) corrective regimen sliding scale   SubCutaneous Before meals and at bedtime  metoprolol tartrate 12.5 milliGRAM(s) Oral two times a day  nystatin Powder 1 Application(s) Topical two times a day  oseltamivir 30 milliGRAM(s) Oral daily  sodium chloride 3%  Inhalation 4 milliLiter(s) Inhalation every 12 hours    MEDICATIONS  (PRN):  acetaminophen     Tablet .. 650 milliGRAM(s) Oral every 6 hours PRN Temp greater or equal to 38C (100.4F), Mild Pain (1 - 3)  ALPRAZolam 0.25 milliGRAM(s) Oral two times a day PRN anxiety or panic attack  dextrose Oral Gel 15 Gram(s) Oral once PRN Blood Glucose LESS THAN 70 milliGRAM(s)/deciliter  sodium chloride 0.65% Nasal 1 Spray(s) Both Nostrils two times a day PRN Nasal Congestion      Allergies    No Known Allergies    Intolerances        REVIEW OF SYSTEMS:  CONSTITUTIONAL: No fever  HEENT:  No headache  RESPIRATORY: + cough, + dyspnea  CARDIOVASCULAR: No chest pain, palpitations  GASTROINTESTINAL: No abd pain, nausea, vomiting, or diarrhea  NEUROLOGICAL: no focal weakness or dizziness  MUSCULOSKELETAL: no myalgias     Vital Signs Last 24 Hrs  T(C): 37.1 (04 Feb 2025 11:26), Max: 37.6 (04 Feb 2025 05:32)  T(F): 98.7 (04 Feb 2025 11:26), Max: 99.7 (04 Feb 2025 05:32)  HR: 108 (04 Feb 2025 11:26) (70 - 108)  BP: 103/60 (04 Feb 2025 11:26) (103/60 - 126/72)  BP(mean): --  RR: 18 (04 Feb 2025 11:26) (18 - 18)  SpO2: 86% (04 Feb 2025 11:26) (86% - 100%)    Parameters below as of 04 Feb 2025 11:26  Patient On (Oxygen Delivery Method): nasal cannula  O2 Flow (L/min): 2      PHYSICAL EXAM:  GENERAL: NAD, coughing, on nasal cannula  HEENT:  anicteric, moist mucous membranes  CHEST/LUNG:  + diffuse rhonchi worse in upper lobes  HEART:  RRR, S1, S2  ABDOMEN:  BS+, soft, nontender  EXTREMITIES: no edema  NERVOUS SYSTEM: answers questions and follows commands appropriately    LABS:                        9.0    7.24  )-----------( 315      ( 04 Feb 2025 08:53 )             27.2     CBC Full  -  ( 04 Feb 2025 08:53 )  WBC Count : 7.24 K/uL  Hemoglobin : 9.0 g/dL  Hematocrit : 27.2 %  Platelet Count - Automated : 315 K/uL  Mean Cell Volume : 92.8 fl  Mean Cell Hemoglobin : 30.7 pg  Mean Cell Hemoglobin Concentration : 33.1 g/dL  Auto Neutrophil # : 5.84 K/uL  Auto Lymphocyte # : 0.62 K/uL  Auto Monocyte # : 0.52 K/uL  Auto Eosinophil # : 0.18 K/uL  Auto Basophil # : 0.01 K/uL  Auto Neutrophil % : 80.6 %  Auto Lymphocyte % : 8.6 %  Auto Monocyte % : 7.2 %  Auto Eosinophil % : 2.5 %  Auto Basophil % : 0.1 %    04 Feb 2025 08:53    140    |  108    |  47     ----------------------------<  178    4.4     |  24     |  2.00     Ca    9.0        04 Feb 2025 08:53  Phos  3.6       04 Feb 2025 08:53  Mg     2.3       04 Feb 2025 08:53    TPro  6.6    /  Alb  2.5    /  TBili  0.6    /  DBili  x      /  AST  28     /  ALT  21     /  AlkPhos  77     04 Feb 2025 08:53      Urinalysis Basic - ( 04 Feb 2025 08:53 )    Color: x / Appearance: x / SG: x / pH: x  Gluc: 178 mg/dL / Ketone: x  / Bili: x / Urobili: x   Blood: x / Protein: x / Nitrite: x   Leuk Esterase: x / RBC: x / WBC x   Sq Epi: x / Non Sq Epi: x / Bacteria: x      CAPILLARY BLOOD GLUCOSE      POCT Blood Glucose.: 170 mg/dL (04 Feb 2025 07:55)  POCT Blood Glucose.: 188 mg/dL (03 Feb 2025 21:57)  POCT Blood Glucose.: 179 mg/dL (03 Feb 2025 17:31)  POCT Blood Glucose.: 206 mg/dL (03 Feb 2025 11:59)        Culture - Blood (collected 01-28-25 @ 13:30)  Source: .Blood BLOOD  Final Report (02-02-25 @ 20:00):    No growth at 5 days    Culture - Blood (collected 01-28-25 @ 13:25)  Source: .Blood BLOOD  Final Report (02-02-25 @ 20:00):    No growth at 5 days    Culture - Urine (collected 01-28-25 @ 12:26)  Source: Clean Catch Clean Catch (Midstream)  Final Report (01-29-25 @ 23:08):    No growth        RADIOLOGY & ADDITIONAL TESTS:    Personally reviewed.     Consultant(s) Notes Reviewed:  [x] YES  [ ] NO     Patient is a 89y old  Female who presents with a chief complaint of STEMI (04 Feb 2025 09:48)      INTERVAL HPI/OVERNIGHT EVENTS: Patient seen and examined at bedside. Patient has new afib this morning. Patient states she does not know about having afib in the past or any arrhythmia. Patient denies chest pain or palpitations. She was in afib when seen around 10am on tele. She complains that dyspnea improvement is little to none after lasix. She is visibly coughing a lot and complains of some red-brown tinge in the sputum.    Son seen later at bedside. States that they want cardiac resuscitation.    MEDICATIONS  (STANDING):  albuterol    0.083% 2.5 milliGRAM(s) Nebulizer every 6 hours  atorvastatin 40 milliGRAM(s) Oral at bedtime  chlorhexidine 2% Cloths 1 Application(s) Topical <User Schedule>  clopidogrel Tablet 75 milliGRAM(s) Oral every 24 hours  dextrose 5%. 1000 milliLiter(s) (50 mL/Hr) IV Continuous <Continuous>  dextrose 5%. 1000 milliLiter(s) (100 mL/Hr) IV Continuous <Continuous>  dextrose 50% Injectable 25 Gram(s) IV Push once  dextrose 50% Injectable 12.5 Gram(s) IV Push once  dextrose 50% Injectable 25 Gram(s) IV Push once  DULoxetine 90 milliGRAM(s) Oral daily  enoxaparin Injectable 70 milliGRAM(s) SubCutaneous every 24 hours  glucagon  Injectable 1 milliGRAM(s) IntraMuscular once  influenza  Vaccine (HIGH DOSE) 0.5 milliLiter(s) IntraMuscular once  insulin lispro (ADMELOG) corrective regimen sliding scale   SubCutaneous Before meals and at bedtime  metoprolol tartrate 12.5 milliGRAM(s) Oral two times a day  nystatin Powder 1 Application(s) Topical two times a day  oseltamivir 30 milliGRAM(s) Oral daily  sodium chloride 3%  Inhalation 4 milliLiter(s) Inhalation every 12 hours    MEDICATIONS  (PRN):  acetaminophen     Tablet .. 650 milliGRAM(s) Oral every 6 hours PRN Temp greater or equal to 38C (100.4F), Mild Pain (1 - 3)  ALPRAZolam 0.25 milliGRAM(s) Oral two times a day PRN anxiety or panic attack  dextrose Oral Gel 15 Gram(s) Oral once PRN Blood Glucose LESS THAN 70 milliGRAM(s)/deciliter  sodium chloride 0.65% Nasal 1 Spray(s) Both Nostrils two times a day PRN Nasal Congestion      Allergies    No Known Allergies    Intolerances        REVIEW OF SYSTEMS:  CONSTITUTIONAL: No fever  HEENT:  No headache  RESPIRATORY: + cough, + dyspnea  CARDIOVASCULAR: No chest pain, palpitations  GASTROINTESTINAL: No abd pain, nausea, vomiting, or diarrhea  NEUROLOGICAL: no focal weakness or dizziness  MUSCULOSKELETAL: no myalgias     Vital Signs Last 24 Hrs  T(C): 37.1 (04 Feb 2025 11:26), Max: 37.6 (04 Feb 2025 05:32)  T(F): 98.7 (04 Feb 2025 11:26), Max: 99.7 (04 Feb 2025 05:32)  HR: 108 (04 Feb 2025 11:26) (70 - 108)  BP: 103/60 (04 Feb 2025 11:26) (103/60 - 126/72)  BP(mean): --  RR: 18 (04 Feb 2025 11:26) (18 - 18)  SpO2: 86% (04 Feb 2025 11:26) (86% - 100%)    Parameters below as of 04 Feb 2025 11:26  Patient On (Oxygen Delivery Method): nasal cannula  O2 Flow (L/min): 2      PHYSICAL EXAM:  GENERAL: NAD, coughing, on nasal cannula  HEENT:  anicteric, moist mucous membranes  CHEST/LUNG:  + diffuse rhonchi worse in upper lobes  HEART:  RRR, S1, S2  ABDOMEN:  BS+, soft, nontender  EXTREMITIES: no edema  NERVOUS SYSTEM: answers questions and follows commands appropriately    LABS:                        9.0    7.24  )-----------( 315      ( 04 Feb 2025 08:53 )             27.2     CBC Full  -  ( 04 Feb 2025 08:53 )  WBC Count : 7.24 K/uL  Hemoglobin : 9.0 g/dL  Hematocrit : 27.2 %  Platelet Count - Automated : 315 K/uL  Mean Cell Volume : 92.8 fl  Mean Cell Hemoglobin : 30.7 pg  Mean Cell Hemoglobin Concentration : 33.1 g/dL  Auto Neutrophil # : 5.84 K/uL  Auto Lymphocyte # : 0.62 K/uL  Auto Monocyte # : 0.52 K/uL  Auto Eosinophil # : 0.18 K/uL  Auto Basophil # : 0.01 K/uL  Auto Neutrophil % : 80.6 %  Auto Lymphocyte % : 8.6 %  Auto Monocyte % : 7.2 %  Auto Eosinophil % : 2.5 %  Auto Basophil % : 0.1 %    04 Feb 2025 08:53    140    |  108    |  47     ----------------------------<  178    4.4     |  24     |  2.00     Ca    9.0        04 Feb 2025 08:53  Phos  3.6       04 Feb 2025 08:53  Mg     2.3       04 Feb 2025 08:53    TPro  6.6    /  Alb  2.5    /  TBili  0.6    /  DBili  x      /  AST  28     /  ALT  21     /  AlkPhos  77     04 Feb 2025 08:53      Urinalysis Basic - ( 04 Feb 2025 08:53 )    Color: x / Appearance: x / SG: x / pH: x  Gluc: 178 mg/dL / Ketone: x  / Bili: x / Urobili: x   Blood: x / Protein: x / Nitrite: x   Leuk Esterase: x / RBC: x / WBC x   Sq Epi: x / Non Sq Epi: x / Bacteria: x      CAPILLARY BLOOD GLUCOSE      POCT Blood Glucose.: 170 mg/dL (04 Feb 2025 07:55)  POCT Blood Glucose.: 188 mg/dL (03 Feb 2025 21:57)  POCT Blood Glucose.: 179 mg/dL (03 Feb 2025 17:31)  POCT Blood Glucose.: 206 mg/dL (03 Feb 2025 11:59)        Culture - Blood (collected 01-28-25 @ 13:30)  Source: .Blood BLOOD  Final Report (02-02-25 @ 20:00):    No growth at 5 days    Culture - Blood (collected 01-28-25 @ 13:25)  Source: .Blood BLOOD  Final Report (02-02-25 @ 20:00):    No growth at 5 days    Culture - Urine (collected 01-28-25 @ 12:26)  Source: Clean Catch Clean Catch (Midstream)  Final Report (01-29-25 @ 23:08):    No growth        RADIOLOGY & ADDITIONAL TESTS:    Personally reviewed.     Consultant(s) Notes Reviewed:  [x] YES  [ ] NO

## 2025-02-04 NOTE — PROGRESS NOTE ADULT - SUBJECTIVE AND OBJECTIVE BOX
Date/Time Patient Seen:  		  Referring MD:   Data Reviewed	       Patient is a 89y old  Female who presents with a chief complaint of STEMI (03 Feb 2025 14:07)      Subjective/HPI     PAST MEDICAL & SURGICAL HISTORY:  HTN (hypertension)    DM (diabetes mellitus)    HLD (hyperlipidemia)    Major depression    No significant past surgical history          Medication list         MEDICATIONS  (STANDING):  albuterol    0.083% 2.5 milliGRAM(s) Nebulizer every 6 hours  aspirin enteric coated 81 milliGRAM(s) Oral daily  atorvastatin 40 milliGRAM(s) Oral at bedtime  chlorhexidine 2% Cloths 1 Application(s) Topical <User Schedule>  clopidogrel Tablet 75 milliGRAM(s) Oral every 24 hours  dextrose 5%. 1000 milliLiter(s) (100 mL/Hr) IV Continuous <Continuous>  dextrose 5%. 1000 milliLiter(s) (50 mL/Hr) IV Continuous <Continuous>  dextrose 50% Injectable 25 Gram(s) IV Push once  dextrose 50% Injectable 12.5 Gram(s) IV Push once  dextrose 50% Injectable 25 Gram(s) IV Push once  DULoxetine 90 milliGRAM(s) Oral daily  glucagon  Injectable 1 milliGRAM(s) IntraMuscular once  heparin   Injectable 5000 Unit(s) SubCutaneous every 8 hours  influenza  Vaccine (HIGH DOSE) 0.5 milliLiter(s) IntraMuscular once  insulin lispro (ADMELOG) corrective regimen sliding scale   SubCutaneous Before meals and at bedtime  nystatin Powder 1 Application(s) Topical two times a day  oseltamivir 30 milliGRAM(s) Oral daily  sodium chloride 3%  Inhalation 4 milliLiter(s) Inhalation every 12 hours    MEDICATIONS  (PRN):  acetaminophen     Tablet .. 650 milliGRAM(s) Oral every 6 hours PRN Temp greater or equal to 38C (100.4F), Mild Pain (1 - 3)  ALPRAZolam 0.25 milliGRAM(s) Oral two times a day PRN anxiety or panic attack  dextrose Oral Gel 15 Gram(s) Oral once PRN Blood Glucose LESS THAN 70 milliGRAM(s)/deciliter  sodium chloride 0.65% Nasal 1 Spray(s) Both Nostrils two times a day PRN Nasal Congestion         Vitals log        ICU Vital Signs Last 24 Hrs  T(C): 37.6 (04 Feb 2025 05:32), Max: 37.6 (04 Feb 2025 05:32)  T(F): 99.7 (04 Feb 2025 05:32), Max: 99.7 (04 Feb 2025 05:32)  HR: 84 (04 Feb 2025 05:32) (69 - 103)  BP: 109/66 (04 Feb 2025 05:32) (109/66 - 126/72)  BP(mean): --  ABP: --  ABP(mean): --  RR: 18 (04 Feb 2025 05:32) (18 - 18)  SpO2: 94% (04 Feb 2025 05:32) (91% - 98%)    O2 Parameters below as of 04 Feb 2025 05:32  Patient On (Oxygen Delivery Method): nasal cannula  O2 Flow (L/min): 1               Input and Output:  I&O's Detail    02 Feb 2025 07:01  -  03 Feb 2025 07:00  --------------------------------------------------------  IN:  Total IN: 0 mL    OUT:    Voided (mL): 1000 mL  Total OUT: 1000 mL    Total NET: -1000 mL      03 Feb 2025 07:01  -  04 Feb 2025 05:55  --------------------------------------------------------  IN:  Total IN: 0 mL    OUT:    Stool (mL): 1 mL    Voided (mL): 400 mL  Total OUT: 401 mL    Total NET: -401 mL          Lab Data                        8.7    6.53  )-----------( 261      ( 03 Feb 2025 08:30 )             27.1     02-03    138  |  109[H]  |  47[H]  ----------------------------<  164[H]  3.9   |  23  |  2.00[H]    Ca    8.7      03 Feb 2025 08:30  Phos  3.7     02-03  Mg     2.4     02-03    TPro  6.2  /  Alb  2.3[L]  /  TBili  0.5  /  DBili  x   /  AST  23  /  ALT  19  /  AlkPhos  76  02-03            Review of Systems	      Objective     Physical Examination    heart s1s2  lung dc bS  head c  head at  abd soft      Pertinent Lab findings & Imaging      Mery:  NO   Adequate UO     I&O's Detail    02 Feb 2025 07:01  -  03 Feb 2025 07:00  --------------------------------------------------------  IN:  Total IN: 0 mL    OUT:    Voided (mL): 1000 mL  Total OUT: 1000 mL    Total NET: -1000 mL      03 Feb 2025 07:01  -  04 Feb 2025 05:55  --------------------------------------------------------  IN:  Total IN: 0 mL    OUT:    Stool (mL): 1 mL    Voided (mL): 400 mL  Total OUT: 401 mL    Total NET: -401 mL               Discussed with:     Cultures:	        Radiology

## 2025-02-04 NOTE — PROGRESS NOTE ADULT - PROBLEM SELECTOR PLAN 1
Acute hypoxic respiratory failure secondary to acute HFrEF and influenza viral PNA  continue O2 supplementation and wean as tolerated  Hold off IVF given heart failure - may need diuretics with albumin  Found to be Flu A +  Continue tamiflu for 5 days  Now with secretions, will start saline nebs along with nebulizers  On NC, wean as tolerated  Given IV lasix with albumin 2/3, will consider giving again today  CT Chest: Moderate to severe right upper lobe pneumonia. Mild left upper lobe   pneumonia, moderate b/l pleural effusions, Large right thyroid nodule deviating the trachea to the left.

## 2025-02-04 NOTE — PROGRESS NOTE ADULT - ASSESSMENT
89y/o F with PMHx HTN, DM2, HLD, depression who presented to the ED with intermittent jaw pain followed by anterior chest pressure radiating to the left shoulder, admitted for STEMI.    - s/p code STEMI  - S/P: PCI (1/26/25) with 100% occlusion of diagonal and MARLENE x1  - continue plavix 75 mg daily  -will D/C aspirin as pt is now being started on full dose AC for Afib and would like to avoid triple therapy  - cont Lipitor 40mg QHS  - TTE 1/27/25 Left ventricular systolic function is moderately decreased with an ejection fraction visually estimated at 40 to 45 %. small pericardial effusion noted.     - Appears compensated from HF POV.   - Previously with hypotension after getting BB dosing sbp improved metoprolol 12.5 mg twice daily started    - ef 40-45%, with small unchanged pericardial effusion without tamponade  - Would hold on further fluid boluses and starting to get trace LE edema    Afib   -converted into Afib overnight rate controlled   - CW BB at current dosing   - start lovenox full dose, dose has been adjusted for creatinine clearance  - will need to transition to eliquis when she is mor clinically stable from a JOHNNY/flu standpoint.    - cr stable  - Please continue to maintain strict I/Os, monitor daily weights, Cr, and K.   - hold off on ARB/ARNI    - now flu+    - Monitor creatinine and electrolytes. Keep K>4, Mg>2  - Further cardiac workup will depend on clinical course.   - All other workup per primary team  Thank you for the consult  Will continue to follow  Chandler Frost DNP, AGAP-BC  Cardiology   Call Teams  89y/o F with PMHx HTN, DM2, HLD, depression who presented to the ED with intermittent jaw pain followed by anterior chest pressure radiating to the left shoulder, admitted for STEMI.    - s/p code STEMI  - S/P: PCI (1/26/25) with 100% occlusion of diagonal and MARLENE x1  - continue plavix 75 mg daily  -will D/C aspirin as pt is now being started on full dose AC for Afib and would like to avoid triple therapy  - cont Lipitor 40mg QHS  - TTE 1/27/25 Left ventricular systolic function is moderately decreased with an ejection fraction visually estimated at 40 to 45 %. small pericardial effusion noted.     - Appears compensated from HF POV.   - Previously with hypotension after getting BB dosing sbp improved metoprolol 12.5 mg twice daily started    - ef 40-45%, with small unchanged pericardial effusion without tamponade  - Would hold on further fluid boluses and starting to get trace LE edema    Afib   -converted into Afib overnight rate controlled   - CW BB at current dosing   - start lovenox full dose, dose has been adjusted for creatinine clearance  - will need to transition to eliquis when she is mor clinically stable from a JOHNNY/flu standpoint.  -for now will hope that she reverts to sr as flu improves, etc. if she does not can consider dccv, which would be more important if she decompensates further, attributable to loss of av synchrony    - cr stable  - Please continue to maintain strict I/Os, monitor daily weights, Cr, and K.   - hold off on ARB/ARNI    - now flu+    - Monitor creatinine and electrolytes. Keep K>4, Mg>2  - Further cardiac workup will depend on clinical course.   - All other workup per primary team  Thank you for the consult  Will continue to follow  Chandler Frost DNP, AGAP-BC  Cardiology   Call Teams

## 2025-02-04 NOTE — PROGRESS NOTE ADULT - ASSESSMENT
JOHNNY/CKDIII  STEMI s/p MARLENE  HTN    -CKDIII baseline with Cr 1.5 on admission, possibly due to hypertensive nephrosclerosis   -JOHNNY clinically due to CARLO +/- hypotension induced ATN. Now stable likely at new baseline   -Urine studies reviewed   -No IVF needed   -No indication for RRT   -No kidney biopsy indicated   -Will benefit from RAAS blockade +/- SGLT2i in the future for CKD JOHNNY/CKDIII  STEMI s/p MARLENE  HTN    -CKDIII baseline with Cr 1.5 on admission, possibly due to hypertensive nephrosclerosis   -JOHNNY clinically due to CARLO +/- hypotension induced ATN. Now stable likely at new baseline   -Urine studies reviewed   -No IVF needed   -No indication for RRT   -No kidney biopsy indicated   -Will benefit from RAAS blockade +/- SGLT2i in the future for CKD  -Lasix 20 mg IV x 1 today

## 2025-02-05 LAB
ALBUMIN SERPL ELPH-MCNC: 2.4 G/DL — LOW (ref 3.3–5)
ALP SERPL-CCNC: 78 U/L — SIGNIFICANT CHANGE UP (ref 40–120)
ALT FLD-CCNC: 21 U/L — SIGNIFICANT CHANGE UP (ref 12–78)
ANION GAP SERPL CALC-SCNC: 11 MMOL/L — SIGNIFICANT CHANGE UP (ref 5–17)
APTT BLD: 35.9 SEC — HIGH (ref 24.5–35.6)
AST SERPL-CCNC: 28 U/L — SIGNIFICANT CHANGE UP (ref 15–37)
BASOPHILS # BLD AUTO: 0.01 K/UL — SIGNIFICANT CHANGE UP (ref 0–0.2)
BASOPHILS NFR BLD AUTO: 0.2 % — SIGNIFICANT CHANGE UP (ref 0–2)
BILIRUB SERPL-MCNC: 0.6 MG/DL — SIGNIFICANT CHANGE UP (ref 0.2–1.2)
BUN SERPL-MCNC: 56 MG/DL — HIGH (ref 7–23)
CALCIUM SERPL-MCNC: 8.9 MG/DL — SIGNIFICANT CHANGE UP (ref 8.5–10.1)
CHLORIDE SERPL-SCNC: 107 MMOL/L — SIGNIFICANT CHANGE UP (ref 96–108)
CO2 SERPL-SCNC: 22 MMOL/L — SIGNIFICANT CHANGE UP (ref 22–31)
CREAT SERPL-MCNC: 2.3 MG/DL — HIGH (ref 0.5–1.3)
EGFR: 20 ML/MIN/1.73M2 — LOW
EOSINOPHIL # BLD AUTO: 0.18 K/UL — SIGNIFICANT CHANGE UP (ref 0–0.5)
EOSINOPHIL NFR BLD AUTO: 2.7 % — SIGNIFICANT CHANGE UP (ref 0–6)
GLUCOSE SERPL-MCNC: 163 MG/DL — HIGH (ref 70–99)
HCT VFR BLD CALC: 25.9 % — LOW (ref 34.5–45)
HGB BLD-MCNC: 8.4 G/DL — LOW (ref 11.5–15.5)
IMM GRANULOCYTES NFR BLD AUTO: 0.9 % — SIGNIFICANT CHANGE UP (ref 0–0.9)
INR BLD: 1.16 RATIO — SIGNIFICANT CHANGE UP (ref 0.85–1.16)
LYMPHOCYTES # BLD AUTO: 0.79 K/UL — LOW (ref 1–3.3)
LYMPHOCYTES # BLD AUTO: 12 % — LOW (ref 13–44)
MAGNESIUM SERPL-MCNC: 2.4 MG/DL — SIGNIFICANT CHANGE UP (ref 1.6–2.6)
MCHC RBC-ENTMCNC: 29.8 PG — SIGNIFICANT CHANGE UP (ref 27–34)
MCHC RBC-ENTMCNC: 32.4 G/DL — SIGNIFICANT CHANGE UP (ref 32–36)
MCV RBC AUTO: 91.8 FL — SIGNIFICANT CHANGE UP (ref 80–100)
MONOCYTES # BLD AUTO: 0.48 K/UL — SIGNIFICANT CHANGE UP (ref 0–0.9)
MONOCYTES NFR BLD AUTO: 7.3 % — SIGNIFICANT CHANGE UP (ref 2–14)
NEUTROPHILS # BLD AUTO: 5.09 K/UL — SIGNIFICANT CHANGE UP (ref 1.8–7.4)
NEUTROPHILS NFR BLD AUTO: 76.9 % — SIGNIFICANT CHANGE UP (ref 43–77)
NRBC # BLD: 0 /100 WBCS — SIGNIFICANT CHANGE UP (ref 0–0)
NRBC BLD-RTO: 0 /100 WBCS — SIGNIFICANT CHANGE UP (ref 0–0)
PLATELET # BLD AUTO: 310 K/UL — SIGNIFICANT CHANGE UP (ref 150–400)
POTASSIUM SERPL-MCNC: 3.8 MMOL/L — SIGNIFICANT CHANGE UP (ref 3.5–5.3)
POTASSIUM SERPL-SCNC: 3.8 MMOL/L — SIGNIFICANT CHANGE UP (ref 3.5–5.3)
PROT SERPL-MCNC: 6.2 G/DL — SIGNIFICANT CHANGE UP (ref 6–8.3)
PROTHROM AB SERPL-ACNC: 13.7 SEC — HIGH (ref 9.9–13.4)
RBC # BLD: 2.82 M/UL — LOW (ref 3.8–5.2)
RBC # FLD: 14.4 % — SIGNIFICANT CHANGE UP (ref 10.3–14.5)
SODIUM SERPL-SCNC: 140 MMOL/L — SIGNIFICANT CHANGE UP (ref 135–145)
WBC # BLD: 6.61 K/UL — SIGNIFICANT CHANGE UP (ref 3.8–10.5)
WBC # FLD AUTO: 6.61 K/UL — SIGNIFICANT CHANGE UP (ref 3.8–10.5)

## 2025-02-05 PROCEDURE — 99233 SBSQ HOSP IP/OBS HIGH 50: CPT

## 2025-02-05 PROCEDURE — 99233 SBSQ HOSP IP/OBS HIGH 50: CPT | Mod: GC

## 2025-02-05 RX ADMIN — ANTISEPTIC SURGICAL SCRUB 1 APPLICATION(S): 0.04 SOLUTION TOPICAL at 05:37

## 2025-02-05 RX ADMIN — Medication 4 MILLILITER(S): at 19:57

## 2025-02-05 RX ADMIN — Medication 12.5 MILLIGRAM(S): at 05:36

## 2025-02-05 RX ADMIN — Medication 4 MILLILITER(S): at 07:29

## 2025-02-05 RX ADMIN — Medication 75 MILLIGRAM(S): at 05:36

## 2025-02-05 RX ADMIN — DULOXETINE 90 MILLIGRAM(S): 20 CAPSULE, DELAYED RELEASE ORAL at 11:06

## 2025-02-05 RX ADMIN — Medication 2.5 MILLIGRAM(S): at 07:29

## 2025-02-05 RX ADMIN — NYSTATIN 1 APPLICATION(S): 100000 POWDER TOPICAL at 17:11

## 2025-02-05 RX ADMIN — OSELTAMIVIR PHOSPHATE 30 MILLIGRAM(S): 75 CAPSULE ORAL at 11:07

## 2025-02-05 RX ADMIN — ENOXAPARIN SODIUM 70 MILLIGRAM(S): 100 INJECTION SUBCUTANEOUS at 11:23

## 2025-02-05 RX ADMIN — Medication 1: at 08:13

## 2025-02-05 RX ADMIN — Medication 2.5 MILLIGRAM(S): at 19:57

## 2025-02-05 RX ADMIN — Medication 2.5 MILLIGRAM(S): at 13:56

## 2025-02-05 RX ADMIN — Medication 1: at 17:17

## 2025-02-05 RX ADMIN — Medication 2.5 MILLIGRAM(S): at 01:31

## 2025-02-05 RX ADMIN — ATORVASTATIN CALCIUM 40 MILLIGRAM(S): 80 TABLET, FILM COATED ORAL at 21:55

## 2025-02-05 RX ADMIN — Medication 1: at 21:55

## 2025-02-05 RX ADMIN — Medication 1: at 12:19

## 2025-02-05 RX ADMIN — NYSTATIN 1 APPLICATION(S): 100000 POWDER TOPICAL at 05:38

## 2025-02-05 NOTE — PROGRESS NOTE ADULT - PROBLEM SELECTOR PLAN 5
New afib on telemetry, sustained  - Starting 12.5 BID metoprolol   - Starting full dose lovenox renally dosed, consider switching to DOAC when JOHNYN improves for dc, will d/w cardio  - Cardio following  - Monitor on telemetry  - Replete electrolytes as needed

## 2025-02-05 NOTE — CASE MANAGEMENT PROGRESS NOTE - NSCMPROGRESSNOTE_GEN_ALL_CORE
Discussed patient on rounds this morning and spoke to MD resident. Per MD resident, the patient is not medically cleared for discharge. Patient is now on 4LNC. No home O2 PTA. Cardiology and Pulmonary following. Patient is a CMS STAR. Referral was previously made to [facility]. CM remains available for transition planning when medically cleared.

## 2025-02-05 NOTE — PROGRESS NOTE ADULT - SUBJECTIVE AND OBJECTIVE BOX
[facility] -- Jamal Moralez, Roney Villela Savella, Goodger, Cohen: Office # [phone]    Follow Up:  STEMI    Subjective/Observations: Patient seen and examined. Patient awake, alert, resting in bed. No complaints of chest pain, dyspnea, palpitations or dizziness. No signs of orthopnea or PND. Tolerating O2 via nasal cannula. + cough, Remains in A fib on telemetry     REVIEW OF SYSTEMS: All other review of systems are negative unless indicated above    PAST MEDICAL & SURGICAL HISTORY:  HTN (hypertension)      DM (diabetes mellitus)      HLD (hyperlipidemia)      Major depression      No significant past surgical history      MEDICATIONS  (STANDING):  albuterol    0.083% 2.5 milliGRAM(s) Nebulizer every 6 hours  atorvastatin 40 milliGRAM(s) Oral at bedtime  chlorhexidine 2% Cloths 1 Application(s) Topical <User Schedule>  clopidogrel Tablet 75 milliGRAM(s) Oral every 24 hours  dextrose 5%. 1000 milliLiter(s) (100 mL/Hr) IV Continuous <Continuous>  dextrose 5%. 1000 milliLiter(s) (50 mL/Hr) IV Continuous <Continuous>  dextrose 50% Injectable 25 Gram(s) IV Push once  dextrose 50% Injectable 12.5 Gram(s) IV Push once  dextrose 50% Injectable 25 Gram(s) IV Push once  DULoxetine 90 milliGRAM(s) Oral daily  enoxaparin Injectable 70 milliGRAM(s) SubCutaneous every 24 hours  glucagon  Injectable 1 milliGRAM(s) IntraMuscular once  influenza  Vaccine (HIGH DOSE) 0.5 milliLiter(s) IntraMuscular once  insulin lispro (ADMELOG) corrective regimen sliding scale   SubCutaneous Before meals and at bedtime  metoprolol tartrate 12.5 milliGRAM(s) Oral two times a day  nystatin Powder 1 Application(s) Topical two times a day  oseltamivir 30 milliGRAM(s) Oral daily  sodium chloride 3%  Inhalation 4 milliLiter(s) Inhalation every 12 hours    MEDICATIONS  (PRN):  acetaminophen     Tablet .. 650 milliGRAM(s) Oral every 6 hours PRN Temp greater or equal to 38C (100.4F), Mild Pain (1 - 3)  ALPRAZolam 0.25 milliGRAM(s) Oral two times a day PRN anxiety or panic attack  dextrose Oral Gel 15 Gram(s) Oral once PRN Blood Glucose LESS THAN 70 milliGRAM(s)/deciliter  sodium chloride 0.65% Nasal 1 Spray(s) Both Nostrils two times a day PRN Nasal Congestion    Allergies  No Known Allergies    Vital Signs Last 24 Hrs  T(C): 36.8 (05 Feb 2025 05:25), Max: 37.1 (04 Feb 2025 11:26)  T(F): 98.2 (05 Feb 2025 05:25), Max: 98.7 (04 Feb 2025 11:26)  HR: 89 (05 Feb 2025 05:25) (85 - 110)  BP: 112/64 (05 Feb 2025 05:25) (103/60 - 124/56)  BP(mean): --  RR: 17 (05 Feb 2025 05:25) (17 - 20)  SpO2: 97% (05 Feb 2025 05:25) (86% - 97%)    Parameters below as of 05 Feb 2025 05:25  Patient On (Oxygen Delivery Method): nasal cannula  O2 Flow (L/min): 4    I&O's Summary    04 Feb 2025 07:01  -  05 Feb 2025 07:00  --------------------------------------------------------  IN: 0 mL / OUT: 700 mL / NET: -700 mL          TELE: SOUTH fib, was in 110-140s, now 70-80s   PHYSICAL EXAM:  Constitutional: NAD, awake and alert  HEENT: Moist Mucous Membranes, Anicteric  Pulmonary: Non-labored, breath sounds are clear bilaterally, No wheezing, rales + rhonchi  Cardiovascular: Irregular, S1 and S2, No murmurs, No rubs, gallops or clicks  Gastrointestinal:  soft, nontender, nondistended   Lymph: No peripheral edema. No lymphadenopathy.   Skin: No visible rashes or ulcers.  Psych:  Mood & affect appropriate      LABS: All Labs Reviewed:                        8.4    6.61  )-----------( 310      ( 05 Feb 2025 06:50 )             25.9                         9.0    7.24  )-----------( 315      ( 04 Feb 2025 08:53 )             27.2                         8.7    6.53  )-----------( 261      ( 03 Feb 2025 08:30 )             27.1     05 Feb 2025 06:50    140    |  107    |  56     ----------------------------<  163    3.8     |  22     |  2.30   04 Feb 2025 08:53    140    |  108    |  47     ----------------------------<  178    4.4     |  24     |  2.00   03 Feb 2025 08:30    138    |  109    |  47     ----------------------------<  164    3.9     |  23     |  2.00     Ca    8.9        05 Feb 2025 06:50  Ca    9.0        04 Feb 2025 08:53  Ca    8.7        03 Feb 2025 08:30  Phos  3.6       04 Feb 2025 08:53  Phos  3.7       03 Feb 2025 08:30  Mg     2.4       05 Feb 2025 06:50  Mg     2.3       04 Feb 2025 08:53  Mg     2.4       03 Feb 2025 08:30    TPro  6.2    /  Alb  2.4    /  TBili  0.6    /  DBili  x      /  AST  28     /  ALT  21     /  AlkPhos  78     05 Feb 2025 06:50  TPro  6.6    /  Alb  2.5    /  TBili  0.6    /  DBili  x      /  AST  28     /  ALT  21     /  AlkPhos  77     04 Feb 2025 08:53  TPro  6.2    /  Alb  2.3    /  TBili  0.5    /  DBili  x      /  AST  23     /  ALT  19     /  AlkPhos  76     03 Feb 2025 08:30   LIVER FUNCTIONS - ( 05 Feb 2025 06:50 )  Alb: 2.4 g/dL / Pro: 6.2 g/dL / ALK PHOS: 78 U/L / ALT: 21 U/L / AST: 28 U/L / GGT: x           Troponin I, High Sensitivity Result: 46908.4 ng/L (01-28-25 @ 13:30)  Troponin I, High Sensitivity Result: 35368.8 ng/L (01-28-25 @ 06:18)  Troponin I, High Sensitivity Result: 26184.5 ng/L (01-27-25 @ 06:10)  Troponin I, High Sensitivity Result: 737.1 ng/L (01-26-25 @ 19:45)  Cholesterol: 261 mg/dL (01-27-25 @ 06:10)  HDL Cholesterol: 43 mg/dL (01-27-25 @ 06:10)  Triglycerides, Serum: 239 mg/dL (01-27-25 @ 06:10)    12 Lead ECG:   Ventricular Rate 102 BPM    QRS Duration 100 ms    Q-T Interval 572 ms    QTC Calculation(Bazett) 745 ms    R Axis 45 degrees    T Axis -35 degrees    Diagnosis Line *** Critical Test Result: Long QTc , STEMI  Atrial fibrillation  Lateral infarct (cited on or before 26-JAN-2025)  T wave abnormality, consider inferior ischemia  Prolonged QT  ** ** ACUTE MI / STEMI ** **  Abnormal ECG  When compared with ECG of 04-FEB-2025 08:33, (Unconfirmed)  Junctional rhythm has replaced Atrial fibrillation  ST elevation now present in Lateral leads  Inverted T waves have replaced nonspecific T wave abnormality in Anterior leads  QT has lengthened  Confirmed by Amish Berry MD ([id number]) on 2/4/2025 4:15:40 PM (02-04-25 @ 08:34)      TRANSTHORACIC ECHOCARDIOGRAM REPORT  ________________________________________________________________________________                                      _______       Pt. Name:       DOMITILA GRACE Study Date:    1/28/2025  MRN:            [id number]           YOB: 1933  Accession #:    [id number]          Age:           89 years  Account#:       [id number]         Gender:        F  Heart Rate:                        Height:        62.20 in (158.00 cm)  Rhythm:          Weight:        160.93 lb (73.00 kg)  Blood Pressure: 93/52 mmHg         BSA/BMI:       1.75 m² / 29.24 kg/m²  ________________________________________________________________________________________  Referring Physician:    [id number] Carlos Alberto  Interpreting Physician: Braxton Bowens M.D.  Primary Sonographer:    Marlys Queen    CPT:               ECHO TTE W/O CON F/U LTD - 50162.m  Indication(s):     ST elevation [STEMI] myocardial infarction of unspecified                     site - I21.3  Procedure:         Limited transthoracic echocardiogram.  Ordering Location: [facility]  Admission Status:  Inpatient    _______________________________________________________________________________________     CONCLUSIONS:      1. Left ventricular systolic function is moderately decreased with an ejection fraction visually estimated at 40 to 45 %.   2. Small pericardial effusion noted adjacent to the right atrium and small pericardial effusion noted adjacent to the right ventricle. The effusion measures 0.82 cm in diastole in the subcostal view adjacent ot the RV.   3. Thickened pericardium.   4. No significant change in the size of the pericardial effusion compared to TTE study from yesterday (1/27/25) is noted.    ________________________________________________________________________________________  FINDINGS:     Left Ventricle:  Left ventricular systolic function is moderately decreased with an ejection fraction visually estimated at 40 to 45%.     Right Ventricle:  Right ventricular systolic function is normal.     Left Atrium:  The left atrium is dilated.     Mitral Valve:  There is moderate calcification of the mitral valve annulus.     Pericardium:  The pericardium is thickened. There is a small pericardial effusionnoted adjacent to the right atrium and a small pericardial effusion noted adjacent to the right ventricle.     Systemic Veins:  The inferior vena cava is normal in size measuring 1.83 cm in diameter, (normal <2.1cm) with normal inspiratory collapse (normal >50%) consistent with normal right atrial pressure (~3, range 0-5mmHg).  ____________________________________________________________________  QUANTITATIVE DATA:  Left Ventricle Measurements: (Indexed to BSA)     Visualized LV EF%: 40 to 45%       ________________________________________________________________________________________  Electronically signed on 1/28/2025 at 2:21:15 PM by Braxton Bowens M.D.         *** Final ***

## 2025-02-05 NOTE — PROGRESS NOTE ADULT - SUBJECTIVE AND OBJECTIVE BOX
[facility]                             Nephrology and Hypertension                             Timur Payan                                          [phone]     Patient is a 89y old  Female who presents with a chief complaint of STEMI (02 Feb 2025 08:42)       HPI:  Pt is a 89y/o F with PMHx HTN, DM2, HLD, depression who presented to the ED with intermittent jaw pain followed by anterior chest pressure radiating to the left shoulder, Pt was found to have elevated troponins and EKG with ST elevations with reciprocal ST-T changes in leads II, III, aVF. Code STEMI was activated and patient was taken to the cath lab where she was found to have 100% occlusion to the diagonal now s/p 1 stent placement. Patient seen and examined in the ICU, reports feeling much better. She denies any current chest pain or jaw pain, and also denies dizziness, headache, numbness/tingling, nausea, vomiting, palpitations, shortness of breath, abdominal pain. She does admit to chronic diarrhea. Prior to this event, patient was in her usual state of health. No other concerns at this time.    Renal consulted on 2/2/25 for JOHNNY/CKD. S/p ICU course with STEMI Code STEMI, 100% occlusion to the diagonal now s/p x1 stent placed. No cath lab complication reported and pt admitted to the ICU for post cath. Patient denies any knowledge of prior renal issues but Cr was 1.5 on admission     No acute events noted    PAST MEDICAL & SURGICAL HISTORY:  HTN (hypertension)      DM (diabetes mellitus)      HLD (hyperlipidemia)      Major depression      No significant past surgical history           FAMILY HISTORY:  NC    Social History:Non smoker    MEDICATIONS  (STANDING):  albuterol    0.083% 2.5 milliGRAM(s) Nebulizer every 6 hours  aspirin enteric coated 81 milliGRAM(s) Oral daily  atorvastatin 40 milliGRAM(s) Oral at bedtime  chlorhexidine 2% Cloths 1 Application(s) Topical <User Schedule>  clopidogrel Tablet 75 milliGRAM(s) Oral every 24 hours  dextrose 5%. 1000 milliLiter(s) (50 mL/Hr) IV Continuous <Continuous>  dextrose 5%. 1000 milliLiter(s) (100 mL/Hr) IV Continuous <Continuous>  dextrose 50% Injectable 25 Gram(s) IV Push once  dextrose 50% Injectable 12.5 Gram(s) IV Push once  dextrose 50% Injectable 25 Gram(s) IV Push once  DULoxetine 90 milliGRAM(s) Oral daily  glucagon  Injectable 1 milliGRAM(s) IntraMuscular once  heparin   Injectable 5000 Unit(s) SubCutaneous every 8 hours  influenza  Vaccine (HIGH DOSE) 0.5 milliLiter(s) IntraMuscular once  insulin lispro (ADMELOG) corrective regimen sliding scale   SubCutaneous Before meals and at bedtime  metoprolol tartrate 12.5 milliGRAM(s) Oral two times a day  nystatin Powder 1 Application(s) Topical two times a day  oseltamivir 30 milliGRAM(s) Oral daily    MEDICATIONS  (PRN):  acetaminophen     Tablet .. 650 milliGRAM(s) Oral every 6 hours PRN Temp greater or equal to 38C (100.4F), Mild Pain (1 - 3)  dextrose Oral Gel 15 Gram(s) Oral once PRN Blood Glucose LESS THAN 70 milliGRAM(s)/deciliter  sodium chloride 0.65% Nasal 1 Spray(s) Both Nostrils two times a day PRN Nasal Congestion   Meds reviewed    Allergies    No Known Allergies    Intolerances         REVIEW OF SYSTEMS: As per HPI, otherwise negative     Vital Signs Last 24 Hrs  T(C): 36.8 (05 Feb 2025 11:13), Max: 36.9 (04 Feb 2025 20:46)  T(F): 98.2 (05 Feb 2025 11:13), Max: 98.5 (04 Feb 2025 20:46)  HR: 89 (05 Feb 2025 11:13) (69 - 110)  BP: 105/65 (05 Feb 2025 11:13) (105/65 - 124/56)  BP(mean): --  RR: 18 (05 Feb 2025 11:13) (17 - 20)  SpO2: 95% (05 Feb 2025 11:13) (90% - 97%)    Parameters below as of 05 Feb 2025 11:13  Patient On (Oxygen Delivery Method): nasal cannula  O2 Flow (L/min): 4      PHYSICAL EXAM:    GENERAL: NAD  HEAD:  Atraumatic, Normocephalic  EYES: EOMI, conjunctiva and sclera clear  ENMT: No Drainage from nares, No drainage from ears  NECK: Supple, neck  veins full  NERVOUS SYSTEM:  Awake and Alert  CHEST/LUNG: Clear to percussion bilaterally; No rales, rhonchi, wheezing, or rubs  HEART: Regular rate and rhythm; No murmurs, rubs, or gallops  ABDOMEN: Soft, Nontender, Nondistended; Bowel sounds present  EXTREMITIES:  No Edema  SKIN: No rashes No obvious ecchymosis      LABS:                                           8.4    6.61  )-----------( 310      ( 05 Feb 2025 06:50 )             25.9     02-05    140  |  107  |  56[H]  ----------------------------<  163[H]  3.8   |  22  |  2.30[H]    Ca    8.9      05 Feb 2025 06:50  Phos  3.6     02-04  Mg     2.4     02-05    TPro  6.2  /  Alb  2.4[L]  /  TBili  0.6  /  DBili  x   /  AST  28  /  ALT  21  /  AlkPhos  78  02-05    PT/INR - ( 05 Feb 2025 12:17 )   PT: 13.7 sec;   INR: 1.16 ratio         PTT - ( 05 Feb 2025 12:17 )  PTT:35.9 sec  Urinalysis Basic - ( 05 Feb 2025 06:50 )    Color: x / Appearance: x / SG: x / pH: x  Gluc: 163 mg/dL / Ketone: x  / Bili: x / Urobili: x   Blood: x / Protein: x / Nitrite: x   Leuk Esterase: x / RBC: x / WBC x   Sq Epi: x / Non Sq Epi: x / Bacteria: x

## 2025-02-05 NOTE — PROGRESS NOTE ADULT - PROBLEM SELECTOR PLAN 1
Acute hypoxic respiratory failure secondary to acute HFrEF and influenza viral PNA  continue O2 supplementation and wean as tolerated  Hold off IVF given heart failure - may need diuretics with albumin  Found to be Flu A +  Continue tamiflu for 5 days  Now with secretions, will start saline nebs along with nebulizers  CT Chest: Moderate to severe right upper lobe pneumonia. Mild left upper lobe   pneumonia, moderate b/l pleural effusions, Large right thyroid nodule deviating the trachea to the left.  On 5L NC, wean as tolerate (baseline RA)  Given lasix yesterday, will d/w cardio about giving today Acute hypoxic respiratory failure secondary to acute HFrEF and influenza viral PNA  continue O2 supplementation and wean as tolerated  Hold off IVF given heart failure - may need diuretics with albumin  Found to be Flu A +  Continue tamiflu for 5 days  Now with secretions, will start saline nebs along with nebulizers  CT Chest: Moderate to severe right upper lobe pneumonia. Mild left upper lobe   pneumonia, moderate b/l pleural effusions, Large right thyroid nodule deviating the trachea to the left.  On 5L NC, wean as tolerate (baseline RA)  Given lasix yesterday, will skip lasix dose today and consider tomorrow per cardio recs Acute hypoxic respiratory failure secondary to acute HFrEF and influenza viral PNA  continue O2 supplementation and wean as tolerated  Hold off IVF given heart failure - may need diuretics with albumin  Found to be Flu A +  Continue tamiflu for 5 days  Now with secretions, will start saline nebs along with nebulizers  CT Chest: Moderate to severe right upper lobe pneumonia. Mild left upper lobe   pneumonia, moderate b/l pleural effusions, Large right thyroid nodule deviating the trachea to the left.  On 5L NC, wean as tolerate (baseline RA)  Given lasix yesterday, will give lasix again today  will not thora per pulm/cardio due to plavix Acute hypoxic respiratory failure secondary to acute HFrEF and influenza viral PNA  continue O2 supplementation and wean as tolerated  Hold off IVF given heart failure - may need diuretics with albumin  Found to be Flu A +  Continue tamiflu for 5 days  Now with secretions, will start saline nebs along with nebulizers  CT Chest: Moderate to severe right upper lobe pneumonia. Mild left upper lobe   pneumonia, moderate b/l pleural effusions, Large right thyroid nodule deviating the trachea to the left.  On 5L NC, wean as tolerate (baseline RA)  Given lasix yesterday, will consider lasix and imaging tomorrow  will not thora per pulm/cardio due to plavix Acute hypoxic respiratory failure secondary to acute HFrEF and influenza viral PNA  continue O2 supplementation and wean as tolerated  Hold off IVF given heart failure - may need diuretics with albumin  Found to be Flu A +  Continue tamiflu for 5 days  Now with secretions, will start saline nebs along with nebulizers  CT Chest: Moderate to severe right upper lobe pneumonia. Mild left upper lobe   pneumonia, moderate b/l pleural effusions, Large right thyroid nodule deviating the trachea to the left.  On 5L NC, wean as tolerate (baseline RA)  Given lasix yesterday, will consider lasix and imaging tomorrow  Plan to perform thoracentesis this week, holding lovenox and plavix

## 2025-02-05 NOTE — PROGRESS NOTE ADULT - ASSESSMENT
JOHNNY/CKDIII  STEMI s/p MARLENE  HTN    -CKDIII baseline with Cr 1.5 on admission, possibly due to hypertensive nephrosclerosis   -JOHNNY clinically due to CARLO +/- hypotension induced ATN. Now stable likely at new baseline with fluctuation with need for intermittent doses of lasix; monitor for now  -Urine studies reviewed   -No IVF needed   -No indication for RRT   -No kidney biopsy indicated   -Will benefit from RAAS blockade +/- SGLT2i in the future for CKD  -Lasix 20 mg IV x 1 was given 2/4/25; hold additional IV lasix today; consider placing on PO dosing tomorrow 2/6/25 for maintenance, 20mg daily

## 2025-02-05 NOTE — PROGRESS NOTE ADULT - SUBJECTIVE AND OBJECTIVE BOX
Patient is a 89y old  Female who presents with a chief complaint of STEMI (05 Feb 2025 05:46)      INTERVAL HPI/OVERNIGHT EVENTS: Patient seen and examined at bedside. Patient states her cough is improving today and she is feeling less SOB. She is on 5L NC at this time. Patient is seen coughing. She received lasix yesterday. Patient denies chest pain, palpitations. Rate controlled    MEDICATIONS  (STANDING):  albuterol    0.083% 2.5 milliGRAM(s) Nebulizer every 6 hours  atorvastatin 40 milliGRAM(s) Oral at bedtime  chlorhexidine 2% Cloths 1 Application(s) Topical <User Schedule>  clopidogrel Tablet 75 milliGRAM(s) Oral every 24 hours  dextrose 5%. 1000 milliLiter(s) (100 mL/Hr) IV Continuous <Continuous>  dextrose 5%. 1000 milliLiter(s) (50 mL/Hr) IV Continuous <Continuous>  dextrose 50% Injectable 25 Gram(s) IV Push once  dextrose 50% Injectable 12.5 Gram(s) IV Push once  dextrose 50% Injectable 25 Gram(s) IV Push once  DULoxetine 90 milliGRAM(s) Oral daily  enoxaparin Injectable 70 milliGRAM(s) SubCutaneous every 24 hours  glucagon  Injectable 1 milliGRAM(s) IntraMuscular once  influenza  Vaccine (HIGH DOSE) 0.5 milliLiter(s) IntraMuscular once  insulin lispro (ADMELOG) corrective regimen sliding scale   SubCutaneous Before meals and at bedtime  metoprolol tartrate 12.5 milliGRAM(s) Oral two times a day  nystatin Powder 1 Application(s) Topical two times a day  oseltamivir 30 milliGRAM(s) Oral daily  sodium chloride 3%  Inhalation 4 milliLiter(s) Inhalation every 12 hours    MEDICATIONS  (PRN):  acetaminophen     Tablet .. 650 milliGRAM(s) Oral every 6 hours PRN Temp greater or equal to 38C (100.4F), Mild Pain (1 - 3)  ALPRAZolam 0.25 milliGRAM(s) Oral two times a day PRN anxiety or panic attack  dextrose Oral Gel 15 Gram(s) Oral once PRN Blood Glucose LESS THAN 70 milliGRAM(s)/deciliter  sodium chloride 0.65% Nasal 1 Spray(s) Both Nostrils two times a day PRN Nasal Congestion      Allergies    No Known Allergies    Intolerances        REVIEW OF SYSTEMS:  CONSTITUTIONAL: No fever or chills  HEENT:  No headache, no sore throat  RESPIRATORY: + sob, + cough  CARDIOVASCULAR: No chest pain, palpitations  GASTROINTESTINAL: No abd pain, nausea, vomiting, or diarrhea  NEUROLOGICAL: no focal weakness or dizziness  MUSCULOSKELETAL: no myalgias     Vital Signs Last 24 Hrs  T(C): 36.8 (05 Feb 2025 05:25), Max: 37.1 (04 Feb 2025 11:26)  T(F): 98.2 (05 Feb 2025 05:25), Max: 98.7 (04 Feb 2025 11:26)  HR: 89 (05 Feb 2025 05:25) (85 - 110)  BP: 112/64 (05 Feb 2025 05:25) (103/60 - 124/56)  BP(mean): --  RR: 17 (05 Feb 2025 05:25) (17 - 20)  SpO2: 97% (05 Feb 2025 05:25) (86% - 97%)    Parameters below as of 05 Feb 2025 05:25  Patient On (Oxygen Delivery Method): nasal cannula  O2 Flow (L/min): 4      PHYSICAL EXAM:  GENERAL: NAD  HEENT:  anicteric, moist mucous membranes  CHEST/LUNG: diffuse rhonchi, worse on R side  HEART:  RRR, S1, S2  ABDOMEN:  soft, NT  EXTREMITIES: no edema   NERVOUS SYSTEM: answers questions and follows commands appropriately    LABS:                        8.4    6.61  )-----------( 310      ( 05 Feb 2025 06:50 )             25.9     CBC Full  -  ( 05 Feb 2025 06:50 )  WBC Count : 6.61 K/uL  Hemoglobin : 8.4 g/dL  Hematocrit : 25.9 %  Platelet Count - Automated : 310 K/uL  Mean Cell Volume : 91.8 fl  Mean Cell Hemoglobin : 29.8 pg  Mean Cell Hemoglobin Concentration : 32.4 g/dL  Auto Neutrophil # : 5.09 K/uL  Auto Lymphocyte # : 0.79 K/uL  Auto Monocyte # : 0.48 K/uL  Auto Eosinophil # : 0.18 K/uL  Auto Basophil # : 0.01 K/uL  Auto Neutrophil % : 76.9 %  Auto Lymphocyte % : 12.0 %  Auto Monocyte % : 7.3 %  Auto Eosinophil % : 2.7 %  Auto Basophil % : 0.2 %    05 Feb 2025 06:50    140    |  107    |  56     ----------------------------<  163    3.8     |  22     |  2.30     Ca    8.9        05 Feb 2025 06:50  Mg     2.4       05 Feb 2025 06:50    TPro  6.2    /  Alb  2.4    /  TBili  0.6    /  DBili  x      /  AST  28     /  ALT  21     /  AlkPhos  78     05 Feb 2025 06:50      Urinalysis Basic - ( 05 Feb 2025 06:50 )    Color: x / Appearance: x / SG: x / pH: x  Gluc: 163 mg/dL / Ketone: x  / Bili: x / Urobili: x   Blood: x / Protein: x / Nitrite: x   Leuk Esterase: x / RBC: x / WBC x   Sq Epi: x / Non Sq Epi: x / Bacteria: x      CAPILLARY BLOOD GLUCOSE      POCT Blood Glucose.: 167 mg/dL (05 Feb 2025 08:04)  POCT Blood Glucose.: 157 mg/dL (04 Feb 2025 21:44)  POCT Blood Glucose.: 227 mg/dL (04 Feb 2025 16:48)  POCT Blood Glucose.: 185 mg/dL (04 Feb 2025 12:19)  POCT Blood Glucose.: 192 mg/dL (04 Feb 2025 12:14)          RADIOLOGY & ADDITIONAL TESTS:    Personally reviewed.     Consultant(s) Notes Reviewed:  [x] YES  [ ] NO     Patient is a 89y old  Female who presents with a chief complaint of STEMI (05 Feb 2025 05:46)      INTERVAL HPI/OVERNIGHT EVENTS: Patient seen and examined at bedside. Patient states her cough is improving today and she is feeling less SOB. She is on 5L NC at this time. Patient is seen coughing. She received lasix yesterday. Patient denies chest pain, palpitations. Rate controlled. PT will walk pt today.    MEDICATIONS  (STANDING):  albuterol    0.083% 2.5 milliGRAM(s) Nebulizer every 6 hours  atorvastatin 40 milliGRAM(s) Oral at bedtime  chlorhexidine 2% Cloths 1 Application(s) Topical <User Schedule>  clopidogrel Tablet 75 milliGRAM(s) Oral every 24 hours  dextrose 5%. 1000 milliLiter(s) (100 mL/Hr) IV Continuous <Continuous>  dextrose 5%. 1000 milliLiter(s) (50 mL/Hr) IV Continuous <Continuous>  dextrose 50% Injectable 25 Gram(s) IV Push once  dextrose 50% Injectable 12.5 Gram(s) IV Push once  dextrose 50% Injectable 25 Gram(s) IV Push once  DULoxetine 90 milliGRAM(s) Oral daily  enoxaparin Injectable 70 milliGRAM(s) SubCutaneous every 24 hours  glucagon  Injectable 1 milliGRAM(s) IntraMuscular once  influenza  Vaccine (HIGH DOSE) 0.5 milliLiter(s) IntraMuscular once  insulin lispro (ADMELOG) corrective regimen sliding scale   SubCutaneous Before meals and at bedtime  metoprolol tartrate 12.5 milliGRAM(s) Oral two times a day  nystatin Powder 1 Application(s) Topical two times a day  oseltamivir 30 milliGRAM(s) Oral daily  sodium chloride 3%  Inhalation 4 milliLiter(s) Inhalation every 12 hours    MEDICATIONS  (PRN):  acetaminophen     Tablet .. 650 milliGRAM(s) Oral every 6 hours PRN Temp greater or equal to 38C (100.4F), Mild Pain (1 - 3)  ALPRAZolam 0.25 milliGRAM(s) Oral two times a day PRN anxiety or panic attack  dextrose Oral Gel 15 Gram(s) Oral once PRN Blood Glucose LESS THAN 70 milliGRAM(s)/deciliter  sodium chloride 0.65% Nasal 1 Spray(s) Both Nostrils two times a day PRN Nasal Congestion      Allergies    No Known Allergies    Intolerances        REVIEW OF SYSTEMS:  CONSTITUTIONAL: No fever or chills  HEENT:  No headache, no sore throat  RESPIRATORY: + sob, + cough  CARDIOVASCULAR: No chest pain, palpitations  GASTROINTESTINAL: No abd pain, nausea, vomiting, or diarrhea  NEUROLOGICAL: no focal weakness or dizziness  MUSCULOSKELETAL: no myalgias     Vital Signs Last 24 Hrs  T(C): 36.8 (05 Feb 2025 05:25), Max: 37.1 (04 Feb 2025 11:26)  T(F): 98.2 (05 Feb 2025 05:25), Max: 98.7 (04 Feb 2025 11:26)  HR: 89 (05 Feb 2025 05:25) (85 - 110)  BP: 112/64 (05 Feb 2025 05:25) (103/60 - 124/56)  BP(mean): --  RR: 17 (05 Feb 2025 05:25) (17 - 20)  SpO2: 97% (05 Feb 2025 05:25) (86% - 97%)    Parameters below as of 05 Feb 2025 05:25  Patient On (Oxygen Delivery Method): nasal cannula  O2 Flow (L/min): 4      PHYSICAL EXAM:  GENERAL: NAD  HEENT:  anicteric, moist mucous membranes  CHEST/LUNG: diffuse rhonchi, worse on R side  HEART:  RRR, S1, S2  ABDOMEN:  soft, NT  EXTREMITIES: no edema   NERVOUS SYSTEM: answers questions and follows commands appropriately    LABS:                        8.4    6.61  )-----------( 310      ( 05 Feb 2025 06:50 )             25.9     CBC Full  -  ( 05 Feb 2025 06:50 )  WBC Count : 6.61 K/uL  Hemoglobin : 8.4 g/dL  Hematocrit : 25.9 %  Platelet Count - Automated : 310 K/uL  Mean Cell Volume : 91.8 fl  Mean Cell Hemoglobin : 29.8 pg  Mean Cell Hemoglobin Concentration : 32.4 g/dL  Auto Neutrophil # : 5.09 K/uL  Auto Lymphocyte # : 0.79 K/uL  Auto Monocyte # : 0.48 K/uL  Auto Eosinophil # : 0.18 K/uL  Auto Basophil # : 0.01 K/uL  Auto Neutrophil % : 76.9 %  Auto Lymphocyte % : 12.0 %  Auto Monocyte % : 7.3 %  Auto Eosinophil % : 2.7 %  Auto Basophil % : 0.2 %    05 Feb 2025 06:50    140    |  107    |  56     ----------------------------<  163    3.8     |  22     |  2.30     Ca    8.9        05 Feb 2025 06:50  Mg     2.4       05 Feb 2025 06:50    TPro  6.2    /  Alb  2.4    /  TBili  0.6    /  DBili  x      /  AST  28     /  ALT  21     /  AlkPhos  78     05 Feb 2025 06:50      Urinalysis Basic - ( 05 Feb 2025 06:50 )    Color: x / Appearance: x / SG: x / pH: x  Gluc: 163 mg/dL / Ketone: x  / Bili: x / Urobili: x   Blood: x / Protein: x / Nitrite: x   Leuk Esterase: x / RBC: x / WBC x   Sq Epi: x / Non Sq Epi: x / Bacteria: x      CAPILLARY BLOOD GLUCOSE      POCT Blood Glucose.: 167 mg/dL (05 Feb 2025 08:04)  POCT Blood Glucose.: 157 mg/dL (04 Feb 2025 21:44)  POCT Blood Glucose.: 227 mg/dL (04 Feb 2025 16:48)  POCT Blood Glucose.: 185 mg/dL (04 Feb 2025 12:19)  POCT Blood Glucose.: 192 mg/dL (04 Feb 2025 12:14)          RADIOLOGY & ADDITIONAL TESTS:    Personally reviewed.     Consultant(s) Notes Reviewed:  [x] YES  [ ] NO     Patient is a 89y old  Female who presents with a chief complaint of STEMI (05 Feb 2025 05:46)      INTERVAL HPI/OVERNIGHT EVENTS: Patient seen and examined at bedside. Patient states her cough is improving today and she is feeling less SOB. She is on 5L NC at this time. Patient is seen coughing. She received lasix yesterday. Patient denies chest pain, palpitations. PT will walk pt today. In afib today per tele rate controlled.    MEDICATIONS  (STANDING):  albuterol    0.083% 2.5 milliGRAM(s) Nebulizer every 6 hours  atorvastatin 40 milliGRAM(s) Oral at bedtime  chlorhexidine 2% Cloths 1 Application(s) Topical <User Schedule>  clopidogrel Tablet 75 milliGRAM(s) Oral every 24 hours  dextrose 5%. 1000 milliLiter(s) (100 mL/Hr) IV Continuous <Continuous>  dextrose 5%. 1000 milliLiter(s) (50 mL/Hr) IV Continuous <Continuous>  dextrose 50% Injectable 25 Gram(s) IV Push once  dextrose 50% Injectable 12.5 Gram(s) IV Push once  dextrose 50% Injectable 25 Gram(s) IV Push once  DULoxetine 90 milliGRAM(s) Oral daily  enoxaparin Injectable 70 milliGRAM(s) SubCutaneous every 24 hours  glucagon  Injectable 1 milliGRAM(s) IntraMuscular once  influenza  Vaccine (HIGH DOSE) 0.5 milliLiter(s) IntraMuscular once  insulin lispro (ADMELOG) corrective regimen sliding scale   SubCutaneous Before meals and at bedtime  metoprolol tartrate 12.5 milliGRAM(s) Oral two times a day  nystatin Powder 1 Application(s) Topical two times a day  oseltamivir 30 milliGRAM(s) Oral daily  sodium chloride 3%  Inhalation 4 milliLiter(s) Inhalation every 12 hours    MEDICATIONS  (PRN):  acetaminophen     Tablet .. 650 milliGRAM(s) Oral every 6 hours PRN Temp greater or equal to 38C (100.4F), Mild Pain (1 - 3)  ALPRAZolam 0.25 milliGRAM(s) Oral two times a day PRN anxiety or panic attack  dextrose Oral Gel 15 Gram(s) Oral once PRN Blood Glucose LESS THAN 70 milliGRAM(s)/deciliter  sodium chloride 0.65% Nasal 1 Spray(s) Both Nostrils two times a day PRN Nasal Congestion      Allergies    No Known Allergies    Intolerances        REVIEW OF SYSTEMS:  CONSTITUTIONAL: No fever or chills  HEENT:  No headache, no sore throat  RESPIRATORY: + sob, + cough  CARDIOVASCULAR: No chest pain, palpitations  GASTROINTESTINAL: No abd pain, nausea, vomiting, or diarrhea  NEUROLOGICAL: no focal weakness or dizziness  MUSCULOSKELETAL: no myalgias     Vital Signs Last 24 Hrs  T(C): 36.8 (05 Feb 2025 05:25), Max: 37.1 (04 Feb 2025 11:26)  T(F): 98.2 (05 Feb 2025 05:25), Max: 98.7 (04 Feb 2025 11:26)  HR: 89 (05 Feb 2025 05:25) (85 - 110)  BP: 112/64 (05 Feb 2025 05:25) (103/60 - 124/56)  BP(mean): --  RR: 17 (05 Feb 2025 05:25) (17 - 20)  SpO2: 97% (05 Feb 2025 05:25) (86% - 97%)    Parameters below as of 05 Feb 2025 05:25  Patient On (Oxygen Delivery Method): nasal cannula  O2 Flow (L/min): 4      PHYSICAL EXAM:  GENERAL: NAD  HEENT:  anicteric, moist mucous membranes  CHEST/LUNG: diffuse rhonchi, worse on R side  HEART:  RRR, S1, S2  ABDOMEN:  soft, NT  EXTREMITIES: no edema   NERVOUS SYSTEM: answers questions and follows commands appropriately    LABS:                        8.4    6.61  )-----------( 310      ( 05 Feb 2025 06:50 )             25.9     CBC Full  -  ( 05 Feb 2025 06:50 )  WBC Count : 6.61 K/uL  Hemoglobin : 8.4 g/dL  Hematocrit : 25.9 %  Platelet Count - Automated : 310 K/uL  Mean Cell Volume : 91.8 fl  Mean Cell Hemoglobin : 29.8 pg  Mean Cell Hemoglobin Concentration : 32.4 g/dL  Auto Neutrophil # : 5.09 K/uL  Auto Lymphocyte # : 0.79 K/uL  Auto Monocyte # : 0.48 K/uL  Auto Eosinophil # : 0.18 K/uL  Auto Basophil # : 0.01 K/uL  Auto Neutrophil % : 76.9 %  Auto Lymphocyte % : 12.0 %  Auto Monocyte % : 7.3 %  Auto Eosinophil % : 2.7 %  Auto Basophil % : 0.2 %    05 Feb 2025 06:50    140    |  107    |  56     ----------------------------<  163    3.8     |  22     |  2.30     Ca    8.9        05 Feb 2025 06:50  Mg     2.4       05 Feb 2025 06:50    TPro  6.2    /  Alb  2.4    /  TBili  0.6    /  DBili  x      /  AST  28     /  ALT  21     /  AlkPhos  78     05 Feb 2025 06:50      Urinalysis Basic - ( 05 Feb 2025 06:50 )    Color: x / Appearance: x / SG: x / pH: x  Gluc: 163 mg/dL / Ketone: x  / Bili: x / Urobili: x   Blood: x / Protein: x / Nitrite: x   Leuk Esterase: x / RBC: x / WBC x   Sq Epi: x / Non Sq Epi: x / Bacteria: x      CAPILLARY BLOOD GLUCOSE      POCT Blood Glucose.: 167 mg/dL (05 Feb 2025 08:04)  POCT Blood Glucose.: 157 mg/dL (04 Feb 2025 21:44)  POCT Blood Glucose.: 227 mg/dL (04 Feb 2025 16:48)  POCT Blood Glucose.: 185 mg/dL (04 Feb 2025 12:19)  POCT Blood Glucose.: 192 mg/dL (04 Feb 2025 12:14)          RADIOLOGY & ADDITIONAL TESTS:    Personally reviewed.     Consultant(s) Notes Reviewed:  [x] YES  [ ] NO

## 2025-02-05 NOTE — PROGRESS NOTE ADULT - PROBLEM SELECTOR PLAN 3
- unclear baseline, - GFR similar to 2023 on chart review  - S/p IVF - Cr unchanged today  - monitor renal function, increased s/p cardiac cath - likely ATN from hypotension and CARLO  - avoid hypotension - albumin assisted diuresis planned - unclear baseline, - GFR similar to 2023 on chart review  - S/p IVF - Cr unchanged today  - monitor renal function, increased s/p cardiac cath - likely ATN from hypotension and CARLO

## 2025-02-05 NOTE — PROGRESS NOTE ADULT - PROBLEM SELECTOR PLAN 6
Chronic  - elevated on arrival likely in setting of STEMI; now improved  - takes fosinopril at home; previously on HCTZ however pt states she hasn't been taking this recently  - hold home antihypertensives given episodes of hypotension and JOHNNY  - continue lopressor for rate control with plans to change to toprol  - monitor routine hemodynamics

## 2025-02-05 NOTE — PROGRESS NOTE ADULT - SUBJECTIVE AND OBJECTIVE BOX
Date/Time Patient Seen:  		  Referring MD:   Data Reviewed	       Patient is a 89y old  Female who presents with a chief complaint of STEMI (04 Feb 2025 11:33)      Subjective/HPI     PAST MEDICAL & SURGICAL HISTORY:  HTN (hypertension)    DM (diabetes mellitus)    HLD (hyperlipidemia)    Major depression    No significant past surgical history          Medication list         MEDICATIONS  (STANDING):  albuterol    0.083% 2.5 milliGRAM(s) Nebulizer every 6 hours  atorvastatin 40 milliGRAM(s) Oral at bedtime  chlorhexidine 2% Cloths 1 Application(s) Topical <User Schedule>  clopidogrel Tablet 75 milliGRAM(s) Oral every 24 hours  dextrose 5%. 1000 milliLiter(s) (100 mL/Hr) IV Continuous <Continuous>  dextrose 5%. 1000 milliLiter(s) (50 mL/Hr) IV Continuous <Continuous>  dextrose 50% Injectable 25 Gram(s) IV Push once  dextrose 50% Injectable 12.5 Gram(s) IV Push once  dextrose 50% Injectable 25 Gram(s) IV Push once  DULoxetine 90 milliGRAM(s) Oral daily  enoxaparin Injectable 70 milliGRAM(s) SubCutaneous every 24 hours  glucagon  Injectable 1 milliGRAM(s) IntraMuscular once  influenza  Vaccine (HIGH DOSE) 0.5 milliLiter(s) IntraMuscular once  insulin lispro (ADMELOG) corrective regimen sliding scale   SubCutaneous Before meals and at bedtime  metoprolol tartrate 12.5 milliGRAM(s) Oral two times a day  nystatin Powder 1 Application(s) Topical two times a day  oseltamivir 30 milliGRAM(s) Oral daily  sodium chloride 3%  Inhalation 4 milliLiter(s) Inhalation every 12 hours    MEDICATIONS  (PRN):  acetaminophen     Tablet .. 650 milliGRAM(s) Oral every 6 hours PRN Temp greater or equal to 38C (100.4F), Mild Pain (1 - 3)  ALPRAZolam 0.25 milliGRAM(s) Oral two times a day PRN anxiety or panic attack  dextrose Oral Gel 15 Gram(s) Oral once PRN Blood Glucose LESS THAN 70 milliGRAM(s)/deciliter  sodium chloride 0.65% Nasal 1 Spray(s) Both Nostrils two times a day PRN Nasal Congestion         Vitals log        ICU Vital Signs Last 24 Hrs  T(C): 36.8 (05 Feb 2025 05:25), Max: 37.1 (04 Feb 2025 11:26)  T(F): 98.2 (05 Feb 2025 05:25), Max: 98.7 (04 Feb 2025 11:26)  HR: 89 (05 Feb 2025 05:25) (85 - 110)  BP: 112/64 (05 Feb 2025 05:25) (103/60 - 124/56)  BP(mean): --  ABP: --  ABP(mean): --  RR: 17 (05 Feb 2025 05:25) (17 - 20)  SpO2: 97% (05 Feb 2025 05:25) (86% - 100%)    O2 Parameters below as of 05 Feb 2025 05:25  Patient On (Oxygen Delivery Method): nasal cannula  O2 Flow (L/min): 4               Input and Output:  I&O's Detail    03 Feb 2025 07:01  -  04 Feb 2025 07:00  --------------------------------------------------------  IN:  Total IN: 0 mL    OUT:    Stool (mL): 1 mL    Voided (mL): 400 mL  Total OUT: 401 mL    Total NET: -401 mL      04 Feb 2025 07:01  -  05 Feb 2025 05:46  --------------------------------------------------------  IN:  Total IN: 0 mL    OUT:    Voided (mL): 700 mL  Total OUT: 700 mL    Total NET: -700 mL          Lab Data                        9.0    7.24  )-----------( 315      ( 04 Feb 2025 08:53 )             27.2     02-04    140  |  108  |  47[H]  ----------------------------<  178[H]  4.4   |  24  |  2.00[H]    Ca    9.0      04 Feb 2025 08:53  Phos  3.6     02-04  Mg     2.3     02-04    TPro  6.6  /  Alb  2.5[L]  /  TBili  0.6  /  DBili  x   /  AST  28  /  ALT  21  /  AlkPhos  77  02-04            Review of Systems	      Objective     Physical Examination    heart s1s2  lung dc BS  head nc  ehad at      Pertinent Lab findings & Imaging      Mery:  NO   Adequate UO     I&O's Detail    03 Feb 2025 07:01  -  04 Feb 2025 07:00  --------------------------------------------------------  IN:  Total IN: 0 mL    OUT:    Stool (mL): 1 mL    Voided (mL): 400 mL  Total OUT: 401 mL    Total NET: -401 mL      04 Feb 2025 07:01  -  05 Feb 2025 05:46  --------------------------------------------------------  IN:  Total IN: 0 mL    OUT:    Voided (mL): 700 mL  Total OUT: 700 mL    Total NET: -700 mL               Discussed with:     Cultures:	        Radiology

## 2025-02-05 NOTE — PROGRESS NOTE ADULT - ASSESSMENT
89y/o F with PMHx HTN, DM2, HLD, depression who presented to the ED with intermittent jaw pain followed by anterior chest pressure radiating to the left shoulder, admitted for STEMI.    - Pt p/w intermittent jaw pain followed by anterior chest pressure radiating to the left shoulder, admitted for STEMI.  - S/p PCI (1/26/25) with 100% occlusion of diagonal and MARLENE x1  - Continue Plavix 75 mg daily  - Aspirin d/c as pt is now on full dose AC for Afib to avoid triple therapy  - Continue Lipitor 40mg QHS  - Previously with hypotension after getting BB dosing sbp improved metoprolol 12.5 mg twice daily started      - TTE 1/27/25 Left ventricular systolic function is moderately decreased with an ejection fraction visually estimated at 40 to 45 %. small pericardial effusion noted.   - BNP:  <--[id number]  - Appears compensated from HF POV.   - Creatinine:  <--2.30,  <--2.00,  <--2.00,  <--2.00,  <--2.00,  <--1.70  - Hold off on ARB/ARNI given JOHNNY  - Please continue to maintain strict I/Os, monitor daily weights, Cr, and K.     - Tele w/ A fib 110-140s yesterday, now more controlled 70-80s, continue to monitor   - Continue Metoprolol 12.5 BID. Can eventually switch to Toprol for GDMT  - Continue Lovenox full dose, dose has been adjusted for creatinine clearance  - Will need to transition to Eliquis when she is mor clinically stable from a JOHNNY/ flu standpoint.  - For now will hope that she reverts to SR as flu improves, etc.   - If she does not can consider DCCV which would be more important if she decompensates further, attributable to loss of av synchrony    - BP stable and controlled     - Flu+, Management per primary team   - Monitor and replete lytes, keep K>4, Mg>2.  - Will continue to follow.    Angela Cm, MS FNP, AGAP  Nurse Practitioner- Cardiology   Please call on TEAMS

## 2025-02-05 NOTE — PROGRESS NOTE ADULT - ASSESSMENT
89y/o F with PMHx HTN, DM2, HLD, depression who presented to the ED with intermittent jaw pain followed by anterior chest pressure radiating to the left shoulder, Pt was found to have elevated troponins and EKG with ST elevations with reciprocal ST-T changes in leads II, III, aVF. Code STEMI was activated and patient was taken to the cath lab where she was found to have 100% occlusion to the diagonal now s/p 1 stent placement. Patient seen and examined in the ICU, reports feeling much better.    flu  malaise  weakness  STEMI  OP  OA  HTN  DM  HLD    prn diuresis  cardio f/u  I and O  am labs pending  fio2 wean  vs noted    monitor for pulm edema - CHF ex -   Cardio f/u   I and O  replete lytes  cvs rx regimen optimization and HD monitoring  TTE 1/27/25 Left ventricular systolic function is moderately decreased with an ejection fraction visually estimated at 40 to 45 %. small pericardial effusion noted  FLU management - isol precs  nebs - mucolytics  cough rx regimen  I velma  fio2 wean as tolerated  goal sat > 88 pct  oral hygiene  skin care  DM care  s/p ICU stay -   chest imaging reviewed - eff - atelectasis - pulm edema - will benefit from repeat

## 2025-02-05 NOTE — PROGRESS NOTE ADULT - PROBLEM SELECTOR PLAN 2
- s/p PCI with 1 stent placement to 100% occl to the diagonal  - transferred to ICU s/p cath. + Hypotension. S/p IVF after hypotension ?related to early BB use  - On aspirin/plavix & atorvastatin - with new afib continue AC + plavix, stop ASA  - With acute systolic congestive heart failure - to initiate GDMT with BB (to transition to toprol XL in outpatient realm) - possible SGLT-2i on DC  - Reviewed TTE 1/27: EF 40 to 45%, probnp about 17k  - Diurese PRN - IV lasix 40mg x 1 on 2/4  - GDMT as tolerated by BP and renal function  - Cardio Following  - Anemia noted - stable on DAPT - watch as starting AC  - DC planning to home with HCPT - on hold due to AHRF/ADHF & influenza - s/p PCI with 1 stent placement to 100% occl to the diagonal  - transferred to ICU s/p cath. + Hypotension. S/p IVF after hypotension ?related to early BB use  - With acute systolic congestive heart failure - to initiate GDMT with BB (to transition to toprol XL in outpatient realm)  - Reviewed TTE 1/27: EF 40 to 45%, probnp about 17k  - Diurese PRN - IV lasix 40mg x 1 on 2/4  - Cardio Following  - Anemia noted - stable on DAPT - watch as starting AC  - DC planning to home with HCPT - on hold due to AHRF/ADHF & influenza  - patient on ASA + full dose lovenox (holding plavix to avoid triple therapy) - s/p PCI with 1 stent placement to 100% occl to the diagonal  - transferred to ICU s/p cath. + Hypotension. S/p IVF after hypotension ?related to early BB use  - With acute systolic congestive heart failure - to initiate GDMT with BB (to transition to toprol XL in outpatient realm)  - Reviewed TTE 1/27: EF 40 to 45%, probnp about 17k  - Diurese PRN - IV lasix 40mg x 1 on 2/4  - Cardio Following  - Anemia noted - stable on DAPT - watch as starting AC  - DC planning to home with HCPT - on hold due to AHRF/ADHF & influenza  - patient on plavix + full dose lovenox (holding asa to avoid triple therapy) - s/p PCI with 1 stent placement to 100% occl to the diagonal  - transferred to ICU s/p cath. + Hypotension. S/p IVF after hypotension ?related to early BB use  - With acute systolic congestive heart failure - to initiate GDMT with BB (to transition to toprol XL in outpatient realm)  - Reviewed TTE 1/27: EF 40 to 45%, probnp about 17k  - Diurese PRN - IV lasix 40mg x 1 on 2/4  - Cardio Following  - Anemia noted - stable on DAPT - watch as starting AC  - DC planning to home with HCPT - on hold due to AHRF/ADHF & influenza  - holding plavix and lovenox for thora this week per pulm/cardio

## 2025-02-06 ENCOUNTER — APPOINTMENT (OUTPATIENT)
Dept: CARDIOLOGY | Facility: CLINIC | Age: 89
End: 2025-02-06

## 2025-02-06 LAB
ALBUMIN FLD-MCNC: 1.8 G/DL — SIGNIFICANT CHANGE UP
ALBUMIN SERPL ELPH-MCNC: 2.4 G/DL — LOW (ref 3.3–5)
ALP SERPL-CCNC: 80 U/L — SIGNIFICANT CHANGE UP (ref 40–120)
ALT FLD-CCNC: 31 U/L — SIGNIFICANT CHANGE UP (ref 12–78)
ANION GAP SERPL CALC-SCNC: 7 MMOL/L — SIGNIFICANT CHANGE UP (ref 5–17)
AST SERPL-CCNC: 36 U/L — SIGNIFICANT CHANGE UP (ref 15–37)
BASOPHILS # BLD AUTO: 0.01 K/UL — SIGNIFICANT CHANGE UP (ref 0–0.2)
BASOPHILS NFR BLD AUTO: 0.1 % — SIGNIFICANT CHANGE UP (ref 0–2)
BILIRUB SERPL-MCNC: 0.5 MG/DL — SIGNIFICANT CHANGE UP (ref 0.2–1.2)
BUN SERPL-MCNC: 66 MG/DL — HIGH (ref 7–23)
CALCIUM SERPL-MCNC: 9.3 MG/DL — SIGNIFICANT CHANGE UP (ref 8.5–10.1)
CHLORIDE SERPL-SCNC: 108 MMOL/L — SIGNIFICANT CHANGE UP (ref 96–108)
CO2 SERPL-SCNC: 25 MMOL/L — SIGNIFICANT CHANGE UP (ref 22–31)
CREAT SERPL-MCNC: 2.3 MG/DL — HIGH (ref 0.5–1.3)
EGFR: 20 ML/MIN/1.73M2 — LOW
EOSINOPHIL # BLD AUTO: 0.35 K/UL — SIGNIFICANT CHANGE UP (ref 0–0.5)
EOSINOPHIL NFR BLD AUTO: 4.8 % — SIGNIFICANT CHANGE UP (ref 0–6)
GLUCOSE FLD-MCNC: 176 MG/DL — SIGNIFICANT CHANGE UP
GLUCOSE SERPL-MCNC: 174 MG/DL — HIGH (ref 70–99)
GRAM STN FLD: SIGNIFICANT CHANGE UP
HCT VFR BLD CALC: 26.5 % — LOW (ref 34.5–45)
HGB BLD-MCNC: 8.4 G/DL — LOW (ref 11.5–15.5)
IMM GRANULOCYTES NFR BLD AUTO: 0.8 % — SIGNIFICANT CHANGE UP (ref 0–0.9)
LDH SERPL L TO P-CCNC: 282 U/L — SIGNIFICANT CHANGE UP
LDH SERPL L TO P-CCNC: 311 U/L — HIGH (ref 50–242)
LYMPHOCYTES # BLD AUTO: 0.69 K/UL — LOW (ref 1–3.3)
LYMPHOCYTES # BLD AUTO: 9.4 % — LOW (ref 13–44)
MCHC RBC-ENTMCNC: 29.2 PG — SIGNIFICANT CHANGE UP (ref 27–34)
MCHC RBC-ENTMCNC: 31.7 G/DL — LOW (ref 32–36)
MCV RBC AUTO: 92 FL — SIGNIFICANT CHANGE UP (ref 80–100)
MONOCYTES # BLD AUTO: 0.39 K/UL — SIGNIFICANT CHANGE UP (ref 0–0.9)
MONOCYTES NFR BLD AUTO: 5.3 % — SIGNIFICANT CHANGE UP (ref 2–14)
NEUTROPHILS # BLD AUTO: 5.84 K/UL — SIGNIFICANT CHANGE UP (ref 1.8–7.4)
NEUTROPHILS NFR BLD AUTO: 79.6 % — HIGH (ref 43–77)
NRBC # BLD: 0 /100 WBCS — SIGNIFICANT CHANGE UP (ref 0–0)
NRBC BLD-RTO: 0 /100 WBCS — SIGNIFICANT CHANGE UP (ref 0–0)
PLATELET # BLD AUTO: 333 K/UL — SIGNIFICANT CHANGE UP (ref 150–400)
POTASSIUM SERPL-MCNC: 4.6 MMOL/L — SIGNIFICANT CHANGE UP (ref 3.5–5.3)
POTASSIUM SERPL-SCNC: 4.6 MMOL/L — SIGNIFICANT CHANGE UP (ref 3.5–5.3)
PROT FLD-MCNC: 2.6 G/DL — SIGNIFICANT CHANGE UP
PROT SERPL-MCNC: 6.1 G/DL — SIGNIFICANT CHANGE UP (ref 6–8.3)
RBC # BLD: 2.88 M/UL — LOW (ref 3.8–5.2)
RBC # FLD: 14.3 % — SIGNIFICANT CHANGE UP (ref 10.3–14.5)
SODIUM SERPL-SCNC: 140 MMOL/L — SIGNIFICANT CHANGE UP (ref 135–145)
SPECIMEN SOURCE: SIGNIFICANT CHANGE UP
WBC # BLD: 7.34 K/UL — SIGNIFICANT CHANGE UP (ref 3.8–10.5)
WBC # FLD AUTO: 7.34 K/UL — SIGNIFICANT CHANGE UP (ref 3.8–10.5)

## 2025-02-06 PROCEDURE — 99233 SBSQ HOSP IP/OBS HIGH 50: CPT | Mod: GC

## 2025-02-06 PROCEDURE — 71045 X-RAY EXAM CHEST 1 VIEW: CPT | Mod: 26

## 2025-02-06 PROCEDURE — 99233 SBSQ HOSP IP/OBS HIGH 50: CPT

## 2025-02-06 PROCEDURE — 88305 TISSUE EXAM BY PATHOLOGIST: CPT | Mod: 26

## 2025-02-06 PROCEDURE — 88112 CYTOPATH CELL ENHANCE TECH: CPT | Mod: 26

## 2025-02-06 PROCEDURE — 32555 ASPIRATE PLEURA W/ IMAGING: CPT | Mod: RT

## 2025-02-06 RX ORDER — ENOXAPARIN SODIUM 100 MG/ML
70 INJECTION SUBCUTANEOUS EVERY 24 HOURS
Refills: 0 | Status: DISCONTINUED | OUTPATIENT
Start: 2025-02-07 | End: 2025-02-10

## 2025-02-06 RX ORDER — ENOXAPARIN SODIUM 100 MG/ML
70 INJECTION SUBCUTANEOUS ONCE
Refills: 0 | Status: COMPLETED | OUTPATIENT
Start: 2025-02-06 | End: 2025-02-06

## 2025-02-06 RX ADMIN — Medication 4 MILLILITER(S): at 19:34

## 2025-02-06 RX ADMIN — Medication 2.5 MILLIGRAM(S): at 07:08

## 2025-02-06 RX ADMIN — NYSTATIN 1 APPLICATION(S): 100000 POWDER TOPICAL at 05:54

## 2025-02-06 RX ADMIN — Medication 1: at 08:12

## 2025-02-06 RX ADMIN — Medication 1: at 21:34

## 2025-02-06 RX ADMIN — Medication 4 MILLILITER(S): at 07:09

## 2025-02-06 RX ADMIN — Medication 75 MILLIGRAM(S): at 12:24

## 2025-02-06 RX ADMIN — Medication 2.5 MILLIGRAM(S): at 13:42

## 2025-02-06 RX ADMIN — Medication 2.5 MILLIGRAM(S): at 19:34

## 2025-02-06 RX ADMIN — ATORVASTATIN CALCIUM 40 MILLIGRAM(S): 80 TABLET, FILM COATED ORAL at 21:16

## 2025-02-06 RX ADMIN — Medication 2.5 MILLIGRAM(S): at 00:43

## 2025-02-06 RX ADMIN — Medication 1: at 17:15

## 2025-02-06 RX ADMIN — Medication 1: at 12:30

## 2025-02-06 RX ADMIN — ANTISEPTIC SURGICAL SCRUB 1 APPLICATION(S): 0.04 SOLUTION TOPICAL at 05:55

## 2025-02-06 RX ADMIN — Medication 12.5 MILLIGRAM(S): at 05:54

## 2025-02-06 RX ADMIN — DULOXETINE 90 MILLIGRAM(S): 20 CAPSULE, DELAYED RELEASE ORAL at 11:02

## 2025-02-06 RX ADMIN — NYSTATIN 1 APPLICATION(S): 100000 POWDER TOPICAL at 17:16

## 2025-02-06 RX ADMIN — ENOXAPARIN SODIUM 70 MILLIGRAM(S): 100 INJECTION SUBCUTANEOUS at 12:30

## 2025-02-06 NOTE — PROGRESS NOTE ADULT - PROBLEM SELECTOR PLAN 5
New afib on telemetry, sustained  - Starting 12.5 BID metoprolol   - Starting full dose lovenox renally dosed, consider switching to DOAC when JOHNNY improves for dc, will d/w cardio  - Cardio following  - Monitor on telemetry  - Replete electrolytes as needed New afib on telemetry, sustained  - Starting 12.5 BID metoprolol   - Starting full dose lovenox renally dosed, consider switching to DOAC when JOHNNY improves for dc, will d/w cardio  - Cardio following  - Monitor on telemetry  - Replete electrolytes as needed  - Transition from lovenox to doac tmr for dc

## 2025-02-06 NOTE — PROGRESS NOTE ADULT - PROBLEM SELECTOR PLAN 3
- unclear baseline, - GFR similar to 2023 on chart review  - S/p IVF - Cr unchanged today  - monitor renal function, increased s/p cardiac cath - likely ATN from hypotension and CARLO

## 2025-02-06 NOTE — PROGRESS NOTE ADULT - SUBJECTIVE AND OBJECTIVE BOX
Patient is a 89y old  Female who presents with a chief complaint of STEMI (06 Feb 2025 05:58)      INTERVAL HPI/OVERNIGHT EVENTS: Patient seen and examined at bedside. No overnight events occurred. Patient has no complaints at this time. Denies fevers, chills, headache, lightheadedness, chest pain, dyspnea, abdominal pain, n/v/d/c.    MEDICATIONS  (STANDING):  albuterol    0.083% 2.5 milliGRAM(s) Nebulizer every 6 hours  atorvastatin 40 milliGRAM(s) Oral at bedtime  chlorhexidine 2% Cloths 1 Application(s) Topical <User Schedule>  dextrose 5%. 1000 milliLiter(s) (100 mL/Hr) IV Continuous <Continuous>  dextrose 5%. 1000 milliLiter(s) (50 mL/Hr) IV Continuous <Continuous>  dextrose 50% Injectable 25 Gram(s) IV Push once  dextrose 50% Injectable 12.5 Gram(s) IV Push once  dextrose 50% Injectable 25 Gram(s) IV Push once  DULoxetine 90 milliGRAM(s) Oral daily  glucagon  Injectable 1 milliGRAM(s) IntraMuscular once  influenza  Vaccine (HIGH DOSE) 0.5 milliLiter(s) IntraMuscular once  insulin lispro (ADMELOG) corrective regimen sliding scale   SubCutaneous Before meals and at bedtime  metoprolol tartrate 12.5 milliGRAM(s) Oral two times a day  nystatin Powder 1 Application(s) Topical two times a day  sodium chloride 3%  Inhalation 4 milliLiter(s) Inhalation every 12 hours    MEDICATIONS  (PRN):  acetaminophen     Tablet .. 650 milliGRAM(s) Oral every 6 hours PRN Temp greater or equal to 38C (100.4F), Mild Pain (1 - 3)  ALPRAZolam 0.25 milliGRAM(s) Oral two times a day PRN anxiety or panic attack  dextrose Oral Gel 15 Gram(s) Oral once PRN Blood Glucose LESS THAN 70 milliGRAM(s)/deciliter  sodium chloride 0.65% Nasal 1 Spray(s) Both Nostrils two times a day PRN Nasal Congestion      Allergies    No Known Allergies    Intolerances        REVIEW OF SYSTEMS:  CONSTITUTIONAL: No fever or chills  HEENT:  No headache, no sore throat  RESPIRATORY: No cough, wheezing, or shortness of breath  CARDIOVASCULAR: No chest pain, palpitations  GASTROINTESTINAL: No abd pain, nausea, vomiting, or diarrhea  GENITOURINARY: No dysuria, frequency, or hematuria  NEUROLOGICAL: no focal weakness or dizziness  MUSCULOSKELETAL: no myalgias     Vital Signs Last 24 Hrs  T(C): 37.1 (06 Feb 2025 05:30), Max: 37.1 (06 Feb 2025 05:30)  T(F): 98.7 (06 Feb 2025 05:30), Max: 98.7 (06 Feb 2025 05:30)  HR: 86 (06 Feb 2025 05:30) (74 - 96)  BP: 107/67 (06 Feb 2025 05:30) (95/58 - 108/64)  BP(mean): --  RR: 17 (06 Feb 2025 05:30) (17 - 18)  SpO2: 94% (06 Feb 2025 05:30) (94% - 95%)    Parameters below as of 06 Feb 2025 05:30  Patient On (Oxygen Delivery Method): nasal cannula        PHYSICAL EXAM:  GENERAL: NAD  HEENT:  anicteric, moist mucous membranes  CHEST/LUNG:  CTA b/l, no rales, wheezes, or rhonchi  HEART:  RRR, S1, S2  ABDOMEN:  BS+, soft, nontender, nondistended  EXTREMITIES: no edema, cyanosis, or calf tenderness  NERVOUS SYSTEM: answers questions and follows commands appropriately    LABS:    CBC Full  -  ( 05 Feb 2025 06:50 )  WBC Count : 6.61 K/uL  Hemoglobin : 8.4 g/dL  Hematocrit : 25.9 %  Platelet Count - Automated : 310 K/uL  Mean Cell Volume : 91.8 fl  Mean Cell Hemoglobin : 29.8 pg  Mean Cell Hemoglobin Concentration : 32.4 g/dL  Auto Neutrophil # : 5.09 K/uL  Auto Lymphocyte # : 0.79 K/uL  Auto Monocyte # : 0.48 K/uL  Auto Eosinophil # : 0.18 K/uL  Auto Basophil # : 0.01 K/uL  Auto Neutrophil % : 76.9 %  Auto Lymphocyte % : 12.0 %  Auto Monocyte % : 7.3 %  Auto Eosinophil % : 2.7 %  Auto Basophil % : 0.2 %      Ca    8.9        05 Feb 2025 06:50      PT/INR - ( 05 Feb 2025 12:17 )   PT: 13.7 sec;   INR: 1.16 ratio         PTT - ( 05 Feb 2025 12:17 )  PTT:35.9 sec  Urinalysis Basic - ( 05 Feb 2025 06:50 )    Color: x / Appearance: x / SG: x / pH: x  Gluc: 163 mg/dL / Ketone: x  / Bili: x / Urobili: x   Blood: x / Protein: x / Nitrite: x   Leuk Esterase: x / RBC: x / WBC x   Sq Epi: x / Non Sq Epi: x / Bacteria: x      CAPILLARY BLOOD GLUCOSE      POCT Blood Glucose.: 179 mg/dL (05 Feb 2025 21:19)  POCT Blood Glucose.: 185 mg/dL (05 Feb 2025 16:57)  POCT Blood Glucose.: 189 mg/dL (05 Feb 2025 12:04)  POCT Blood Glucose.: 167 mg/dL (05 Feb 2025 08:04)          RADIOLOGY & ADDITIONAL TESTS:    Personally reviewed.     Consultant(s) Notes Reviewed:  [x] YES  [ ] NO     Patient is a 89y old  Female who presents with a chief complaint of STEMI (06 Feb 2025 05:58)      INTERVAL HPI/OVERNIGHT EVENTS: Patient seen and examined at bedside. Patient on RA and off NC. She is resting comfortably, Coughing but dyspnea improving. Patient is going for thora either today or tomorrow.    MEDICATIONS  (STANDING):  albuterol    0.083% 2.5 milliGRAM(s) Nebulizer every 6 hours  atorvastatin 40 milliGRAM(s) Oral at bedtime  chlorhexidine 2% Cloths 1 Application(s) Topical <User Schedule>  dextrose 5%. 1000 milliLiter(s) (100 mL/Hr) IV Continuous <Continuous>  dextrose 5%. 1000 milliLiter(s) (50 mL/Hr) IV Continuous <Continuous>  dextrose 50% Injectable 25 Gram(s) IV Push once  dextrose 50% Injectable 12.5 Gram(s) IV Push once  dextrose 50% Injectable 25 Gram(s) IV Push once  DULoxetine 90 milliGRAM(s) Oral daily  glucagon  Injectable 1 milliGRAM(s) IntraMuscular once  influenza  Vaccine (HIGH DOSE) 0.5 milliLiter(s) IntraMuscular once  insulin lispro (ADMELOG) corrective regimen sliding scale   SubCutaneous Before meals and at bedtime  metoprolol tartrate 12.5 milliGRAM(s) Oral two times a day  nystatin Powder 1 Application(s) Topical two times a day  sodium chloride 3%  Inhalation 4 milliLiter(s) Inhalation every 12 hours    MEDICATIONS  (PRN):  acetaminophen     Tablet .. 650 milliGRAM(s) Oral every 6 hours PRN Temp greater or equal to 38C (100.4F), Mild Pain (1 - 3)  ALPRAZolam 0.25 milliGRAM(s) Oral two times a day PRN anxiety or panic attack  dextrose Oral Gel 15 Gram(s) Oral once PRN Blood Glucose LESS THAN 70 milliGRAM(s)/deciliter  sodium chloride 0.65% Nasal 1 Spray(s) Both Nostrils two times a day PRN Nasal Congestion      Allergies    No Known Allergies    Intolerances        REVIEW OF SYSTEMS:  CONSTITUTIONAL: No fever or chills  HEENT:  No headache, no sore throat  RESPIRATORY: + cough  CARDIOVASCULAR: No chest pain, palpitations  GASTROINTESTINAL: No abd pain, n/v/d/c  NEUROLOGICAL: no focal weakness or dizziness  MUSCULOSKELETAL: no myalgias     Vital Signs Last 24 Hrs  T(C): 37.1 (06 Feb 2025 05:30), Max: 37.1 (06 Feb 2025 05:30)  T(F): 98.7 (06 Feb 2025 05:30), Max: 98.7 (06 Feb 2025 05:30)  HR: 86 (06 Feb 2025 05:30) (74 - 96)  BP: 107/67 (06 Feb 2025 05:30) (95/58 - 108/64)  BP(mean): --  RR: 17 (06 Feb 2025 05:30) (17 - 18)  SpO2: 94% (06 Feb 2025 05:30) (94% - 95%)    Parameters below as of 06 Feb 2025 05:30  Patient On (Oxygen Delivery Method): nasal cannula        PHYSICAL EXAM:  GENERAL: NAD  HEENT:  anicteric, moist mucous membranes  CHEST/LUNG:  CTA b/l, no rales, wheezes, or rhonchi  HEART:  RRR, S1, S2  ABDOMEN:  BS+, soft, nontender, nondistended  EXTREMITIES: no edema, cyanosis, or calf tenderness  NERVOUS SYSTEM: answers questions and follows commands appropriately    LABS:    CBC Full  -  ( 05 Feb 2025 06:50 )  WBC Count : 6.61 K/uL  Hemoglobin : 8.4 g/dL  Hematocrit : 25.9 %  Platelet Count - Automated : 310 K/uL  Mean Cell Volume : 91.8 fl  Mean Cell Hemoglobin : 29.8 pg  Mean Cell Hemoglobin Concentration : 32.4 g/dL  Auto Neutrophil # : 5.09 K/uL  Auto Lymphocyte # : 0.79 K/uL  Auto Monocyte # : 0.48 K/uL  Auto Eosinophil # : 0.18 K/uL  Auto Basophil # : 0.01 K/uL  Auto Neutrophil % : 76.9 %  Auto Lymphocyte % : 12.0 %  Auto Monocyte % : 7.3 %  Auto Eosinophil % : 2.7 %  Auto Basophil % : 0.2 %      Ca    8.9        05 Feb 2025 06:50      PT/INR - ( 05 Feb 2025 12:17 )   PT: 13.7 sec;   INR: 1.16 ratio         PTT - ( 05 Feb 2025 12:17 )  PTT:35.9 sec  Urinalysis Basic - ( 05 Feb 2025 06:50 )    Color: x / Appearance: x / SG: x / pH: x  Gluc: 163 mg/dL / Ketone: x  / Bili: x / Urobili: x   Blood: x / Protein: x / Nitrite: x   Leuk Esterase: x / RBC: x / WBC x   Sq Epi: x / Non Sq Epi: x / Bacteria: x      CAPILLARY BLOOD GLUCOSE      POCT Blood Glucose.: 179 mg/dL (05 Feb 2025 21:19)  POCT Blood Glucose.: 185 mg/dL (05 Feb 2025 16:57)  POCT Blood Glucose.: 189 mg/dL (05 Feb 2025 12:04)  POCT Blood Glucose.: 167 mg/dL (05 Feb 2025 08:04)          RADIOLOGY & ADDITIONAL TESTS:    Personally reviewed.     Consultant(s) Notes Reviewed:  [x] YES  [ ] NO

## 2025-02-06 NOTE — PROGRESS NOTE ADULT - ASSESSMENT
89y/o F with PMHx HTN, DM2, HLD, depression who presented to the ED with intermittent jaw pain followed by anterior chest pressure radiating to the left shoulder, Pt was found to have elevated troponins and EKG with ST elevations with reciprocal ST-T changes in leads II, III, aVF. Code STEMI was activated and patient was taken to the cath lab where she was found to have 100% occlusion to the diagonal now s/p 1 stent placement. Patient seen and examined in the ICU, reports feeling much better.    flu  malaise  weakness  STEMI  OP  OA  HTN  DM  HLD    plavix - lovenox held for thoracentesis with IR - this am - Right Side -   VS noted    monitor for pulm edema - CHF ex -   Cardio f/u   I and O  replete lytes  cvs rx regimen optimization and HD monitoring  TTE 1/27/25 Left ventricular systolic function is moderately decreased with an ejection fraction visually estimated at 40 to 45 %. small pericardial effusion noted  FLU management - isol precs  nebs - mucolytics  cough rx regimen  I velma  fio2 wean as tolerated  goal sat > 88 pct  oral hygiene  skin care  DM care  s/p ICU stay -   chest imaging reviewed - eff - atelectasis - pulm edema - will benefit from repeat

## 2025-02-06 NOTE — PROGRESS NOTE ADULT - SUBJECTIVE AND OBJECTIVE BOX
[facility]                                  Nephrology and Hypertension                             Timur Kwon                                          [phone]     Patient is a 89y old  Female who presents with a chief complaint of STEMI (02 Feb 2025 08:42)       HPI:  Pt is a 89y/o F with PMHx HTN, DM2, HLD, depression who presented to the ED with intermittent jaw pain followed by anterior chest pressure radiating to the left shoulder, Pt was found to have elevated troponins and EKG with ST elevations with reciprocal ST-T changes in leads II, III, aVF. Code STEMI was activated and patient was taken to the cath lab where she was found to have 100% occlusion to the diagonal now s/p 1 stent placement. Patient seen and examined in the ICU, reports feeling much better. She denies any current chest pain or jaw pain, and also denies dizziness, headache, numbness/tingling, nausea, vomiting, palpitations, shortness of breath, abdominal pain. She does admit to chronic diarrhea. Prior to this event, patient was in her usual state of health. No other concerns at this time.    Renal consulted on 2/2/25 for JOHNNY/CKD. S/p ICU course with STEMI Code STEMI, 100% occlusion to the diagonal now s/p x1 stent placed. No cath lab complication reported and pt admitted to the ICU for post cath. Patient denies any knowledge of prior renal issues but Cr was 1.5 on admission     No acute events noted, states her breathing is much better     PAST MEDICAL & SURGICAL HISTORY:  HTN (hypertension)      DM (diabetes mellitus)      HLD (hyperlipidemia)      Major depression      No significant past surgical history           FAMILY HISTORY:  NC    Social History:Non smoker    MEDICATIONS  (STANDING):  albuterol    0.083% 2.5 milliGRAM(s) Nebulizer every 6 hours  aspirin enteric coated 81 milliGRAM(s) Oral daily  atorvastatin 40 milliGRAM(s) Oral at bedtime  chlorhexidine 2% Cloths 1 Application(s) Topical <User Schedule>  clopidogrel Tablet 75 milliGRAM(s) Oral every 24 hours  dextrose 5%. 1000 milliLiter(s) (50 mL/Hr) IV Continuous <Continuous>  dextrose 5%. 1000 milliLiter(s) (100 mL/Hr) IV Continuous <Continuous>  dextrose 50% Injectable 25 Gram(s) IV Push once  dextrose 50% Injectable 12.5 Gram(s) IV Push once  dextrose 50% Injectable 25 Gram(s) IV Push once  DULoxetine 90 milliGRAM(s) Oral daily  glucagon  Injectable 1 milliGRAM(s) IntraMuscular once  heparin   Injectable 5000 Unit(s) SubCutaneous every 8 hours  influenza  Vaccine (HIGH DOSE) 0.5 milliLiter(s) IntraMuscular once  insulin lispro (ADMELOG) corrective regimen sliding scale   SubCutaneous Before meals and at bedtime  metoprolol tartrate 12.5 milliGRAM(s) Oral two times a day  nystatin Powder 1 Application(s) Topical two times a day  oseltamivir 30 milliGRAM(s) Oral daily    MEDICATIONS  (PRN):  acetaminophen     Tablet .. 650 milliGRAM(s) Oral every 6 hours PRN Temp greater or equal to 38C (100.4F), Mild Pain (1 - 3)  dextrose Oral Gel 15 Gram(s) Oral once PRN Blood Glucose LESS THAN 70 milliGRAM(s)/deciliter  sodium chloride 0.65% Nasal 1 Spray(s) Both Nostrils two times a day PRN Nasal Congestion   Meds reviewed    Allergies    No Known Allergies    Intolerances         REVIEW OF SYSTEMS: As per HPI, otherwise negative     Vital Signs Last 24 Hrs  T(C): 36.9 (06 Feb 2025 11:45), Max: 37.1 (06 Feb 2025 05:30)  T(F): 98.4 (06 Feb 2025 11:45), Max: 98.7 (06 Feb 2025 05:30)  HR: 85 (06 Feb 2025 11:45) (73 - 96)  BP: 108/72 (06 Feb 2025 11:45) (95/58 - 108/72)  BP(mean): 66 (06 Feb 2025 10:05) (66 - 69)  RR: 18 (06 Feb 2025 11:45) (17 - 22)  SpO2: 97% (06 Feb 2025 11:45) (94% - 100%)    Parameters below as of 06 Feb 2025 11:45  Patient On (Oxygen Delivery Method): nasal cannula  O2 Flow (L/min): 2        PHYSICAL EXAM:    GENERAL: NAD  HEAD:  Atraumatic, Normocephalic  EYES: EOMI, conjunctiva and sclera clear  ENMT: No Drainage from nares, No drainage from ears  NECK: Supple, neck  veins full  NERVOUS SYSTEM:  Awake and Alert  CHEST/LUNG: coarseness noted, better   HEART: Regular rate and rhythm; No murmurs, rubs, or gallops  ABDOMEN: Soft, Nontender, Nondistended; Bowel sounds present  EXTREMITIES:  No Edema  SKIN: No rashes No obvious ecchymosis        LABS:                          8.4    7.34  )-----------( 333      ( 06 Feb 2025 07:21 )             26.5     02-06    140  |  108  |  66[H]  ----------------------------<  174[H]  4.6   |  25  |  2.30[H]    Ca    9.3      06 Feb 2025 07:21  Mg     2.4     02-05    TPro  6.1  /  Alb  2.4[L]  /  TBili  0.5  /  DBili  x   /  AST  36  /  ALT  31  /  AlkPhos  80  02-06    LIVER FUNCTIONS - ( 06 Feb 2025 07:21 )  Alb: 2.4 g/dL / Pro: 6.1 g/dL / ALK PHOS: 80 U/L / ALT: 31 U/L / AST: 36 U/L / GGT: x           PT/INR - ( 05 Feb 2025 12:17 )   PT: 13.7 sec;   INR: 1.16 ratio         PTT - ( 05 Feb 2025 12:17 )  PTT:35.9 sec  Urinalysis Basic - ( 06 Feb 2025 07:21 )    Color: x / Appearance: x / SG: x / pH: x  Gluc: 174 mg/dL / Ketone: x  / Bili: x / Urobili: x   Blood: x / Protein: x / Nitrite: x   Leuk Esterase: x / RBC: x / WBC x   Sq Epi: x / Non Sq Epi: x / Bacteria: x                                                     [facility]                                  Nephrology and Hypertension                             Timurderrick Kwon                                          [phone]     Patient is a 89y old  Female who presents with a chief complaint of STEMI (02 Feb 2025 08:42)       HPI:  Pt is a 89y/o F with PMHx HTN, DM2, HLD, depression who presented to the ED with intermittent jaw pain followed by anterior chest pressure radiating to the left shoulder, Pt was found to have elevated troponins and EKG with ST elevations with reciprocal ST-T changes in leads II, III, aVF. Code STEMI was activated and patient was taken to the cath lab where she was found to have 100% occlusion to the diagonal now s/p 1 stent placement. Patient seen and examined in the ICU, reports feeling much better. She denies any current chest pain or jaw pain, and also denies dizziness, headache, numbness/tingling, nausea, vomiting, palpitations, shortness of breath, abdominal pain. She does admit to chronic diarrhea. Prior to this event, patient was in her usual state of health. No other concerns at this time.    Renal consulted on 2/2/25 for JOHNNY/CKD. S/p ICU course with STEMI Code STEMI, 100% occlusion to the diagonal now s/p x1 stent placed. No cath lab complication reported and pt admitted to the ICU for post cath. Patient denies any knowledge of prior renal issues but Cr was 1.5 on admission     No acute events noted, states her breathing is much better, s/p thora this am    PAST MEDICAL & SURGICAL HISTORY:  HTN (hypertension)      DM (diabetes mellitus)      HLD (hyperlipidemia)      Major depression      No significant past surgical history           FAMILY HISTORY:  NC    Social History:Non smoker    MEDICATIONS  (STANDING):  albuterol    0.083% 2.5 milliGRAM(s) Nebulizer every 6 hours  aspirin enteric coated 81 milliGRAM(s) Oral daily  atorvastatin 40 milliGRAM(s) Oral at bedtime  chlorhexidine 2% Cloths 1 Application(s) Topical <User Schedule>  clopidogrel Tablet 75 milliGRAM(s) Oral every 24 hours  dextrose 5%. 1000 milliLiter(s) (50 mL/Hr) IV Continuous <Continuous>  dextrose 5%. 1000 milliLiter(s) (100 mL/Hr) IV Continuous <Continuous>  dextrose 50% Injectable 25 Gram(s) IV Push once  dextrose 50% Injectable 12.5 Gram(s) IV Push once  dextrose 50% Injectable 25 Gram(s) IV Push once  DULoxetine 90 milliGRAM(s) Oral daily  glucagon  Injectable 1 milliGRAM(s) IntraMuscular once  heparin   Injectable 5000 Unit(s) SubCutaneous every 8 hours  influenza  Vaccine (HIGH DOSE) 0.5 milliLiter(s) IntraMuscular once  insulin lispro (ADMELOG) corrective regimen sliding scale   SubCutaneous Before meals and at bedtime  metoprolol tartrate 12.5 milliGRAM(s) Oral two times a day  nystatin Powder 1 Application(s) Topical two times a day  oseltamivir 30 milliGRAM(s) Oral daily    MEDICATIONS  (PRN):  acetaminophen     Tablet .. 650 milliGRAM(s) Oral every 6 hours PRN Temp greater or equal to 38C (100.4F), Mild Pain (1 - 3)  dextrose Oral Gel 15 Gram(s) Oral once PRN Blood Glucose LESS THAN 70 milliGRAM(s)/deciliter  sodium chloride 0.65% Nasal 1 Spray(s) Both Nostrils two times a day PRN Nasal Congestion   Meds reviewed    Allergies    No Known Allergies    Intolerances         REVIEW OF SYSTEMS: As per HPI, otherwise negative     Vital Signs Last 24 Hrs  T(C): 36.9 (06 Feb 2025 11:45), Max: 37.1 (06 Feb 2025 05:30)  T(F): 98.4 (06 Feb 2025 11:45), Max: 98.7 (06 Feb 2025 05:30)  HR: 85 (06 Feb 2025 11:45) (73 - 96)  BP: 108/72 (06 Feb 2025 11:45) (95/58 - 108/72)  BP(mean): 66 (06 Feb 2025 10:05) (66 - 69)  RR: 18 (06 Feb 2025 11:45) (17 - 22)  SpO2: 97% (06 Feb 2025 11:45) (94% - 100%)    Parameters below as of 06 Feb 2025 11:45  Patient On (Oxygen Delivery Method): nasal cannula  O2 Flow (L/min): 2        PHYSICAL EXAM:    GENERAL: NAD  HEAD:  Atraumatic, Normocephalic  EYES: EOMI, conjunctiva and sclera clear  ENMT: No Drainage from nares, No drainage from ears  NECK: Supple, neck  veins full  NERVOUS SYSTEM:  Awake and Alert  CHEST/LUNG: coarseness noted, better   HEART: Regular rate and rhythm; No murmurs, rubs, or gallops  ABDOMEN: Soft, Nontender, Nondistended; Bowel sounds present  EXTREMITIES:  No Edema  SKIN: No rashes No obvious ecchymosis        LABS:                          8.4    7.34  )-----------( 333      ( 06 Feb 2025 07:21 )             26.5     02-06    140  |  108  |  66[H]  ----------------------------<  174[H]  4.6   |  25  |  2.30[H]    Ca    9.3      06 Feb 2025 07:21  Mg     2.4     02-05    TPro  6.1  /  Alb  2.4[L]  /  TBili  0.5  /  DBili  x   /  AST  36  /  ALT  31  /  AlkPhos  80  02-06    LIVER FUNCTIONS - ( 06 Feb 2025 07:21 )  Alb: 2.4 g/dL / Pro: 6.1 g/dL / ALK PHOS: 80 U/L / ALT: 31 U/L / AST: 36 U/L / GGT: x           PT/INR - ( 05 Feb 2025 12:17 )   PT: 13.7 sec;   INR: 1.16 ratio         PTT - ( 05 Feb 2025 12:17 )  PTT:35.9 sec  Urinalysis Basic - ( 06 Feb 2025 07:21 )    Color: x / Appearance: x / SG: x / pH: x  Gluc: 174 mg/dL / Ketone: x  / Bili: x / Urobili: x   Blood: x / Protein: x / Nitrite: x   Leuk Esterase: x / RBC: x / WBC x   Sq Epi: x / Non Sq Epi: x / Bacteria: x

## 2025-02-06 NOTE — PROGRESS NOTE ADULT - ASSESSMENT
JOHNNY/CKDIII  STEMI s/p MARLENE  HTN  Anemia     -CKDIII baseline with Cr 1.5 on admission, possibly due to hypertensive nephrosclerosis   -JOHNNY clinically due to CARLO +/- hypotension induced ATN. Now stable likely at new baseline with fluctuation with need for intermittent doses of lasix; monitor for now  -Urine studies reviewed   -No IVF needed   -No indication for RRT   -No kidney biopsy indicated   -Will benefit from RAAS blockade +/- SGLT2i in the future for CKD  -Lasix 20 mg IV x 1 was given 2/4/25  -Volume is better, but still with coarseness will start her on po lasix today and monitor renal function  -BP on lower end, will need to monitor this with lasix   -With anemia, add iron panel with am labs  JOHNNY/CKDIII  STEMI s/p MARLENE  HTN  Anemia     -CKDIII baseline with Cr 1.5 on admission, possibly due to hypertensive nephrosclerosis   -JOHNNY clinically due to CARLO +/- hypotension induced ATN. Now stable likely at new baseline with fluctuation with need for intermittent doses of lasix; monitor for now  -Urine studies reviewed   -No IVF needed   -No indication for RRT   -No kidney biopsy indicated   -Will benefit from RAAS blockade +/- SGLT2i in the future for CKD  -Lasix 20 mg IV x 1 was given 2/4/25  -Volume is better, but still with coarseness will start her on po lasix today and monitor renal function  -BP on lower end, will need to monitor this with lasix   -With anemia, add iron panel with am labs   -S/p thora today also 2/6/25

## 2025-02-06 NOTE — CASE MANAGEMENT PROGRESS NOTE - NSCMPROGRESSNOTE_GEN_ALL_CORE
Discussed patient on rounds this morning and chart reviewed. Patient is s/p right thoracentesis and is on 2LNC. CM and SW met with the patient at the bedside to discuss transition planning when medically cleared. Patient is declining [facility] and is in agreement for [facility]. Patient inquiring about home health aide. CM discussed home care limitations regarding home health aides. Private hire list provided. Patient stated she has a large support system that can assist her as needed. Patient stated she has a RW, canes, and a wheelchair. Denies home oxygen. Patient requested CM contact her daughter Corinne [phone] to discuss the stated above; left a message. CM remains available.

## 2025-02-06 NOTE — PROGRESS NOTE ADULT - SUBJECTIVE AND OBJECTIVE BOX
Date/Time Patient Seen:  		  Referring MD:   Data Reviewed	       Patient is a 89y old  Female who presents with a chief complaint of STEMI (05 Feb 2025 13:53)      Subjective/HPI     PAST MEDICAL & SURGICAL HISTORY:  HTN (hypertension)    DM (diabetes mellitus)    HLD (hyperlipidemia)    Major depression    No significant past surgical history          Medication list         MEDICATIONS  (STANDING):  albuterol    0.083% 2.5 milliGRAM(s) Nebulizer every 6 hours  atorvastatin 40 milliGRAM(s) Oral at bedtime  chlorhexidine 2% Cloths 1 Application(s) Topical <User Schedule>  dextrose 5%. 1000 milliLiter(s) (100 mL/Hr) IV Continuous <Continuous>  dextrose 5%. 1000 milliLiter(s) (50 mL/Hr) IV Continuous <Continuous>  dextrose 50% Injectable 25 Gram(s) IV Push once  dextrose 50% Injectable 12.5 Gram(s) IV Push once  dextrose 50% Injectable 25 Gram(s) IV Push once  DULoxetine 90 milliGRAM(s) Oral daily  glucagon  Injectable 1 milliGRAM(s) IntraMuscular once  influenza  Vaccine (HIGH DOSE) 0.5 milliLiter(s) IntraMuscular once  insulin lispro (ADMELOG) corrective regimen sliding scale   SubCutaneous Before meals and at bedtime  metoprolol tartrate 12.5 milliGRAM(s) Oral two times a day  nystatin Powder 1 Application(s) Topical two times a day  sodium chloride 3%  Inhalation 4 milliLiter(s) Inhalation every 12 hours    MEDICATIONS  (PRN):  acetaminophen     Tablet .. 650 milliGRAM(s) Oral every 6 hours PRN Temp greater or equal to 38C (100.4F), Mild Pain (1 - 3)  ALPRAZolam 0.25 milliGRAM(s) Oral two times a day PRN anxiety or panic attack  dextrose Oral Gel 15 Gram(s) Oral once PRN Blood Glucose LESS THAN 70 milliGRAM(s)/deciliter  sodium chloride 0.65% Nasal 1 Spray(s) Both Nostrils two times a day PRN Nasal Congestion         Vitals log        ICU Vital Signs Last 24 Hrs  T(C): 37.1 (06 Feb 2025 05:30), Max: 37.1 (06 Feb 2025 05:30)  T(F): 98.7 (06 Feb 2025 05:30), Max: 98.7 (06 Feb 2025 05:30)  HR: 86 (06 Feb 2025 05:30) (69 - 96)  BP: 107/67 (06 Feb 2025 05:30) (95/58 - 108/64)  BP(mean): --  ABP: --  ABP(mean): --  RR: 17 (06 Feb 2025 05:30) (17 - 18)  SpO2: 94% (06 Feb 2025 05:30) (91% - 95%)    O2 Parameters below as of 06 Feb 2025 05:30  Patient On (Oxygen Delivery Method): nasal cannula                 Input and Output:  I&O's Detail    04 Feb 2025 07:01  -  05 Feb 2025 07:00  --------------------------------------------------------  IN:  Total IN: 0 mL    OUT:    Voided (mL): 700 mL  Total OUT: 700 mL    Total NET: -700 mL      05 Feb 2025 07:01  -  06 Feb 2025 05:58  --------------------------------------------------------  IN:    Oral Fluid: 240 mL  Total IN: 240 mL    OUT:  Total OUT: 0 mL    Total NET: 240 mL          Lab Data                        8.4    6.61  )-----------( 310      ( 05 Feb 2025 06:50 )             25.9     02-05    140  |  107  |  56[H]  ----------------------------<  163[H]  3.8   |  22  |  2.30[H]    Ca    8.9      05 Feb 2025 06:50  Phos  3.6     02-04  Mg     2.4     02-05    TPro  6.2  /  Alb  2.4[L]  /  TBili  0.6  /  DBili  x   /  AST  28  /  ALT  21  /  AlkPhos  78  02-05            Review of Systems	      Objective     Physical Examination    heart s1s2  lung dc BS  head nc  head at      Pertinent Lab findings & Imaging      Mery:  [email or web address]   Adequate UO     I&O's Detail    04 Feb 2025 07:01  -  05 Feb 2025 07:00  --------------------------------------------------------  IN:  Total IN: 0 mL    OUT:    Voided (mL): 700 mL  Total OUT: 700 mL    Total NET: -700 mL      05 Feb 2025 07:01  -  06 Feb 2025 05:58  --------------------------------------------------------  IN:    Oral Fluid: 240 mL  Total IN: 240 mL    OUT:  Total OUT: 0 mL    Total NET: 240 mL               Discussed with:     Cultures:	        Radiology

## 2025-02-06 NOTE — PROGRESS NOTE ADULT - ASSESSMENT
89y/o F with PMHx HTN, DM2, HLD, depression who presented to the ED with intermittent jaw pain followed by anterior chest pressure radiating to the left shoulder, admitted for STEMI.    - Pt p/w intermittent jaw pain followed by anterior chest pressure radiating to the left shoulder, admitted for STEMI.  - S/p PCI (1/26/25) with 100% occlusion of diagonal and MARLENE x1  - Continue Plavix 75 mg daily, on hold for thoracentesis, resume as early as possible given recent stent   - Aspirin d/c as pt is now on full dose AC for Afib to avoid triple therapy  - Continue Lipitor 40mg QHS  - Previously with hypotension after getting BB dosing sbp improved metoprolol 12.5 mg twice daily started      - TTE 1/27/25 Left ventricular systolic function is moderately decreased with an ejection fraction visually estimated at 40 to 45 %. small pericardial effusion noted.   - BNP:  <--30943  - Appears compensated from HF POV.   - Creatinine:  <--2.30,  <--2.30, <--2.00,  <--2.00,  <--1.70  - Hold off on ARB/ARNI given JOHNNY  - Please continue to maintain strict I/Os, monitor daily weights, Cr, and K.     - Tele w/ SR 80-90s nonsustained A fib 130s, continue to monitor   - Continue Metoprolol 12.5 BID. Can eventually switch to Toprol for GDMT  - Continue Lovenox full dose, being held for thoracentesis dose has been adjusted for creatinine clearance  - Will need to transition to Eliquis when she is mor clinically stable from a JOHNNY/ flu standpoint.  - For now will hope that she reverts to SR as flu improves, etc.   - If she does not can consider DCCV which would be more important if she decompensates further, attributable to loss of av synchrony    - BP stable and controlled     - Flu+, Management per primary team   - Monitor and replete lytes, keep K>4, Mg>2.  - Will continue to follow.    Angela Cm, MS FNP, AGACNP  Nurse Practitioner- Cardiology   Please call on TEAMS     89y/o F with HTN, DM2, HLD, depression who presented to the ED with intermittent jaw pain followed by anterior chest pressure radiating to the left shoulder, admitted for STEMI.    - Pt p/w intermittent jaw pain followed by anterior chest pressure radiating to the left shoulder, admitted for STEMI.  - S/p PCI (1/26/25) with 100% occlusion of diagonal and MARLENE x1  - Continue Plavix 75 mg daily, on hold for thoracentesis, resume as early as possible given recent stent   - Aspirin d/c as pt is now on full dose AC for Afib to avoid triple therapy  - Continue Lipitor 40mg QHS  - Previously with hypotension after getting BB dosing sbp improved metoprolol 12.5 mg twice daily started      - TTE 1/27/25 Left ventricular systolic function is moderately decreased with an ejection fraction visually estimated at 40 to 45 %. small pericardial effusion noted.   - BNP:  <--75695  - Appears compensated from HF POV.   - Creatinine:  <--2.30,  <--2.30, <--2.00,  <--2.00,  <--1.70  - Hold off on ARB/ARNI given JOHNNY  - Please continue to maintain strict I/Os, monitor daily weights, Cr, and K.     - Tele w/ SR 80-90s nonsustained A fib 130s, continue to monitor   - Continue Metoprolol 12.5 BID. Can eventually switch to Toprol for GDMT  - Continue Lovenox full dose, being held for thoracentesis dose has been adjusted for creatinine clearance  - Will need to transition to Eliquis when she is more clinically stable from a JOHNNY/ flu standpoint.  - For now will hope that she reverts to SR as flu improves, etc.   - If she does not can consider DCCV which would be more important if she decompensates further, attributable to loss of av synchrony    - BP stable and controlled     - Flu+, Management per primary team   - Monitor and replete lytes, keep K>4, Mg>2.  - Will continue to follow.    Angela Cm, MS FNP, AGAP  Nurse Practitioner- Cardiology   Please call on TEAMS

## 2025-02-06 NOTE — PROGRESS NOTE ADULT - PROBLEM SELECTOR PLAN 10
VTE ppx: SC full dose LMWH    Downgraded from ICU 2/2/25 VTE ppx: SC full dose LMWH, holding for thora    Downgraded from ICU 2/2/25 VTE ppx: SC full dose LMWH, holding for thora, switch to doac tomorrow for dc    Downgraded from ICU 2/2/25

## 2025-02-06 NOTE — PROCEDURE NOTE - PROCEDURE FINDINGS AND DETAILS
Patient was referred to Interventional Radiology for the drainage of right pleural effusion.  Procedure was explained to patient and consent is in the chart.    570cc of yellow pleuritic fluid removed from right thorax under sterile conditions and ultrasound guidance.  Post procedure CXR was ordered.

## 2025-02-06 NOTE — PROGRESS NOTE ADULT - PROBLEM SELECTOR PLAN 2
- s/p PCI with 1 stent placement to 100% occl to the diagonal  - transferred to ICU s/p cath. + Hypotension. S/p IVF after hypotension ?related to early BB use  - With acute systolic congestive heart failure - to initiate GDMT with BB (to transition to toprol XL in outpatient realm)  - Reviewed TTE 1/27: EF 40 to 45%, probnp about 17k  - Diurese PRN - IV lasix 40mg x 1 on 2/4  - Cardio Following  - Anemia noted - stable on DAPT - watch as starting AC  - DC planning to home with HCPT - on hold due to AHRF/ADHF & influenza  - holding plavix and lovenox for thora this week per pulm/cardio - s/p PCI with 1 stent placement to 100% occl to the diagonal  - transferred to ICU s/p cath. + Hypotension. S/p IVF after hypotension ?related to early BB use  - With acute systolic congestive heart failure - to initiate GDMT with BB (to transition to toprol XL in outpatient realm)  - Reviewed TTE 1/27: EF 40 to 45%, probnp about 17k  - Diurese PRN - IV lasix 40mg x 1 on 2/4  - Cardio Following  - Anemia noted - stable on DAPT - watch as starting AC  - DC planning to home with HCPT - on hold due to AHRF/ADHF & influenza  - holding plavix and lovenox for thora this week per pulm/cardio, transition from lovenox to DOAC tomorrow for DC

## 2025-02-06 NOTE — PROGRESS NOTE ADULT - PROBLEM SELECTOR PLAN 1
Acute hypoxic respiratory failure secondary to acute HFrEF and influenza viral PNA  continue O2 supplementation and wean as tolerated  Hold off IVF given heart failure - may need diuretics with albumin  Found to be Flu A +  Continue tamiflu for 5 days  Now with secretions, will start saline nebs along with nebulizers  CT Chest: Moderate to severe right upper lobe pneumonia. Mild left upper lobe   pneumonia, moderate b/l pleural effusions, Large right thyroid nodule deviating the trachea to the left.  On 5L NC, wean as tolerate (baseline RA)  Given lasix yesterday, will consider lasix and imaging tomorrow  Plan to perform thoracentesis this week, holding lovenox and plavix Acute hypoxic respiratory failure secondary to acute HFrEF and influenza viral PNA  continue O2 supplementation and wean as tolerated  Hold off IVF given heart failure - may need diuretics with albumin  Found to be Flu A +  Continue tamiflu for 5 days  Now with secretions, will start saline nebs along with nebulizers  CT Chest: Moderate to severe right upper lobe pneumonia. Mild left upper lobe   pneumonia, moderate b/l pleural effusions, Large right thyroid nodule deviating the trachea to the left.  On 5L NC, wean as tolerate (baseline RA)  Given lasix 2/4, improving  Plan to perform thoracentesis this week, holding lovenox and plavix Acute hypoxic respiratory failure secondary to acute HFrEF and influenza viral PNA  continue O2 supplementation and wean as tolerated  Hold off IVF given heart failure - may need diuretics with albumin  Found to be Flu A +  Continue tamiflu for 5 days  Now with secretions, will start saline nebs along with nebulizers  CT Chest: Moderate to severe right upper lobe pneumonia. Mild left upper lobe   pneumonia, moderate b/l pleural effusions, Large right thyroid nodule deviating the trachea to the left.  On 5L NC, wean as tolerate (baseline RA)  Given lasix 2/4, improving  Plan to perform thoracentesis this week, holding lovenox and plavix, restart plavix today after procedure and lovenox, transition to DOAC tomorrow for dc Acute hypoxic respiratory failure secondary to acute HFrEF and influenza viral PNA  continue O2 supplementation and wean as tolerated  Hold off IVF given heart failure - may need diuretics with albumin  Found to be Flu A +  Continue tamiflu for 5 days  Now with secretions, will start saline nebs along with nebulizers  CT Chest: Moderate to severe right upper lobe pneumonia. Mild left upper lobe   pneumonia, moderate b/l pleural effusions, Large right thyroid nodule deviating the trachea to the left.  On 5L NC, wean as tolerate (baseline RA)  Given lasix 2/4, improving  Thora results: 570cc of yellow pleuritic fluid removed from right thorax under sterile conditions and ultrasound guidance.  Post procedure CXR was ordered

## 2025-02-06 NOTE — PROGRESS NOTE ADULT - SUBJECTIVE AND OBJECTIVE BOX
[facility] -- Jamal Moralez, Manohar, Roney, Christopher, Sincere Cooley: Office # [phone]    Follow Up:  STEMI    Subjective/Observations: Patient seen and examined. Patient awake, alert, resting in bed. No complaints of chest pain, dyspnea, palpitations or dizziness. No signs of orthopnea or PND. Lying flat. + cough, Remains in pA fib on telemetry. Plan for thoracentesis today. Trialing off NC now, tolerating room air at present.     REVIEW OF SYSTEMS: All other review of systems are negative unless indicated above    PAST MEDICAL & SURGICAL HISTORY:  HTN (hypertension)      DM (diabetes mellitus)      HLD (hyperlipidemia)      Major depression      No significant past surgical history      MEDICATIONS  (STANDING):  albuterol    0.083% 2.5 milliGRAM(s) Nebulizer every 6 hours  atorvastatin 40 milliGRAM(s) Oral at bedtime  chlorhexidine 2% Cloths 1 Application(s) Topical <User Schedule>  dextrose 5%. 1000 milliLiter(s) (100 mL/Hr) IV Continuous <Continuous>  dextrose 5%. 1000 milliLiter(s) (50 mL/Hr) IV Continuous <Continuous>  dextrose 50% Injectable 25 Gram(s) IV Push once  dextrose 50% Injectable 12.5 Gram(s) IV Push once  dextrose 50% Injectable 25 Gram(s) IV Push once  DULoxetine 90 milliGRAM(s) Oral daily  glucagon  Injectable 1 milliGRAM(s) IntraMuscular once  influenza  Vaccine (HIGH DOSE) 0.5 milliLiter(s) IntraMuscular once  insulin lispro (ADMELOG) corrective regimen sliding scale   SubCutaneous Before meals and at bedtime  metoprolol tartrate 12.5 milliGRAM(s) Oral two times a day  nystatin Powder 1 Application(s) Topical two times a day  sodium chloride 3%  Inhalation 4 milliLiter(s) Inhalation every 12 hours    MEDICATIONS  (PRN):  acetaminophen     Tablet .. 650 milliGRAM(s) Oral every 6 hours PRN Temp greater or equal to 38C (100.4F), Mild Pain (1 - 3)  ALPRAZolam 0.25 milliGRAM(s) Oral two times a day PRN anxiety or panic attack  dextrose Oral Gel 15 Gram(s) Oral once PRN Blood Glucose LESS THAN 70 milliGRAM(s)/deciliter  sodium chloride 0.65% Nasal 1 Spray(s) Both Nostrils two times a day PRN Nasal Congestion    Allergies  No Known Allergies    Vital Signs Last 24 Hrs  T(C): 37.1 (06 Feb 2025 05:30), Max: 37.1 (06 Feb 2025 05:30)  T(F): 98.7 (06 Feb 2025 05:30), Max: 98.7 (06 Feb 2025 05:30)  HR: 86 (06 Feb 2025 05:30) (74 - 96)  BP: 107/67 (06 Feb 2025 05:30) (95/58 - 108/64)  BP(mean): --  RR: 17 (06 Feb 2025 05:30) (17 - 18)  SpO2: 94% (06 Feb 2025 05:30) (94% - 95%)    Parameters below as of 06 Feb 2025 05:30  Patient On (Oxygen Delivery Method): nasal cannula      I&O's Summary    05 Feb 2025 07:01  -  06 Feb 2025 07:00  --------------------------------------------------------  IN: 240 mL / OUT: 0 mL / NET: 240 mL        TELE: SR 80-90s nonsustained A fib 130s  PHYSICAL EXAM:  Constitutional: NAD, awake and alert  HEENT: Moist Mucous Membranes, Anicteric  Pulmonary: Non-labored, breath sounds are clear bilaterally, No wheezing, rales + rhonchi  Cardiovascular: Irregular, S1 and S2, No murmurs, No rubs, gallops or clicks  Gastrointestinal:  soft, nontender, nondistended   Lymph: No peripheral edema. No lymphadenopathy.   Skin: No visible rashes or ulcers.  Psych:  Mood & affect appropriate      LABS: All Labs Reviewed:                        8.4    7.34  )-----------( 333      ( 06 Feb 2025 07:21 )             26.5                         8.4    6.61  )-----------( 310      ( 05 Feb 2025 06:50 )             25.9                         9.0    7.24  )-----------( 315      ( 04 Feb 2025 08:53 )             27.2     06 Feb 2025 07:21    140    |  108    |  66     ----------------------------<  174    4.6     |  25     |  2.30   05 Feb 2025 06:50    140    |  107    |  56     ----------------------------<  163    3.8     |  22     |  2.30   04 Feb 2025 08:53    140    |  108    |  47     ----------------------------<  178    4.4     |  24     |  2.00     Ca    9.3        06 Feb 2025 07:21  Ca    8.9        05 Feb 2025 06:50  Ca    9.0        04 Feb 2025 08:53  Phos  3.6       04 Feb 2025 08:53  Mg     2.4       05 Feb 2025 06:50  Mg     2.3       04 Feb 2025 08:53    TPro  6.1    /  Alb  2.4    /  TBili  0.5    /  DBili  x      /  AST  36     /  ALT  31     /  AlkPhos  80     06 Feb 2025 07:21  TPro  6.2    /  Alb  2.4    /  TBili  0.6    /  DBili  x      /  AST  28     /  ALT  21     /  AlkPhos  78     05 Feb 2025 06:50  TPro  6.6    /  Alb  2.5    /  TBili  0.6    /  DBili  x      /  AST  28     /  ALT  21     /  AlkPhos  77     04 Feb 2025 08:53   LIVER FUNCTIONS - ( 06 Feb 2025 07:21 )  Alb: 2.4 g/dL / Pro: 6.1 g/dL / ALK PHOS: 80 U/L / ALT: 31 U/L / AST: 36 U/L / GGT: x           PT/INR - ( 05 Feb 2025 12:17 )   PT: 13.7 sec;   INR: 1.16 ratio         PTT - ( 05 Feb 2025 12:17 )  PTT:35.9 secTroponin I, High Sensitivity Result: 25859.4 ng/L (01-28-25 @ 13:30)  Troponin I, High Sensitivity Result: 63986.8 ng/L (01-28-25 @ 06:18)  Cholesterol: 261 mg/dL (01-27-25 @ 06:10)  HDL Cholesterol: 43 mg/dL (01-27-25 @ 06:10)  Triglycerides, Serum: 239 mg/dL (01-27-25 @ 06:10)    12 Lead ECG:   Ventricular Rate 100 BPM    QRS Duration 98 ms    Q-T Interval 360 ms    QTC Calculation(Bazett) 464 ms    R Axis 50 degrees    T Axis -47 degrees    Diagnosis Line *** Critical Test Result: STEMI  Atrial fibrillation  Lateral infarct (cited on or before 26-JAN-2025)  ST & T wave abnormality, consider inferior ischemia  ST & T wave abnormality, consider anterior ischemia    Confirmed by ISAAC PALUMBO ([id number]) on 2/5/2025 12:55:12 PM (02-04-25 @ 16:25)      TRANSTHORACIC ECHOCARDIOGRAM REPORT  ________________________________________________________________________________                                      _______       Pt. Name:       DOMITILA GRACE Study Date:    1/28/2025  MRN:            [id number]           YOB: 1933  Accession #:    [id number]          Age:           89 years  Account#:       [id number]         Gender:        F  Heart Rate:                        Height:        62.20 in (158.00 cm)  Rhythm:          Weight:        160.93 lb (73.00 kg)  Blood Pressure: 93/52 mmHg         BSA/BMI:       1.75 m² / 29.24 kg/m²  ________________________________________________________________________________________  Referring Physician:    [id number] Carlos Alberto  Interpreting Physician: Brxaton Bowens M.D.  Primary Sonographer:    Marlys Queen    CPT:               ECHO TTE W/O CON F/U LTD - 59492.m  Indication(s):     ST elevation [STEMI] myocardial infarction of unspecified                     site - I21.3  Procedure:         Limited transthoracic echocardiogram.  Ordering Location: ICU1  Admission Status:  Inpatient    _______________________________________________________________________________________     CONCLUSIONS:      1. Left ventricular systolic function is moderately decreased with an ejection fraction visually estimated at 40 to 45 %.   2. Small pericardial effusion noted adjacent to the right atrium and small pericardial effusion noted adjacent to the right ventricle. The effusion measures 0.82 cm in diastole in the subcostal view adjacent ot the RV.   3. Thickened pericardium.   4. No significant change in the size of the pericardial effusion compared to TTE study from yesterday (1/27/25) is noted.    ________________________________________________________________________________________  FINDINGS:     Left Ventricle:  Left ventricular systolic function is moderately decreased with an ejection fraction visually estimated at 40 to 45%.     Right Ventricle:  Right ventricular systolic function is normal.     Left Atrium:  The left atrium is dilated.     Mitral Valve:  There is moderate calcification of the mitral valve annulus.     Pericardium:  The pericardium is thickened. There is a small pericardial effusionnoted adjacent to the right atrium and a small pericardial effusion noted adjacent to the right ventricle.     Systemic Veins:  The inferior vena cava is normal in size measuring 1.83 cm in diameter, (normal <2.1cm) with normal inspiratory collapse (normal >50%) consistent with normal right atrial pressure (~3, range 0-5mmHg).  ____________________________________________________________________  QUANTITATIVE DATA:  Left Ventricle Measurements: (Indexed to BSA)     Visualized LV EF%: 40 to 45%       ________________________________________________________________________________________  Electronically signed on 1/28/2025 at 2:21:15 PM by Braxton Bowens M.D.         *** Final ***

## 2025-02-07 DIAGNOSIS — J96.01 ACUTE RESPIRATORY FAILURE WITH HYPOXIA: ICD-10-CM

## 2025-02-07 LAB
ALBUMIN SERPL ELPH-MCNC: 2.3 G/DL — LOW (ref 3.3–5)
ALP SERPL-CCNC: 83 U/L — SIGNIFICANT CHANGE UP (ref 40–120)
ALT FLD-CCNC: 33 U/L — SIGNIFICANT CHANGE UP (ref 12–78)
ANION GAP SERPL CALC-SCNC: 10 MMOL/L — SIGNIFICANT CHANGE UP (ref 5–17)
AST SERPL-CCNC: 31 U/L — SIGNIFICANT CHANGE UP (ref 15–37)
B PERT IGG+IGM PNL SER: ABNORMAL
BASOPHILS # BLD AUTO: 0.02 K/UL — SIGNIFICANT CHANGE UP (ref 0–0.2)
BASOPHILS NFR BLD AUTO: 0.3 % — SIGNIFICANT CHANGE UP (ref 0–2)
BILIRUB SERPL-MCNC: 0.6 MG/DL — SIGNIFICANT CHANGE UP (ref 0.2–1.2)
BUN SERPL-MCNC: 68 MG/DL — HIGH (ref 7–23)
CALCIUM SERPL-MCNC: 9 MG/DL — SIGNIFICANT CHANGE UP (ref 8.5–10.1)
CHLORIDE SERPL-SCNC: 109 MMOL/L — HIGH (ref 96–108)
CO2 SERPL-SCNC: 21 MMOL/L — LOW (ref 22–31)
COLOR FLD: YELLOW — SIGNIFICANT CHANGE UP
CREAT SERPL-MCNC: 2.1 MG/DL — HIGH (ref 0.5–1.3)
EGFR: 22 ML/MIN/1.73M2 — LOW
EOSINOPHIL # BLD AUTO: 0.3 K/UL — SIGNIFICANT CHANGE UP (ref 0–0.5)
EOSINOPHIL # FLD: 0 % — SIGNIFICANT CHANGE UP
EOSINOPHIL NFR BLD AUTO: 4.2 % — SIGNIFICANT CHANGE UP (ref 0–6)
FERRITIN SERPL-MCNC: 378 NG/ML — HIGH (ref 13–330)
FLUID INTAKE SUBSTANCE CLASS: SIGNIFICANT CHANGE UP
FOLATE+VIT B12 SERBLD-IMP: 0 % — SIGNIFICANT CHANGE UP
GLUCOSE SERPL-MCNC: 171 MG/DL — HIGH (ref 70–99)
HCT VFR BLD CALC: 27.9 % — LOW (ref 34.5–45)
HGB BLD-MCNC: 8.8 G/DL — LOW (ref 11.5–15.5)
IMM GRANULOCYTES NFR BLD AUTO: 1.3 % — HIGH (ref 0–0.9)
IRON SATN MFR SERPL: 16 % — SIGNIFICANT CHANGE UP (ref 14–50)
IRON SATN MFR SERPL: 35 UG/DL — SIGNIFICANT CHANGE UP (ref 30–160)
LYMPHOCYTES # BLD AUTO: 0.67 K/UL — LOW (ref 1–3.3)
LYMPHOCYTES # BLD AUTO: 9.3 % — LOW (ref 13–44)
LYMPHOCYTES # FLD: 2 % — SIGNIFICANT CHANGE UP
MAGNESIUM SERPL-MCNC: 2.5 MG/DL — SIGNIFICANT CHANGE UP (ref 1.6–2.6)
MCHC RBC-ENTMCNC: 28.9 PG — SIGNIFICANT CHANGE UP (ref 27–34)
MCHC RBC-ENTMCNC: 31.5 G/DL — LOW (ref 32–36)
MCV RBC AUTO: 91.8 FL — SIGNIFICANT CHANGE UP (ref 80–100)
MESOTHL CELL # FLD: 0 % — SIGNIFICANT CHANGE UP
MONOCYTES # BLD AUTO: 0.45 K/UL — SIGNIFICANT CHANGE UP (ref 0–0.9)
MONOCYTES NFR BLD AUTO: 6.3 % — SIGNIFICANT CHANGE UP (ref 2–14)
MONOS+MACROS # FLD: 79 % — SIGNIFICANT CHANGE UP
NEUTROPHILS # BLD AUTO: 5.67 K/UL — SIGNIFICANT CHANGE UP (ref 1.8–7.4)
NEUTROPHILS NFR BLD AUTO: 78.6 % — HIGH (ref 43–77)
NEUTROPHILS-BODY FLUID: 19 % — SIGNIFICANT CHANGE UP
NRBC # BLD: 0 /100 WBCS — SIGNIFICANT CHANGE UP (ref 0–0)
NRBC # FLD: 0 % — SIGNIFICANT CHANGE UP
NRBC BLD-RTO: 0 /100 WBCS — SIGNIFICANT CHANGE UP (ref 0–0)
OTHER CELLS FLD MANUAL: 0 % — SIGNIFICANT CHANGE UP
PH FLD: 7.6 — SIGNIFICANT CHANGE UP
PHOSPHATE SERPL-MCNC: 3.5 MG/DL — SIGNIFICANT CHANGE UP (ref 2.5–4.5)
PLATELET # BLD AUTO: 360 K/UL — SIGNIFICANT CHANGE UP (ref 150–400)
POTASSIUM SERPL-MCNC: 4 MMOL/L — SIGNIFICANT CHANGE UP (ref 3.5–5.3)
POTASSIUM SERPL-SCNC: 4 MMOL/L — SIGNIFICANT CHANGE UP (ref 3.5–5.3)
PROT SERPL-MCNC: 6 G/DL — SIGNIFICANT CHANGE UP (ref 6–8.3)
RBC # BLD: 3.04 M/UL — LOW (ref 3.8–5.2)
RBC # FLD: 14.2 % — SIGNIFICANT CHANGE UP (ref 10.3–14.5)
RCV VOL RI: < 2000 CELLS/UL — SIGNIFICANT CHANGE UP
SODIUM SERPL-SCNC: 140 MMOL/L — SIGNIFICANT CHANGE UP (ref 135–145)
TIBC SERPL-MCNC: 220 UG/DL — SIGNIFICANT CHANGE UP (ref 220–430)
TOTAL NUCLEATED CELL COUNT, BODY FLUID: 768 CELLS/UL — SIGNIFICANT CHANGE UP
TUBE TYPE: SIGNIFICANT CHANGE UP
UIBC SERPL-MCNC: 185 UG/DL — SIGNIFICANT CHANGE UP (ref 110–370)
WBC # BLD: 7.2 K/UL — SIGNIFICANT CHANGE UP (ref 3.8–10.5)
WBC # FLD AUTO: 7.2 K/UL — SIGNIFICANT CHANGE UP (ref 3.8–10.5)
WBC COUNT.: 584 CELLS/UL — SIGNIFICANT CHANGE UP

## 2025-02-07 PROCEDURE — 71045 X-RAY EXAM CHEST 1 VIEW: CPT | Mod: 26

## 2025-02-07 PROCEDURE — 99233 SBSQ HOSP IP/OBS HIGH 50: CPT

## 2025-02-07 PROCEDURE — 99233 SBSQ HOSP IP/OBS HIGH 50: CPT | Mod: GC

## 2025-02-07 RX ORDER — ATORVASTATIN CALCIUM 80 MG/1
80 TABLET, FILM COATED ORAL AT BEDTIME
Refills: 0 | Status: DISCONTINUED | OUTPATIENT
Start: 2025-02-07 | End: 2025-02-12

## 2025-02-07 RX ORDER — AMIODARONE HYDROCHLORIDE 50 MG/ML
200 INJECTION, SOLUTION INTRAVENOUS DAILY
Refills: 0 | Status: DISCONTINUED | OUTPATIENT
Start: 2025-02-11 | End: 2025-02-12

## 2025-02-07 RX ORDER — AMIODARONE HYDROCHLORIDE 50 MG/ML
400 INJECTION, SOLUTION INTRAVENOUS EVERY 8 HOURS
Refills: 0 | Status: COMPLETED | OUTPATIENT
Start: 2025-02-07 | End: 2025-02-11

## 2025-02-07 RX ORDER — AMIODARONE HYDROCHLORIDE 50 MG/ML
INJECTION, SOLUTION INTRAVENOUS
Refills: 0 | Status: DISCONTINUED | OUTPATIENT
Start: 2025-02-07 | End: 2025-02-12

## 2025-02-07 RX ORDER — METOPROLOL SUCCINATE 25 MG
25 TABLET, EXTENDED RELEASE 24 HR ORAL DAILY
Refills: 0 | Status: DISCONTINUED | OUTPATIENT
Start: 2025-02-07 | End: 2025-02-12

## 2025-02-07 RX ADMIN — Medication 4 MILLILITER(S): at 19:33

## 2025-02-07 RX ADMIN — Medication 3: at 17:31

## 2025-02-07 RX ADMIN — NYSTATIN 1 APPLICATION(S): 100000 POWDER TOPICAL at 06:06

## 2025-02-07 RX ADMIN — Medication 2.5 MILLIGRAM(S): at 19:35

## 2025-02-07 RX ADMIN — Medication 75 MILLIGRAM(S): at 11:37

## 2025-02-07 RX ADMIN — AMIODARONE HYDROCHLORIDE 400 MILLIGRAM(S): 50 INJECTION, SOLUTION INTRAVENOUS at 15:27

## 2025-02-07 RX ADMIN — Medication 2.5 MILLIGRAM(S): at 00:45

## 2025-02-07 RX ADMIN — Medication 12.5 MILLIGRAM(S): at 06:06

## 2025-02-07 RX ADMIN — DULOXETINE 90 MILLIGRAM(S): 20 CAPSULE, DELAYED RELEASE ORAL at 11:37

## 2025-02-07 RX ADMIN — NYSTATIN 1 APPLICATION(S): 100000 POWDER TOPICAL at 17:34

## 2025-02-07 RX ADMIN — Medication 20 MILLIGRAM(S): at 06:06

## 2025-02-07 RX ADMIN — Medication 2.5 MILLIGRAM(S): at 13:37

## 2025-02-07 RX ADMIN — Medication 1: at 22:08

## 2025-02-07 RX ADMIN — AMIODARONE HYDROCHLORIDE 400 MILLIGRAM(S): 50 INJECTION, SOLUTION INTRAVENOUS at 21:14

## 2025-02-07 RX ADMIN — ATORVASTATIN CALCIUM 80 MILLIGRAM(S): 80 TABLET, FILM COATED ORAL at 21:14

## 2025-02-07 RX ADMIN — Medication 2.5 MILLIGRAM(S): at 07:52

## 2025-02-07 RX ADMIN — Medication 1: at 08:34

## 2025-02-07 RX ADMIN — Medication 2: at 15:23

## 2025-02-07 RX ADMIN — Medication 4 MILLILITER(S): at 07:52

## 2025-02-07 RX ADMIN — ENOXAPARIN SODIUM 70 MILLIGRAM(S): 100 INJECTION SUBCUTANEOUS at 11:37

## 2025-02-07 NOTE — PROGRESS NOTE ADULT - PROBLEM SELECTOR PLAN 10
VTE ppx: FD Lovenox, plan to switch to Eliquis    Downgraded from ICU 2/2/25 VTE ppx: FD Lovenox, plan to switch to Eliquis    Downgraded from ICU 2/2/25     updated over the phone 2/7

## 2025-02-07 NOTE — PATIENT CHOICE NOTE. - NSPTCHOICESTATE_GEN_ALL_CORE
I have met with the patient and/or caregiver to discuss discharge goals and treatment plan. Patient and/or caregiver also provided with instructions on accessing the CMS Compare websites for additional information related to Post Acute Provider quality and resource use measures to assist them in evaluation of the providers and in selecting their post-acute provider of choice. Patient and caregiver were informed of the facilities that are owned and/or operated by [facility]. I have discussed with the patient the availability of in-network facilities and providers. Patient and caregiver provided with a list of post-acute providers whose services are appropriate to the discharge plans and patient needs.     For patient requiring durable medical equipment, patient and/or caregiver were informed that they have the right to request who provides the required equipment.
I have met with the patient and/or caregiver to discuss discharge goals and treatment plan. Patient and/or caregiver also provided with instructions on accessing the CMS Compare websites for additional information related to Post Acute Provider quality and resource use measures to assist them in evaluation of the providers and in selecting their post-acute provider of choice. Patient and caregiver were informed of the facilities that are owned and/or operated by [facility]. I have discussed with the patient the availability of in-network facilities and providers. Patient and caregiver provided with a list of post-acute providers whose services are appropriate to the discharge plans and patient needs.     For patient requiring durable medical equipment, patient and/or caregiver were informed that they have the right to request who provides the required equipment.

## 2025-02-07 NOTE — PROGRESS NOTE ADULT - SUBJECTIVE AND OBJECTIVE BOX
[facility]                                  Nephrology and Hypertension                             Timur Kwon                                          [phone]     Patient is a 89y old  Female who presents with a chief complaint of STEMI (02 Feb 2025 08:42)       HPI:  Pt is a 89y/o F with PMHx HTN, DM2, HLD, depression who presented to the ED with intermittent jaw pain followed by anterior chest pressure radiating to the left shoulder, Pt was found to have elevated troponins and EKG with ST elevations with reciprocal ST-T changes in leads II, III, aVF. Code STEMI was activated and patient was taken to the cath lab where she was found to have 100% occlusion to the diagonal now s/p 1 stent placement. Patient seen and examined in the ICU, reports feeling much better. She denies any current chest pain or jaw pain, and also denies dizziness, headache, numbness/tingling, nausea, vomiting, palpitations, shortness of breath, abdominal pain. She does admit to chronic diarrhea. Prior to this event, patient was in her usual state of health. No other concerns at this time.    Renal consulted on 2/2/25 for JOHNNY/CKD. S/p ICU course with STEMI Code STEMI, 100% occlusion to the diagonal now s/p x1 stent placed. No cath lab complication reported and pt admitted to the ICU for post cath. Patient denies any knowledge of prior renal issues but Cr was 1.5 on admission     No acute events noted, states her breathing is much better    PAST MEDICAL & SURGICAL HISTORY:  HTN (hypertension)      DM (diabetes mellitus)      HLD (hyperlipidemia)      Major depression      No significant past surgical history           FAMILY HISTORY:  [place]    Social History:Non smoker    MEDICATIONS  (STANDING):  albuterol    0.083% 2.5 milliGRAM(s) Nebulizer every 6 hours  aspirin enteric coated 81 milliGRAM(s) Oral daily  atorvastatin 40 milliGRAM(s) Oral at bedtime  chlorhexidine 2% Cloths 1 Application(s) Topical <User Schedule>  clopidogrel Tablet 75 milliGRAM(s) Oral every 24 hours  dextrose 5%. 1000 milliLiter(s) (50 mL/Hr) IV Continuous <Continuous>  dextrose 5%. 1000 milliLiter(s) (100 mL/Hr) IV Continuous <Continuous>  dextrose 50% Injectable 25 Gram(s) IV Push once  dextrose 50% Injectable 12.5 Gram(s) IV Push once  dextrose 50% Injectable 25 Gram(s) IV Push once  DULoxetine 90 milliGRAM(s) Oral daily  glucagon  Injectable 1 milliGRAM(s) IntraMuscular once  heparin   Injectable 5000 Unit(s) SubCutaneous every 8 hours  influenza  Vaccine (HIGH DOSE) 0.5 milliLiter(s) IntraMuscular once  insulin lispro (ADMELOG) corrective regimen sliding scale   SubCutaneous Before meals and at bedtime  metoprolol tartrate 12.5 milliGRAM(s) Oral two times a day  nystatin Powder 1 Application(s) Topical two times a day  oseltamivir 30 milliGRAM(s) Oral daily    MEDICATIONS  (PRN):  acetaminophen     Tablet .. 650 milliGRAM(s) Oral every 6 hours PRN Temp greater or equal to 38C (100.4F), Mild Pain (1 - 3)  dextrose Oral Gel 15 Gram(s) Oral once PRN Blood Glucose LESS THAN 70 milliGRAM(s)/deciliter  sodium chloride 0.65% Nasal 1 Spray(s) Both Nostrils two times a day PRN Nasal Congestion   Meds reviewed    Allergies    No Known Allergies    Intolerances         REVIEW OF SYSTEMS: As per HPI, otherwise negative     Vital Signs Last 24 Hrs  T(C): 37.1 (07 Feb 2025 10:45), Max: 37.1 (07 Feb 2025 10:45)  T(F): 98.7 (07 Feb 2025 10:45), Max: 98.7 (07 Feb 2025 10:45)  HR: 78 (07 Feb 2025 13:39) (75 - 88)  BP: 100/56 (07 Feb 2025 10:45) (92/55 - 150/80)  BP(mean): --  RR: 18 (07 Feb 2025 10:45) (18 - 18)  SpO2: 97% (07 Feb 2025 13:39) (91% - 98%)    Parameters below as of 07 Feb 2025 13:39  Patient On (Oxygen Delivery Method): room air        PHYSICAL EXAM:    GENERAL: NAD  HEAD:  Atraumatic, Normocephalic  EYES: EOMI, conjunctiva and sclera clear  ENMT: No Drainage from nares, No drainage from ears  NECK: Supple, neck  veins full  NERVOUS SYSTEM:  Awake and Alert  CHEST/LUNG: coarseness noted, better   HEART: Regular rate and rhythm; No murmurs, rubs, or gallops  ABDOMEN: Soft, Nontender, Nondistended; Bowel sounds present  EXTREMITIES:  No Edema  SKIN: No rashes No obvious ecchymosis        LABS:                        8.8    7.20  )-----------( 360      ( 07 Feb 2025 05:55 )             27.9     02-07    140  |  109[H]  |  68[H]  ----------------------------<  171[H]  4.0   |  21[L]  |  2.10[H]    Ca    9.0      07 Feb 2025 05:55  Phos  3.5     02-07  Mg     2.5     02-07    TPro  6.0  /  Alb  2.3[L]  /  TBili  0.6  /  DBili  x   /  AST  31  /  ALT  33  /  AlkPhos  83  02-07      Urinalysis Basic - ( 07 Feb 2025 05:55 )    Color: x / Appearance: x / SG: x / pH: x  Gluc: 171 mg/dL / Ketone: x  / Bili: x / Urobili: x   Blood: x / Protein: x / Nitrite: x   Leuk Esterase: x / RBC: x / WBC x   Sq Epi: x / Non Sq Epi: x / Bacteria: x

## 2025-02-07 NOTE — PROGRESS NOTE ADULT - PROBLEM SELECTOR PLAN 3
- s/p PCI x1 to D1 (100% occlusion)  - Continue plavix and statin, anemia noted stable  - On FD Lovenox as well in setting of new onset afib  - With acute systolic congestive heart failure - initiated GDMT now on Toprol XL 25mg qd, BPs soft  - TTE as above, on PO Lasix 20mg qd  - Cardio Following

## 2025-02-07 NOTE — PROGRESS NOTE ADULT - PROBLEM SELECTOR PLAN 1
2/2 acute HFrEF and influenza viral PNA  - CT Chest: Moderate to severe right upper lobe pneumonia. Mild left upper lobe pneumonia, moderate b/l pleural effusions  - pleural effusion s/p right thoracentesis 2/6 with 570cc output  - had been on IV Lasix, now on PO Lasix 20mg qd  - TTE with mild pericardial effusion, EF 40%-45%  - Found to be Flu A + completed course of tamiflu  - standing saline and albuterol nebs  - continue O2 supplementation and wean as tolerated

## 2025-02-07 NOTE — PROGRESS NOTE ADULT - SUBJECTIVE AND OBJECTIVE BOX
Date/Time Patient Seen:  		  Referring MD:   Data Reviewed	       Patient is a 89y old  Female who presents with a chief complaint of STEMI (06 Feb 2025 13:03)      Subjective/HPI     PAST MEDICAL & SURGICAL HISTORY:  HTN (hypertension)    DM (diabetes mellitus)    HLD (hyperlipidemia)    Major depression    No significant past surgical history          Medication list         MEDICATIONS  (STANDING):  albuterol    0.083% 2.5 milliGRAM(s) Nebulizer every 6 hours  atorvastatin 40 milliGRAM(s) Oral at bedtime  chlorhexidine 2% Cloths 1 Application(s) Topical <User Schedule>  clopidogrel Tablet 75 milliGRAM(s) Oral daily  dextrose 5%. 1000 milliLiter(s) (50 mL/Hr) IV Continuous <Continuous>  dextrose 5%. 1000 milliLiter(s) (100 mL/Hr) IV Continuous <Continuous>  dextrose 50% Injectable 25 Gram(s) IV Push once  dextrose 50% Injectable 12.5 Gram(s) IV Push once  dextrose 50% Injectable 25 Gram(s) IV Push once  DULoxetine 90 milliGRAM(s) Oral daily  enoxaparin Injectable 70 milliGRAM(s) SubCutaneous every 24 hours  furosemide    Tablet 20 milliGRAM(s) Oral daily  glucagon  Injectable 1 milliGRAM(s) IntraMuscular once  influenza  Vaccine (HIGH DOSE) 0.5 milliLiter(s) IntraMuscular once  insulin lispro (ADMELOG) corrective regimen sliding scale   SubCutaneous Before meals and at bedtime  metoprolol tartrate 12.5 milliGRAM(s) Oral two times a day  nystatin Powder 1 Application(s) Topical two times a day  sodium chloride 3%  Inhalation 4 milliLiter(s) Inhalation every 12 hours    MEDICATIONS  (PRN):  acetaminophen     Tablet .. 650 milliGRAM(s) Oral every 6 hours PRN Temp greater or equal to 38C (100.4F), Mild Pain (1 - 3)  ALPRAZolam 0.25 milliGRAM(s) Oral two times a day PRN anxiety or panic attack  dextrose Oral Gel 15 Gram(s) Oral once PRN Blood Glucose LESS THAN 70 milliGRAM(s)/deciliter  sodium chloride 0.65% Nasal 1 Spray(s) Both Nostrils two times a day PRN Nasal Congestion         Vitals log        ICU Vital Signs Last 24 Hrs  T(C): 36.9 (07 Feb 2025 04:38), Max: 36.9 (06 Feb 2025 11:45)  T(F): 98.4 (07 Feb 2025 04:38), Max: 98.4 (06 Feb 2025 11:45)  HR: 75 (07 Feb 2025 04:38) (72 - 89)  BP: 150/80 (07 Feb 2025 04:38) (92/55 - 150/80)  BP(mean): 66 (06 Feb 2025 10:05) (66 - 69)  ABP: --  ABP(mean): --  RR: 18 (07 Feb 2025 04:38) (18 - 22)  SpO2: 95% (07 Feb 2025 04:38) (91% - 100%)    O2 Parameters below as of 07 Feb 2025 04:38  Patient On (Oxygen Delivery Method): nasal cannula  O2 Flow (L/min): 3               Input and Output:  I&O's Detail    06 Feb 2025 07:01  -  07 Feb 2025 07:00  --------------------------------------------------------  IN:    Oral Fluid: 270 mL  Total IN: 270 mL    OUT:    Incontinent per Collection Bag (mL): 450 mL    Other (mL): 500 mL    Voided (mL): 150 mL  Total OUT: 1100 mL    Total NET: -830 mL          Lab Data                        8.4    7.34  )-----------( 333      ( 06 Feb 2025 07:21 )             26.5     02-06    140  |  108  |  66[H]  ----------------------------<  174[H]  4.6   |  25  |  2.30[H]    Ca    9.3      06 Feb 2025 07:21    TPro  6.1  /  Alb  2.4[L]  /  TBili  0.5  /  DBili  x   /  AST  36  /  ALT  31  /  AlkPhos  80  02-06            Review of Systems	      Objective     Physical Examination    heart s1s2  lung dec BS  head nc  head at      Pertinent Lab findings & Imaging      Mery:  NO   Adequate UO     I&O's Detail    06 Feb 2025 07:01  -  07 Feb 2025 07:00  --------------------------------------------------------  IN:    Oral Fluid: 270 mL  Total IN: 270 mL    OUT:    Incontinent per Collection Bag (mL): 450 mL    Other (mL): 500 mL    Voided (mL): 150 mL  Total OUT: 1100 mL    Total NET: -830 mL               Discussed with:     Cultures:	        Radiology

## 2025-02-07 NOTE — PROGRESS NOTE ADULT - SUBJECTIVE AND OBJECTIVE BOX
[facility] -- Jamal Moralez, Roney Palumbo, Yasir White Cohen: Office # [phone]    Follow Up:  STEMI    Subjective/Observations: Patient seen and examined. Patient awake, alert, resting in bed. No complaints of chest pain, dyspnea, palpitations or dizziness. No signs of orthopnea or PND. Lying flat. + cough, Remains in A fib on telemetry.     REVIEW OF SYSTEMS: All other review of systems are negative unless indicated above    PAST MEDICAL & SURGICAL HISTORY:  HTN (hypertension)      DM (diabetes mellitus)      HLD (hyperlipidemia)      Major depression      No significant past surgical history      MEDICATIONS  (STANDING):  albuterol    0.083% 2.5 milliGRAM(s) Nebulizer every 6 hours  atorvastatin 40 milliGRAM(s) Oral at bedtime  chlorhexidine 2% Cloths 1 Application(s) Topical <User Schedule>  clopidogrel Tablet 75 milliGRAM(s) Oral daily  dextrose 5%. 1000 milliLiter(s) (50 mL/Hr) IV Continuous <Continuous>  dextrose 5%. 1000 milliLiter(s) (100 mL/Hr) IV Continuous <Continuous>  dextrose 50% Injectable 25 Gram(s) IV Push once  dextrose 50% Injectable 12.5 Gram(s) IV Push once  dextrose 50% Injectable 25 Gram(s) IV Push once  DULoxetine 90 milliGRAM(s) Oral daily  enoxaparin Injectable 70 milliGRAM(s) SubCutaneous every 24 hours  furosemide    Tablet 20 milliGRAM(s) Oral daily  glucagon  Injectable 1 milliGRAM(s) IntraMuscular once  influenza  Vaccine (HIGH DOSE) 0.5 milliLiter(s) IntraMuscular once  insulin lispro (ADMELOG) corrective regimen sliding scale   SubCutaneous Before meals and at bedtime  metoprolol tartrate 12.5 milliGRAM(s) Oral two times a day  nystatin Powder 1 Application(s) Topical two times a day  sodium chloride 3%  Inhalation 4 milliLiter(s) Inhalation every 12 hours    MEDICATIONS  (PRN):  acetaminophen     Tablet .. 650 milliGRAM(s) Oral every 6 hours PRN Temp greater or equal to 38C (100.4F), Mild Pain (1 - 3)  ALPRAZolam 0.25 milliGRAM(s) Oral two times a day PRN anxiety or panic attack  dextrose Oral Gel 15 Gram(s) Oral once PRN Blood Glucose LESS THAN 70 milliGRAM(s)/deciliter  sodium chloride 0.65% Nasal 1 Spray(s) Both Nostrils two times a day PRN Nasal Congestion    Allergies  No Known Allergies    Vital Signs Last 24 Hrs  T(C): 36.9 (07 Feb 2025 04:38), Max: 36.9 (06 Feb 2025 11:45)  T(F): 98.4 (07 Feb 2025 04:38), Max: 98.4 (06 Feb 2025 11:45)  HR: 77 (07 Feb 2025 07:53) (72 - 89)  BP: 150/80 (07 Feb 2025 04:38) (92/55 - 150/80)  BP(mean): 66 (06 Feb 2025 10:05) (66 - 69)  RR: 18 (07 Feb 2025 04:38) (18 - 22)  SpO2: 98% (07 Feb 2025 07:53) (91% - 100%)    Parameters below as of 07 Feb 2025 07:53  Patient On (Oxygen Delivery Method): nasal cannula      I&O's Summary    06 Feb 2025 07:01  -  07 Feb 2025 07:00  --------------------------------------------------------  IN: 270 mL / OUT: 1100 mL / NET: -830 mL    TELE: SR 70-90s  PHYSICAL EXAM:  Constitutional: NAD, awake and alert  HEENT: Moist Mucous Membranes, Anicteric  Pulmonary: Non-labored, breath sounds are clear bilaterally, No wheezing, rales + rhonchi  Cardiovascular: Irregular, S1 and S2, No murmurs, No rubs, gallops or clicks  Gastrointestinal:  soft, nontender, nondistended   Lymph: No peripheral edema. No lymphadenopathy.   Skin: No visible rashes or ulcers.  Psych:  Mood & affect appropriate    LABS: All Labs Reviewed:                        8.8    7.20  )-----------( 360      ( 07 Feb 2025 05:55 )             27.9                         8.4    7.34  )-----------( 333      ( 06 Feb 2025 07:21 )             26.5                         8.4    6.61  )-----------( 310      ( 05 Feb 2025 06:50 )             25.9     07 Feb 2025 05:55    140    |  109    |  68     ----------------------------<  171    4.0     |  21     |  2.10   06 Feb 2025 07:21    140    |  108    |  66     ----------------------------<  174    4.6     |  25     |  2.30   05 Feb 2025 06:50    140    |  107    |  56     ----------------------------<  163    3.8     |  22     |  2.30     Ca    9.0        07 Feb 2025 05:55  Ca    9.3        06 Feb 2025 07:21  Ca    8.9        05 Feb 2025 06:50  Phos  3.5       07 Feb 2025 05:55  Mg     2.5       07 Feb 2025 05:55  Mg     2.4       05 Feb 2025 06:50    TPro  6.0    /  Alb  2.3    /  TBili  0.6    /  DBili  x      /  AST  31     /  ALT  33     /  AlkPhos  83     07 Feb 2025 05:55  TPro  6.1    /  Alb  2.4    /  TBili  0.5    /  DBili  x      /  AST  36     /  ALT  31     /  AlkPhos  80     06 Feb 2025 07:21  TPro  6.2    /  Alb  2.4    /  TBili  0.6    /  DBili  x      /  AST  28     /  ALT  21     /  AlkPhos  78     05 Feb 2025 06:50   LIVER FUNCTIONS - ( 07 Feb 2025 05:55 )  Alb: 2.3 g/dL / Pro: 6.0 g/dL / ALK PHOS: 83 U/L / ALT: 33 U/L / AST: 31 U/L / GGT: x           PT/INR - ( 05 Feb 2025 12:17 )   PT: 13.7 sec;   INR: 1.16 ratio         PTT - ( 05 Feb 2025 12:17 )  PTT:35.9 secTroponin I, High Sensitivity Result: 53046.4 ng/L (01-28-25 @ 13:30)  Cholesterol: 261 mg/dL (01-27-25 @ 06:10)  HDL Cholesterol: 43 mg/dL (01-27-25 @ 06:10)  Triglycerides, Serum: 239 mg/dL (01-27-25 @ 06:10)    12 Lead ECG:   Ventricular Rate 100 BPM    QRS Duration 98 ms    Q-T Interval 360 ms    QTC Calculation(Bazett) 464 ms    R Axis 50 degrees    T Axis -47 degrees    Diagnosis Line *** Critical Test Result: STEMI  Atrial fibrillation  Lateral infarct (cited on or before 26-JAN-2025)  ST & T wave abnormality, consider inferior ischemia  ST & T wave abnormality, consider anterior ischemia    Confirmed by ISAAC PALUMBO ([id number]) on 2/5/2025 12:55:12 PM (02-04-25 @ 16:25)      TRANSTHORACIC ECHOCARDIOGRAM REPORT  ________________________________________________________________________________                                      _______       Pt. Name:       DOMITILA GRACE Study Date:    1/28/2025  MRN:            [id number]           YOB: 1933  Accession #:    [id number]          Age:           89 years  Account#:       [id number]         Gender:        F  Heart Rate:                        Height:        62.20 in (158.00 cm)  Rhythm:          Weight:        160.93 lb (73.00 kg)  Blood Pressure: 93/52 mmHg         BSA/BMI:       1.75 m² / 29.24 kg/m²  ________________________________________________________________________________________  Referring Physician:    [id number] Carlos Alberto  Interpreting Physician: Braxton Makaryus M.D.  Primary Sonographer:    Marlys Queen    CPT:               ECHO TTE W/O CON F/U LTD - 03994.m  Indication(s):     ST elevation [STEMI] myocardial infarction of unspecified                     site - I21.3  Procedure:         Limited transthoracic echocardiogram.  Ordering Location: ICU1  Admission Status:  Inpatient    _______________________________________________________________________________________     CONCLUSIONS:      1. Left ventricular systolic function is moderately decreased with an ejection fraction visually estimated at 40 to 45 %.   2. Small pericardial effusion noted adjacent to the right atrium and small pericardial effusion noted adjacent to the right ventricle. The effusion measures 0.82 cm in diastole in the subcostal view adjacent ot the RV.   3. Thickened pericardium.   4. No significant change in the size of the pericardial effusion compared to TTE study from yesterday (1/27/25) is noted.    ________________________________________________________________________________________  FINDINGS:     Left Ventricle:  Left ventricular systolic function is moderately decreased with an ejection fraction visually estimated at 40 to 45%.     Right Ventricle:  Right ventricular systolic function is normal.     Left Atrium:  The left atrium is dilated.     Mitral Valve:  There is moderate calcification of the mitral valve annulus.     Pericardium:  The pericardium is thickened. There is a small pericardial effusionnoted adjacent to the right atrium and a small pericardial effusion noted adjacent to the right ventricle.     Systemic Veins:  The inferior vena cava is normal in size measuring 1.83 cm in diameter, (normal <2.1cm) with normal inspiratory collapse (normal >50%) consistent with normal right atrial pressure (~3, range 0-5mmHg).  ____________________________________________________________________  QUANTITATIVE DATA:  Left Ventricle Measurements: (Indexed to BSA)     Visualized LV EF%: 40 to 45%       ________________________________________________________________________________________  Electronically signed on 1/28/2025 at 2:21:15 PM by Braxton Bowens M.D.         *** Final ***   [facility] -- Jamal Moralez, Roney Palumbo, Yasir White Cohen: Office # [phone]    Follow Up:  STEMI    Subjective/Observations: Patient seen and examined. Patient awake, alert, resting in bed. No complaints of chest pain, dyspnea, palpitations or dizziness. No signs of orthopnea or PND. Lying flat. + cough, Remains in A fib on telemetry.     REVIEW OF SYSTEMS: All other review of systems are negative unless indicated above    PAST MEDICAL & SURGICAL HISTORY:  HTN (hypertension)      DM (diabetes mellitus)      HLD (hyperlipidemia)      Major depression      No significant past surgical history      MEDICATIONS  (STANDING):  albuterol    0.083% 2.5 milliGRAM(s) Nebulizer every 6 hours  atorvastatin 40 milliGRAM(s) Oral at bedtime  chlorhexidine 2% Cloths 1 Application(s) Topical <User Schedule>  clopidogrel Tablet 75 milliGRAM(s) Oral daily  dextrose 5%. 1000 milliLiter(s) (50 mL/Hr) IV Continuous <Continuous>  dextrose 5%. 1000 milliLiter(s) (100 mL/Hr) IV Continuous <Continuous>  dextrose 50% Injectable 25 Gram(s) IV Push once  dextrose 50% Injectable 12.5 Gram(s) IV Push once  dextrose 50% Injectable 25 Gram(s) IV Push once  DULoxetine 90 milliGRAM(s) Oral daily  enoxaparin Injectable 70 milliGRAM(s) SubCutaneous every 24 hours  furosemide    Tablet 20 milliGRAM(s) Oral daily  glucagon  Injectable 1 milliGRAM(s) IntraMuscular once  influenza  Vaccine (HIGH DOSE) 0.5 milliLiter(s) IntraMuscular once  insulin lispro (ADMELOG) corrective regimen sliding scale   SubCutaneous Before meals and at bedtime  metoprolol tartrate 12.5 milliGRAM(s) Oral two times a day  nystatin Powder 1 Application(s) Topical two times a day  sodium chloride 3%  Inhalation 4 milliLiter(s) Inhalation every 12 hours    MEDICATIONS  (PRN):  acetaminophen     Tablet .. 650 milliGRAM(s) Oral every 6 hours PRN Temp greater or equal to 38C (100.4F), Mild Pain (1 - 3)  ALPRAZolam 0.25 milliGRAM(s) Oral two times a day PRN anxiety or panic attack  dextrose Oral Gel 15 Gram(s) Oral once PRN Blood Glucose LESS THAN 70 milliGRAM(s)/deciliter  sodium chloride 0.65% Nasal 1 Spray(s) Both Nostrils two times a day PRN Nasal Congestion    Allergies  No Known Allergies    Vital Signs Last 24 Hrs  T(C): 36.9 (07 Feb 2025 04:38), Max: 36.9 (06 Feb 2025 11:45)  T(F): 98.4 (07 Feb 2025 04:38), Max: 98.4 (06 Feb 2025 11:45)  HR: 77 (07 Feb 2025 07:53) (72 - 89)  BP: 150/80 (07 Feb 2025 04:38) (92/55 - 150/80)  BP(mean): 66 (06 Feb 2025 10:05) (66 - 69)  RR: 18 (07 Feb 2025 04:38) (18 - 22)  SpO2: 98% (07 Feb 2025 07:53) (91% - 100%)    Parameters below as of 07 Feb 2025 07:53  Patient On (Oxygen Delivery Method): nasal cannula      I&O's Summary    06 Feb 2025 07:01  -  07 Feb 2025 07:00  --------------------------------------------------------  IN: 270 mL / OUT: 1100 mL / NET: -830 mL    TELE: SR with pAF  PHYSICAL EXAM:  Constitutional: NAD, awake and alert  HEENT: Moist Mucous Membranes, Anicteric  Pulmonary: Non-labored, breath sounds are clear bilaterally, No wheezing, rales + rhonchi  Cardiovascular: Irregular, S1 and S2, No murmurs, No rubs, gallops or clicks  Gastrointestinal:  soft, nontender, nondistended   Lymph: No peripheral edema. No lymphadenopathy.   Skin: No visible rashes or ulcers.  Psych:  Mood & affect appropriate    LABS: All Labs Reviewed:                        8.8    7.20  )-----------( 360      ( 07 Feb 2025 05:55 )             27.9                         8.4    7.34  )-----------( 333      ( 06 Feb 2025 07:21 )             26.5                         8.4    6.61  )-----------( 310      ( 05 Feb 2025 06:50 )             25.9     07 Feb 2025 05:55    140    |  109    |  68     ----------------------------<  171    4.0     |  21     |  2.10   06 Feb 2025 07:21    140    |  108    |  66     ----------------------------<  174    4.6     |  25     |  2.30   05 Feb 2025 06:50    140    |  107    |  56     ----------------------------<  163    3.8     |  22     |  2.30     Ca    9.0        07 Feb 2025 05:55  Ca    9.3        06 Feb 2025 07:21  Ca    8.9        05 Feb 2025 06:50  Phos  3.5       07 Feb 2025 05:55  Mg     2.5       07 Feb 2025 05:55  Mg     2.4       05 Feb 2025 06:50    TPro  6.0    /  Alb  2.3    /  TBili  0.6    /  DBili  x      /  AST  31     /  ALT  33     /  AlkPhos  83     07 Feb 2025 05:55  TPro  6.1    /  Alb  2.4    /  TBili  0.5    /  DBili  x      /  AST  36     /  ALT  31     /  AlkPhos  80     06 Feb 2025 07:21  TPro  6.2    /  Alb  2.4    /  TBili  0.6    /  DBili  x      /  AST  28     /  ALT  21     /  AlkPhos  78     05 Feb 2025 06:50   LIVER FUNCTIONS - ( 07 Feb 2025 05:55 )  Alb: 2.3 g/dL / Pro: 6.0 g/dL / ALK PHOS: 83 U/L / ALT: 33 U/L / AST: 31 U/L / GGT: x           PT/INR - ( 05 Feb 2025 12:17 )   PT: 13.7 sec;   INR: 1.16 ratio         PTT - ( 05 Feb 2025 12:17 )  PTT:35.9 secTroponin I, High Sensitivity Result: 89895.4 ng/L (01-28-25 @ 13:30)  Cholesterol: 261 mg/dL (01-27-25 @ 06:10)  HDL Cholesterol: 43 mg/dL (01-27-25 @ 06:10)  Triglycerides, Serum: 239 mg/dL (01-27-25 @ 06:10)    12 Lead ECG:   Ventricular Rate 100 BPM    QRS Duration 98 ms    Q-T Interval 360 ms    QTC Calculation(Bazett) 464 ms    R Axis 50 degrees    T Axis -47 degrees    Diagnosis Line *** Critical Test Result: STEMI  Atrial fibrillation  Lateral infarct (cited on or before 26-JAN-2025)  ST & T wave abnormality, consider inferior ischemia  ST & T wave abnormality, consider anterior ischemia    Confirmed by ISAAC PALUMBO ([id number]) on 2/5/2025 12:55:12 PM (02-04-25 @ 16:25)      TRANSTHORACIC ECHOCARDIOGRAM REPORT  ________________________________________________________________________________                                      _______       Pt. Name:       DOMITILA GRACE Study Date:    1/28/2025  MRN:            [id number]           YOB: 1933  Accession #:    [id number]          Age:           89 years  Account#:       [id number]         Gender:        F  Heart Rate:                        Height:        62.20 in (158.00 cm)  Rhythm:          Weight:        160.93 lb (73.00 kg)  Blood Pressure: 93/52 mmHg         BSA/BMI:       1.75 m² / 29.24 kg/m²  ________________________________________________________________________________________  Referring Physician:    [id number] Carlos Alberto  Interpreting Physician: Braxton Bowens M.D.  Primary Sonographer:    Marlys Queen    CPT:               ECHO TTE W/O CON F/U LTD - 13731.m  Indication(s):     ST elevation [STEMI] myocardial infarction of unspecified                     site - I21.3  Procedure:         Limited transthoracic echocardiogram.  Ordering Location: ICU1  Admission Status:  Inpatient    _______________________________________________________________________________________     CONCLUSIONS:      1. Left ventricular systolic function is moderately decreased with an ejection fraction visually estimated at 40 to 45 %.   2. Small pericardial effusion noted adjacent to the right atrium and small pericardial effusion noted adjacent to the right ventricle. The effusion measures 0.82 cm in diastole in the subcostal view adjacent ot the RV.   3. Thickened pericardium.   4. No significant change in the size of the pericardial effusion compared to TTE study from yesterday (1/27/25) is noted.    ________________________________________________________________________________________  FINDINGS:     Left Ventricle:  Left ventricular systolic function is moderately decreased with an ejection fraction visually estimated at 40 to 45%.     Right Ventricle:  Right ventricular systolic function is normal.     Left Atrium:  The left atrium is dilated.     Mitral Valve:  There is moderate calcification of the mitral valve annulus.     Pericardium:  The pericardium is thickened. There is a small pericardial effusionnoted adjacent to the right atrium and a small pericardial effusion noted adjacent to the right ventricle.     Systemic Veins:  The inferior vena cava is normal in size measuring 1.83 cm in diameter, (normal <2.1cm) with normal inspiratory collapse (normal >50%) consistent with normal right atrial pressure (~3, range 0-5mmHg).  ____________________________________________________________________  QUANTITATIVE DATA:  Left Ventricle Measurements: (Indexed to BSA)     Visualized LV EF%: 40 to 45%       ________________________________________________________________________________________  Electronically signed on 1/28/2025 at 2:21:15 PM by Braxton Bowens M.D.         *** Final ***

## 2025-02-07 NOTE — PROGRESS NOTE ADULT - PROBLEM SELECTOR PLAN 5
Chronic, DM2, A1c 7.3  - takes metformin at home; hold   - PRITESH with FS QAC/QHS  - hypoglycemia protocol  - Return to home on metformin + farxiga

## 2025-02-07 NOTE — PROGRESS NOTE ADULT - ASSESSMENT
89y/o F with PMHx HTN, DM2, HLD, depression who presented to the ED with intermittent jaw pain followed by anterior chest pressure radiating to the left shoulder, Pt was found to have elevated troponins and EKG with ST elevations with reciprocal ST-T changes in leads II, III, aVF. Code STEMI was activated and patient was taken to the cath lab where she was found to have 100% occlusion to the diagonal now s/p 1 stent placement. Patient seen and examined in the ICU, reports feeling much better.    flu  malaise  weakness  STEMI  OP  OA  HTN  DM  HLD    570 cc drained - right side - exudate -   on po lasix - low dose  cardio f/u  I velma  wean fio2    monitor for pulm edema - CHF ex -   Cardio f/u   I and O  replete lytes  cvs rx regimen optimization and HD monitoring  TTE 1/27/25 Left ventricular systolic function is moderately decreased with an ejection fraction visually estimated at 40 to 45 %. small pericardial effusion noted  FLU management - isol precs  nebs - mucolytics  cough rx regimen  I velma  fio2 wean as tolerated  goal sat > 88 pct  oral hygiene  skin care  DM care  s/p ICU stay -   chest imaging reviewed - eff - atelectasis - pulm edema - will benefit from repeat

## 2025-02-07 NOTE — PROGRESS NOTE ADULT - PROBLEM SELECTOR PLAN 2
New onset afib s/p PCI for STEMI  - Now on Toprol XL 25mg qd  - Start PO amio load to maintain NSR  - On renally dosed FD Lovenox for AC, transition to Eliquis when JOHNNY improves  - Cardio following  - Monitor on telemetry  - Replete electrolytes as needed

## 2025-02-07 NOTE — PROGRESS NOTE ADULT - ASSESSMENT
89y/o F with HTN, DM2, HLD, depression who presented to the ED with intermittent jaw pain followed by anterior chest pressure radiating to the left shoulder, admitted for STEMI.    - Pt p/w intermittent jaw pain followed by anterior chest pressure radiating to the left shoulder, admitted for STEMI.  - S/p PCI (1/26/25) with 100% occlusion of diagonal and MARLENE x1  - Continue Plavix 75 mg daily  - Aspirin d/c as pt is now on full dose AC for Afib to avoid triple therapy  - Continue Lipitor 40mg QHS  - Previously with hypotension after getting BB dosing sbp improved metoprolol 12.5 mg twice daily started      - TTE 1/27/25 Left ventricular systolic function is moderately decreased with an ejection fraction visually estimated at 40 to 45 %. small pericardial effusion noted.   - BNP:  <--11573  - S/p thoracentesis 2/6 570cc of yellow pleuritic fluid  - Appears compensated from HF POV.   - Creatinine:  <--2.10,  <--2.30,  <--2.30,  <--2.00  - Hold off on ARB/ARNI given JOHNNY  - Now on Lasix 20 mg PO daily   - Please continue to maintain strict I/Os, monitor daily weights, Cr, and K.     - Tele w/ SR 70-90s  - Continue Metoprolol 12.5 BID. Can eventually switch to Toprol for GDMT  - Continue Lovenox full dose, dose has been adjusted for creatinine clearance  - Will need to transition to Eliquis when she is more clinically stable from a JOHNNY/ flu standpoint.  - For now will hope that she reverts to SR as flu improves, etc.   - If she does not can consider DCCV which would be more important if she decompensates further, attributable to loss of av synchrony    - BP stable and controlled     - Flu+, Management per primary team   - Monitor and replete lytes, keep K>4, Mg>2.  - Will continue to follow.    Angela Cm, MS FNP, AGACNP  Nurse Practitioner- Cardiology   Please call on TEAMS   89y/o F with HTN, DM2, HLD, depression who presented to the ED with intermittent jaw pain followed by anterior chest pressure radiating to the left shoulder, admitted for STEMI.    - Pt p/w intermittent jaw pain followed by anterior chest pressure radiating to the left shoulder, admitted for STEMI.  - S/p PCI (1/26/25) with 100% occlusion of diagonal and MARLENE x1  - Continue Plavix 75 mg daily  - Aspirin d/c as pt is now on full dose AC for Afib to avoid triple therapy  - Please increase Lipitor to 80mg QD, LDL c in 170s  - Previously with hypotension after getting BB dosing sbp improved metoprolol 12.5 mg twice daily started. Please transition to succinate formulation.     - TTE 1/27/25 Left ventricular systolic function is moderately decreased with an ejection fraction visually estimated at 40 to 45 %. small pericardial effusion noted.   - BNP:  <--29939  - S/p thoracentesis 2/6 570cc of yellow pleuritic fluid  - Appears compensated from HF POV.   - Creatinine:  <--2.10,  <--2.30,  <--2.30,  <--2.00  - Hold off on ARB/ARNI given JOHNNY  - Now on Lasix 20 mg PO daily   - Please continue to maintain strict I/Os, monitor daily weights, Cr, and K.     - Tele w/ SR 70-90s  - Continue Metoprolol 12.5 BID. Please transition to Succinate dosing   - Continue Lovenox full dose, dose has been adjusted for creatinine clearance  - Will need to transition to Eliquis when she is more clinically stable from a JOHNNY/ flu standpoint.  - For now will hope that she reverts to SR as flu improves, etc.   - Remains in and out of AF  - BP stable and controlled     - Flu+, Management per primary team   - Monitor and replete lytes, keep K>4, Mg>2.  - Will continue to follow.    Angela Cm, MS FNP, AGACNP  Nurse Practitioner- Cardiology   Please call on TEAMS

## 2025-02-07 NOTE — PROGRESS NOTE ADULT - SUBJECTIVE AND OBJECTIVE BOX
Patient is a 89y old  Female who presents with a chief complaint of STEMI (07 Feb 2025 09:16)      INTERVAL HPI/OVERNIGHT EVENTS: Patient seen and examined at bedside. No overnight events occurred. Patient has no complaints at this time. Denies fevers, chills, headache, lightheadedness, chest pain, dyspnea, abdominal pain, n/v/d/c.    MEDICATIONS  (STANDING):  albuterol    0.083% 2.5 milliGRAM(s) Nebulizer every 6 hours  aMIOdarone    Tablet   Oral   aMIOdarone    Tablet 400 milliGRAM(s) Oral every 8 hours  atorvastatin 80 milliGRAM(s) Oral at bedtime  chlorhexidine 2% Cloths 1 Application(s) Topical <User Schedule>  clopidogrel Tablet 75 milliGRAM(s) Oral daily  dextrose 5%. 1000 milliLiter(s) (50 mL/Hr) IV Continuous <Continuous>  dextrose 5%. 1000 milliLiter(s) (100 mL/Hr) IV Continuous <Continuous>  dextrose 50% Injectable 25 Gram(s) IV Push once  dextrose 50% Injectable 12.5 Gram(s) IV Push once  dextrose 50% Injectable 25 Gram(s) IV Push once  DULoxetine 90 milliGRAM(s) Oral daily  enoxaparin Injectable 70 milliGRAM(s) SubCutaneous every 24 hours  furosemide    Tablet 20 milliGRAM(s) Oral daily  glucagon  Injectable 1 milliGRAM(s) IntraMuscular once  influenza  Vaccine (HIGH DOSE) 0.5 milliLiter(s) IntraMuscular once  insulin lispro (ADMELOG) corrective regimen sliding scale   SubCutaneous Before meals and at bedtime  metoprolol succinate ER 25 milliGRAM(s) Oral daily  nystatin Powder 1 Application(s) Topical two times a day  sodium chloride 3%  Inhalation 4 milliLiter(s) Inhalation every 12 hours    MEDICATIONS  (PRN):  acetaminophen     Tablet .. 650 milliGRAM(s) Oral every 6 hours PRN Temp greater or equal to 38C (100.4F), Mild Pain (1 - 3)  ALPRAZolam 0.25 milliGRAM(s) Oral two times a day PRN anxiety or panic attack  dextrose Oral Gel 15 Gram(s) Oral once PRN Blood Glucose LESS THAN 70 milliGRAM(s)/deciliter  sodium chloride 0.65% Nasal 1 Spray(s) Both Nostrils two times a day PRN Nasal Congestion      Allergies    No Known Allergies    Intolerances        REVIEW OF SYSTEMS:  CONSTITUTIONAL: No fever or chills  HEENT:  No headache, no sore throat  RESPIRATORY: No cough, wheezing, or shortness of breath  CARDIOVASCULAR: No chest pain, palpitations  GASTROINTESTINAL: No abd pain, nausea, vomiting, or diarrhea  GENITOURINARY: No dysuria, frequency, or hematuria  NEUROLOGICAL: no focal weakness or dizziness  MUSCULOSKELETAL: no myalgias     Vital Signs Last 24 Hrs  T(C): 37.1 (07 Feb 2025 10:45), Max: 37.1 (07 Feb 2025 10:45)  T(F): 98.7 (07 Feb 2025 10:45), Max: 98.7 (07 Feb 2025 10:45)  HR: 85 (07 Feb 2025 10:45) (72 - 88)  BP: 100/56 (07 Feb 2025 10:45) (92/55 - 150/80)  BP(mean): --  RR: 18 (07 Feb 2025 10:45) (18 - 18)  SpO2: 91% (07 Feb 2025 10:45) (91% - 98%)    Parameters below as of 07 Feb 2025 10:45  Patient On (Oxygen Delivery Method): room air        PHYSICAL EXAM:  GENERAL: NAD  HEENT:  anicteric, moist mucous membranes  CHEST/LUNG:  CTA b/l, no rales, wheezes, or rhonchi  HEART:  RRR, S1, S2  ABDOMEN:  BS+, soft, nontender, nondistended  EXTREMITIES: no edema, cyanosis, or calf tenderness  NERVOUS SYSTEM: answers questions and follows commands appropriately    LABS:                        8.8    7.20  )-----------( 360      ( 07 Feb 2025 05:55 )             27.9     CBC Full  -  ( 07 Feb 2025 05:55 )  WBC Count : 7.20 K/uL  Hemoglobin : 8.8 g/dL  Hematocrit : 27.9 %  Platelet Count - Automated : 360 K/uL  Mean Cell Volume : 91.8 fl  Mean Cell Hemoglobin : 28.9 pg  Mean Cell Hemoglobin Concentration : 31.5 g/dL  Auto Neutrophil # : 5.67 K/uL  Auto Lymphocyte # : 0.67 K/uL  Auto Monocyte # : 0.45 K/uL  Auto Eosinophil # : 0.30 K/uL  Auto Basophil # : 0.02 K/uL  Auto Neutrophil % : 78.6 %  Auto Lymphocyte % : 9.3 %  Auto Monocyte % : 6.3 %  Auto Eosinophil % : 4.2 %  Auto Basophil % : 0.3 %    07 Feb 2025 05:55    140    |  109    |  68     ----------------------------<  171    4.0     |  21     |  2.10     Ca    9.0        07 Feb 2025 05:55  Phos  3.5       07 Feb 2025 05:55  Mg     2.5       07 Feb 2025 05:55    TPro  6.0    /  Alb  2.3    /  TBili  0.6    /  DBili  x      /  AST  31     /  ALT  33     /  AlkPhos  83     07 Feb 2025 05:55      Urinalysis Basic - ( 07 Feb 2025 05:55 )    Color: x / Appearance: x / SG: x / pH: x  Gluc: 171 mg/dL / Ketone: x  / Bili: x / Urobili: x   Blood: x / Protein: x / Nitrite: x   Leuk Esterase: x / RBC: x / WBC x   Sq Epi: x / Non Sq Epi: x / Bacteria: x      CAPILLARY BLOOD GLUCOSE      POCT Blood Glucose.: 224 mg/dL (07 Feb 2025 12:02)  POCT Blood Glucose.: 183 mg/dL (07 Feb 2025 08:01)  POCT Blood Glucose.: 173 mg/dL (06 Feb 2025 21:27)  POCT Blood Glucose.: 187 mg/dL (06 Feb 2025 17:08)        Culture - Fungal, Body Fluid (collected 02-06-25 @ 09:44)  Source: Pleural Fl  Preliminary Report (02-07-25 @ 09:21):    Testing in progress    Culture - Body Fluid with Gram Stain (collected 02-06-25 @ 09:44)  Source: Pleural Fl  Gram Stain (02-06-25 @ 23:27):    polymorphonuclear leukocytes seen    No organisms seen    by cytocentrifuge        RADIOLOGY & ADDITIONAL TESTS:    Personally reviewed.     Consultant(s) Notes Reviewed:  [x] YES  [ ] NO     Patient is a 89y old  Female who presents with a chief complaint of STEMI (07 Feb 2025 09:16)      INTERVAL HPI/OVERNIGHT EVENTS: Patient seen and examined at bedside. No overnight events occurred. Sp right thoracentesis yesterday with 570cc fluid drained. Patient reports breathing feels improved after thoracentesis, currently on 2LNC. Patient has no complaints at this time. Denies fevers, chills, headache, lightheadedness, chest pain, dyspnea, abdominal pain, n/v/d/c.    MEDICATIONS  (STANDING):  albuterol    0.083% 2.5 milliGRAM(s) Nebulizer every 6 hours  aMIOdarone    Tablet   Oral   aMIOdarone    Tablet 400 milliGRAM(s) Oral every 8 hours  atorvastatin 80 milliGRAM(s) Oral at bedtime  chlorhexidine 2% Cloths 1 Application(s) Topical <User Schedule>  clopidogrel Tablet 75 milliGRAM(s) Oral daily  dextrose 5%. 1000 milliLiter(s) (50 mL/Hr) IV Continuous <Continuous>  dextrose 5%. 1000 milliLiter(s) (100 mL/Hr) IV Continuous <Continuous>  dextrose 50% Injectable 25 Gram(s) IV Push once  dextrose 50% Injectable 12.5 Gram(s) IV Push once  dextrose 50% Injectable 25 Gram(s) IV Push once  DULoxetine 90 milliGRAM(s) Oral daily  enoxaparin Injectable 70 milliGRAM(s) SubCutaneous every 24 hours  furosemide    Tablet 20 milliGRAM(s) Oral daily  glucagon  Injectable 1 milliGRAM(s) IntraMuscular once  influenza  Vaccine (HIGH DOSE) 0.5 milliLiter(s) IntraMuscular once  insulin lispro (ADMELOG) corrective regimen sliding scale   SubCutaneous Before meals and at bedtime  metoprolol succinate ER 25 milliGRAM(s) Oral daily  nystatin Powder 1 Application(s) Topical two times a day  sodium chloride 3%  Inhalation 4 milliLiter(s) Inhalation every 12 hours    MEDICATIONS  (PRN):  acetaminophen     Tablet .. 650 milliGRAM(s) Oral every 6 hours PRN Temp greater or equal to 38C (100.4F), Mild Pain (1 - 3)  ALPRAZolam 0.25 milliGRAM(s) Oral two times a day PRN anxiety or panic attack  dextrose Oral Gel 15 Gram(s) Oral once PRN Blood Glucose LESS THAN 70 milliGRAM(s)/deciliter  sodium chloride 0.65% Nasal 1 Spray(s) Both Nostrils two times a day PRN Nasal Congestion      Allergies    No Known Allergies    Intolerances        REVIEW OF SYSTEMS:  CONSTITUTIONAL: No fever or chills  HEENT:  No headache, no sore throat  RESPIRATORY: No cough, wheezing, or shortness of breath  CARDIOVASCULAR: No chest pain, palpitations  GASTROINTESTINAL: No abd pain, nausea, vomiting, or diarrhea  GENITOURINARY: No dysuria, frequency, or hematuria  NEUROLOGICAL: no focal weakness or dizziness  MUSCULOSKELETAL: no myalgias     Vital Signs Last 24 Hrs  T(C): 37.1 (07 Feb 2025 10:45), Max: 37.1 (07 Feb 2025 10:45)  T(F): 98.7 (07 Feb 2025 10:45), Max: 98.7 (07 Feb 2025 10:45)  HR: 85 (07 Feb 2025 10:45) (72 - 88)  BP: 100/56 (07 Feb 2025 10:45) (92/55 - 150/80)  BP(mean): --  RR: 18 (07 Feb 2025 10:45) (18 - 18)  SpO2: 91% (07 Feb 2025 10:45) (91% - 98%)    Parameters below as of 07 Feb 2025 10:45  Patient On (Oxygen Delivery Method): room air        PHYSICAL EXAM:  GENERAL: NAD  HEENT:  anicteric, moist mucous membranes  CHEST/LUNG:  CTA b/l, no rales, wheezes, or rhonchi  HEART:  RRR, S1, S2  ABDOMEN:  BS+, soft, nontender, nondistended  EXTREMITIES: no edema, cyanosis, or calf tenderness  NERVOUS SYSTEM: answers questions and follows commands appropriately    LABS:                        8.8    7.20  )-----------( 360      ( 07 Feb 2025 05:55 )             27.9     CBC Full  -  ( 07 Feb 2025 05:55 )  WBC Count : 7.20 K/uL  Hemoglobin : 8.8 g/dL  Hematocrit : 27.9 %  Platelet Count - Automated : 360 K/uL  Mean Cell Volume : 91.8 fl  Mean Cell Hemoglobin : 28.9 pg  Mean Cell Hemoglobin Concentration : 31.5 g/dL  Auto Neutrophil # : 5.67 K/uL  Auto Lymphocyte # : 0.67 K/uL  Auto Monocyte # : 0.45 K/uL  Auto Eosinophil # : 0.30 K/uL  Auto Basophil # : 0.02 K/uL  Auto Neutrophil % : 78.6 %  Auto Lymphocyte % : 9.3 %  Auto Monocyte % : 6.3 %  Auto Eosinophil % : 4.2 %  Auto Basophil % : 0.3 %    07 Feb 2025 05:55    140    |  109    |  68     ----------------------------<  171    4.0     |  21     |  2.10     Ca    9.0        07 Feb 2025 05:55  Phos  3.5       07 Feb 2025 05:55  Mg     2.5       07 Feb 2025 05:55    TPro  6.0    /  Alb  2.3    /  TBili  0.6    /  DBili  x      /  AST  31     /  ALT  33     /  AlkPhos  83     07 Feb 2025 05:55      Urinalysis Basic - ( 07 Feb 2025 05:55 )    Color: x / Appearance: x / SG: x / pH: x  Gluc: 171 mg/dL / Ketone: x  / Bili: x / Urobili: x   Blood: x / Protein: x / Nitrite: x   Leuk Esterase: x / RBC: x / WBC x   Sq Epi: x / Non Sq Epi: x / Bacteria: x      CAPILLARY BLOOD GLUCOSE      POCT Blood Glucose.: 224 mg/dL (07 Feb 2025 12:02)  POCT Blood Glucose.: 183 mg/dL (07 Feb 2025 08:01)  POCT Blood Glucose.: 173 mg/dL (06 Feb 2025 21:27)  POCT Blood Glucose.: 187 mg/dL (06 Feb 2025 17:08)        Culture - Fungal, Body Fluid (collected 02-06-25 @ 09:44)  Source: Pleural Fl  Preliminary Report (02-07-25 @ 09:21):    Testing in progress    Culture - Body Fluid with Gram Stain (collected 02-06-25 @ 09:44)  Source: Pleural Fl  Gram Stain (02-06-25 @ 23:27):    polymorphonuclear leukocytes seen    No organisms seen    by cytocentrifuge        RADIOLOGY & ADDITIONAL TESTS:    Personally reviewed.     Consultant(s) Notes Reviewed:  [x] YES  [ ] NO     Patient is a 89y old  Female who presents with a chief complaint of STEMI (07 Feb 2025 09:16)      INTERVAL HPI/OVERNIGHT EVENTS: Patient seen and examined at bedside. No overnight events occurred. S/p right thoracentesis yesterday with 570cc fluid drained. Patient reports breathing feels improved after thoracentesis, currently on 2LNC. Transitioned to PO Lasix. Patient still reports lingering cough given flu. Denies fevers, chills, headache, lightheadedness, chest pain, dyspnea, abdominal pain, n/v/d/c.    MEDICATIONS  (STANDING):  albuterol    0.083% 2.5 milliGRAM(s) Nebulizer every 6 hours  aMIOdarone    Tablet   Oral   aMIOdarone    Tablet 400 milliGRAM(s) Oral every 8 hours  atorvastatin 80 milliGRAM(s) Oral at bedtime  chlorhexidine 2% Cloths 1 Application(s) Topical <User Schedule>  clopidogrel Tablet 75 milliGRAM(s) Oral daily  dextrose 5%. 1000 milliLiter(s) (50 mL/Hr) IV Continuous <Continuous>  dextrose 5%. 1000 milliLiter(s) (100 mL/Hr) IV Continuous <Continuous>  dextrose 50% Injectable 25 Gram(s) IV Push once  dextrose 50% Injectable 12.5 Gram(s) IV Push once  dextrose 50% Injectable 25 Gram(s) IV Push once  DULoxetine 90 milliGRAM(s) Oral daily  enoxaparin Injectable 70 milliGRAM(s) SubCutaneous every 24 hours  furosemide    Tablet 20 milliGRAM(s) Oral daily  glucagon  Injectable 1 milliGRAM(s) IntraMuscular once  influenza  Vaccine (HIGH DOSE) 0.5 milliLiter(s) IntraMuscular once  insulin lispro (ADMELOG) corrective regimen sliding scale   SubCutaneous Before meals and at bedtime  metoprolol succinate ER 25 milliGRAM(s) Oral daily  nystatin Powder 1 Application(s) Topical two times a day  sodium chloride 3%  Inhalation 4 milliLiter(s) Inhalation every 12 hours    MEDICATIONS  (PRN):  acetaminophen     Tablet .. 650 milliGRAM(s) Oral every 6 hours PRN Temp greater or equal to 38C (100.4F), Mild Pain (1 - 3)  ALPRAZolam 0.25 milliGRAM(s) Oral two times a day PRN anxiety or panic attack  dextrose Oral Gel 15 Gram(s) Oral once PRN Blood Glucose LESS THAN 70 milliGRAM(s)/deciliter  sodium chloride 0.65% Nasal 1 Spray(s) Both Nostrils two times a day PRN Nasal Congestion      Allergies    No Known Allergies    Intolerances        REVIEW OF SYSTEMS:  CONSTITUTIONAL: No fever or chills  HEENT:  No headache, no sore throat  RESPIRATORY: +cough, no wheezing, +shortness of breath  CARDIOVASCULAR: No chest pain, palpitations  GASTROINTESTINAL: No abd pain, nausea, vomiting, or diarrhea  GENITOURINARY: No dysuria, frequency, or hematuria  NEUROLOGICAL: no focal weakness or dizziness  MUSCULOSKELETAL: no myalgias     Vital Signs Last 24 Hrs  T(C): 37.1 (07 Feb 2025 10:45), Max: 37.1 (07 Feb 2025 10:45)  T(F): 98.7 (07 Feb 2025 10:45), Max: 98.7 (07 Feb 2025 10:45)  HR: 85 (07 Feb 2025 10:45) (72 - 88)  BP: 100/56 (07 Feb 2025 10:45) (92/55 - 150/80)  BP(mean): --  RR: 18 (07 Feb 2025 10:45) (18 - 18)  SpO2: 91% (07 Feb 2025 10:45) (91% - 98%)    Parameters below as of 07 Feb 2025 10:45  Patient On (Oxygen Delivery Method): room air        PHYSICAL EXAM:  GENERAL: NAD  HEENT:  anicteric, moist mucous membranes  CHEST/LUNG:  +bibasilar rhonchim no rales or wheezing  HEART:  RRR, S1, S2  ABDOMEN:  BS+, soft, nontender, nondistended  EXTREMITIES: no edema, cyanosis, or calf tenderness  NERVOUS SYSTEM: answers questions and follows commands appropriately    LABS:                        8.8    7.20  )-----------( 360      ( 07 Feb 2025 05:55 )             27.9     CBC Full  -  ( 07 Feb 2025 05:55 )  WBC Count : 7.20 K/uL  Hemoglobin : 8.8 g/dL  Hematocrit : 27.9 %  Platelet Count - Automated : 360 K/uL  Mean Cell Volume : 91.8 fl  Mean Cell Hemoglobin : 28.9 pg  Mean Cell Hemoglobin Concentration : 31.5 g/dL  Auto Neutrophil # : 5.67 K/uL  Auto Lymphocyte # : 0.67 K/uL  Auto Monocyte # : 0.45 K/uL  Auto Eosinophil # : 0.30 K/uL  Auto Basophil # : 0.02 K/uL  Auto Neutrophil % : 78.6 %  Auto Lymphocyte % : 9.3 %  Auto Monocyte % : 6.3 %  Auto Eosinophil % : 4.2 %  Auto Basophil % : 0.3 %    07 Feb 2025 05:55    140    |  109    |  68     ----------------------------<  171    4.0     |  21     |  2.10     Ca    9.0        07 Feb 2025 05:55  Phos  3.5       07 Feb 2025 05:55  Mg     2.5       07 Feb 2025 05:55    TPro  6.0    /  Alb  2.3    /  TBili  0.6    /  DBili  x      /  AST  31     /  ALT  33     /  AlkPhos  83     07 Feb 2025 05:55      Urinalysis Basic - ( 07 Feb 2025 05:55 )    Color: x / Appearance: x / SG: x / pH: x  Gluc: 171 mg/dL / Ketone: x  / Bili: x / Urobili: x   Blood: x / Protein: x / Nitrite: x   Leuk Esterase: x / RBC: x / WBC x   Sq Epi: x / Non Sq Epi: x / Bacteria: x      CAPILLARY BLOOD GLUCOSE      POCT Blood Glucose.: 224 mg/dL (07 Feb 2025 12:02)  POCT Blood Glucose.: 183 mg/dL (07 Feb 2025 08:01)  POCT Blood Glucose.: 173 mg/dL (06 Feb 2025 21:27)  POCT Blood Glucose.: 187 mg/dL (06 Feb 2025 17:08)        Culture - Fungal, Body Fluid (collected 02-06-25 @ 09:44)  Source: Pleural Fl  Preliminary Report (02-07-25 @ 09:21):    Testing in progress    Culture - Body Fluid with Gram Stain (collected 02-06-25 @ 09:44)  Source: Pleural Fl  Gram Stain (02-06-25 @ 23:27):    polymorphonuclear leukocytes seen    No organisms seen    by cytocentrifuge        RADIOLOGY & ADDITIONAL TESTS: No new imaging    Personally reviewed.     Consultant(s) Notes Reviewed:  [x] YES  [ ] NO

## 2025-02-07 NOTE — CAREGIVER ENGAGEMENT NOTE - CAREGIVER OUTREACH NOTES - FREE TEXT
Per MD anticipate transition home tomorrow. Referral sent to [facility] for SOC 2/9/25. CM noted home O2 eval note in which it is documented that the patient desatted to 68% on 2LNC with ambulation. JENNIFER clarified with bedside RN that this was accurate. JENNIFER spoke to MD who requested repeat home O2 eval to evaluate for improvement- bedside RN made aware. JENNIFER sent a referral to [facility] for home O2 pending home O2 eval note. Discharge date pending. CM met with the patient  and her daughter Corinne at the bedside to discuss the stated above. Patient and daughter verbalized understanding of the transition plan when medically cleared and are in agreement. CM remains available.  Per MD anticipate transition home tomorrow. Referral sent to [facility] for SOC 2/9/25. CM noted home O2 eval note in which it is documented that the patient desatted to 68% on 2LNC with ambulation. JENNIFER clarified with bedside RN that this was accurate. JENNIFER spoke to MD who requested repeat home O2 eval to evaluate for improvement- bedside RN made aware. JENNIFER sent a referral to [facility] for home O2 pending home O2 eval note. Discharge date pending. CM met with the patient  and her daughter Corinne at the bedside to discuss the stated above. Patient and daughter verbalized understanding of the transition plan when medically cleared and are in agreement. Family to transport patient home when medically cleared. CM remains available.

## 2025-02-07 NOTE — PROGRESS NOTE ADULT - ASSESSMENT
JOHNNY/CKDIII  STEMI s/p MARLENE  HTN  Anemia     -CKDIII baseline with Cr 1.5 on admission, possibly due to hypertensive nephrosclerosis   -JOHNNY clinically due to CARLO +/- hypotension induced ATN. Now stable likely at new baseline with fluctuation with need for intermittent doses of lasix; monitor for now  -Urine studies reviewed   -No IVF needed   -No indication for RRT   -No kidney biopsy indicated   -Will benefit from RAAS blockade +/- SGLT2i in the future for CKD  -Lasix 20 mg IV x 1 was given 2/4/25, then started on PO Lasix. Renal wise tolerating   -S/p thora today also 2/6/25

## 2025-02-07 NOTE — PROGRESS NOTE ADULT - PROBLEM SELECTOR PLAN 6
Chronic  - elevated on arrival likely in setting of STEMI; now improved  - takes fosinopril at home; previously on HCTZ however pt states she hasn't been taking this recently  - hold home antihypertensives given episodes of hypotension and JOHNNY  - continue Toprol XL and Lasix as above  - monitor routine hemodynamics

## 2025-02-08 LAB
ALBUMIN SERPL ELPH-MCNC: 2.3 G/DL — LOW (ref 3.3–5)
ALP SERPL-CCNC: 80 U/L — SIGNIFICANT CHANGE UP (ref 40–120)
ALT FLD-CCNC: 28 U/L — SIGNIFICANT CHANGE UP (ref 12–78)
ANION GAP SERPL CALC-SCNC: 9 MMOL/L — SIGNIFICANT CHANGE UP (ref 5–17)
AST SERPL-CCNC: 18 U/L — SIGNIFICANT CHANGE UP (ref 15–37)
BASOPHILS # BLD AUTO: 0.01 K/UL — SIGNIFICANT CHANGE UP (ref 0–0.2)
BASOPHILS NFR BLD AUTO: 0.1 % — SIGNIFICANT CHANGE UP (ref 0–2)
BILIRUB SERPL-MCNC: 0.7 MG/DL — SIGNIFICANT CHANGE UP (ref 0.2–1.2)
BUN SERPL-MCNC: 69 MG/DL — HIGH (ref 7–23)
CALCIUM SERPL-MCNC: 8.9 MG/DL — SIGNIFICANT CHANGE UP (ref 8.5–10.1)
CHLORIDE SERPL-SCNC: 109 MMOL/L — HIGH (ref 96–108)
CO2 SERPL-SCNC: 23 MMOL/L — SIGNIFICANT CHANGE UP (ref 22–31)
CREAT SERPL-MCNC: 2 MG/DL — HIGH (ref 0.5–1.3)
EGFR: 23 ML/MIN/1.73M2 — LOW
EOSINOPHIL # BLD AUTO: 0.28 K/UL — SIGNIFICANT CHANGE UP (ref 0–0.5)
EOSINOPHIL NFR BLD AUTO: 3.8 % — SIGNIFICANT CHANGE UP (ref 0–6)
GLUCOSE SERPL-MCNC: 164 MG/DL — HIGH (ref 70–99)
HCT VFR BLD CALC: 25.5 % — LOW (ref 34.5–45)
HGB BLD-MCNC: 8.3 G/DL — LOW (ref 11.5–15.5)
IMM GRANULOCYTES NFR BLD AUTO: 1.5 % — HIGH (ref 0–0.9)
LYMPHOCYTES # BLD AUTO: 0.8 K/UL — LOW (ref 1–3.3)
LYMPHOCYTES # BLD AUTO: 10.7 % — LOW (ref 13–44)
MAGNESIUM SERPL-MCNC: 2.2 MG/DL — SIGNIFICANT CHANGE UP (ref 1.6–2.6)
MCHC RBC-ENTMCNC: 29.2 PG — SIGNIFICANT CHANGE UP (ref 27–34)
MCHC RBC-ENTMCNC: 32.5 G/DL — SIGNIFICANT CHANGE UP (ref 32–36)
MCV RBC AUTO: 89.8 FL — SIGNIFICANT CHANGE UP (ref 80–100)
MONOCYTES # BLD AUTO: 0.47 K/UL — SIGNIFICANT CHANGE UP (ref 0–0.9)
MONOCYTES NFR BLD AUTO: 6.3 % — SIGNIFICANT CHANGE UP (ref 2–14)
NEUTROPHILS # BLD AUTO: 5.78 K/UL — SIGNIFICANT CHANGE UP (ref 1.8–7.4)
NEUTROPHILS NFR BLD AUTO: 77.6 % — HIGH (ref 43–77)
NRBC # BLD: 0 /100 WBCS — SIGNIFICANT CHANGE UP (ref 0–0)
NRBC BLD-RTO: 0 /100 WBCS — SIGNIFICANT CHANGE UP (ref 0–0)
PHOSPHATE SERPL-MCNC: 3.5 MG/DL — SIGNIFICANT CHANGE UP (ref 2.5–4.5)
PLATELET # BLD AUTO: 389 K/UL — SIGNIFICANT CHANGE UP (ref 150–400)
POTASSIUM SERPL-MCNC: 3.6 MMOL/L — SIGNIFICANT CHANGE UP (ref 3.5–5.3)
POTASSIUM SERPL-SCNC: 3.6 MMOL/L — SIGNIFICANT CHANGE UP (ref 3.5–5.3)
PROT SERPL-MCNC: 5.9 G/DL — LOW (ref 6–8.3)
RBC # BLD: 2.84 M/UL — LOW (ref 3.8–5.2)
RBC # FLD: 14.1 % — SIGNIFICANT CHANGE UP (ref 10.3–14.5)
SODIUM SERPL-SCNC: 141 MMOL/L — SIGNIFICANT CHANGE UP (ref 135–145)
WBC # BLD: 7.45 K/UL — SIGNIFICANT CHANGE UP (ref 3.8–10.5)
WBC # FLD AUTO: 7.45 K/UL — SIGNIFICANT CHANGE UP (ref 3.8–10.5)

## 2025-02-08 PROCEDURE — 99232 SBSQ HOSP IP/OBS MODERATE 35: CPT

## 2025-02-08 PROCEDURE — 99233 SBSQ HOSP IP/OBS HIGH 50: CPT | Mod: GC

## 2025-02-08 RX ADMIN — Medication 2: at 17:05

## 2025-02-08 RX ADMIN — Medication 2.5 MILLIGRAM(S): at 19:39

## 2025-02-08 RX ADMIN — AMIODARONE HYDROCHLORIDE 400 MILLIGRAM(S): 50 INJECTION, SOLUTION INTRAVENOUS at 05:05

## 2025-02-08 RX ADMIN — Medication 4 MILLILITER(S): at 07:38

## 2025-02-08 RX ADMIN — ATORVASTATIN CALCIUM 80 MILLIGRAM(S): 80 TABLET, FILM COATED ORAL at 21:16

## 2025-02-08 RX ADMIN — NYSTATIN 1 APPLICATION(S): 100000 POWDER TOPICAL at 05:06

## 2025-02-08 RX ADMIN — DULOXETINE 90 MILLIGRAM(S): 20 CAPSULE, DELAYED RELEASE ORAL at 11:09

## 2025-02-08 RX ADMIN — Medication 4 MILLILITER(S): at 19:40

## 2025-02-08 RX ADMIN — Medication 20 MILLIGRAM(S): at 05:05

## 2025-02-08 RX ADMIN — ENOXAPARIN SODIUM 70 MILLIGRAM(S): 100 INJECTION SUBCUTANEOUS at 11:09

## 2025-02-08 RX ADMIN — Medication 600 MILLIGRAM(S): at 17:05

## 2025-02-08 RX ADMIN — NYSTATIN 1 APPLICATION(S): 100000 POWDER TOPICAL at 17:05

## 2025-02-08 RX ADMIN — Medication 1: at 07:54

## 2025-02-08 RX ADMIN — AMIODARONE HYDROCHLORIDE 400 MILLIGRAM(S): 50 INJECTION, SOLUTION INTRAVENOUS at 14:06

## 2025-02-08 RX ADMIN — ANTISEPTIC SURGICAL SCRUB 1 APPLICATION(S): 0.04 SOLUTION TOPICAL at 05:06

## 2025-02-08 RX ADMIN — Medication 2.5 MILLIGRAM(S): at 07:38

## 2025-02-08 RX ADMIN — Medication 2.5 MILLIGRAM(S): at 01:07

## 2025-02-08 RX ADMIN — Medication 1: at 11:50

## 2025-02-08 RX ADMIN — AMIODARONE HYDROCHLORIDE 400 MILLIGRAM(S): 50 INJECTION, SOLUTION INTRAVENOUS at 21:16

## 2025-02-08 RX ADMIN — Medication 2: at 21:16

## 2025-02-08 RX ADMIN — Medication 75 MILLIGRAM(S): at 11:08

## 2025-02-08 NOTE — PROVIDER CONTACT NOTE (OTHER) - ASSESSMENT
Upon Assessment, Pt is currently on 2L NC, Tolerating well. Pt's O2Sat is 100%.   Tissues have blood tinged sputum. Pt states she has never experienced this before. Pt denies any other complaints or concerns.

## 2025-02-08 NOTE — PROGRESS NOTE ADULT - SUBJECTIVE AND OBJECTIVE BOX
[facility] -- Jamal Moralez, Roney Palumbo Savella, , Yasir Post  Office # [phone]    Follow Up:      Subjective/Observations:     REVIEW OF SYSTEMS: All other review of systems is negative unless indicated above  PAST MEDICAL & SURGICAL HISTORY:  HTN (hypertension)      DM (diabetes mellitus)      HLD (hyperlipidemia)      Major depression      No significant past surgical history        MEDICATIONS  (STANDING):  albuterol    0.083% 2.5 milliGRAM(s) Nebulizer every 6 hours  aMIOdarone    Tablet   Oral   aMIOdarone    Tablet 400 milliGRAM(s) Oral every 8 hours  atorvastatin 80 milliGRAM(s) Oral at bedtime  chlorhexidine 2% Cloths 1 Application(s) Topical <User Schedule>  clopidogrel Tablet 75 milliGRAM(s) Oral daily  dextrose 5%. 1000 milliLiter(s) (50 mL/Hr) IV Continuous <Continuous>  dextrose 5%. 1000 milliLiter(s) (100 mL/Hr) IV Continuous <Continuous>  dextrose 50% Injectable 25 Gram(s) IV Push once  dextrose 50% Injectable 12.5 Gram(s) IV Push once  dextrose 50% Injectable 25 Gram(s) IV Push once  DULoxetine 90 milliGRAM(s) Oral daily  enoxaparin Injectable 70 milliGRAM(s) SubCutaneous every 24 hours  furosemide    Tablet 20 milliGRAM(s) Oral daily  glucagon  Injectable 1 milliGRAM(s) IntraMuscular once  influenza  Vaccine (HIGH DOSE) 0.5 milliLiter(s) IntraMuscular once  insulin lispro (ADMELOG) corrective regimen sliding scale   SubCutaneous Before meals and at bedtime  metoprolol succinate ER 25 milliGRAM(s) Oral daily  nystatin Powder 1 Application(s) Topical two times a day  sodium chloride 3%  Inhalation 4 milliLiter(s) Inhalation every 12 hours    MEDICATIONS  (PRN):  acetaminophen     Tablet .. 650 milliGRAM(s) Oral every 6 hours PRN Temp greater or equal to 38C (100.4F), Mild Pain (1 - 3)  ALPRAZolam 0.25 milliGRAM(s) Oral two times a day PRN anxiety or panic attack  dextrose Oral Gel 15 Gram(s) Oral once PRN Blood Glucose LESS THAN 70 milliGRAM(s)/deciliter  sodium chloride 0.65% Nasal 1 Spray(s) Both Nostrils two times a day PRN Nasal Congestion    Allergies    No Known Allergies    Intolerances      Vital Signs Last 24 Hrs  T(C): 36.7 (08 Feb 2025 04:46), Max: 37.1 (07 Feb 2025 10:45)  T(F): 98.1 (08 Feb 2025 04:46), Max: 98.7 (07 Feb 2025 10:45)  HR: 85 (08 Feb 2025 04:46) (77 - 98)  BP: 109/63 (08 Feb 2025 04:46) (95/55 - 109/63)  BP(mean): --  RR: 18 (08 Feb 2025 04:46) (18 - 18)  SpO2: 95% (08 Feb 2025 04:46) (91% - 100%)    Parameters below as of 08 Feb 2025 04:46  Patient On (Oxygen Delivery Method): nasal cannula  O2 Flow (L/min): 2    I&O's Summary    07 Feb 2025 07:01  -  08 Feb 2025 07:00  --------------------------------------------------------  IN: 0 mL / OUT: 250 mL / NET: -250 mL        PHYSICAL EXAM:  TELE:   Constitutional: NAD, awake and alert, well-developed  HEENT: Moist Mucous Membranes, Anicteric  Pulmonary: Non-labored, breath sounds are clear bilaterally, No wheezing, rales or rhonchi  Cardiovascular: Regular, S1 and S2, No murmurs, rubs, gallops or clicks  Gastrointestinal: Bowel Sounds present, soft, nontender.   Lymph: No peripheral edema. No lymphadenopathy.  Skin: No visible rashes or ulcers.  Psych:  Mood & affect appropriate  LABS: All Labs Reviewed:                        8.8    7.20  )-----------( 360      ( 07 Feb 2025 05:55 )             27.9                         8.4    7.34  )-----------( 333      ( 06 Feb 2025 07:21 )             26.5     07 Feb 2025 05:55    140    |  109    |  68     ----------------------------<  171    4.0     |  21     |  2.10   06 Feb 2025 07:21    140    |  108    |  66     ----------------------------<  174    4.6     |  25     |  2.30     Ca    9.0        07 Feb 2025 05:55  Ca    9.3        06 Feb 2025 07:21  Phos  3.5       07 Feb 2025 05:55  Mg     2.5       07 Feb 2025 05:55    TPro  6.0    /  Alb  2.3    /  TBili  0.6    /  DBili  x      /  AST  31     /  ALT  33     /  AlkPhos  83     07 Feb 2025 05:55  TPro  6.1    /  Alb  2.4    /  TBili  0.5    /  DBili  x      /  AST  36     /  ALT  31     /  AlkPhos  80     06 Feb 2025 07:21          12 Lead ECG:   Ventricular Rate 100 BPM    QRS Duration 98 ms    Q-T Interval 360 ms    QTC Calculation(Bazett) 464 ms    R Axis 50 degrees    T Axis -47 degrees    Diagnosis Line *** Critical Test Result: STEMI  Atrial fibrillation  Lateral infarct (cited on or before 26-JAN-2025)  ST & T wave abnormality, consider inferior ischemia  ST & T wave abnormality, consider anterior ischemia    Confirmed by ISAAC PALUMBO ([id number]) on 2/5/2025 12:55:12 PM (02-04-25 @ 16:25)      TRANSTHORACIC ECHOCARDIOGRAM REPORT  ________________________________________________________________________________                                      _______       Pt. Name:       DOMITILA GRACE Study Date:    1/28/2025  MRN:            [id number]           YOB: 1933  Accession #:    [id number]          Age:           89 years  Account#:       [id number]         Gender:        F  Heart Rate:                        Height:        62.20 in (158.00 cm)  Rhythm:          Weight:        160.93 lb (73.00 kg)  Blood Pressure: 93/52 mmHg         BSA/BMI:       1.75 m² / 29.24 kg/m²  ________________________________________________________________________________________  Referring Physician:    [id number] Carlos Alberto  Interpreting Physician: Braxton Bowens M.D.  Primary Sonographer:    Marlys Queen    CPT:               ECHO TTE W/O CON F/U LTD - 04596.m  Indication(s):     ST elevation [STEMI] myocardial infarction of unspecified                     site - I21.3  Procedure:         Limited transthoracic echocardiogram.  Ordering [facility]  Admission Status:  Inpatient    _______________________________________________________________________________________     CONCLUSIONS:      1. Left ventricular systolic function is moderately decreased with an ejection fraction visually estimated at 40 to 45 %.   2. Small pericardial effusion noted adjacent to the right atrium and small pericardial effusion noted adjacent to the right ventricle. The effusion measures 0.82 cm in diastole in the subcostal view adjacent ot the RV.   3. Thickened pericardium.   4. No significant change in the size of the pericardial effusion compared to TTE study from yesterday (1/27/25) is noted.    ________________________________________________________________________________________  FINDINGS:     Left Ventricle:  Left ventricular systolic function is moderately decreased with an ejection fraction visually estimated at 40 to 45%.     Right Ventricle:  Right ventricular systolic function is normal.     Left Atrium:  The left atrium is dilated.     Mitral Valve:  There is moderate calcification of the mitral valve annulus.     Pericardium:  The pericardium is thickened. There is a small pericardial effusionnoted adjacent to the right atrium and a small pericardial effusion noted adjacent to the right ventricle.     Systemic Veins:  The inferior vena cava is normal in size measuring 1.83 cm in diameter, (normal <2.1cm) with normal inspiratory collapse (normal >50%) consistent with normal right atrial pressure (~3, range 0-5mmHg).  ____________________________________________________________________  QUANTITATIVE DATA:  Left Ventricle Measurements: (Indexed to BSA)     Visualized LV EF%: 40 to 45%       ________________________________________________________________________________________  Electronically signed on 1/28/2025 at 2:21:15 PM by Braxton Bowens M.D.         *** Final ***      [facility] -- Jamal Moralez, Manohar, Christopher Sim, , Yasir Post  Office # [phone]    Follow Up:  STEMI/Afib    Subjective/Observations: c/o cough and pleuritic pain.  Non-radiating.  NC at 1L/min in use.  States, feeling better with breathing.  No tele events.  Rate-controlled afib    REVIEW OF SYSTEMS: All other review of systems is negative unless indicated above  PAST MEDICAL & SURGICAL HISTORY:  HTN (hypertension)      DM (diabetes mellitus)      HLD (hyperlipidemia)      Major depression      No significant past surgical history    MEDICATIONS  (STANDING):  albuterol    0.083% 2.5 milliGRAM(s) Nebulizer every 6 hours  aMIOdarone    Tablet   Oral   aMIOdarone    Tablet 400 milliGRAM(s) Oral every 8 hours  atorvastatin 80 milliGRAM(s) Oral at bedtime  chlorhexidine 2% Cloths 1 Application(s) Topical <User Schedule>  clopidogrel Tablet 75 milliGRAM(s) Oral daily  dextrose 5%. 1000 milliLiter(s) (50 mL/Hr) IV Continuous <Continuous>  dextrose 5%. 1000 milliLiter(s) (100 mL/Hr) IV Continuous <Continuous>  dextrose 50% Injectable 25 Gram(s) IV Push once  dextrose 50% Injectable 12.5 Gram(s) IV Push once  dextrose 50% Injectable 25 Gram(s) IV Push once  DULoxetine 90 milliGRAM(s) Oral daily  enoxaparin Injectable 70 milliGRAM(s) SubCutaneous every 24 hours  furosemide    Tablet 20 milliGRAM(s) Oral daily  glucagon  Injectable 1 milliGRAM(s) IntraMuscular once  influenza  Vaccine (HIGH DOSE) 0.5 milliLiter(s) IntraMuscular once  insulin lispro (ADMELOG) corrective regimen sliding scale   SubCutaneous Before meals and at bedtime  metoprolol succinate ER 25 milliGRAM(s) Oral daily  nystatin Powder 1 Application(s) Topical two times a day  sodium chloride 3%  Inhalation 4 milliLiter(s) Inhalation every 12 hours    MEDICATIONS  (PRN):  acetaminophen     Tablet .. 650 milliGRAM(s) Oral every 6 hours PRN Temp greater or equal to 38C (100.4F), Mild Pain (1 - 3)  ALPRAZolam 0.25 milliGRAM(s) Oral two times a day PRN anxiety or panic attack  dextrose Oral Gel 15 Gram(s) Oral once PRN Blood Glucose LESS THAN 70 milliGRAM(s)/deciliter  sodium chloride 0.65% Nasal 1 Spray(s) Both Nostrils two times a day PRN Nasal Congestion    Allergies    No Known Allergies    Intolerances    Vital Signs Last 24 Hrs  T(C): 36.7 (08 Feb 2025 04:46), Max: 37.1 (07 Feb 2025 10:45)  T(F): 98.1 (08 Feb 2025 04:46), Max: 98.7 (07 Feb 2025 10:45)  HR: 85 (08 Feb 2025 04:46) (77 - 98)  BP: 109/63 (08 Feb 2025 04:46) (95/55 - 109/63)  BP(mean): --  RR: 18 (08 Feb 2025 04:46) (18 - 18)  SpO2: 95% (08 Feb 2025 04:46) (91% - 100%)    Parameters below as of 08 Feb 2025 04:46  Patient On (Oxygen Delivery Method): nasal cannula  O2 Flow (L/min): 2    I&O's Summary    07 Feb 2025 07:01  -  08 Feb 2025 07:00  --------------------------------------------------------  IN: 0 mL / OUT: 250 mL / NET: -250 mL    PHYSICAL EXAM:  TELE: Afib  Constitutional: NAD, awake and alert  HEENT: Moist Mucous Membranes, Anicteric  Pulmonary: Non-labored, breath sounds are diminished bilaterally, No wheezing, rales or rhonchi  Cardiovascular: IRRR, S1 and S2, No murmurs, rubs, gallops or clicks  Gastrointestinal: Bowel Sounds present, soft, nontender.   Lymph: No peripheral edema. No lymphadenopathy.  Skin: No visible rashes or ulcers.  Psych:  Mood & affect appropriate  LABS: All Labs Reviewed:                        8.8    7.20  )-----------( 360      ( 07 Feb 2025 05:55 )             27.9                         8.4    7.34  )-----------( 333      ( 06 Feb 2025 07:21 )             26.5     07 Feb 2025 05:55    140    |  109    |  68     ----------------------------<  171    4.0     |  21     |  2.10   06 Feb 2025 07:21    140    |  108    |  66     ----------------------------<  174    4.6     |  25     |  2.30     Ca    9.0        07 Feb 2025 05:55  Ca    9.3        06 Feb 2025 07:21  Phos  3.5       07 Feb 2025 05:55  Mg     2.5       07 Feb 2025 05:55    TPro  6.0    /  Alb  2.3    /  TBili  0.6    /  DBili  x      /  AST  31     /  ALT  33     /  AlkPhos  83     07 Feb 2025 05:55  TPro  6.1    /  Alb  2.4    /  TBili  0.5    /  DBili  x      /  AST  36     /  ALT  31     /  AlkPhos  80     06 Feb 2025 07:21          12 Lead ECG:   Ventricular Rate 100 BPM    QRS Duration 98 ms    Q-T Interval 360 ms    QTC Calculation(Bazett) 464 ms    R Axis 50 degrees    T Axis -47 degrees    Diagnosis Line *** Critical Test Result: STEMI  Atrial fibrillation  Lateral infarct (cited on or before 26-JAN-2025)  ST & T wave abnormality, consider inferior ischemia  ST & T wave abnormality, consider anterior ischemia    Confirmed by ISAAC PALUMBO ([id number]) on 2/5/2025 12:55:12 PM (02-04-25 @ 16:25)      TRANSTHORACIC ECHOCARDIOGRAM REPORT  ________________________________________________________________________________                                      _______       Pt. Name:       DOMITILA GRACE Study Date:    1/28/2025  MRN:            [id number]           YOB: 1933  Accession #:    [id number]          Age:           89 years  Account#:       [id number]         Gender:        F  Heart Rate:                        Height:        62.20 in (158.00 cm)  Rhythm:          Weight:        160.93 lb (73.00 kg)  Blood Pressure: 93/52 mmHg         BSA/BMI:       1.75 m² / 29.24 kg/m²  ________________________________________________________________________________________  Referring Physician:    [id number] Carlos Alberto  Interpreting Physician: Braxton Bowens M.D.  Primary Sonographer:    Marlys Queen    CPT:               ECHO TTE W/O CON F/U LTD - 12184.m  Indication(s):     ST elevation [STEMI] myocardial infarction of unspecified                     site - I21.3  Procedure:         Limited transthoracic echocardiogram.  Ordering Location: ICU1  Admission Status:  Inpatient    _______________________________________________________________________________________     CONCLUSIONS:      1. Left ventricular systolic function is moderately decreased with an ejection fraction visually estimated at 40 to 45 %.   2. Small pericardial effusion noted adjacent to the right atrium and small pericardial effusion noted adjacent to the right ventricle. The effusion measures 0.82 cm in diastole in the subcostal view adjacent ot the RV.   3. Thickened pericardium.   4. No significant change in the size of the pericardial effusion compared to TTE study from yesterday (1/27/25) is noted.    ________________________________________________________________________________________  FINDINGS:     Left Ventricle:  Left ventricular systolic function is moderately decreased with an ejection fraction visually estimated at 40 to 45%.     Right Ventricle:  Right ventricular systolic function is normal.     Left Atrium:  The left atrium is dilated.     Mitral Valve:  There is moderate calcification of the mitral valve annulus.     Pericardium:  The pericardium is thickened. There is a small pericardial effusionnoted adjacent to the right atrium and a small pericardial effusion noted adjacent to the right ventricle.     Systemic Veins:  The inferior vena cava is normal in size measuring 1.83 cm in diameter, (normal <2.1cm) with normal inspiratory collapse (normal >50%) consistent with normal right atrial pressure (~3, range 0-5mmHg).  ____________________________________________________________________  QUANTITATIVE DATA:  Left Ventricle Measurements: (Indexed to BSA)     Visualized LV EF%: 40 to 45%       ________________________________________________________________________________________  Electronically signed on 1/28/2025 at 2:21:15 PM by Braxton Bowens M.D.         *** Final ***      [facility] -- Jamal Moralez, Manohar, Christopher Sim, , Yasir Post  Office # [phone]    Follow Up:  STEMI/Afib    Subjective/Observations: c/o cough and pleuritic pain.  Non-radiating.  NC at 1L/min in use.  States, feeling better with breathing.  No tele events.  Rate-controlled afib    REVIEW OF SYSTEMS: All other review of systems is negative unless indicated above  PAST MEDICAL & SURGICAL HISTORY:  HTN (hypertension)      DM (diabetes mellitus)      HLD (hyperlipidemia)      Major depression      No significant past surgical history    MEDICATIONS  (STANDING):  albuterol    0.083% 2.5 milliGRAM(s) Nebulizer every 6 hours  aMIOdarone    Tablet   Oral   aMIOdarone    Tablet 400 milliGRAM(s) Oral every 8 hours  atorvastatin 80 milliGRAM(s) Oral at bedtime  chlorhexidine 2% Cloths 1 Application(s) Topical <User Schedule>  clopidogrel Tablet 75 milliGRAM(s) Oral daily  dextrose 5%. 1000 milliLiter(s) (50 mL/Hr) IV Continuous <Continuous>  dextrose 5%. 1000 milliLiter(s) (100 mL/Hr) IV Continuous <Continuous>  dextrose 50% Injectable 25 Gram(s) IV Push once  dextrose 50% Injectable 12.5 Gram(s) IV Push once  dextrose 50% Injectable 25 Gram(s) IV Push once  DULoxetine 90 milliGRAM(s) Oral daily  enoxaparin Injectable 70 milliGRAM(s) SubCutaneous every 24 hours  furosemide    Tablet 20 milliGRAM(s) Oral daily  glucagon  Injectable 1 milliGRAM(s) IntraMuscular once  influenza  Vaccine (HIGH DOSE) 0.5 milliLiter(s) IntraMuscular once  insulin lispro (ADMELOG) corrective regimen sliding scale   SubCutaneous Before meals and at bedtime  metoprolol succinate ER 25 milliGRAM(s) Oral daily  nystatin Powder 1 Application(s) Topical two times a day  sodium chloride 3%  Inhalation 4 milliLiter(s) Inhalation every 12 hours    MEDICATIONS  (PRN):  acetaminophen     Tablet .. 650 milliGRAM(s) Oral every 6 hours PRN Temp greater or equal to 38C (100.4F), Mild Pain (1 - 3)  ALPRAZolam 0.25 milliGRAM(s) Oral two times a day PRN anxiety or panic attack  dextrose Oral Gel 15 Gram(s) Oral once PRN Blood Glucose LESS THAN 70 milliGRAM(s)/deciliter  sodium chloride 0.65% Nasal 1 Spray(s) Both Nostrils two times a day PRN Nasal Congestion    Allergies    No Known Allergies    Intolerances    Vital Signs Last 24 Hrs  T(C): 36.7 (08 Feb 2025 04:46), Max: 37.1 (07 Feb 2025 10:45)  T(F): 98.1 (08 Feb 2025 04:46), Max: 98.7 (07 Feb 2025 10:45)  HR: 85 (08 Feb 2025 04:46) (77 - 98)  BP: 109/63 (08 Feb 2025 04:46) (95/55 - 109/63)  BP(mean): --  RR: 18 (08 Feb 2025 04:46) (18 - 18)  SpO2: 95% (08 Feb 2025 04:46) (91% - 100%)    Parameters below as of 08 Feb 2025 04:46  Patient On (Oxygen Delivery Method): nasal cannula  O2 Flow (L/min): 2    I&O's Summary    07 Feb 2025 07:01  -  08 Feb 2025 07:00  --------------------------------------------------------  IN: 0 mL / OUT: 250 mL / NET: -250 mL    PHYSICAL EXAM:  TELE: Afib  Constitutional: NAD, awake and alert  HEENT: Moist Mucous Membranes, Anicteric  Pulmonary: Non-labored, breath sounds are diminished bilaterally, No wheezing, rales or rhonchi  Cardiovascular: IRRR, S1 and S2, No murmurs, rubs, gallops or clicks  Gastrointestinal: Bowel Sounds present, soft, nontender.   Lymph: No peripheral edema. No lymphadenopathy.  Skin: No visible rashes or ulcers.  Psych:  Mood & affect appropriate  LABS: All Labs Reviewed:                        8.8    7.20  )-----------( 360      ( 07 Feb 2025 05:55 )             27.9                         8.4    7.34  )-----------( 333      ( 06 Feb 2025 07:21 )             26.5     07 Feb 2025 05:55    140    |  109    |  68     ----------------------------<  171    4.0     |  21     |  2.10   06 Feb 2025 07:21    140    |  108    |  66     ----------------------------<  174    4.6     |  25     |  2.30     Ca    9.0        07 Feb 2025 05:55  Ca    9.3        06 Feb 2025 07:21  Phos  3.5       07 Feb 2025 05:55  Mg     2.5       07 Feb 2025 05:55    TPro  6.0    /  Alb  2.3    /  TBili  0.6    /  DBili  x      /  AST  31     /  ALT  33     /  AlkPhos  83     07 Feb 2025 05:55  TPro  6.1    /  Alb  2.4    /  TBili  0.5    /  DBili  x      /  AST  36     /  ALT  31     /  AlkPhos  80     06 Feb 2025 07:21          12 Lead ECG:   Ventricular Rate 100 BPM    QRS Duration 98 ms    Q-T Interval 360 ms    QTC Calculation(Bazett) 464 ms    R Axis 50 degrees    T Axis -47 degrees    Diagnosis Line *** Critical Test Result: STEMI  Atrial fibrillation  Lateral infarct (cited on or before 26-JAN-2025)  ST & T wave abnormality, consider inferior ischemia  ST & T wave abnormality, consider anterior ischemia    Confirmed by ISAAC PALUMBO ([id number]) on 2/5/2025 12:55:12 PM (02-04-25 @ 16:25)      TRANSTHORACIC ECHOCARDIOGRAM REPORT  ________________________________________________________________________________                                      _______       Pt. Name:       DOMITILA GRACE Study Date:    1/28/2025  MRN:            [id number]           YOB: 1933  Accession #:    [id number]          Age:           89 years  Account#:       [id number]         Gender:        F  Heart Rate:                        Height:        62.20 in (158.00 cm)  Rhythm:          Weight:        160.93 lb (73.00 kg)  Blood Pressure: 93/52 mmHg         BSA/BMI:       1.75 m² / 29.24 kg/m²  ________________________________________________________________________________________  Referring Physician:    [id number] Carlos Alberto  Interpreting Physician: Braxton Bowens M.D.  Primary Sonographer:    Marlys Queen    CPT:               ECHO TTE W/O CON F/U LTD - 20937.m  Indication(s):     ST elevation [STEMI] myocardial infarction of unspecified                     site - I21.3  Procedure:         Limited transthoracic echocardiogram.  Ordering Location: ICU1  Admission Status:  Inpatient    _______________________________________________________________________________________     CONCLUSIONS:      1. Left ventricular systolic function is moderately decreased with an ejection fraction visually estimated at 40 to 45 %.   2. Small pericardial effusion noted adjacent to the right atrium and small pericardial effusion noted adjacent to the right ventricle. The effusion measures 0.82 cm in diastole in the subcostal view adjacent ot the RV.   3. Thickened pericardium.   4. No significant change in the size of the pericardial effusion compared to TTE study from yesterday (1/27/25) is noted.    ________________________________________________________________________________________  FINDINGS:     Left Ventricle:  Left ventricular systolic function is moderately decreased with an ejection fraction visually estimated at 40 to 45%.     Right Ventricle:  Right ventricular systolic function is normal.     Left Atrium:  The left atrium is dilated.     Mitral Valve:  There is moderate calcification of the mitral valve annulus.     Pericardium:  The pericardium is thickened. There is a small pericardial effusionnoted adjacent to the right atrium and a small pericardial effusion noted adjacent to the right ventricle.     Systemic Veins:  The inferior vena cava is normal in size measuring 1.83 cm in diameter, (normal <2.1cm) with normal inspiratory collapse (normal >50%) consistent with normal right atrial pressure (~3, range 0-5mmHg).  ____________________________________________________________________  QUANTITATIVE DATA:  Left Ventricle Measurements: (Indexed to BSA)     Visualized LV EF%: 40 to 45%       ________________________________________________________________________________________  Electronically signed on 1/28/2025 at 2:21:15 PM by Braxton Bowens M.D.         *** Final ***

## 2025-02-08 NOTE — PROGRESS NOTE ADULT - SUBJECTIVE AND OBJECTIVE BOX
Date/Time Patient Seen:  		  Referring MD:   Data Reviewed	       Patient is a 89y old  Female who presents with a chief complaint of STEMI (08 Feb 2025 08:58)      Subjective/HPI     PAST MEDICAL & SURGICAL HISTORY:  HTN (hypertension)    DM (diabetes mellitus)    HLD (hyperlipidemia)    Major depression    No significant past surgical history          Medication list         MEDICATIONS  (STANDING):  albuterol    0.083% 2.5 milliGRAM(s) Nebulizer every 6 hours  aMIOdarone    Tablet   Oral   aMIOdarone    Tablet 400 milliGRAM(s) Oral every 8 hours  atorvastatin 80 milliGRAM(s) Oral at bedtime  chlorhexidine 2% Cloths 1 Application(s) Topical <User Schedule>  clopidogrel Tablet 75 milliGRAM(s) Oral daily  dextrose 5%. 1000 milliLiter(s) (50 mL/Hr) IV Continuous <Continuous>  dextrose 5%. 1000 milliLiter(s) (100 mL/Hr) IV Continuous <Continuous>  dextrose 50% Injectable 12.5 Gram(s) IV Push once  dextrose 50% Injectable 25 Gram(s) IV Push once  dextrose 50% Injectable 25 Gram(s) IV Push once  DULoxetine 90 milliGRAM(s) Oral daily  enoxaparin Injectable 70 milliGRAM(s) SubCutaneous every 24 hours  furosemide    Tablet 20 milliGRAM(s) Oral daily  glucagon  Injectable 1 milliGRAM(s) IntraMuscular once  guaiFENesin  milliGRAM(s) Oral every 12 hours  influenza  Vaccine (HIGH DOSE) 0.5 milliLiter(s) IntraMuscular once  insulin lispro (ADMELOG) corrective regimen sliding scale   SubCutaneous Before meals and at bedtime  metoprolol succinate ER 25 milliGRAM(s) Oral daily  nystatin Powder 1 Application(s) Topical two times a day  sodium chloride 3%  Inhalation 4 milliLiter(s) Inhalation every 12 hours    MEDICATIONS  (PRN):  acetaminophen     Tablet .. 650 milliGRAM(s) Oral every 6 hours PRN Temp greater or equal to 38C (100.4F), Mild Pain (1 - 3)  ALPRAZolam 0.25 milliGRAM(s) Oral two times a day PRN anxiety or panic attack  dextrose Oral Gel 15 Gram(s) Oral once PRN Blood Glucose LESS THAN 70 milliGRAM(s)/deciliter  sodium chloride 0.65% Nasal 1 Spray(s) Both Nostrils two times a day PRN Nasal Congestion         Vitals log        ICU Vital Signs Last 24 Hrs  T(C): 36.5 (08 Feb 2025 11:30), Max: 36.9 (07 Feb 2025 19:45)  T(F): 97.7 (08 Feb 2025 11:30), Max: 98.4 (07 Feb 2025 19:45)  HR: 78 (08 Feb 2025 11:30) (74 - 98)  BP: 94/53 (08 Feb 2025 11:30) (94/53 - 109/63)  BP(mean): --  ABP: --  ABP(mean): --  RR: 18 (08 Feb 2025 11:30) (18 - 18)  SpO2: 94% (08 Feb 2025 11:30) (94% - 100%)    O2 Parameters below as of 08 Feb 2025 11:30  Patient On (Oxygen Delivery Method): nasal cannula  O2 Flow (L/min): 2               Input and Output:  I&O's Detail    07 Feb 2025 07:01  -  08 Feb 2025 07:00  --------------------------------------------------------  IN:  Total IN: 0 mL    OUT:    Voided (mL): 250 mL  Total OUT: 250 mL    Total NET: -250 mL          Lab Data                        8.3    7.45  )-----------( 389      ( 08 Feb 2025 06:40 )             25.5     02-08    141  |  109[H]  |  69[H]  ----------------------------<  164[H]  3.6   |  23  |  2.00[H]    Ca    8.9      08 Feb 2025 06:40  Phos  3.5     02-08  Mg     2.2     02-08    TPro  5.9[L]  /  Alb  2.3[L]  /  TBili  0.7  /  DBili  x   /  AST  18  /  ALT  28  /  AlkPhos  80  02-08            Review of Systems	      Objective     Physical Examination    heart s1s2  lung dc bS  head nc  head at  abd soft      Pertinent Lab findings & Imaging      Mery:  NO   Adequate UO     I&O's Detail    07 Feb 2025 07:01  -  08 Feb 2025 07:00  --------------------------------------------------------  IN:  Total IN: 0 mL    OUT:    Voided (mL): 250 mL  Total OUT: 250 mL    Total NET: -250 mL               Discussed with:     Cultures:	        Radiology

## 2025-02-08 NOTE — PROGRESS NOTE ADULT - PROBLEM SELECTOR PLAN 10
VTE ppx: FD Lovenox, plan to switch to Eliquis    Downgraded from ICU 2/2/25 VTE ppx: FD Lovenox, plan to switch to Eliquis    Downgraded from ICU 2/2/25    Spoke to cong argueta 2/8 with updates

## 2025-02-08 NOTE — PROGRESS NOTE ADULT - SUBJECTIVE AND OBJECTIVE BOX
Patient is a 89y old  Female who presents with a chief complaint of STEMI (08 Feb 2025 07:06)      INTERVAL HPI/OVERNIGHT EVENTS: Patient seen and examined at bedside. Patient states she has a cough that is now improving and still has mild dyspnea. She is on 2L NC.    MEDICATIONS  (STANDING):  albuterol    0.083% 2.5 milliGRAM(s) Nebulizer every 6 hours  aMIOdarone    Tablet 400 milliGRAM(s) Oral every 8 hours  aMIOdarone    Tablet   Oral   atorvastatin 80 milliGRAM(s) Oral at bedtime  chlorhexidine 2% Cloths 1 Application(s) Topical <User Schedule>  clopidogrel Tablet 75 milliGRAM(s) Oral daily  dextrose 5%. 1000 milliLiter(s) (50 mL/Hr) IV Continuous <Continuous>  dextrose 5%. 1000 milliLiter(s) (100 mL/Hr) IV Continuous <Continuous>  dextrose 50% Injectable 25 Gram(s) IV Push once  dextrose 50% Injectable 12.5 Gram(s) IV Push once  dextrose 50% Injectable 25 Gram(s) IV Push once  DULoxetine 90 milliGRAM(s) Oral daily  enoxaparin Injectable 70 milliGRAM(s) SubCutaneous every 24 hours  furosemide    Tablet 20 milliGRAM(s) Oral daily  glucagon  Injectable 1 milliGRAM(s) IntraMuscular once  influenza  Vaccine (HIGH DOSE) 0.5 milliLiter(s) IntraMuscular once  insulin lispro (ADMELOG) corrective regimen sliding scale   SubCutaneous Before meals and at bedtime  metoprolol succinate ER 25 milliGRAM(s) Oral daily  nystatin Powder 1 Application(s) Topical two times a day  sodium chloride 3%  Inhalation 4 milliLiter(s) Inhalation every 12 hours    MEDICATIONS  (PRN):  acetaminophen     Tablet .. 650 milliGRAM(s) Oral every 6 hours PRN Temp greater or equal to 38C (100.4F), Mild Pain (1 - 3)  ALPRAZolam 0.25 milliGRAM(s) Oral two times a day PRN anxiety or panic attack  dextrose Oral Gel 15 Gram(s) Oral once PRN Blood Glucose LESS THAN 70 milliGRAM(s)/deciliter  sodium chloride 0.65% Nasal 1 Spray(s) Both Nostrils two times a day PRN Nasal Congestion      Allergies    No Known Allergies    Intolerances        REVIEW OF SYSTEMS:  CONSTITUTIONAL: No fever or chills  HEENT:  No headache, no sore throat  RESPIRATORY: + cough, + dyspnea  CARDIOVASCULAR: No chest pain, palpitations  GASTROINTESTINAL: No abd pain, nv/d/c  NEUROLOGICAL: no focal weakness or dizziness  MUSCULOSKELETAL: no myalgias     Vital Signs Last 24 Hrs  T(C): 36.7 (08 Feb 2025 04:46), Max: 37.1 (07 Feb 2025 10:45)  T(F): 98.1 (08 Feb 2025 04:46), Max: 98.7 (07 Feb 2025 10:45)  HR: 74 (08 Feb 2025 07:43) (74 - 98)  BP: 109/63 (08 Feb 2025 04:46) (95/55 - 109/63)  BP(mean): --  RR: 18 (08 Feb 2025 04:46) (18 - 18)  SpO2: 98% (08 Feb 2025 07:43) (91% - 100%)    Parameters below as of 08 Feb 2025 07:43  Patient On (Oxygen Delivery Method): nasal cannula        PHYSICAL EXAM:  GENERAL: NAD on 2L NC  HEENT:  anicteric, moist mucous membranes  CHEST/LUNG:  wheezing  HEART:  RRR, S1, S2  ABDOMEN:  soft NT  EXTREMITIES: no edema  NERVOUS SYSTEM: answers questions and follows commands appropriately    LABS:                        8.3    7.45  )-----------( 389      ( 08 Feb 2025 06:40 )             25.5     CBC Full  -  ( 08 Feb 2025 06:40 )  WBC Count : 7.45 K/uL  Hemoglobin : 8.3 g/dL  Hematocrit : 25.5 %  Platelet Count - Automated : 389 K/uL  Mean Cell Volume : 89.8 fl  Mean Cell Hemoglobin : 29.2 pg  Mean Cell Hemoglobin Concentration : 32.5 g/dL  Auto Neutrophil # : 5.78 K/uL  Auto Lymphocyte # : 0.80 K/uL  Auto Monocyte # : 0.47 K/uL  Auto Eosinophil # : 0.28 K/uL  Auto Basophil # : 0.01 K/uL  Auto Neutrophil % : 77.6 %  Auto Lymphocyte % : 10.7 %  Auto Monocyte % : 6.3 %  Auto Eosinophil % : 3.8 %  Auto Basophil % : 0.1 %    08 Feb 2025 06:40    141    |  109    |  69     ----------------------------<  164    3.6     |  23     |  2.00     Ca    8.9        08 Feb 2025 06:40  Phos  3.5       08 Feb 2025 06:40  Mg     2.2       08 Feb 2025 06:40    TPro  5.9    /  Alb  2.3    /  TBili  0.7    /  DBili  x      /  AST  18     /  ALT  28     /  AlkPhos  80     08 Feb 2025 06:40      Urinalysis Basic - ( 08 Feb 2025 06:40 )    Color: x / Appearance: x / SG: x / pH: x  Gluc: 164 mg/dL / Ketone: x  / Bili: x / Urobili: x   Blood: x / Protein: x / Nitrite: x   Leuk Esterase: x / RBC: x / WBC x   Sq Epi: x / Non Sq Epi: x / Bacteria: x      CAPILLARY BLOOD GLUCOSE      POCT Blood Glucose.: 183 mg/dL (08 Feb 2025 07:36)  POCT Blood Glucose.: 184 mg/dL (07 Feb 2025 21:54)  POCT Blood Glucose.: 257 mg/dL (07 Feb 2025 16:55)  POCT Blood Glucose.: 220 mg/dL (07 Feb 2025 15:19)  POCT Blood Glucose.: 224 mg/dL (07 Feb 2025 12:02)        Culture - Fungal, Body Fluid (collected 02-06-25 @ 09:44)  Source: Pleural Fl  Preliminary Report (02-08-25 @ 07:46):    No growth    Culture - Body Fluid with Gram Stain (collected 02-06-25 @ 09:44)  Source: Pleural Fl  Gram Stain (02-06-25 @ 23:27):    polymorphonuclear leukocytes seen    No organisms seen    by cytocentrifuge  Preliminary Report (02-07-25 @ 15:59):    No growth        RADIOLOGY & ADDITIONAL TESTS:    Personally reviewed.     Consultant(s) Notes Reviewed:  [x] YES  [ ] NO

## 2025-02-08 NOTE — PROGRESS NOTE ADULT - ASSESSMENT
89y/o F with PMHx HTN, DM2, HLD, depression who presented to the ED with intermittent jaw pain followed by anterior chest pressure radiating to the left shoulder, Pt was found to have elevated troponins and EKG with ST elevations with reciprocal ST-T changes in leads II, III, aVF. Code STEMI was activated and patient was taken to the cath lab where she was found to have 100% occlusion to the diagonal now s/p 1 stent placement. Patient seen and examined in the ICU, reports feeling much better.    flu  malaise  weakness  STEMI  OP  OA  HTN  DM  HLD    570 cc drained - right side - exudate -   on amio  on lasix  on nebs  o2 support - plan for home o2 -   cm f/u    monitor for pulm edema - CHF ex -   Cardio f/u   I and O  replete lytes  cvs rx regimen optimization and HD monitoring  TTE 1/27/25 Left ventricular systolic function is moderately decreased with an ejection fraction visually estimated at 40 to 45 %. small pericardial effusion noted  FLU management - isol precs  nebs - mucolytics  cough rx regimen  I velma  fio2 wean as tolerated  goal sat > 88 pct  oral hygiene  skin care  DM care  s/p ICU stay -   chest imaging reviewed - eff - atelectasis - pulm edema - will benefit from repeat

## 2025-02-08 NOTE — PROGRESS NOTE ADULT - PROBLEM SELECTOR PLAN 1
2/2 acute HFrEF and influenza viral PNA  - CT Chest: Moderate to severe right upper lobe pneumonia. Mild left upper lobe pneumonia, moderate b/l pleural effusions  - pleural effusion s/p right thoracentesis 2/6 with 570cc output  - had been on IV Lasix, now on PO Lasix 20mg qd  - TTE with mild pericardial effusion, EF 40%-45%  - Found to be Flu A + completed course of tamiflu  - standing saline and albuterol nebs  - continue O2 supplementation and wean as tolerated, on 2L NC now 2/2 acute HFrEF and influenza viral PNA  - CT Chest: Moderate to severe right upper lobe pneumonia. Mild left upper lobe pneumonia, moderate b/l pleural effusions  - pleural effusion s/p right thoracentesis 2/6 with 570cc output  - had been on IV Lasix, now on PO Lasix 20mg qd  - TTE with mild pericardial effusion, EF 40%-45%  - Found to be Flu A + completed course of tamiflu  - standing saline and albuterol nebs  - continue O2 supplementation and wean as tolerated, on 2L NC now  - started mucinex for congestion

## 2025-02-08 NOTE — PROGRESS NOTE ADULT - ASSESSMENT
89y/o F with HTN, DM2, HLD, depression who presented to the ED with intermittent jaw pain followed by anterior chest pressure radiating to the left shoulder, admitted for STEMI.    STEMI, New Afib/HFmofEF  - S/p PCI (1/26/25) with 100% occlusion of diagonal and MARLENE x1  - Continue Plavix 75 mg daily without ASA to avoid triple therapy  - Continue Lipitor to 80mg   - Continue BB    - TTE 1/27/25 Left ventricular systolic function is moderately decreased with an ejection fraction visually estimated at 40 to 45 %. small pericardial effusion noted.   - BNP:  <--[id number]  - S/p thoracentesis 2/6 570cc of yellow pleuritic fluid  - Appears compensated from HF POV.  Wean off NC, tolerating 1L/min  - Continue Toprol XL fro GDMT.  Uptitrate as BP tolerates  - Unable to initiate ACEI/ARB in the setting of JOHNNY on CKD  - Recommend Farxiga once BP tolerates   - Continue Lasix 20 mg PO daily      - Afib on tele overnight, rate-controlled  - Currently on renal  FD Lovenox.  Plan to switch to DOAC  - Continue Amio load with the hope that she converts back to NSR    - BP stable and controlled   - Monitor and replete lytes, keep K>4, Mg>2.  - Will continue to follow.    Jenna Martinez DNP, NP-C, AGACNP-C  Cardiology   Call TEAMS

## 2025-02-08 NOTE — PROGRESS NOTE ADULT - SUBJECTIVE AND OBJECTIVE BOX
[facility]                                  Nephrology and Hypertension                             Timur Kwon                                          [phone]     Patient is a 89y old  Female who presents with a chief complaint of STEMI (02 Feb 2025 08:42)       HPI:  Pt is a 89y/o F with PMHx HTN, DM2, HLD, depression who presented to the ED with intermittent jaw pain followed by anterior chest pressure radiating to the left shoulder, Pt was found to have elevated troponins and EKG with ST elevations with reciprocal ST-T changes in leads II, III, aVF. Code STEMI was activated and patient was taken to the cath lab where she was found to have 100% occlusion to the diagonal now s/p 1 stent placement. Patient seen and examined in the ICU, reports feeling much better. She denies any current chest pain or jaw pain, and also denies dizziness, headache, numbness/tingling, nausea, vomiting, palpitations, shortness of breath, abdominal pain. She does admit to chronic diarrhea. Prior to this event, patient was in her usual state of health. No other concerns at this time.    Renal consulted on 2/2/25 for JOHNNY/CKD. S/p ICU course with STEMI Code STEMI, 100% occlusion to the diagonal now s/p x1 stent placed. No cath lab complication reported and pt admitted to the ICU for post cath. Patient denies any knowledge of prior renal issues but Cr was 1.5 on admission     No acute events noted    PAST MEDICAL & SURGICAL HISTORY:  HTN (hypertension)      DM (diabetes mellitus)      HLD (hyperlipidemia)      Major depression      No significant past surgical history           FAMILY HISTORY:  NC    Social History:Non smoker    MEDICATIONS  (STANDING):  albuterol    0.083% 2.5 milliGRAM(s) Nebulizer every 6 hours  aspirin enteric coated 81 milliGRAM(s) Oral daily  atorvastatin 40 milliGRAM(s) Oral at bedtime  chlorhexidine 2% Cloths 1 Application(s) Topical <User Schedule>  clopidogrel Tablet 75 milliGRAM(s) Oral every 24 hours  dextrose 5%. 1000 milliLiter(s) (50 mL/Hr) IV Continuous <Continuous>  dextrose 5%. 1000 milliLiter(s) (100 mL/Hr) IV Continuous <Continuous>  dextrose 50% Injectable 25 Gram(s) IV Push once  dextrose 50% Injectable 12.5 Gram(s) IV Push once  dextrose 50% Injectable 25 Gram(s) IV Push once  DULoxetine 90 milliGRAM(s) Oral daily  glucagon  Injectable 1 milliGRAM(s) IntraMuscular once  heparin   Injectable 5000 Unit(s) SubCutaneous every 8 hours  influenza  Vaccine (HIGH DOSE) 0.5 milliLiter(s) IntraMuscular once  insulin lispro (ADMELOG) corrective regimen sliding scale   SubCutaneous Before meals and at bedtime  metoprolol tartrate 12.5 milliGRAM(s) Oral two times a day  nystatin Powder 1 Application(s) Topical two times a day  oseltamivir 30 milliGRAM(s) Oral daily    MEDICATIONS  (PRN):  acetaminophen     Tablet .. 650 milliGRAM(s) Oral every 6 hours PRN Temp greater or equal to 38C (100.4F), Mild Pain (1 - 3)  dextrose Oral Gel 15 Gram(s) Oral once PRN Blood Glucose LESS THAN 70 milliGRAM(s)/deciliter  sodium chloride 0.65% Nasal 1 Spray(s) Both Nostrils two times a day PRN Nasal Congestion   Meds reviewed    Allergies    No Known Allergies    Intolerances         REVIEW OF SYSTEMS: As per HPI, otherwise negative     Vital Signs Last 24 Hrs  T(C): 36.5 (08 Feb 2025 11:30), Max: 36.9 (07 Feb 2025 19:45)  T(F): 97.7 (08 Feb 2025 11:30), Max: 98.4 (07 Feb 2025 19:45)  HR: 78 (08 Feb 2025 11:30) (74 - 98)  BP: 94/53 (08 Feb 2025 11:30) (94/53 - 109/63)  BP(mean): --  RR: 18 (08 Feb 2025 11:30) (18 - 18)  SpO2: 94% (08 Feb 2025 11:30) (94% - 100%)    Parameters below as of 08 Feb 2025 11:30  Patient On (Oxygen Delivery Method): nasal cannula  O2 Flow (L/min): 2      PHYSICAL EXAM:    GENERAL: NAD  HEAD:  Atraumatic, Normocephalic  EYES: EOMI, conjunctiva and sclera clear  ENMT: No Drainage from nares, No drainage from ears  NECK: Supple, neck  veins full  NERVOUS SYSTEM:  Awake and Alert  CHEST/LUNG: coarseness noted, better   HEART: Regular rate and rhythm; No murmurs, rubs, or gallops  EXTREMITIES:  No Edema          LABS:                                           8.3    7.45  )-----------( 389      ( 08 Feb 2025 06:40 )             25.5     02-08    141  |  109[H]  |  69[H]  ----------------------------<  164[H]  3.6   |  23  |  2.00[H]    Ca    8.9      08 Feb 2025 06:40  Phos  3.5     02-08  Mg     2.2     02-08    TPro  5.9[L]  /  Alb  2.3[L]  /  TBili  0.7  /  DBili  x   /  AST  18  /  ALT  28  /  AlkPhos  80  02-08      Urinalysis Basic - ( 08 Feb 2025 06:40 )    Color: x / Appearance: x / SG: x / pH: x  Gluc: 164 mg/dL / Ketone: x  / Bili: x / Urobili: x   Blood: x / Protein: x / Nitrite: x   Leuk Esterase: x / RBC: x / WBC x   Sq Epi: x / Non Sq Epi: x / Bacteria: x

## 2025-02-08 NOTE — PROGRESS NOTE ADULT - ASSESSMENT
JOHNNY/CKDIII  STEMI s/p MARLENE  HTN  Anemia     -CKDIII baseline with Cr 1.5 on admission, possibly due to hypertensive nephrosclerosis   -JOHNNY clinically due to CARLO +/- hypotension induced ATN. Now stable likely at new baseline with fluctuation with need for intermittent doses of lasix; monitor for now  -Urine studies reviewed   -No IVF needed   -No indication for RRT   -No kidney biopsy indicated   -Will benefit from RAAS blockade +/- SGLT2i in the future for CKD  -Lasix 20 mg IV x 1 was given 2/4/25, then started on PO Lasix. Renal wise tolerating   -S/p thora 2/6/25

## 2025-02-09 LAB
ALBUMIN SERPL ELPH-MCNC: 2.4 G/DL — LOW (ref 3.3–5)
ALP SERPL-CCNC: 77 U/L — SIGNIFICANT CHANGE UP (ref 40–120)
ALT FLD-CCNC: 26 U/L — SIGNIFICANT CHANGE UP (ref 12–78)
ANION GAP SERPL CALC-SCNC: 8 MMOL/L — SIGNIFICANT CHANGE UP (ref 5–17)
AST SERPL-CCNC: 17 U/L — SIGNIFICANT CHANGE UP (ref 15–37)
BASOPHILS # BLD AUTO: 0.02 K/UL — SIGNIFICANT CHANGE UP (ref 0–0.2)
BASOPHILS NFR BLD AUTO: 0.3 % — SIGNIFICANT CHANGE UP (ref 0–2)
BILIRUB SERPL-MCNC: 0.7 MG/DL — SIGNIFICANT CHANGE UP (ref 0.2–1.2)
BUN SERPL-MCNC: 64 MG/DL — HIGH (ref 7–23)
CALCIUM SERPL-MCNC: 8.6 MG/DL — SIGNIFICANT CHANGE UP (ref 8.5–10.1)
CHLORIDE SERPL-SCNC: 109 MMOL/L — HIGH (ref 96–108)
CO2 SERPL-SCNC: 24 MMOL/L — SIGNIFICANT CHANGE UP (ref 22–31)
CREAT SERPL-MCNC: 2.1 MG/DL — HIGH (ref 0.5–1.3)
EGFR: 22 ML/MIN/1.73M2 — LOW
EOSINOPHIL # BLD AUTO: 0.28 K/UL — SIGNIFICANT CHANGE UP (ref 0–0.5)
EOSINOPHIL NFR BLD AUTO: 3.7 % — SIGNIFICANT CHANGE UP (ref 0–6)
GLUCOSE SERPL-MCNC: 180 MG/DL — HIGH (ref 70–99)
HCT VFR BLD CALC: 25.9 % — LOW (ref 34.5–45)
HGB BLD-MCNC: 8.3 G/DL — LOW (ref 11.5–15.5)
IMM GRANULOCYTES NFR BLD AUTO: 2 % — HIGH (ref 0–0.9)
LYMPHOCYTES # BLD AUTO: 1.02 K/UL — SIGNIFICANT CHANGE UP (ref 1–3.3)
LYMPHOCYTES # BLD AUTO: 13.3 % — SIGNIFICANT CHANGE UP (ref 13–44)
MCHC RBC-ENTMCNC: 29 PG — SIGNIFICANT CHANGE UP (ref 27–34)
MCHC RBC-ENTMCNC: 32 G/DL — SIGNIFICANT CHANGE UP (ref 32–36)
MCV RBC AUTO: 90.6 FL — SIGNIFICANT CHANGE UP (ref 80–100)
MONOCYTES # BLD AUTO: 0.46 K/UL — SIGNIFICANT CHANGE UP (ref 0–0.9)
MONOCYTES NFR BLD AUTO: 6 % — SIGNIFICANT CHANGE UP (ref 2–14)
NEUTROPHILS # BLD AUTO: 5.73 K/UL — SIGNIFICANT CHANGE UP (ref 1.8–7.4)
NEUTROPHILS NFR BLD AUTO: 74.7 % — SIGNIFICANT CHANGE UP (ref 43–77)
NRBC # BLD: 0 /100 WBCS — SIGNIFICANT CHANGE UP (ref 0–0)
NRBC BLD-RTO: 0 /100 WBCS — SIGNIFICANT CHANGE UP (ref 0–0)
PLATELET # BLD AUTO: 390 K/UL — SIGNIFICANT CHANGE UP (ref 150–400)
POTASSIUM SERPL-MCNC: 3.9 MMOL/L — SIGNIFICANT CHANGE UP (ref 3.5–5.3)
POTASSIUM SERPL-SCNC: 3.9 MMOL/L — SIGNIFICANT CHANGE UP (ref 3.5–5.3)
PROT SERPL-MCNC: 5.9 G/DL — LOW (ref 6–8.3)
RBC # BLD: 2.86 M/UL — LOW (ref 3.8–5.2)
RBC # FLD: 14.3 % — SIGNIFICANT CHANGE UP (ref 10.3–14.5)
SODIUM SERPL-SCNC: 141 MMOL/L — SIGNIFICANT CHANGE UP (ref 135–145)
WBC # BLD: 7.66 K/UL — SIGNIFICANT CHANGE UP (ref 3.8–10.5)
WBC # FLD AUTO: 7.66 K/UL — SIGNIFICANT CHANGE UP (ref 3.8–10.5)

## 2025-02-09 PROCEDURE — 99233 SBSQ HOSP IP/OBS HIGH 50: CPT | Mod: GC

## 2025-02-09 PROCEDURE — 71250 CT THORAX DX C-: CPT | Mod: 26

## 2025-02-09 PROCEDURE — 99232 SBSQ HOSP IP/OBS MODERATE 35: CPT

## 2025-02-09 RX ADMIN — AMIODARONE HYDROCHLORIDE 400 MILLIGRAM(S): 50 INJECTION, SOLUTION INTRAVENOUS at 05:28

## 2025-02-09 RX ADMIN — ANTISEPTIC SURGICAL SCRUB 1 APPLICATION(S): 0.04 SOLUTION TOPICAL at 05:28

## 2025-02-09 RX ADMIN — Medication 1: at 08:03

## 2025-02-09 RX ADMIN — Medication 4 MILLILITER(S): at 07:49

## 2025-02-09 RX ADMIN — Medication 2.5 MILLIGRAM(S): at 13:08

## 2025-02-09 RX ADMIN — AMIODARONE HYDROCHLORIDE 400 MILLIGRAM(S): 50 INJECTION, SOLUTION INTRAVENOUS at 21:49

## 2025-02-09 RX ADMIN — Medication 2.5 MILLIGRAM(S): at 07:48

## 2025-02-09 RX ADMIN — Medication 2.5 MILLIGRAM(S): at 19:22

## 2025-02-09 RX ADMIN — NYSTATIN 1 APPLICATION(S): 100000 POWDER TOPICAL at 05:28

## 2025-02-09 RX ADMIN — NYSTATIN 1 APPLICATION(S): 100000 POWDER TOPICAL at 17:04

## 2025-02-09 RX ADMIN — AMIODARONE HYDROCHLORIDE 400 MILLIGRAM(S): 50 INJECTION, SOLUTION INTRAVENOUS at 14:04

## 2025-02-09 RX ADMIN — Medication 600 MILLIGRAM(S): at 17:03

## 2025-02-09 RX ADMIN — Medication 4 MILLILITER(S): at 19:22

## 2025-02-09 RX ADMIN — Medication 1: at 11:43

## 2025-02-09 RX ADMIN — ATORVASTATIN CALCIUM 80 MILLIGRAM(S): 80 TABLET, FILM COATED ORAL at 21:49

## 2025-02-09 RX ADMIN — Medication 75 MILLIGRAM(S): at 11:20

## 2025-02-09 RX ADMIN — Medication 2: at 21:59

## 2025-02-09 RX ADMIN — Medication 1: at 17:04

## 2025-02-09 RX ADMIN — Medication 600 MILLIGRAM(S): at 05:28

## 2025-02-09 RX ADMIN — Medication 40 MILLIGRAM(S): at 11:42

## 2025-02-09 RX ADMIN — DULOXETINE 90 MILLIGRAM(S): 20 CAPSULE, DELAYED RELEASE ORAL at 11:20

## 2025-02-09 RX ADMIN — ENOXAPARIN SODIUM 70 MILLIGRAM(S): 100 INJECTION SUBCUTANEOUS at 11:19

## 2025-02-09 NOTE — PROGRESS NOTE ADULT - SUBJECTIVE AND OBJECTIVE BOX
[facility]                                  Nephrology and Hypertension                             Timur Kwon                                          [phone]     Patient is a 89y old  Female who presents with a chief complaint of STEMI (02 Feb 2025 08:42)       HPI:  Pt is a 89y/o F with PMHx HTN, DM2, HLD, depression who presented to the ED with intermittent jaw pain followed by anterior chest pressure radiating to the left shoulder, Pt was found to have elevated troponins and EKG with ST elevations with reciprocal ST-T changes in leads II, III, aVF. Code STEMI was activated and patient was taken to the cath lab where she was found to have 100% occlusion to the diagonal now s/p 1 stent placement. Patient seen and examined in the ICU, reports feeling much better. She denies any current chest pain or jaw pain, and also denies dizziness, headache, numbness/tingling, nausea, vomiting, palpitations, shortness of breath, abdominal pain. She does admit to chronic diarrhea. Prior to this event, patient was in her usual state of health. No other concerns at this time.    Renal consulted on 2/2/25 for JOHNNY/CKD. S/p ICU course with STEMI Code STEMI, 100% occlusion to the diagonal now s/p x1 stent placed. No cath lab complication reported and pt admitted to the ICU for post cath. Patient denies any knowledge of prior renal issues but Cr was 1.5 on admission     No acute events noted    PAST MEDICAL & SURGICAL HISTORY:  HTN (hypertension)      DM (diabetes mellitus)      HLD (hyperlipidemia)      Major depression      No significant past surgical history           FAMILY HISTORY:  NC    Social History:Non smoker    MEDICATIONS  (STANDING):  albuterol    0.083% 2.5 milliGRAM(s) Nebulizer every 6 hours  aspirin enteric coated 81 milliGRAM(s) Oral daily  atorvastatin 40 milliGRAM(s) Oral at bedtime  chlorhexidine 2% Cloths 1 Application(s) Topical <User Schedule>  clopidogrel Tablet 75 milliGRAM(s) Oral every 24 hours  dextrose 5%. 1000 milliLiter(s) (50 mL/Hr) IV Continuous <Continuous>  dextrose 5%. 1000 milliLiter(s) (100 mL/Hr) IV Continuous <Continuous>  dextrose 50% Injectable 25 Gram(s) IV Push once  dextrose 50% Injectable 12.5 Gram(s) IV Push once  dextrose 50% Injectable 25 Gram(s) IV Push once  DULoxetine 90 milliGRAM(s) Oral daily  glucagon  Injectable 1 milliGRAM(s) IntraMuscular once  heparin   Injectable 5000 Unit(s) SubCutaneous every 8 hours  influenza  Vaccine (HIGH DOSE) 0.5 milliLiter(s) IntraMuscular once  insulin lispro (ADMELOG) corrective regimen sliding scale   SubCutaneous Before meals and at bedtime  metoprolol tartrate 12.5 milliGRAM(s) Oral two times a day  nystatin Powder 1 Application(s) Topical two times a day  oseltamivir 30 milliGRAM(s) Oral daily    MEDICATIONS  (PRN):  acetaminophen     Tablet .. 650 milliGRAM(s) Oral every 6 hours PRN Temp greater or equal to 38C (100.4F), Mild Pain (1 - 3)  dextrose Oral Gel 15 Gram(s) Oral once PRN Blood Glucose LESS THAN 70 milliGRAM(s)/deciliter  sodium chloride 0.65% Nasal 1 Spray(s) Both Nostrils two times a day PRN Nasal Congestion   Meds reviewed    Allergies    No Known Allergies    Intolerances         REVIEW OF SYSTEMS: As per HPI, otherwise negative     Vital Signs Last 24 Hrs  T(C): 37.1 (09 Feb 2025 10:18), Max: 37.1 (09 Feb 2025 10:18)  T(F): 98.7 (09 Feb 2025 10:18), Max: 98.7 (09 Feb 2025 10:18)  HR: 94 (09 Feb 2025 10:18) (79 - 94)  BP: 100/61 (09 Feb 2025 10:18) (96/57 - 102/55)  BP(mean): --  RR: 18 (09 Feb 2025 10:18) (18 - 18)  SpO2: 99% (09 Feb 2025 10:18) (97% - 100%)    Parameters below as of 09 Feb 2025 10:18  Patient On (Oxygen Delivery Method): nasal cannula, 2  O2 Flow (L/min): 2    PHYSICAL EXAM:    GENERAL: NAD  HEAD:  Atraumatic, Normocephalic  EYES: EOMI, conjunctiva and sclera clear  ENMT: No Drainage from nares, No drainage from ears  NECK: Supple, neck  veins full  NERVOUS SYSTEM:  Awake and Alert  CHEST/LUNG: coarseness noted, better   HEART: Regular rate and rhythm; No murmurs, rubs, or gallops  EXTREMITIES:  No Edema          LABS:                                       8.3    7.66  )-----------( 390      ( 09 Feb 2025 06:55 )             25.9     02-09    141  |  109[H]  |  64[H]  ----------------------------<  180[H]  3.9   |  24  |  2.10[H]    Ca    8.6      09 Feb 2025 06:55  Phos  3.5     02-08  Mg     2.2     02-08    TPro  5.9[L]  /  Alb  2.4[L]  /  TBili  0.7  /  DBili  x   /  AST  17  /  ALT  26  /  AlkPhos  77  02-09      Urinalysis Basic - ( 09 Feb 2025 06:55 )    Color: x / Appearance: x / SG: x / pH: x  Gluc: 180 mg/dL / Ketone: x  / Bili: x / Urobili: x   Blood: x / Protein: x / Nitrite: x   Leuk Esterase: x / RBC: x / WBC x   Sq Epi: x / Non Sq Epi: x / Bacteria: x

## 2025-02-09 NOTE — PROGRESS NOTE ADULT - PROBLEM SELECTOR PLAN 2
New onset afib s/p PCI for STEMI  - Now on Toprol XL 25mg qd  - Start PO amio load to maintain NSR  - On renally dosed FD Lovenox for AC, transition to Eliquis when JOHNNY improves  - Cardio following  - Monitor on telemetry  - Replete electrolytes as needed New onset afib s/p PCI for STEMI  - Now on Toprol XL 25mg qd  - Start PO amio load to maintain NSR  - On renally dosed FD Lovenox for AC, transition to Eliquis when JOHNNY improves  - Cardio following  - Monitor on telemetry  - Replete electrolytes as needed  - Consider transitioning to Eliquis 2.5 BID tomorrow, pending if thora is needed New onset afib s/p PCI for STEMI  - Now on Toprol XL 25mg qd  - Start PO amio load to maintain NSR  - On renally dosed FD Lovenox for AC, transition to Eliquis when JOHNNY improves  - Cardio following  - Monitor on telemetry  - Replete electrolytes as needed  - Consider transitioning to Eliquis 2.5 BID tomorrow, unless thora needed (pending CT results)

## 2025-02-09 NOTE — PROGRESS NOTE ADULT - ASSESSMENT
89y/o F with HTN, DM2, HLD, depression who presented to the ED with intermittent jaw pain followed by anterior chest pressure radiating to the left shoulder, admitted for STEMI.    STEMI, New Afib/HFmofEF  - S/p PCI (1/26/25) with 100% occlusion of diagonal and MARLENE x1  - Continue Plavix 75 mg daily without ASA to avoid triple therapy  - Continue Lipitor to 80mg   - Continue BB    - TTE 1/27/25 Left ventricular systolic function is moderately decreased with an ejection fraction visually estimated at 40 to 45 %. small pericardial effusion noted.   - BNP:  <--50001  - S/p thoracentesis 2/6 570cc of yellow pleuritic fluid  - Appears dry but with increasing O2 requirement  with cough, now on NC at 3L/min from 1L  - Had recent CxR, 26, showing interval resolution of small right-sided pleural effusion  - May need repeat CxR  - Continue Toprol XL fro GDMT.  Uptitrate as BP tolerates  - Unable to initiate ACEI/ARB in the setting of JOHNNY on CKD  - Recommend Farxiga once BP tolerates   - Continue Lasix 20 mg PO daily      - Afib on tele, rate-controlled.    - Currently on renal FD Lovenox.  Can switch to DOAC  - Continue Amio load with the hope that she converts back to NSR for now    - BP stable but on the soft side  - Monitor and replete lytes, keep K>4, Mg>2.  - Will continue to follow.    Jenna Martinez DNP, NP-C, AGACNP-C  Cardiology   Call TEAMS        89y/o F with HTN, DM2, HLD, depression who presented to the ED with intermittent jaw pain followed by anterior chest pressure radiating to the left shoulder, admitted for STEMI.    STEMI, New Afib/HFmofEF  - S/p PCI (1/26/25) with 100% occlusion of diagonal and MARLENE x1  - Continue Plavix 75 mg daily without ASA to avoid triple therapy  - Continue Lipitor to 80mg   - Continue BB    - TTE 1/27/25 Left ventricular systolic function is moderately decreased with an ejection fraction visually estimated at 40 to 45 %. small pericardial effusion noted.   - BNP:  <--68151  - S/p thoracentesis 2/6 570cc of yellow pleuritic fluid  - Appears dry but with increasing O2 requirement  with cough, now on NC at 3L/min from 1L  - Had recent CxR, 2/6, showing interval resolution of small right-sided pleural effusion  - Obtain repeat CXR today  - Continue Toprol XL fro GDMT.  Uptitrate as BP tolerates  - Unable to initiate ACEI/ARB in the setting of JOHNNY on CKD  - Recommend Farxiga once BP tolerates   - Would give additional 40mg IV lasix today  - Continue Lasix 20 mg PO daily      - Afib on tele, rate-controlled.    - Currently on renal FD Lovenox.  Can switch to DOAC  - Continue Amio load with the hope that she converts back to NSR for now    - BP stable but on the soft side  - Monitor and replete lytes, keep K>4, Mg>2.  - Will continue to follow.    Jenna Martinez DNP, NP-C, AGACNP-C  Cardiology   Call TEAMS

## 2025-02-09 NOTE — PROGRESS NOTE ADULT - SUBJECTIVE AND OBJECTIVE BOX
[facility] -- Jamal Moralez, Roney Palumbo Savella, , Yasir Post  Office # [phone]    Follow Up:  STEMI/Afib    Subjective/Observations: Denies chest pain or palpitations.  However, c/o persistent cough and SOB.  Remains Afib on tele with rate-controlled    REVIEW OF SYSTEMS: All other review of systems is negative unless indicated above  PAST MEDICAL & SURGICAL HISTORY:  HTN (hypertension)  DM (diabetes mellitus)  HLD (hyperlipidemia)  Major depression  No significant past surgical history    MEDICATIONS  (STANDING):  albuterol    0.083% 2.5 milliGRAM(s) Nebulizer every 6 hours  aMIOdarone    Tablet   Oral   aMIOdarone    Tablet 400 milliGRAM(s) Oral every 8 hours  atorvastatin 80 milliGRAM(s) Oral at bedtime  chlorhexidine 2% Cloths 1 Application(s) Topical <User Schedule>  clopidogrel Tablet 75 milliGRAM(s) Oral daily  dextrose 5%. 1000 milliLiter(s) (100 mL/Hr) IV Continuous <Continuous>  dextrose 5%. 1000 milliLiter(s) (50 mL/Hr) IV Continuous <Continuous>  dextrose 50% Injectable 25 Gram(s) IV Push once  dextrose 50% Injectable 12.5 Gram(s) IV Push once  dextrose 50% Injectable 25 Gram(s) IV Push once  DULoxetine 90 milliGRAM(s) Oral daily  enoxaparin Injectable 70 milliGRAM(s) SubCutaneous every 24 hours  furosemide    Tablet 20 milliGRAM(s) Oral daily  glucagon  Injectable 1 milliGRAM(s) IntraMuscular once  guaiFENesin  milliGRAM(s) Oral every 12 hours  influenza  Vaccine (HIGH DOSE) 0.5 milliLiter(s) IntraMuscular once  insulin lispro (ADMELOG) corrective regimen sliding scale   SubCutaneous Before meals and at bedtime  metoprolol succinate ER 25 milliGRAM(s) Oral daily  nystatin Powder 1 Application(s) Topical two times a day  sodium chloride 3%  Inhalation 4 milliLiter(s) Inhalation every 12 hours    MEDICATIONS  (PRN):  acetaminophen     Tablet .. 650 milliGRAM(s) Oral every 6 hours PRN Temp greater or equal to 38C (100.4F), Mild Pain (1 - 3)  ALPRAZolam 0.25 milliGRAM(s) Oral two times a day PRN anxiety or panic attack  dextrose Oral Gel 15 Gram(s) Oral once PRN Blood Glucose LESS THAN 70 milliGRAM(s)/deciliter  sodium chloride 0.65% Nasal 1 Spray(s) Both Nostrils two times a day PRN Nasal Congestion    Allergies    No Known Allergies    Intolerances      Vital Signs Last 24 Hrs  T(C): 37.1 (09 Feb 2025 10:18), Max: 37.1 (09 Feb 2025 10:18)  T(F): 98.7 (09 Feb 2025 10:18), Max: 98.7 (09 Feb 2025 10:18)  HR: 94 (09 Feb 2025 10:18) (78 - 94)  BP: 100/61 (09 Feb 2025 10:18) (94/53 - 102/55)  BP(mean): --  RR: 18 (09 Feb 2025 10:18) (18 - 18)  SpO2: 99% (09 Feb 2025 10:18) (94% - 100%)    Parameters below as of 09 Feb 2025 10:18  Patient On (Oxygen Delivery Method): nasal cannula, 2  O2 Flow (L/min): 2    I&O's Summary    08 Feb 2025 07:01  -  09 Feb 2025 07:00  --------------------------------------------------------  IN: 230 mL / OUT: 301 mL / NET: -71 mL    PHYSICAL EXAM:  TELE: Afib  Constitutional: NAD, awake and alert  HEENT: Moist Mucous Membranes, Anicteric  Pulmonary: Non-labored, breath sounds are diminished bilaterally, No wheezing, rales +rhonchi +cough  Cardiovascular: IRRR, S1 and S2, No murmurs, rubs, gallops or clicks  Gastrointestinal: Bowel Sounds present, soft, nontender.   Lymph: No peripheral edema. No lymphadenopathy.  Skin: No visible rashes or ulcers.  Psych:  Mood & affect appropriate    LABS: All Labs Reviewed:                        8.3    7.66  )-----------( 390      ( 09 Feb 2025 06:55 )             25.9                         8.3    7.45  )-----------( 389      ( 08 Feb 2025 06:40 )             25.5                         8.8    7.20  )-----------( 360      ( 07 Feb 2025 05:55 )             27.9     09 Feb 2025 06:55    141    |  109    |  64     ----------------------------<  180    3.9     |  24     |  2.10   08 Feb 2025 06:40    141    |  109    |  69     ----------------------------<  164    3.6     |  23     |  2.00   07 Feb 2025 05:55    140    |  109    |  68     ----------------------------<  171    4.0     |  21     |  2.10     Ca    8.6        09 Feb 2025 06:55  Ca    8.9        08 Feb 2025 06:40  Ca    9.0        07 Feb 2025 05:55  Phos  3.5       08 Feb 2025 06:40  Phos  3.5       07 Feb 2025 05:55  Mg     2.2       08 Feb 2025 06:40  Mg     2.5       07 Feb 2025 05:55    TPro  5.9    /  Alb  2.4    /  TBili  0.7    /  DBili  x      /  AST  17     /  ALT  26     /  AlkPhos  77     09 Feb 2025 06:55  TPro  5.9    /  Alb  2.3    /  TBili  0.7    /  DBili  x      /  AST  18     /  ALT  28     /  AlkPhos  80     08 Feb 2025 06:40  TPro  6.0    /  Alb  2.3    /  TBili  0.6    /  DBili  x      /  AST  31     /  ALT  33     /  AlkPhos  83     07 Feb 2025 05:55          12 Lead ECG:   Ventricular Rate 100 BPM    QRS Duration 98 ms    Q-T Interval 360 ms    QTC Calculation(Bazett) 464 ms    R Axis 50 degrees    T Axis -47 degrees    Diagnosis Line *** Critical Test Result: STEMI  Atrial fibrillation  Lateral infarct (cited on or before 26-JAN-2025)  ST & T wave abnormality, consider inferior ischemia  ST & T wave abnormality, consider anterior ischemia    Confirmed by ISAAC PALUMBO ([id number]) on 2/5/2025 12:55:12 PM (02-04-25 @ 16:25)      TRANSTHORACIC ECHOCARDIOGRAM REPORT  ________________________________________________________________________________                                      _______       Pt. Name:       DOMITILA GRACE Study Date:    1/28/2025  MRN:            [id number]           YOB: 1933  Accession #:    [id number]          Age:           89 years  Account#:       [id number]         Gender:        F  Heart Rate:                        Height:        62.20 in (158.00 cm)  Rhythm:          Weight:        160.93 lb (73.00 kg)  Blood Pressure: 93/52 mmHg         BSA/BMI:       1.75 m² / 29.24 kg/m²  ________________________________________________________________________________________  Referring Physician:    [id number] Carlos Alberto  Interpreting Physician: Braxton Bowens M.D.  Primary Sonographer:    Marlys Queen    CPT:               ECHO TTE W/O CON F/U LTD - 98961.m  Indication(s):     ST elevation [STEMI] myocardial infarction of unspecified                     site - I21.3  Procedure:         Limited transthoracic echocardiogram.  Ordering Location: ICU1  Admission Status:  Inpatient    _______________________________________________________________________________________     CONCLUSIONS:      1. Left ventricular systolic function is moderately decreased with an ejection fraction visually estimated at 40 to 45 %.   2. Small pericardial effusion noted adjacent to the right atrium and small pericardial effusion noted adjacent to the right ventricle. The effusion measures 0.82 cm in diastole in the subcostal view adjacent ot the RV.   3. Thickened pericardium.   4. No significant change in the size of the pericardial effusion compared to TTE study from yesterday (1/27/25) is noted.    ________________________________________________________________________________________  FINDINGS:     Left Ventricle:  Left ventricular systolic function is moderately decreased with an ejection fraction visually estimated at 40 to 45%.     Right Ventricle:  Right ventricular systolic function is normal.     Left Atrium:  The left atrium is dilated.     Mitral Valve:  There is moderate calcification of the mitral valve annulus.     Pericardium:  The pericardium is thickened. There is a small pericardial effusionnoted adjacent to the right atrium and a small pericardial effusion noted adjacent to the right ventricle.     Systemic Veins:  The inferior vena cava is normal in size measuring 1.83 cm in diameter, (normal <2.1cm) with normal inspiratory collapse (normal >50%) consistent with normal right atrial pressure (~3, range 0-5mmHg).  ____________________________________________________________________  QUANTITATIVE DATA:  Left Ventricle Measurements: (Indexed to BSA)     Visualized LV EF%: 40 to 45%  _____________________________________________________________________________________  Electronically signed on 1/28/2025 at 2:21:15 PM by Braxton Bowens M.D.    *** Final ***      [facility] -- Jamal Moralez, Roney Palumbo Savella, , Yasir Post  Office # [phone]    Follow Up:  STEMI/Afib    Subjective/Observations: Denies chest pain or palpitations.  However, c/o persistent cough and SOB.  Remains Afib on tele with rate-controlled    REVIEW OF SYSTEMS: All other review of systems is negative unless indicated above  PAST MEDICAL & SURGICAL HISTORY:  HTN (hypertension)  DM (diabetes mellitus)  HLD (hyperlipidemia)  Major depression  No significant past surgical history    MEDICATIONS  (STANDING):  albuterol    0.083% 2.5 milliGRAM(s) Nebulizer every 6 hours  aMIOdarone    Tablet   Oral   aMIOdarone    Tablet 400 milliGRAM(s) Oral every 8 hours  atorvastatin 80 milliGRAM(s) Oral at bedtime  chlorhexidine 2% Cloths 1 Application(s) Topical <User Schedule>  clopidogrel Tablet 75 milliGRAM(s) Oral daily  dextrose 5%. 1000 milliLiter(s) (100 mL/Hr) IV Continuous <Continuous>  dextrose 5%. 1000 milliLiter(s) (50 mL/Hr) IV Continuous <Continuous>  dextrose 50% Injectable 25 Gram(s) IV Push once  dextrose 50% Injectable 12.5 Gram(s) IV Push once  dextrose 50% Injectable 25 Gram(s) IV Push once  DULoxetine 90 milliGRAM(s) Oral daily  enoxaparin Injectable 70 milliGRAM(s) SubCutaneous every 24 hours  furosemide    Tablet 20 milliGRAM(s) Oral daily  glucagon  Injectable 1 milliGRAM(s) IntraMuscular once  guaiFENesin  milliGRAM(s) Oral every 12 hours  influenza  Vaccine (HIGH DOSE) 0.5 milliLiter(s) IntraMuscular once  insulin lispro (ADMELOG) corrective regimen sliding scale   SubCutaneous Before meals and at bedtime  metoprolol succinate ER 25 milliGRAM(s) Oral daily  nystatin Powder 1 Application(s) Topical two times a day  sodium chloride 3%  Inhalation 4 milliLiter(s) Inhalation every 12 hours    MEDICATIONS  (PRN):  acetaminophen     Tablet .. 650 milliGRAM(s) Oral every 6 hours PRN Temp greater or equal to 38C (100.4F), Mild Pain (1 - 3)  ALPRAZolam 0.25 milliGRAM(s) Oral two times a day PRN anxiety or panic attack  dextrose Oral Gel 15 Gram(s) Oral once PRN Blood Glucose LESS THAN 70 milliGRAM(s)/deciliter  sodium chloride 0.65% Nasal 1 Spray(s) Both Nostrils two times a day PRN Nasal Congestion    Allergies    No Known Allergies    Intolerances      Vital Signs Last 24 Hrs  T(C): 37.1 (09 Feb 2025 10:18), Max: 37.1 (09 Feb 2025 10:18)  T(F): 98.7 (09 Feb 2025 10:18), Max: 98.7 (09 Feb 2025 10:18)  HR: 94 (09 Feb 2025 10:18) (78 - 94)  BP: 100/61 (09 Feb 2025 10:18) (94/53 - 102/55)  BP(mean): --  RR: 18 (09 Feb 2025 10:18) (18 - 18)  SpO2: 99% (09 Feb 2025 10:18) (94% - 100%)    Parameters below as of 09 Feb 2025 10:18  Patient On (Oxygen Delivery Method): nasal cannula, 2  O2 Flow (L/min): 2    I&O's Summary    08 Feb 2025 07:01  -  09 Feb 2025 07:00  --------------------------------------------------------  IN: 230 mL / OUT: 301 mL / NET: -71 mL    PHYSICAL EXAM:  TELE: Afib  Constitutional: NAD, awake and alert  HEENT: Moist Mucous Membranes, Anicteric  Pulmonary: Non-labored, breath sounds are diminished bilaterally, No wheezing, rales +rhonchi +cough  Cardiovascular: IRRR, S1 and S2, No murmurs, rubs, gallops or clicks  Gastrointestinal: Bowel Sounds present, soft, nontender.   Lymph: No peripheral edema. No lymphadenopathy.  Skin: No visible rashes or ulcers.  Psych:  Mood & affect appropriate    LABS: All Labs Reviewed:                        8.3    7.66  )-----------( 390      ( 09 Feb 2025 06:55 )             25.9                         8.3    7.45  )-----------( 389      ( 08 Feb 2025 06:40 )             25.5                         8.8    7.20  )-----------( 360      ( 07 Feb 2025 05:55 )             27.9     09 Feb 2025 06:55    141    |  109    |  64     ----------------------------<  180    3.9     |  24     |  2.10   08 Feb 2025 06:40    141    |  109    |  69     ----------------------------<  164    3.6     |  23     |  2.00   07 Feb 2025 05:55    140    |  109    |  68     ----------------------------<  171    4.0     |  21     |  2.10     Ca    8.6        09 Feb 2025 06:55  Ca    8.9        08 Feb 2025 06:40  Ca    9.0        07 Feb 2025 05:55  Phos  3.5       08 Feb 2025 06:40  Phos  3.5       07 Feb 2025 05:55  Mg     2.2       08 Feb 2025 06:40  Mg     2.5       07 Feb 2025 05:55    TPro  5.9    /  Alb  2.4    /  TBili  0.7    /  DBili  x      /  AST  17     /  ALT  26     /  AlkPhos  77     09 Feb 2025 06:55  TPro  5.9    /  Alb  2.3    /  TBili  0.7    /  DBili  x      /  AST  18     /  ALT  28     /  AlkPhos  80     08 Feb 2025 06:40  TPro  6.0    /  Alb  2.3    /  TBili  0.6    /  DBili  x      /  AST  31     /  ALT  33     /  AlkPhos  83     07 Feb 2025 05:55          12 Lead ECG:   Ventricular Rate 100 BPM    QRS Duration 98 ms    Q-T Interval 360 ms    QTC Calculation(Bazett) 464 ms    R Axis 50 degrees    T Axis -47 degrees    Diagnosis Line *** Critical Test Result: STEMI  Atrial fibrillation  Lateral infarct (cited on or before 26-JAN-2025)  ST & T wave abnormality, consider inferior ischemia  ST & T wave abnormality, consider anterior ischemia    Confirmed by ISAAC PALUMBO ([id number]) on 2/5/2025 12:55:12 PM (02-04-25 @ 16:25)      TRANSTHORACIC ECHOCARDIOGRAM REPORT  ________________________________________________________________________________                                      _______       Pt. Name:       DOMITILA GRACE Study Date:    1/28/2025  MRN:            [id number]           YOB: 1933  Accession #:    [id number]          Age:           89 years  Account#:       [id number]         Gender:        F  Heart Rate:                        Height:        62.20 in (158.00 cm)  Rhythm:          Weight:        160.93 lb (73.00 kg)  Blood Pressure: 93/52 mmHg         BSA/BMI:       1.75 m² / 29.24 kg/m²  ________________________________________________________________________________________  Referring Physician:    [id number] Carlos Alberto  Interpreting Physician: Braxton Bowens M.D.  Primary Sonographer:    Marlys Queen    CPT:               ECHO TTE W/O CON F/U LTD - 56936.m  Indication(s):     ST elevation [STEMI] myocardial infarction of unspecified                     site - I21.3  Procedure:         Limited transthoracic echocardiogram.  Ordering Location: ICU1  Admission Status:  Inpatient    _______________________________________________________________________________________     CONCLUSIONS:      1. Left ventricular systolic function is moderately decreased with an ejection fraction visually estimated at 40 to 45 %.   2. Small pericardial effusion noted adjacent to the right atrium and small pericardial effusion noted adjacent to the right ventricle. The effusion measures 0.82 cm in diastole in the subcostal view adjacent ot the RV.   3. Thickened pericardium.   4. No significant change in the size of the pericardial effusion compared to TTE study from yesterday (1/27/25) is noted.    ________________________________________________________________________________________  FINDINGS:     Left Ventricle:  Left ventricular systolic function is moderately decreased with an ejection fraction visually estimated at 40 to 45%.     Right Ventricle:  Right ventricular systolic function is normal.     Left Atrium:  The left atrium is dilated.     Mitral Valve:  There is moderate calcification of the mitral valve annulus.     Pericardium:  The pericardium is thickened. There is a small pericardial effusionnoted adjacent to the right atrium and a small pericardial effusion noted adjacent to the right ventricle.     Systemic Veins:  The inferior vena cava is normal in size measuring 1.83 cm in diameter, (normal <2.1cm) with normal inspiratory collapse (normal >50%) consistent with normal right atrial pressure (~3, range 0-5mmHg).  ____________________________________________________________________  QUANTITATIVE DATA:  Left Ventricle Measurements: (Indexed to BSA)     Visualized LV EF%: 40 to 45%  _____________________________________________________________________________________  Electronically signed on 1/28/2025 at 2:21:15 PM by Braxton Bowens M.D.    *** Final ***

## 2025-02-09 NOTE — PROGRESS NOTE ADULT - ASSESSMENT
89y/o F with PMHx HTN, DM2, HLD, depression who presented to the ED with intermittent jaw pain followed by anterior chest pressure radiating to the left shoulder, Pt was found to have elevated troponins and EKG with ST elevations with reciprocal ST-T changes in leads II, III, aVF. Code STEMI was activated and patient was taken to the cath lab where she was found to have 100% occlusion to the diagonal now s/p 1 stent placement. Patient seen and examined in the ICU, reports feeling much better.    flu  malaise  weakness  STEMI  OP  OA  HTN  DM  HLD    570 cc drained - right side - exudate -   on amio  on lasix  on nebs  o2 support - plan for home o2 -   cm f/u    monitor for pulm edema - CHF ex -   Cardio f/u   I and O  replete lytes  cvs rx regimen optimization and HD monitoring  TTE 1/27/25 Left ventricular systolic function is moderately decreased with an ejection fraction visually estimated at 40 to 45 %. small pericardial effusion noted  FLU management - isol precs  nebs - mucolytics  cough rx regimen  I velma  fio2 wean as tolerated  goal sat > 88 pct  oral hygiene  skin care  DM care  s/p ICU stay -   chest imaging reviewed - eff - atelectasis - pulm edema - will benefit from repeat

## 2025-02-09 NOTE — CHART NOTE - NSCHARTNOTEFT_GEN_A_CORE
Assessment: Pt seen for nutrition follow-up. Chart reviewed, hospital course noted.    Brief hx: Pt is a "89 y/o F with PMHx HTN, DM2, HLD, depression who presented to the ED with intermittent jaw pain followed by anterior chest pressure radiating to the left shoulder, admitted for STEMI. Course c/b acute hypoxic respiratory failure secondary to acute HFrEF and influenza. Also with JOHNNY."    Visited pt at bedside this am. Pt reports fair appetite/intake. Tolerating diet well. PO intakes % per nursing documentation. Pt reports consuming >50% of breakfast meal this am. Tolerating diet well. Denies N/V. +BM 2/7. Pt states she does not have her dentures with her in hospital. Had difficulty chewing tougher meat few nights ago. Pt now transitioned to soft/bite size diet. Receiving Glucerna shake daily. Fair intake of supplement. Additional food preferences obtained to optimize po intake/tolerance. RD remains available and will continue to follow-up.     Factors impacting intake: [X] none [ ] nausea  [ ] vomiting [ ] diarrhea [ ] constipation  [ ]chewing problems [ ] swallowing issues  [ ] other:     Diet Prescription: Diet, Soft and Bite Sized:   Low Sodium  Supplement Feeding Modality:  Oral  Glucerna Shake Cans or Servings Per Day:  1       Frequency:  Daily (02-06-25 @ 09:18) [Active]    Intake: fair    Current Weight: Weight (kg): 72.6 (26 Jan 2025 19:18)  % Weight Change    Pertinent Medications: MEDICATIONS  (STANDING):  albuterol    0.083% 2.5 milliGRAM(s) Nebulizer every 6 hours  aMIOdarone    Tablet   Oral   aMIOdarone    Tablet 400 milliGRAM(s) Oral every 8 hours  atorvastatin 80 milliGRAM(s) Oral at bedtime  chlorhexidine 2% Cloths 1 Application(s) Topical <User Schedule>  clopidogrel Tablet 75 milliGRAM(s) Oral daily  dextrose 5%. 1000 milliLiter(s) (50 mL/Hr) IV Continuous <Continuous>  dextrose 5%. 1000 milliLiter(s) (100 mL/Hr) IV Continuous <Continuous>  dextrose 50% Injectable 25 Gram(s) IV Push once  dextrose 50% Injectable 12.5 Gram(s) IV Push once  dextrose 50% Injectable 25 Gram(s) IV Push once  DULoxetine 90 milliGRAM(s) Oral daily  enoxaparin Injectable 70 milliGRAM(s) SubCutaneous every 24 hours  furosemide    Tablet 20 milliGRAM(s) Oral daily  glucagon  Injectable 1 milliGRAM(s) IntraMuscular once  guaiFENesin  milliGRAM(s) Oral every 12 hours  influenza  Vaccine (HIGH DOSE) 0.5 milliLiter(s) IntraMuscular once  insulin lispro (ADMELOG) corrective regimen sliding scale   SubCutaneous Before meals and at bedtime  metoprolol succinate ER 25 milliGRAM(s) Oral daily  nystatin Powder 1 Application(s) Topical two times a day  sodium chloride 3%  Inhalation 4 milliLiter(s) Inhalation every 12 hours    MEDICATIONS  (PRN):  acetaminophen     Tablet .. 650 milliGRAM(s) Oral every 6 hours PRN Temp greater or equal to 38C (100.4F), Mild Pain (1 - 3)  ALPRAZolam 0.25 milliGRAM(s) Oral two times a day PRN anxiety or panic attack  dextrose Oral Gel 15 Gram(s) Oral once PRN Blood Glucose LESS THAN 70 milliGRAM(s)/deciliter  sodium chloride 0.65% Nasal 1 Spray(s) Both Nostrils two times a day PRN Nasal Congestion    Pertinent Labs: 02-09 Na141 mmol/L Glu 180 mg/dL[H] K+ 3.9 mmol/L Cr  2.10 mg/dL[H] BUN 64 mg/dL[H] 02-08 Phos 3.5 mg/dL 02-09 Alb 2.4 g/dL[L] 01-27 Chol 261 mg/dL[H] LDL --    HDL 43 mg/dL[L] Trig 239 mg/dL[H]    CAPILLARY BLOOD GLUCOSE  POCT Blood Glucose.: 168 mg/dL (09 Feb 2025 11:41)  POCT Blood Glucose.: 187 mg/dL (09 Feb 2025 08:00)  POCT Blood Glucose.: 212 mg/dL (08 Feb 2025 21:09)  POCT Blood Glucose.: 226 mg/dL (08 Feb 2025 16:46)    Skin: no pressure ulcers    Estimated Needs:   [X] no change since previous assessment  [ ] recalculated:     Previous Nutrition Diagnosis:   [X] Inadequate Energy Intake [ ]Inadequate Oral Intake [ ] Excessive Energy Intake   [ ] Underweight [X] Decreased Nutrient Needs [ ] Overweight/Obesity   [ ] Altered GI Function [ ] Unintended Weight Loss [ ] Food & Nutrition Related Knowledge Deficit [ ] Malnutrition     Nutrition Diagnosis is [X] ongoing  [ ] resolved [ ] not applicable     New Nutrition Diagnosis: [ ] not applicable       Interventions:   Recommend  [ ] Change Diet To:  [ ] Nutrition Supplement  [ ] Nutrition Support  [X] Other: Recommend soft/bite size, low Na, consistent carbohydrate diet with Glucerna shake daily  Assistance/encouragement at meal times as needed  Recommend MVI/minerals daily    Monitoring and Evaluation:   [X] PO intake [ x ] Tolerance to diet prescription [ x ] weights [ x ] labs[ x ] follow up per protocol  [X] other: s/s GI distress, bowel function, skin integrity/ edema
Assessment/Follow up: Pt A+Ox4 at visit. Continues on Consistent Carbohydrate, Dash/TLC diet. Tolerating well with fair appetite/po intake; pt reports consuming ~50% most meals. Per breakfast plate waste observation ~50% consumed 2/2. Minimal documentation in EMR however % po intake documented on 2/1. Pt reports congestion/cough affecting po intake at times. No report N/V. BM 2/1. Food preferences/meal alternatives obtained for optimal satisfaction/po intake. Recommend glucerna 8oz daily to maximize energy/protein intake (chocolate per pt preference). Encourage po intake as tolerated with emphasis on HBV protein sources.     Factors impacting intake: [ ] none [ ] nausea  [ ] vomiting [ ] diarrhea [ ] constipation  [ ]chewing problems [ ] swallowing issues  [x ] other: acute illness, congestion/cough    Diet Presciption: Diet, Consistent Carbohydrate/No Snacks:   DASH/TLC {Sodium & Cholesterol Restricted} (01-27-25 @ 07:43)    Intake: Fair    Current Weight: Weight (kg): 72.6 (01-26 @ 19:18)  % Weight Change    Pertinent Medications: MEDICATIONS  (STANDING):  albuterol    0.083% 2.5 milliGRAM(s) Nebulizer every 6 hours  aspirin enteric coated 81 milliGRAM(s) Oral daily  atorvastatin 40 milliGRAM(s) Oral at bedtime  chlorhexidine 2% Cloths 1 Application(s) Topical <User Schedule>  clopidogrel Tablet 75 milliGRAM(s) Oral every 24 hours  dextrose 5%. 1000 milliLiter(s) (50 mL/Hr) IV Continuous <Continuous>  dextrose 5%. 1000 milliLiter(s) (100 mL/Hr) IV Continuous <Continuous>  dextrose 50% Injectable 25 Gram(s) IV Push once  dextrose 50% Injectable 12.5 Gram(s) IV Push once  dextrose 50% Injectable 25 Gram(s) IV Push once  DULoxetine 90 milliGRAM(s) Oral daily  glucagon  Injectable 1 milliGRAM(s) IntraMuscular once  heparin   Injectable 5000 Unit(s) SubCutaneous every 8 hours  influenza  Vaccine (HIGH DOSE) 0.5 milliLiter(s) IntraMuscular once  insulin lispro (ADMELOG) corrective regimen sliding scale   SubCutaneous Before meals and at bedtime  metoprolol tartrate 12.5 milliGRAM(s) Oral two times a day  nystatin Powder 1 Application(s) Topical two times a day  oseltamivir 30 milliGRAM(s) Oral daily    MEDICATIONS  (PRN):  acetaminophen     Tablet .. 650 milliGRAM(s) Oral every 6 hours PRN Temp greater or equal to 38C (100.4F), Mild Pain (1 - 3)  dextrose Oral Gel 15 Gram(s) Oral once PRN Blood Glucose LESS THAN 70 milliGRAM(s)/deciliter  sodium chloride 0.65% Nasal 1 Spray(s) Both Nostrils two times a day PRN Nasal Congestion    Pertinent Labs: 02-02 Na138 mmol/L Glu 159 mg/dL[H] K+ 4.1 mmol/L Cr  2.00 mg/dL[H] BUN 41 mg/dL[H] 02-02 Phos 3.8 mg/dL 02-02 Alb 2.4 g/dL[L] 01-27 Chol 261 mg/dL[H] LDL --    HDL 43 mg/dL[L] Trig 239 mg/dL[H]     CAPILLARY BLOOD GLUCOSE      POCT Blood Glucose.: 165 mg/dL (02 Feb 2025 07:50)  POCT Blood Glucose.: 282 mg/dL (01 Feb 2025 21:14)  POCT Blood Glucose.: 150 mg/dL (01 Feb 2025 17:01)  POCT Blood Glucose.: 242 mg/dL (01 Feb 2025 12:05)    Skin: right groin surgical incision, TULIONorbert Kaufman 16.    Estimated Needs:   [x ] no change since previous assessment  [ ] recalculated:     Previous Nutrition Diagnosis:   [ ] Inadequate Energy Intake [ ]Inadequate Oral Intake [ ] Excessive Energy Intake   [ ] Underweight [ ] Increased Nutrient Needs [ ] Overweight/Obesity   [ ] Altered GI Function [ ] Unintended Weight Loss [ ] Food & Nutrition Related Knowledge Deficit [ ] Malnutrition   [x ] Decreased nutrient need     Nutrition Diagnosis is [x ] ongoing  [ ] resolved [ ] not applicable     New Nutrition Diagnosis: [x ] Inadequate protein energy intake related to decreased ability to consume sufficient energy as evidenced  by acute illness +Flu, cough/congestion affecting po intake, ~50% meals consumed per pt.      Interventions:   Recommend  [ ] Change Diet To:  [x ] Nutrition Supplement- Glucerna 8oz po daily (chocolate)  [ ] Nutrition Support  [x ] Other: Food preferences obtained/honored. Encourage po intake with emphasis on HBV protein sources. Recommend MVI with minerals daily.     Monitoring and Evaluation:   [x ] PO intake [ x ] Tolerance to diet prescription [ x ] weights [ x ] labs[ x ] follow up per protocol  [ ] other:

## 2025-02-09 NOTE — PROVIDER CONTACT NOTE (OTHER) - SITUATION
pt sat dropped to 78 amb in spence on 2l sat 96 at rest in  lasix given.
Patient O2 desat below 80, patient placed on venti mask 50% 15L.
Presented to ED with intermittent jaw pain followed by anterior chest pressure. Pt diagnosed with ST Elevation, went to Cath Lab, and Stent was placed. Dx with Flu and Right Pleural Effusion.

## 2025-02-09 NOTE — CASE MANAGEMENT PROGRESS NOTE - NSCMPROGRESSNOTE_GEN_ALL_CORE
CM w/e task noted; per MD, no DC today. Patient remains on 2LNC, hypoxic on exertion. Anticipated DC plan for DC home with [facility] services when stable; patient is identified as CMS STAR and is declining SHAE. CM team will continue to follow.

## 2025-02-09 NOTE — PROGRESS NOTE ADULT - PROBLEM SELECTOR PLAN 10
VTE ppx: FD Lovenox, plan to switch to Eliquis    Downgraded from ICU 2/2/25    Spoke to cong argueta 2/8 with updates VTE ppx: FD Lovenox, plan to switch to Eliquis    Downgraded from ICU 2/2/25    Spoke to cong argueta 2/9 with updates

## 2025-02-09 NOTE — PROGRESS NOTE ADULT - SUBJECTIVE AND OBJECTIVE BOX
Date/Time Patient Seen:  		  Referring MD:   Data Reviewed	       Patient is a 89y old  Female who presents with a chief complaint of STEMI (08 Feb 2025 14:47)      Subjective/HPI     PAST MEDICAL & SURGICAL HISTORY:  HTN (hypertension)    DM (diabetes mellitus)    HLD (hyperlipidemia)    Major depression    No significant past surgical history          Medication list         MEDICATIONS  (STANDING):  albuterol    0.083% 2.5 milliGRAM(s) Nebulizer every 6 hours  aMIOdarone    Tablet   Oral   aMIOdarone    Tablet 400 milliGRAM(s) Oral every 8 hours  atorvastatin 80 milliGRAM(s) Oral at bedtime  chlorhexidine 2% Cloths 1 Application(s) Topical <User Schedule>  clopidogrel Tablet 75 milliGRAM(s) Oral daily  dextrose 5%. 1000 milliLiter(s) (100 mL/Hr) IV Continuous <Continuous>  dextrose 5%. 1000 milliLiter(s) (50 mL/Hr) IV Continuous <Continuous>  dextrose 50% Injectable 25 Gram(s) IV Push once  dextrose 50% Injectable 12.5 Gram(s) IV Push once  dextrose 50% Injectable 25 Gram(s) IV Push once  DULoxetine 90 milliGRAM(s) Oral daily  enoxaparin Injectable 70 milliGRAM(s) SubCutaneous every 24 hours  furosemide    Tablet 20 milliGRAM(s) Oral daily  glucagon  Injectable 1 milliGRAM(s) IntraMuscular once  guaiFENesin  milliGRAM(s) Oral every 12 hours  influenza  Vaccine (HIGH DOSE) 0.5 milliLiter(s) IntraMuscular once  insulin lispro (ADMELOG) corrective regimen sliding scale   SubCutaneous Before meals and at bedtime  metoprolol succinate ER 25 milliGRAM(s) Oral daily  nystatin Powder 1 Application(s) Topical two times a day  sodium chloride 3%  Inhalation 4 milliLiter(s) Inhalation every 12 hours    MEDICATIONS  (PRN):  acetaminophen     Tablet .. 650 milliGRAM(s) Oral every 6 hours PRN Temp greater or equal to 38C (100.4F), Mild Pain (1 - 3)  ALPRAZolam 0.25 milliGRAM(s) Oral two times a day PRN anxiety or panic attack  dextrose Oral Gel 15 Gram(s) Oral once PRN Blood Glucose LESS THAN 70 milliGRAM(s)/deciliter  sodium chloride 0.65% Nasal 1 Spray(s) Both Nostrils two times a day PRN Nasal Congestion         Vitals log        ICU Vital Signs Last 24 Hrs  T(C): 36.4 (09 Feb 2025 04:25), Max: 36.5 (08 Feb 2025 11:30)  T(F): 97.5 (09 Feb 2025 04:25), Max: 97.7 (08 Feb 2025 11:30)  HR: 82 (09 Feb 2025 07:38) (78 - 88)  BP: 96/57 (09 Feb 2025 04:25) (94/53 - 102/55)  BP(mean): --  ABP: --  ABP(mean): --  RR: 18 (09 Feb 2025 04:25) (18 - 18)  SpO2: 97% (09 Feb 2025 07:38) (94% - 100%)    O2 Parameters below as of 09 Feb 2025 07:38  Patient On (Oxygen Delivery Method): nasal cannula, 3 lpm                 Input and Output:  I&O's Detail    08 Feb 2025 07:01  -  09 Feb 2025 07:00  --------------------------------------------------------  IN:    Oral Fluid: 230 mL  Total IN: 230 mL    OUT:    Incontinent per Collection Bag (mL): 300 mL    Stool (mL): 1 mL  Total OUT: 301 mL    Total NET: -71 mL          Lab Data                        8.3    7.66  )-----------( 390      ( 09 Feb 2025 06:55 )             25.9     02-09    141  |  109[H]  |  64[H]  ----------------------------<  180[H]  3.9   |  24  |  2.10[H]    Ca    8.6      09 Feb 2025 06:55  Phos  3.5     02-08  Mg     2.2     02-08    TPro  5.9[L]  /  Alb  2.4[L]  /  TBili  0.7  /  DBili  x   /  AST  17  /  ALT  26  /  AlkPhos  77  02-09            Review of Systems	      Objective     Physical Examination    heart s1s2  lung dc BS  head nc  head at      Pertinent Lab findings & Imaging      Ordonez:  NO   Adequate UO     I&O's Detail    08 Feb 2025 07:01  -  09 Feb 2025 07:00  --------------------------------------------------------  IN:    Oral Fluid: 230 mL  Total IN: 230 mL    OUT:    Incontinent per Collection Bag (mL): 300 mL    Stool (mL): 1 mL  Total OUT: 301 mL    Total NET: -71 mL               Discussed with:     Cultures:	        Radiology

## 2025-02-09 NOTE — PROGRESS NOTE ADULT - SUBJECTIVE AND OBJECTIVE BOX
Patient is a 89y old  Female who presents with a chief complaint of STEMI (09 Feb 2025 10:22)      INTERVAL HPI/OVERNIGHT EVENTS: Patient seen and examined at bedside. No overnight events occurred. Patient has no complaints at this time. Denies fevers, chills, headache, lightheadedness, chest pain, dyspnea, abdominal pain, n/v/d/c.    MEDICATIONS  (STANDING):  albuterol    0.083% 2.5 milliGRAM(s) Nebulizer every 6 hours  aMIOdarone    Tablet   Oral   aMIOdarone    Tablet 400 milliGRAM(s) Oral every 8 hours  atorvastatin 80 milliGRAM(s) Oral at bedtime  chlorhexidine 2% Cloths 1 Application(s) Topical <User Schedule>  clopidogrel Tablet 75 milliGRAM(s) Oral daily  dextrose 5%. 1000 milliLiter(s) (100 mL/Hr) IV Continuous <Continuous>  dextrose 5%. 1000 milliLiter(s) (50 mL/Hr) IV Continuous <Continuous>  dextrose 50% Injectable 25 Gram(s) IV Push once  dextrose 50% Injectable 12.5 Gram(s) IV Push once  dextrose 50% Injectable 25 Gram(s) IV Push once  DULoxetine 90 milliGRAM(s) Oral daily  enoxaparin Injectable 70 milliGRAM(s) SubCutaneous every 24 hours  furosemide    Tablet 20 milliGRAM(s) Oral daily  glucagon  Injectable 1 milliGRAM(s) IntraMuscular once  guaiFENesin  milliGRAM(s) Oral every 12 hours  influenza  Vaccine (HIGH DOSE) 0.5 milliLiter(s) IntraMuscular once  insulin lispro (ADMELOG) corrective regimen sliding scale   SubCutaneous Before meals and at bedtime  metoprolol succinate ER 25 milliGRAM(s) Oral daily  nystatin Powder 1 Application(s) Topical two times a day  sodium chloride 3%  Inhalation 4 milliLiter(s) Inhalation every 12 hours    MEDICATIONS  (PRN):  acetaminophen     Tablet .. 650 milliGRAM(s) Oral every 6 hours PRN Temp greater or equal to 38C (100.4F), Mild Pain (1 - 3)  ALPRAZolam 0.25 milliGRAM(s) Oral two times a day PRN anxiety or panic attack  dextrose Oral Gel 15 Gram(s) Oral once PRN Blood Glucose LESS THAN 70 milliGRAM(s)/deciliter  sodium chloride 0.65% Nasal 1 Spray(s) Both Nostrils two times a day PRN Nasal Congestion      Allergies    No Known Allergies    Intolerances        REVIEW OF SYSTEMS:  CONSTITUTIONAL: No fever or chills  HEENT:  No headache, no sore throat  RESPIRATORY: No cough, wheezing, or shortness of breath  CARDIOVASCULAR: No chest pain, palpitations  GASTROINTESTINAL: No abd pain, nausea, vomiting, or diarrhea  GENITOURINARY: No dysuria, frequency, or hematuria  NEUROLOGICAL: no focal weakness or dizziness  MUSCULOSKELETAL: no myalgias     Vital Signs Last 24 Hrs  T(C): 37.1 (09 Feb 2025 10:18), Max: 37.1 (09 Feb 2025 10:18)  T(F): 98.7 (09 Feb 2025 10:18), Max: 98.7 (09 Feb 2025 10:18)  HR: 94 (09 Feb 2025 10:18) (78 - 94)  BP: 100/61 (09 Feb 2025 10:18) (94/53 - 102/55)  BP(mean): --  RR: 18 (09 Feb 2025 10:18) (18 - 18)  SpO2: 99% (09 Feb 2025 10:18) (94% - 100%)    Parameters below as of 09 Feb 2025 10:18  Patient On (Oxygen Delivery Method): nasal cannula, 2  O2 Flow (L/min): 2      PHYSICAL EXAM:  GENERAL: NAD  HEENT:  anicteric, moist mucous membranes  CHEST/LUNG:  CTA b/l, no rales, wheezes, or rhonchi  HEART:  RRR, S1, S2  ABDOMEN:  BS+, soft, nontender, nondistended  EXTREMITIES: no edema, cyanosis, or calf tenderness  NERVOUS SYSTEM: answers questions and follows commands appropriately    LABS:                        8.3    7.66  )-----------( 390      ( 09 Feb 2025 06:55 )             25.9     CBC Full  -  ( 09 Feb 2025 06:55 )  WBC Count : 7.66 K/uL  Hemoglobin : 8.3 g/dL  Hematocrit : 25.9 %  Platelet Count - Automated : 390 K/uL  Mean Cell Volume : 90.6 fl  Mean Cell Hemoglobin : 29.0 pg  Mean Cell Hemoglobin Concentration : 32.0 g/dL  Auto Neutrophil # : 5.73 K/uL  Auto Lymphocyte # : 1.02 K/uL  Auto Monocyte # : 0.46 K/uL  Auto Eosinophil # : 0.28 K/uL  Auto Basophil # : 0.02 K/uL  Auto Neutrophil % : 74.7 %  Auto Lymphocyte % : 13.3 %  Auto Monocyte % : 6.0 %  Auto Eosinophil % : 3.7 %  Auto Basophil % : 0.3 %    09 Feb 2025 06:55    141    |  109    |  64     ----------------------------<  180    3.9     |  24     |  2.10     Ca    8.6        09 Feb 2025 06:55    TPro  5.9    /  Alb  2.4    /  TBili  0.7    /  DBili  x      /  AST  17     /  ALT  26     /  AlkPhos  77     09 Feb 2025 06:55      Urinalysis Basic - ( 09 Feb 2025 06:55 )    Color: x / Appearance: x / SG: x / pH: x  Gluc: 180 mg/dL / Ketone: x  / Bili: x / Urobili: x   Blood: x / Protein: x / Nitrite: x   Leuk Esterase: x / RBC: x / WBC x   Sq Epi: x / Non Sq Epi: x / Bacteria: x      CAPILLARY BLOOD GLUCOSE      POCT Blood Glucose.: 187 mg/dL (09 Feb 2025 08:00)  POCT Blood Glucose.: 212 mg/dL (08 Feb 2025 21:09)  POCT Blood Glucose.: 226 mg/dL (08 Feb 2025 16:46)  POCT Blood Glucose.: 185 mg/dL (08 Feb 2025 11:37)        Culture - Fungal, Body Fluid (collected 02-06-25 @ 09:44)  Source: Pleural Fl  Preliminary Report (02-09-25 @ 08:38):    No growth    Culture - Body Fluid with Gram Stain (collected 02-06-25 @ 09:44)  Source: Pleural Fl  Gram Stain (02-06-25 @ 23:27):    polymorphonuclear leukocytes seen    No organisms seen    by cytocentrifuge  Preliminary Report (02-07-25 @ 15:59):    No growth        RADIOLOGY & ADDITIONAL TESTS:    Personally reviewed.     Consultant(s) Notes Reviewed:  [x] YES  [ ] NO     Patient is a 89y old  Female who presents with a chief complaint of STEMI (09 Feb 2025 10:22)      INTERVAL HPI/OVERNIGHT EVENTS: Patient seen and examined at bedside. She is on 2L NC. Patient states she still has a cough. In afib with rate controlled today. No overnight events.     MEDICATIONS  (STANDING):  albuterol    0.083% 2.5 milliGRAM(s) Nebulizer every 6 hours  aMIOdarone    Tablet   Oral   aMIOdarone    Tablet 400 milliGRAM(s) Oral every 8 hours  atorvastatin 80 milliGRAM(s) Oral at bedtime  chlorhexidine 2% Cloths 1 Application(s) Topical <User Schedule>  clopidogrel Tablet 75 milliGRAM(s) Oral daily  dextrose 5%. 1000 milliLiter(s) (100 mL/Hr) IV Continuous <Continuous>  dextrose 5%. 1000 milliLiter(s) (50 mL/Hr) IV Continuous <Continuous>  dextrose 50% Injectable 25 Gram(s) IV Push once  dextrose 50% Injectable 12.5 Gram(s) IV Push once  dextrose 50% Injectable 25 Gram(s) IV Push once  DULoxetine 90 milliGRAM(s) Oral daily  enoxaparin Injectable 70 milliGRAM(s) SubCutaneous every 24 hours  furosemide    Tablet 20 milliGRAM(s) Oral daily  glucagon  Injectable 1 milliGRAM(s) IntraMuscular once  guaiFENesin  milliGRAM(s) Oral every 12 hours  influenza  Vaccine (HIGH DOSE) 0.5 milliLiter(s) IntraMuscular once  insulin lispro (ADMELOG) corrective regimen sliding scale   SubCutaneous Before meals and at bedtime  metoprolol succinate ER 25 milliGRAM(s) Oral daily  nystatin Powder 1 Application(s) Topical two times a day  sodium chloride 3%  Inhalation 4 milliLiter(s) Inhalation every 12 hours    MEDICATIONS  (PRN):  acetaminophen     Tablet .. 650 milliGRAM(s) Oral every 6 hours PRN Temp greater or equal to 38C (100.4F), Mild Pain (1 - 3)  ALPRAZolam 0.25 milliGRAM(s) Oral two times a day PRN anxiety or panic attack  dextrose Oral Gel 15 Gram(s) Oral once PRN Blood Glucose LESS THAN 70 milliGRAM(s)/deciliter  sodium chloride 0.65% Nasal 1 Spray(s) Both Nostrils two times a day PRN Nasal Congestion      Allergies    No Known Allergies    Intolerances        REVIEW OF SYSTEMS:  CONSTITUTIONAL: No fever or chills  HEENT:  No headache, no sore throat  RESPIRATORY: + cough, denies dyspnea  CARDIOVASCULAR: No chest pain, palpitations  GASTROINTESTINAL: No abd pain, nausea, vomiting, or diarrhea  GENITOURINARY: No dysuria, frequency, or hematuria  NEUROLOGICAL: no focal weakness or dizziness  MUSCULOSKELETAL: no myalgias     Vital Signs Last 24 Hrs  T(C): 37.1 (09 Feb 2025 10:18), Max: 37.1 (09 Feb 2025 10:18)  T(F): 98.7 (09 Feb 2025 10:18), Max: 98.7 (09 Feb 2025 10:18)  HR: 94 (09 Feb 2025 10:18) (78 - 94)  BP: 100/61 (09 Feb 2025 10:18) (94/53 - 102/55)  BP(mean): --  RR: 18 (09 Feb 2025 10:18) (18 - 18)  SpO2: 99% (09 Feb 2025 10:18) (94% - 100%)    Parameters below as of 09 Feb 2025 10:18  Patient On (Oxygen Delivery Method): nasal cannula, 2  O2 Flow (L/min): 2      PHYSICAL EXAM:  GENERAL: NAD  HEENT:  anicteric, moist mucous membranes  CHEST/LUNG:  CTA b/l, no rales, wheezes, or rhonchi  HEART:  RRR, S1, S2  ABDOMEN:  BS+, soft, nontender, nondistended  EXTREMITIES: no edema, cyanosis, or calf tenderness  NERVOUS SYSTEM: answers questions and follows commands appropriately    LABS:                        8.3    7.66  )-----------( 390      ( 09 Feb 2025 06:55 )             25.9     CBC Full  -  ( 09 Feb 2025 06:55 )  WBC Count : 7.66 K/uL  Hemoglobin : 8.3 g/dL  Hematocrit : 25.9 %  Platelet Count - Automated : 390 K/uL  Mean Cell Volume : 90.6 fl  Mean Cell Hemoglobin : 29.0 pg  Mean Cell Hemoglobin Concentration : 32.0 g/dL  Auto Neutrophil # : 5.73 K/uL  Auto Lymphocyte # : 1.02 K/uL  Auto Monocyte # : 0.46 K/uL  Auto Eosinophil # : 0.28 K/uL  Auto Basophil # : 0.02 K/uL  Auto Neutrophil % : 74.7 %  Auto Lymphocyte % : 13.3 %  Auto Monocyte % : 6.0 %  Auto Eosinophil % : 3.7 %  Auto Basophil % : 0.3 %    09 Feb 2025 06:55    141    |  109    |  64     ----------------------------<  180    3.9     |  24     |  2.10     Ca    8.6        09 Feb 2025 06:55    TPro  5.9    /  Alb  2.4    /  TBili  0.7    /  DBili  x      /  AST  17     /  ALT  26     /  AlkPhos  77     09 Feb 2025 06:55      Urinalysis Basic - ( 09 Feb 2025 06:55 )    Color: x / Appearance: x / SG: x / pH: x  Gluc: 180 mg/dL / Ketone: x  / Bili: x / Urobili: x   Blood: x / Protein: x / Nitrite: x   Leuk Esterase: x / RBC: x / WBC x   Sq Epi: x / Non Sq Epi: x / Bacteria: x      CAPILLARY BLOOD GLUCOSE      POCT Blood Glucose.: 187 mg/dL (09 Feb 2025 08:00)  POCT Blood Glucose.: 212 mg/dL (08 Feb 2025 21:09)  POCT Blood Glucose.: 226 mg/dL (08 Feb 2025 16:46)  POCT Blood Glucose.: 185 mg/dL (08 Feb 2025 11:37)        Culture - Fungal, Body Fluid (collected 02-06-25 @ 09:44)  Source: Pleural Fl  Preliminary Report (02-09-25 @ 08:38):    No growth    Culture - Body Fluid with Gram Stain (collected 02-06-25 @ 09:44)  Source: Pleural Fl  Gram Stain (02-06-25 @ 23:27):    polymorphonuclear leukocytes seen    No organisms seen    by cytocentrifuge  Preliminary Report (02-07-25 @ 15:59):    No growth        RADIOLOGY & ADDITIONAL TESTS:    Personally reviewed.     Consultant(s) Notes Reviewed:  [x] YES  [ ] NO

## 2025-02-10 LAB
ALBUMIN SERPL ELPH-MCNC: 2.4 G/DL — LOW (ref 3.3–5)
ALP SERPL-CCNC: 75 U/L — SIGNIFICANT CHANGE UP (ref 40–120)
ALT FLD-CCNC: 24 U/L — SIGNIFICANT CHANGE UP (ref 12–78)
ANION GAP SERPL CALC-SCNC: 6 MMOL/L — SIGNIFICANT CHANGE UP (ref 5–17)
AST SERPL-CCNC: 16 U/L — SIGNIFICANT CHANGE UP (ref 15–37)
BASOPHILS # BLD AUTO: 0.01 K/UL — SIGNIFICANT CHANGE UP (ref 0–0.2)
BASOPHILS NFR BLD AUTO: 0.1 % — SIGNIFICANT CHANGE UP (ref 0–2)
BILIRUB SERPL-MCNC: 0.6 MG/DL — SIGNIFICANT CHANGE UP (ref 0.2–1.2)
BUN SERPL-MCNC: 60 MG/DL — HIGH (ref 7–23)
CALCIUM SERPL-MCNC: 8.7 MG/DL — SIGNIFICANT CHANGE UP (ref 8.5–10.1)
CHLORIDE SERPL-SCNC: 109 MMOL/L — HIGH (ref 96–108)
CO2 SERPL-SCNC: 26 MMOL/L — SIGNIFICANT CHANGE UP (ref 22–31)
CREAT SERPL-MCNC: 2.2 MG/DL — HIGH (ref 0.5–1.3)
EGFR: 21 ML/MIN/1.73M2 — LOW
EOSINOPHIL # BLD AUTO: 0.2 K/UL — SIGNIFICANT CHANGE UP (ref 0–0.5)
EOSINOPHIL NFR BLD AUTO: 2.7 % — SIGNIFICANT CHANGE UP (ref 0–6)
GLUCOSE SERPL-MCNC: 182 MG/DL — HIGH (ref 70–99)
HCT VFR BLD CALC: 25.5 % — LOW (ref 34.5–45)
HGB BLD-MCNC: 8.2 G/DL — LOW (ref 11.5–15.5)
IMM GRANULOCYTES NFR BLD AUTO: 1.5 % — HIGH (ref 0–0.9)
LYMPHOCYTES # BLD AUTO: 1.01 K/UL — SIGNIFICANT CHANGE UP (ref 1–3.3)
LYMPHOCYTES # BLD AUTO: 13.4 % — SIGNIFICANT CHANGE UP (ref 13–44)
MAGNESIUM SERPL-MCNC: 2.3 MG/DL — SIGNIFICANT CHANGE UP (ref 1.6–2.6)
MCHC RBC-ENTMCNC: 29.3 PG — SIGNIFICANT CHANGE UP (ref 27–34)
MCHC RBC-ENTMCNC: 32.2 G/DL — SIGNIFICANT CHANGE UP (ref 32–36)
MCV RBC AUTO: 91.1 FL — SIGNIFICANT CHANGE UP (ref 80–100)
MONOCYTES # BLD AUTO: 0.45 K/UL — SIGNIFICANT CHANGE UP (ref 0–0.9)
MONOCYTES NFR BLD AUTO: 6 % — SIGNIFICANT CHANGE UP (ref 2–14)
NEUTROPHILS # BLD AUTO: 5.76 K/UL — SIGNIFICANT CHANGE UP (ref 1.8–7.4)
NEUTROPHILS NFR BLD AUTO: 76.3 % — SIGNIFICANT CHANGE UP (ref 43–77)
NRBC # BLD: 0 /100 WBCS — SIGNIFICANT CHANGE UP (ref 0–0)
NRBC BLD-RTO: 0 /100 WBCS — SIGNIFICANT CHANGE UP (ref 0–0)
PLATELET # BLD AUTO: 399 K/UL — SIGNIFICANT CHANGE UP (ref 150–400)
POTASSIUM SERPL-MCNC: 3.7 MMOL/L — SIGNIFICANT CHANGE UP (ref 3.5–5.3)
POTASSIUM SERPL-SCNC: 3.7 MMOL/L — SIGNIFICANT CHANGE UP (ref 3.5–5.3)
PROT SERPL-MCNC: 5.8 G/DL — LOW (ref 6–8.3)
RBC # BLD: 2.8 M/UL — LOW (ref 3.8–5.2)
RBC # FLD: 14 % — SIGNIFICANT CHANGE UP (ref 10.3–14.5)
SODIUM SERPL-SCNC: 141 MMOL/L — SIGNIFICANT CHANGE UP (ref 135–145)
WBC # BLD: 7.54 K/UL — SIGNIFICANT CHANGE UP (ref 3.8–10.5)
WBC # FLD AUTO: 7.54 K/UL — SIGNIFICANT CHANGE UP (ref 3.8–10.5)

## 2025-02-10 PROCEDURE — 99233 SBSQ HOSP IP/OBS HIGH 50: CPT | Mod: GC

## 2025-02-10 PROCEDURE — 99232 SBSQ HOSP IP/OBS MODERATE 35: CPT

## 2025-02-10 RX ORDER — APIXABAN 5 MG/1
2.5 TABLET, FILM COATED ORAL
Refills: 0 | Status: DISCONTINUED | OUTPATIENT
Start: 2025-02-11 | End: 2025-02-12

## 2025-02-10 RX ADMIN — Medication 2: at 12:22

## 2025-02-10 RX ADMIN — ENOXAPARIN SODIUM 70 MILLIGRAM(S): 100 INJECTION SUBCUTANEOUS at 11:28

## 2025-02-10 RX ADMIN — Medication 600 MILLIGRAM(S): at 17:17

## 2025-02-10 RX ADMIN — Medication 2.5 MILLIGRAM(S): at 13:07

## 2025-02-10 RX ADMIN — NYSTATIN 1 APPLICATION(S): 100000 POWDER TOPICAL at 05:26

## 2025-02-10 RX ADMIN — AMIODARONE HYDROCHLORIDE 400 MILLIGRAM(S): 50 INJECTION, SOLUTION INTRAVENOUS at 05:25

## 2025-02-10 RX ADMIN — Medication 1: at 17:29

## 2025-02-10 RX ADMIN — Medication 4 MILLILITER(S): at 19:52

## 2025-02-10 RX ADMIN — Medication 1: at 08:20

## 2025-02-10 RX ADMIN — ATORVASTATIN CALCIUM 80 MILLIGRAM(S): 80 TABLET, FILM COATED ORAL at 22:04

## 2025-02-10 RX ADMIN — Medication 25 MILLIGRAM(S): at 05:25

## 2025-02-10 RX ADMIN — Medication 2.5 MILLIGRAM(S): at 19:52

## 2025-02-10 RX ADMIN — Medication 600 MILLIGRAM(S): at 05:26

## 2025-02-10 RX ADMIN — ANTISEPTIC SURGICAL SCRUB 1 APPLICATION(S): 0.04 SOLUTION TOPICAL at 05:26

## 2025-02-10 RX ADMIN — NYSTATIN 1 APPLICATION(S): 100000 POWDER TOPICAL at 17:17

## 2025-02-10 RX ADMIN — Medication 75 MILLIGRAM(S): at 11:29

## 2025-02-10 RX ADMIN — Medication 2.5 MILLIGRAM(S): at 00:53

## 2025-02-10 RX ADMIN — DULOXETINE 90 MILLIGRAM(S): 20 CAPSULE, DELAYED RELEASE ORAL at 11:29

## 2025-02-10 RX ADMIN — Medication 2.5 MILLIGRAM(S): at 07:34

## 2025-02-10 RX ADMIN — Medication 20 MILLIGRAM(S): at 05:26

## 2025-02-10 RX ADMIN — AMIODARONE HYDROCHLORIDE 400 MILLIGRAM(S): 50 INJECTION, SOLUTION INTRAVENOUS at 14:03

## 2025-02-10 RX ADMIN — Medication 2: at 22:04

## 2025-02-10 RX ADMIN — AMIODARONE HYDROCHLORIDE 400 MILLIGRAM(S): 50 INJECTION, SOLUTION INTRAVENOUS at 22:04

## 2025-02-10 RX ADMIN — Medication 4 MILLILITER(S): at 07:34

## 2025-02-10 NOTE — PROGRESS NOTE ADULT - SUBJECTIVE AND OBJECTIVE BOX
Date/Time Patient Seen:  		  Referring MD:   Data Reviewed	       Patient is a 89y old  Female who presents with a chief complaint of STEMI (09 Feb 2025 12:30)      Subjective/HPI     PAST MEDICAL & SURGICAL HISTORY:  HTN (hypertension)    DM (diabetes mellitus)    HLD (hyperlipidemia)    Major depression    No significant past surgical history          Medication list         MEDICATIONS  (STANDING):  albuterol    0.083% 2.5 milliGRAM(s) Nebulizer every 6 hours  aMIOdarone    Tablet   Oral   aMIOdarone    Tablet 400 milliGRAM(s) Oral every 8 hours  atorvastatin 80 milliGRAM(s) Oral at bedtime  chlorhexidine 2% Cloths 1 Application(s) Topical <User Schedule>  clopidogrel Tablet 75 milliGRAM(s) Oral daily  dextrose 5%. 1000 milliLiter(s) (50 mL/Hr) IV Continuous <Continuous>  dextrose 5%. 1000 milliLiter(s) (100 mL/Hr) IV Continuous <Continuous>  dextrose 50% Injectable 25 Gram(s) IV Push once  dextrose 50% Injectable 12.5 Gram(s) IV Push once  dextrose 50% Injectable 25 Gram(s) IV Push once  DULoxetine 90 milliGRAM(s) Oral daily  enoxaparin Injectable 70 milliGRAM(s) SubCutaneous every 24 hours  furosemide    Tablet 20 milliGRAM(s) Oral daily  glucagon  Injectable 1 milliGRAM(s) IntraMuscular once  guaiFENesin  milliGRAM(s) Oral every 12 hours  influenza  Vaccine (HIGH DOSE) 0.5 milliLiter(s) IntraMuscular once  insulin lispro (ADMELOG) corrective regimen sliding scale   SubCutaneous Before meals and at bedtime  metoprolol succinate ER 25 milliGRAM(s) Oral daily  nystatin Powder 1 Application(s) Topical two times a day  sodium chloride 3%  Inhalation 4 milliLiter(s) Inhalation every 12 hours    MEDICATIONS  (PRN):  acetaminophen     Tablet .. 650 milliGRAM(s) Oral every 6 hours PRN Temp greater or equal to 38C (100.4F), Mild Pain (1 - 3)  dextrose Oral Gel 15 Gram(s) Oral once PRN Blood Glucose LESS THAN 70 milliGRAM(s)/deciliter  sodium chloride 0.65% Nasal 1 Spray(s) Both Nostrils two times a day PRN Nasal Congestion         Vitals log        ICU Vital Signs Last 24 Hrs  T(C): 36.4 (10 Feb 2025 04:55), Max: 37.1 (09 Feb 2025 10:18)  T(F): 97.5 (10 Feb 2025 04:55), Max: 98.7 (09 Feb 2025 10:18)  HR: 79 (10 Feb 2025 04:55) (79 - 99)  BP: 103/58 (10 Feb 2025 04:55) (100/61 - 107/61)  BP(mean): --  ABP: --  ABP(mean): --  RR: 18 (10 Feb 2025 04:55) (18 - 18)  SpO2: 96% (10 Feb 2025 04:55) (96% - 99%)    O2 Parameters below as of 10 Feb 2025 04:55  Patient On (Oxygen Delivery Method): nasal cannula  O2 Flow (L/min): 2               Input and Output:  I&O's Detail    08 Feb 2025 07:01  -  09 Feb 2025 07:00  --------------------------------------------------------  IN:    Oral Fluid: 230 mL  Total IN: 230 mL    OUT:    Incontinent per Collection Bag (mL): 300 mL    Stool (mL): 1 mL  Total OUT: 301 mL    Total NET: -71 mL      09 Feb 2025 07:01  -  10 Feb 2025 05:56  --------------------------------------------------------  IN:    Oral Fluid: 540 mL  Total IN: 540 mL    OUT:    Voided (mL): 650 mL  Total OUT: 650 mL    Total NET: -110 mL          Lab Data                        8.3    7.66  )-----------( 390      ( 09 Feb 2025 06:55 )             25.9     02-09    141  |  109[H]  |  64[H]  ----------------------------<  180[H]  3.9   |  24  |  2.10[H]    Ca    8.6      09 Feb 2025 06:55  Phos  3.5     02-08  Mg     2.2     02-08    TPro  5.9[L]  /  Alb  2.4[L]  /  TBili  0.7  /  DBili  x   /  AST  17  /  ALT  26  /  AlkPhos  77  02-09            Review of Systems	      Objective     Physical Examination    heart s1s2  lung dec BS  hea dnc  head at  abd soft      Pertinent Lab findings & Imaging      Mery:  NO   Adequate UO     I&O's Detail    08 Feb 2025 07:01  -  09 Feb 2025 07:00  --------------------------------------------------------  IN:    Oral Fluid: 230 mL  Total IN: 230 mL    OUT:    Incontinent per Collection Bag (mL): 300 mL    Stool (mL): 1 mL  Total OUT: 301 mL    Total NET: -71 mL      09 Feb 2025 07:01  -  10 Feb 2025 05:56  --------------------------------------------------------  IN:    Oral Fluid: 540 mL  Total IN: 540 mL    OUT:    Voided (mL): 650 mL  Total OUT: 650 mL    Total NET: -110 mL               Discussed with:     Cultures:	        Radiology

## 2025-02-10 NOTE — CASE MANAGEMENT PROGRESS NOTE - NSCMPROGRESSNOTE_GEN_ALL_CORE
Per MD, patient is not medically cleared for discharge as she remains in Afib and continues on an Amio loading dose. [facility] has accepted home care case.  anticipates that the patient will require home O2 upon discharge. Referral has been sent to [facility] in anticipation of ordering home O2. CM remains available.

## 2025-02-10 NOTE — PROGRESS NOTE ADULT - PROBLEM SELECTOR PLAN 2
New onset afib s/p PCI for STEMI  - Now on Toprol XL 25mg qd  - Start PO amio load to maintain NSR  - On renally dosed FD Lovenox for AC, transition to Eliquis when JOHNNY improves  - Cardio following  - Monitor on telemetry  - Replete electrolytes as needed  - Consider transitioning to Eliquis 2.5 BID tomorrow, unless thora needed (pending CT results) New onset afib s/p PCI for STEMI  - c/w Toprol XL 25mg qd  - c/w PO amio load to maintain NSR  - On renally dosed FD Lovenox for AC, transition to Eliquis 2.5mg BID when JOHNNY improves  - Monitor on telemetry  - Replete electrolytes as needed  - Cardio ([facility]) following, recs appreciated New onset afib s/p PCI for STEMI  - c/w Toprol XL 25mg qd  - c/w PO amio load to bring to NSR  - pt remains in afib on monitor - per cardio, would like in sinus rhythm due to recent PCI  - On renally dosed FD Lovenox for AC, transition to Eliquis 2.5mg BID when JOHNNY improves  - Monitor on telemetry  - Replete electrolytes as needed  - Cardio ([facility]) following, recs appreciated

## 2025-02-10 NOTE — PROGRESS NOTE ADULT - PROBLEM SELECTOR PLAN 1
2/2 acute HFrEF and influenza viral PNA  - CT Chest: Moderate to severe right upper lobe pneumonia. Mild left upper lobe pneumonia, moderate b/l pleural effusions  - pleural effusion s/p right thoracentesis 2/6 with 570cc output  - had been on IV Lasix, now on PO Lasix 20mg qd  - TTE with mild pericardial effusion, EF 40%-45%  - Found to be Flu A + completed course of tamiflu  - standing saline and albuterol nebs  - continue O2 supplementation and wean as tolerated, on 2L NC now  - started mucinex for congestion  - follow up CT chest non-con ordered today AHRF 2/2 acute HFrEF and influenza viral PNA  - CT Chest: Moderate to severe right upper lobe pneumonia. Mild left upper lobe pneumonia, moderate b/l pleural effusions  - pleural effusion s/p right thoracentesis 2/6 with 570cc output  - d/c IV Lasix, now on PO Lasix 20mg qd  - TTE with mild pericardial effusion, EF 40%-45%  - Found to be Flu A + completed course of tamiflu  - standing saline and albuterol nebs  - continue O2 supplementation and wean as tolerated, on 2L NC now  - started mucinex for congestion  - repeat CT chest - Interval decrease in density of consolidation and groundglass opacities in right upper lobe. Small right pleural effusion with interval decrease in size. Small to moderate left pleural effusions, relatively stable. Mediastinal and right hilar lymphadenopathy Enlarged heterogeneous right thyroid lobe.   - Pulm following - Dr. Parrish

## 2025-02-10 NOTE — PROGRESS NOTE ADULT - PROBLEM SELECTOR PLAN 10
VTE ppx: FD Lovenox, plan to switch to Eliquis    Downgraded from ICU 2/2/25    Spoke to cong argueta 2/9 with updates VTE ppx: FD Lovenox, plan to switch to Eliquis when Cr stabilizes     Downgraded from ICU 2/2/25 VTE ppx: FD Lovenox, plan to switch to Eliquis when Cr stabilizes     Downgraded from ICU 2/2/25    Daughter Corinne updated over the phone by resident 2/10

## 2025-02-10 NOTE — PROGRESS NOTE ADULT - SUBJECTIVE AND OBJECTIVE BOX
[facility] -- Jamal Moralez Pannella, Patel, Savella, Goodger, Cohen: Office # [phone]    Follow Up:  STEMI/Afib    Subjective/Observations: No events overnight resting comfortably in bed.  No complaints of chest pain, dyspnea, or palpitations reported. No signs of orthopnea or PND. Wearing nasal cannula 3 L    REVIEW OF SYSTEMS: All other review of systems are negative unless indicated above    PAST MEDICAL & SURGICAL HISTORY:  HTN (hypertension)      DM (diabetes mellitus)      HLD (hyperlipidemia)      Major depression      No significant past surgical history          MEDICATIONS  (STANDING):  albuterol    0.083% 2.5 milliGRAM(s) Nebulizer every 6 hours  aMIOdarone    Tablet   Oral   aMIOdarone    Tablet 400 milliGRAM(s) Oral every 8 hours  atorvastatin 80 milliGRAM(s) Oral at bedtime  chlorhexidine 2% Cloths 1 Application(s) Topical <User Schedule>  clopidogrel Tablet 75 milliGRAM(s) Oral daily  dextrose 5%. 1000 milliLiter(s) (50 mL/Hr) IV Continuous <Continuous>  dextrose 5%. 1000 milliLiter(s) (100 mL/Hr) IV Continuous <Continuous>  dextrose 50% Injectable 25 Gram(s) IV Push once  dextrose 50% Injectable 12.5 Gram(s) IV Push once  dextrose 50% Injectable 25 Gram(s) IV Push once  DULoxetine 90 milliGRAM(s) Oral daily  enoxaparin Injectable 70 milliGRAM(s) SubCutaneous every 24 hours  furosemide    Tablet 20 milliGRAM(s) Oral daily  glucagon  Injectable 1 milliGRAM(s) IntraMuscular once  guaiFENesin  milliGRAM(s) Oral every 12 hours  influenza  Vaccine (HIGH DOSE) 0.5 milliLiter(s) IntraMuscular once  insulin lispro (ADMELOG) corrective regimen sliding scale   SubCutaneous Before meals and at bedtime  metoprolol succinate ER 25 milliGRAM(s) Oral daily  nystatin Powder 1 Application(s) Topical two times a day  sodium chloride 3%  Inhalation 4 milliLiter(s) Inhalation every 12 hours    MEDICATIONS  (PRN):  acetaminophen     Tablet .. 650 milliGRAM(s) Oral every 6 hours PRN Temp greater or equal to 38C (100.4F), Mild Pain (1 - 3)  dextrose Oral Gel 15 Gram(s) Oral once PRN Blood Glucose LESS THAN 70 milliGRAM(s)/deciliter  sodium chloride 0.65% Nasal 1 Spray(s) Both Nostrils two times a day PRN Nasal Congestion    Allergies    No Known Allergies    Intolerances      Vital Signs Last 24 Hrs  T(C): 36.4 (10 Feb 2025 04:55), Max: 36.9 (09 Feb 2025 19:44)  T(F): 97.5 (10 Feb 2025 04:55), Max: 98.4 (09 Feb 2025 19:44)  HR: 79 (10 Feb 2025 07:27) (79 - 99)  BP: 103/58 (10 Feb 2025 04:55) (103/58 - 107/61)  BP(mean): --  RR: 18 (10 Feb 2025 04:55) (18 - 18)  SpO2: 97% (10 Feb 2025 07:27) (96% - 99%)    Parameters below as of 10 Feb 2025 07:27  Patient On (Oxygen Delivery Method): nasal cannula, 3 lpm      I&O's Summary    09 Feb 2025 07:01  -  10 Feb 2025 07:00  --------------------------------------------------------  IN: 540 mL / OUT: 650 mL / NET: -110 mL          TELE: Afib   PHYSICAL EXAM:  Constitutional: NAD, awake and alert  HEENT: Moist Mucous Membranes, Anicteric  Pulmonary: Non-labored, breath sounds with Bilaterally diminished at bases  No  wheezes, crackles or rhonchi   Cardiovascular: Iegular, S1 and S2, No murmurs, No rubs, gallops or clicks  Gastrointestinal:  soft, nontender, nondistended   Lymph: No peripheral edema. No lymphadenopathy.   Skin: No visible rashes or ulcers.  Psych:  Mood & affect appropriate      LABS: All Labs Reviewed:                        8.2    7.54  )-----------( 399      ( 10 Feb 2025 06:55 )             25.5                         8.3    7.66  )-----------( 390      ( 09 Feb 2025 06:55 )             25.9                         8.3    7.45  )-----------( 389      ( 08 Feb 2025 06:40 )             25.5     10 Feb 2025 06:55    141    |  109    |  60     ----------------------------<  182    3.7     |  26     |  2.20   09 Feb 2025 06:55    141    |  109    |  64     ----------------------------<  180    3.9     |  24     |  2.10   08 Feb 2025 06:40    141    |  109    |  69     ----------------------------<  164    3.6     |  23     |  2.00     Ca    8.7        10 Feb 2025 06:55  Ca    8.6        09 Feb 2025 06:55  Ca    8.9        08 Feb 2025 06:40  Phos  3.5       08 Feb 2025 06:40  Mg     2.3       10 Feb 2025 06:55  Mg     2.2       08 Feb 2025 06:40    TPro  5.8    /  Alb  2.4    /  TBili  0.6    /  DBili  x      /  AST  16     /  ALT  24     /  AlkPhos  75     10 Feb 2025 06:55  TPro  5.9    /  Alb  2.4    /  TBili  0.7    /  DBili  x      /  AST  17     /  ALT  26     /  AlkPhos  77     09 Feb 2025 06:55  TPro  5.9    /  Alb  2.3    /  TBili  0.7    /  DBili  x      /  AST  18     /  ALT  28     /  AlkPhos  80     08 Feb 2025 06:40   LIVER FUNCTIONS - ( 10 Feb 2025 06:55 )  Alb: 2.4 g/dL / Pro: 5.8 g/dL / ALK PHOS: 75 U/L / ALT: 24 U/L / AST: 16 U/L / GGT: x           Cholesterol: 261 mg/dL (01-27-25 @ 06:10)  HDL Cholesterol: 43 mg/dL (01-27-25 @ 06:10)  Triglycerides, Serum: 239 mg/dL (01-27-25 @ 06:10)    12 Lead ECG:   Ventricular Rate 100 BPM    QRS Duration 98 ms    Q-T Interval 360 ms    QTC Calculation(Bazett) 464 ms    R Axis 50 degrees    T Axis -47 degrees    Diagnosis Line *** Critical Test Result: STEMI  Atrial fibrillation  Lateral infarct (cited on or before 26-JAN-2025)  ST & T wave abnormality, consider inferior ischemia  ST & T wave abnormality, consider anterior ischemia    Confirmed by ISAAC PALUMBO ([id number]) on 2/5/2025 12:55:12 PM (02-04-25 @ 16:25)      TRANSTHORACIC ECHOCARDIOGRAM REPORT  ________________________________________________________________________________                                      _______       Pt. Name:       DOMITILA GRACE Study Date:    1/28/2025  MRN:            [id number]           YOB: 1933  Accession #:    [id number]          Age:           89 years  Account#:       [id number]         Gender:        F  Heart Rate:                        Height:        62.20 in (158.00 cm)  Rhythm:          Weight:        160.93 lb (73.00 kg)  Blood Pressure: 93/52 mmHg         BSA/BMI:       1.75 m² / 29.24 kg/m²  ________________________________________________________________________________________  Referring Physician:    [id number] Carlos Alberto  Interpreting Physician: Braxton Bowens M.D.  Primary Sonographer:    Marlys Queen    CPT:               ECHO TTE W/O CON F/U LTD - 81778.m  Indication(s):     ST elevation [STEMI] myocardial infarction of unspecified                     site - I21.3  Procedure:         Limited transthoracic echocardiogram.  Ordering Location: ICU1  Admission Status:  Inpatient    _______________________________________________________________________________________     CONCLUSIONS:      1. Left ventricular systolic function is moderately decreased with an ejection fraction visually estimated at 40 to 45 %.   2. Small pericardial effusion noted adjacent to the right atrium and small pericardial effusion noted adjacent to the right ventricle. The effusion measures 0.82 cm in diastole in the subcostal view adjacent ot the RV.   3. Thickened pericardium.   4. No significant change in the size of the pericardial effusion compared to TTE study from yesterday (1/27/25) is noted.    ________________________________________________________________________________________  FINDINGS:     Left Ventricle:  Left ventricular systolic function is moderately decreased with an ejection fraction visually estimated at 40 to 45%.     Right Ventricle:  Right ventricular systolic function is normal.     Left Atrium:  The left atrium is dilated.     Mitral Valve:  There is moderate calcification of the mitral valve annulus.     Pericardium:  The pericardium is thickened. There is a small pericardial effusionnoted adjacent to the right atrium and a small pericardial effusion noted adjacent to the right ventricle.     Systemic Veins:  The inferior vena cava is normal in size measuring 1.83 cm in diameter, (normal <2.1cm) with normal inspiratory collapse (normal >50%) consistent with normal right atrial pressure (~3, range 0-5mmHg).  ____________________________________________________________________  QUANTITATIVE DATA:  Left Ventricle Measurements: (Indexed to BSA)     Visualized LV EF%: 40 to 45%       ________________________________________________________________________________________  Electronically signed on 1/28/2025 at 2:21:15 PM by Braxton Bowens M.D.         *** Final ***

## 2025-02-10 NOTE — PROGRESS NOTE ADULT - PROBLEM SELECTOR PLAN 7
Thyroid nodules on CT Chest w tracheal deviation  - Patient notified, outpatient follow up Thyroid nodules on CT Chest w tracheal deviation  - repeat CT chest with Enlarged heterogeneous right thyroid lobe. For further evaluation,  recommend ultrasound soft tissue neck outpt  - Patient notified, outpatient follow up

## 2025-02-10 NOTE — PROGRESS NOTE ADULT - SUBJECTIVE AND OBJECTIVE BOX
[facility]                                  Nephrology and Hypertension                             Timur Kwon                                          [phone]     Patient is a 89y old  Female who presents with a chief complaint of STEMI (02 Feb 2025 08:42)       HPI:  Pt is a 89y/o F with PMHx HTN, DM2, HLD, depression who presented to the ED with intermittent jaw pain followed by anterior chest pressure radiating to the left shoulder, Pt was found to have elevated troponins and EKG with ST elevations with reciprocal ST-T changes in leads II, III, aVF. Code STEMI was activated and patient was taken to the cath lab where she was found to have 100% occlusion to the diagonal now s/p 1 stent placement. Patient seen and examined in the ICU, reports feeling much better. She denies any current chest pain or jaw pain, and also denies dizziness, headache, numbness/tingling, nausea, vomiting, palpitations, shortness of breath, abdominal pain. She does admit to chronic diarrhea. Prior to this event, patient was in her usual state of health. No other concerns at this time.    Renal consulted on 2/2/25 for JOHNNY/CKD. S/p ICU course with STEMI Code STEMI, 100% occlusion to the diagonal now s/p x1 stent placed. No cath lab complication reported and pt admitted to the ICU for post cath. Patient denies any knowledge of prior renal issues but Cr was 1.5 on admission     No acute events noted    PAST MEDICAL & SURGICAL HISTORY:  HTN (hypertension)      DM (diabetes mellitus)      HLD (hyperlipidemia)      Major depression      No significant past surgical history           FAMILY HISTORY:  NC    Social History:Non smoker    MEDICATIONS  (STANDING):  albuterol    0.083% 2.5 milliGRAM(s) Nebulizer every 6 hours  aspirin enteric coated 81 milliGRAM(s) Oral daily  atorvastatin 40 milliGRAM(s) Oral at bedtime  chlorhexidine 2% Cloths 1 Application(s) Topical <User Schedule>  clopidogrel Tablet 75 milliGRAM(s) Oral every 24 hours  dextrose 5%. 1000 milliLiter(s) (50 mL/Hr) IV Continuous <Continuous>  dextrose 5%. 1000 milliLiter(s) (100 mL/Hr) IV Continuous <Continuous>  dextrose 50% Injectable 25 Gram(s) IV Push once  dextrose 50% Injectable 12.5 Gram(s) IV Push once  dextrose 50% Injectable 25 Gram(s) IV Push once  DULoxetine 90 milliGRAM(s) Oral daily  glucagon  Injectable 1 milliGRAM(s) IntraMuscular once  heparin   Injectable 5000 Unit(s) SubCutaneous every 8 hours  influenza  Vaccine (HIGH DOSE) 0.5 milliLiter(s) IntraMuscular once  insulin lispro (ADMELOG) corrective regimen sliding scale   SubCutaneous Before meals and at bedtime  metoprolol tartrate 12.5 milliGRAM(s) Oral two times a day  nystatin Powder 1 Application(s) Topical two times a day  oseltamivir 30 milliGRAM(s) Oral daily    MEDICATIONS  (PRN):  acetaminophen     Tablet .. 650 milliGRAM(s) Oral every 6 hours PRN Temp greater or equal to 38C (100.4F), Mild Pain (1 - 3)  dextrose Oral Gel 15 Gram(s) Oral once PRN Blood Glucose LESS THAN 70 milliGRAM(s)/deciliter  sodium chloride 0.65% Nasal 1 Spray(s) Both Nostrils two times a day PRN Nasal Congestion   Meds reviewed    Allergies    No Known Allergies    Intolerances         REVIEW OF SYSTEMS: As per HPI, otherwise negative     ICU Vital Signs Last 24 Hrs  T(C): 36.6 (10 Feb 2025 11:19), Max: 36.9 (09 Feb 2025 19:44)  T(F): 97.9 (10 Feb 2025 11:19), Max: 98.4 (09 Feb 2025 19:44)  HR: 100 (10 Feb 2025 13:59) (79 - 100)  BP: 106/57 (10 Feb 2025 13:59) (103/58 - 114/67)  BP(mean): --  ABP: --  ABP(mean): --  RR: 17 (10 Feb 2025 11:19) (17 - 18)  SpO2: 98% (10 Feb 2025 13:16) (96% - 99%)    O2 Parameters below as of 10 Feb 2025 13:16  Patient On (Oxygen Delivery Method): nasal cannula            PHYSICAL EXAM:    GENERAL: NAD  HEAD:  Atraumatic, Normocephalic  EYES: EOMI, conjunctiva and sclera clear  ENMT: No Drainage from nares, No drainage from ears  NECK: Supple, neck  veins full  NERVOUS SYSTEM:  Awake and Alert  CHEST/LUNG: coarseness noted, better   HEART: Regular rate and rhythm; No murmurs, rubs, or gallops  EXTREMITIES:  No Edema          LABS:                                   8.2    7.54  )-----------( 399      ( 10 Feb 2025 06:55 )             25.5     02-10    141  |  109[H]  |  60[H]  ----------------------------<  182[H]  3.7   |  26  |  2.20[H]    Ca    8.7      10 Feb 2025 06:55  Mg     2.3     02-10    TPro  5.8[L]  /  Alb  2.4[L]  /  TBili  0.6  /  DBili  x   /  AST  16  /  ALT  24  /  AlkPhos  75  02-10      Urinalysis Basic - ( 10 Feb 2025 06:55 )    Color: x / Appearance: x / SG: x / pH: x  Gluc: 182 mg/dL / Ketone: x  / Bili: x / Urobili: x   Blood: x / Protein: x / Nitrite: x   Leuk Esterase: x / RBC: x / WBC x   Sq Epi: x / Non Sq Epi: x / Bacteria: x

## 2025-02-10 NOTE — PROGRESS NOTE ADULT - ASSESSMENT
89y/o F with HTN, DM2, HLD, depression who presented to the ED with intermittent jaw pain followed by anterior chest pressure radiating to the left shoulder, admitted for STEMI.    STEMI, New Afib/HFmofEF  - S/p PCI (1/26/25) with 100% occlusion of diagonal and MARLENE x1  - Continue Plavix 75 mg daily without ASA to avoid triple therapy  - Continue Lipitor to 80mg   - Continue BB    - TTE 1/27/25 Left ventricular systolic function is moderately decreased with an ejection fraction visually estimated at 40 to 45 %. small pericardial effusion noted.   - BNP:  <--[id number]  - S/p thoracentesis 2/6 570cc of yellow pleuritic fluid  - Appears dry but with increasing O2 requirement  with cough, now on NC at 3L/min from 1L  - Had recent CxR, 2/6, showing interval resolution of small right-sided pleural effusion  - CT noted  - Continue Toprol XL fro GDMT.  Uptitrate as BP tolerates  - Unable to initiate ACEI/ARB in the setting of JOHNNY on CKD  - Recommend Farxiga once BP tolerates   - Continue Lasix 20 mg PO daily      - Afib on tele, rate-controlled.    - Currently on renal FD Lovenox.  Can switch to DOAC  - Continue Amio load with the hope that she converts back to NSR for now    - BP stable but on the soft side  - Monitor and replete lytes, keep K>4, Mg>2.  - Will continue to follow.    Chandler Frost DNP, AGAP-BC  Cardiology   Call Teams

## 2025-02-10 NOTE — PROGRESS NOTE ADULT - SUBJECTIVE AND OBJECTIVE BOX
Patient is a 89y old  Female who presents with a chief complaint of STEMI (10 Feb 2025 05:55)      INTERVAL HPI/OVERNIGHT EVENTS: No acute overnight events. Pt was seen and examined at bedside. Pt states that she is feeling well this AM. Denies any chest pain, shortness of breath, HA,     MEDICATIONS  (STANDING):  albuterol    0.083% 2.5 milliGRAM(s) Nebulizer every 6 hours  aMIOdarone    Tablet   Oral   aMIOdarone    Tablet 400 milliGRAM(s) Oral every 8 hours  atorvastatin 80 milliGRAM(s) Oral at bedtime  chlorhexidine 2% Cloths 1 Application(s) Topical <User Schedule>  clopidogrel Tablet 75 milliGRAM(s) Oral daily  dextrose 5%. 1000 milliLiter(s) (50 mL/Hr) IV Continuous <Continuous>  dextrose 5%. 1000 milliLiter(s) (100 mL/Hr) IV Continuous <Continuous>  dextrose 50% Injectable 25 Gram(s) IV Push once  dextrose 50% Injectable 12.5 Gram(s) IV Push once  dextrose 50% Injectable 25 Gram(s) IV Push once  DULoxetine 90 milliGRAM(s) Oral daily  enoxaparin Injectable 70 milliGRAM(s) SubCutaneous every 24 hours  furosemide    Tablet 20 milliGRAM(s) Oral daily  glucagon  Injectable 1 milliGRAM(s) IntraMuscular once  guaiFENesin  milliGRAM(s) Oral every 12 hours  influenza  Vaccine (HIGH DOSE) 0.5 milliLiter(s) IntraMuscular once  insulin lispro (ADMELOG) corrective regimen sliding scale   SubCutaneous Before meals and at bedtime  metoprolol succinate ER 25 milliGRAM(s) Oral daily  nystatin Powder 1 Application(s) Topical two times a day  sodium chloride 3%  Inhalation 4 milliLiter(s) Inhalation every 12 hours    MEDICATIONS  (PRN):  acetaminophen     Tablet .. 650 milliGRAM(s) Oral every 6 hours PRN Temp greater or equal to 38C (100.4F), Mild Pain (1 - 3)  dextrose Oral Gel 15 Gram(s) Oral once PRN Blood Glucose LESS THAN 70 milliGRAM(s)/deciliter  sodium chloride 0.65% Nasal 1 Spray(s) Both Nostrils two times a day PRN Nasal Congestion      Allergies    No Known Allergies    Intolerances        REVIEW OF SYSTEMS:  CONSTITUTIONAL: No fever or chills  HEENT:  No headache, no sore throat  RESPIRATORY: No cough, wheezing, or shortness of breath  CARDIOVASCULAR: No chest pain, palpitations  GASTROINTESTINAL: No abd pain, nausea, vomiting, or diarrhea  GENITOURINARY: No dysuria, frequency, or hematuria  NEUROLOGICAL: no focal weakness or dizziness  MUSCULOSKELETAL: no myalgias     Vital Signs Last 24 Hrs  T(C): 36.4 (10 Feb 2025 04:55), Max: 37.1 (09 Feb 2025 10:18)  T(F): 97.5 (10 Feb 2025 04:55), Max: 98.7 (09 Feb 2025 10:18)  HR: 79 (10 Feb 2025 07:27) (79 - 99)  BP: 103/58 (10 Feb 2025 04:55) (100/61 - 107/61)  BP(mean): --  RR: 18 (10 Feb 2025 04:55) (18 - 18)  SpO2: 97% (10 Feb 2025 07:27) (96% - 99%)    Parameters below as of 10 Feb 2025 07:27  Patient On (Oxygen Delivery Method): nasal cannula, 3 lpm        PHYSICAL EXAM:  GENERAL: NAD  HEENT:  anicteric, moist mucous membranes  CHEST/LUNG:  CTA b/l, no rales, wheezes, or rhonchi  HEART:  RRR, S1, S2  ABDOMEN:  BS+, soft, nontender, nondistended  EXTREMITIES: no edema, cyanosis, or calf tenderness  NERVOUS SYSTEM: answers questions and follows commands appropriately    LABS:                        8.2    7.54  )-----------( 399      ( 10 Feb 2025 06:55 )             25.5     CBC Full  -  ( 10 Feb 2025 06:55 )  WBC Count : 7.54 K/uL  Hemoglobin : 8.2 g/dL  Hematocrit : 25.5 %  Platelet Count - Automated : 399 K/uL  Mean Cell Volume : 91.1 fl  Mean Cell Hemoglobin : 29.3 pg  Mean Cell Hemoglobin Concentration : 32.2 g/dL  Auto Neutrophil # : 5.76 K/uL  Auto Lymphocyte # : 1.01 K/uL  Auto Monocyte # : 0.45 K/uL  Auto Eosinophil # : 0.20 K/uL  Auto Basophil # : 0.01 K/uL  Auto Neutrophil % : 76.3 %  Auto Lymphocyte % : 13.4 %  Auto Monocyte % : 6.0 %  Auto Eosinophil % : 2.7 %  Auto Basophil % : 0.1 %    10 Feb 2025 06:55    141    |  109    |  60     ----------------------------<  182    3.7     |  26     |  2.20     Ca    8.7        10 Feb 2025 06:55  Mg     2.3       10 Feb 2025 06:55    TPro  5.8    /  Alb  2.4    /  TBili  0.6    /  DBili  x      /  AST  16     /  ALT  24     /  AlkPhos  75     10 Feb 2025 06:55      Urinalysis Basic - ( 10 Feb 2025 06:55 )    Color: x / Appearance: x / SG: x / pH: x  Gluc: 182 mg/dL / Ketone: x  / Bili: x / Urobili: x   Blood: x / Protein: x / Nitrite: x   Leuk Esterase: x / RBC: x / WBC x   Sq Epi: x / Non Sq Epi: x / Bacteria: x      CAPILLARY BLOOD GLUCOSE      POCT Blood Glucose.: 190 mg/dL (10 Feb 2025 07:45)  POCT Blood Glucose.: 236 mg/dL (09 Feb 2025 21:32)  POCT Blood Glucose.: 193 mg/dL (09 Feb 2025 16:46)  POCT Blood Glucose.: 168 mg/dL (09 Feb 2025 11:41)        Culture - Fungal, Body Fluid (collected 02-06-25 @ 09:44)  Source: Pleural Fl  Preliminary Report (02-09-25 @ 08:38):    No growth    Culture - Body Fluid with Gram Stain (collected 02-06-25 @ 09:44)  Source: Pleural Fl  Gram Stain (02-06-25 @ 23:27):    polymorphonuclear leukocytes seen    No organisms seen    by cytocentrifuge  Preliminary Report (02-07-25 @ 15:59):    No growth        RADIOLOGY & ADDITIONAL TESTS: ____    Personally reviewed.     Consultant(s) Notes Reviewed:  [x] YES  [ ] NO     Patient is a 89y old  Female who presents with a chief complaint of STEMI (10 Feb 2025 05:55)      INTERVAL HPI/OVERNIGHT EVENTS: No acute overnight events. Pt was seen and examined at bedside. Pt states that she is feeling well this AM. Denies any chest pain, shortness of breath, HA, diarrhea, constipation.   On 2L NC, In afib on the monitor, rate controlled.      MEDICATIONS  (STANDING):  albuterol    0.083% 2.5 milliGRAM(s) Nebulizer every 6 hours  aMIOdarone    Tablet   Oral   aMIOdarone    Tablet 400 milliGRAM(s) Oral every 8 hours  atorvastatin 80 milliGRAM(s) Oral at bedtime  chlorhexidine 2% Cloths 1 Application(s) Topical <User Schedule>  clopidogrel Tablet 75 milliGRAM(s) Oral daily  dextrose 5%. 1000 milliLiter(s) (50 mL/Hr) IV Continuous <Continuous>  dextrose 5%. 1000 milliLiter(s) (100 mL/Hr) IV Continuous <Continuous>  dextrose 50% Injectable 25 Gram(s) IV Push once  dextrose 50% Injectable 12.5 Gram(s) IV Push once  dextrose 50% Injectable 25 Gram(s) IV Push once  DULoxetine 90 milliGRAM(s) Oral daily  enoxaparin Injectable 70 milliGRAM(s) SubCutaneous every 24 hours  furosemide    Tablet 20 milliGRAM(s) Oral daily  glucagon  Injectable 1 milliGRAM(s) IntraMuscular once  guaiFENesin  milliGRAM(s) Oral every 12 hours  influenza  Vaccine (HIGH DOSE) 0.5 milliLiter(s) IntraMuscular once  insulin lispro (ADMELOG) corrective regimen sliding scale   SubCutaneous Before meals and at bedtime  metoprolol succinate ER 25 milliGRAM(s) Oral daily  nystatin Powder 1 Application(s) Topical two times a day  sodium chloride 3%  Inhalation 4 milliLiter(s) Inhalation every 12 hours    MEDICATIONS  (PRN):  acetaminophen     Tablet .. 650 milliGRAM(s) Oral every 6 hours PRN Temp greater or equal to 38C (100.4F), Mild Pain (1 - 3)  dextrose Oral Gel 15 Gram(s) Oral once PRN Blood Glucose LESS THAN 70 milliGRAM(s)/deciliter  sodium chloride 0.65% Nasal 1 Spray(s) Both Nostrils two times a day PRN Nasal Congestion      Allergies    No Known Allergies    Intolerances        REVIEW OF SYSTEMS:  CONSTITUTIONAL: No fever or chills  HEENT:  No headache, no sore throat  RESPIRATORY: + cough, no wheezing, or shortness of breath  CARDIOVASCULAR: No chest pain, palpitations  GASTROINTESTINAL: No abd pain, nausea, vomiting, or diarrhea  GENITOURINARY: No dysuria, frequency, or hematuria  NEUROLOGICAL: no focal weakness or dizziness  MUSCULOSKELETAL: no myalgias     Vital Signs Last 24 Hrs  T(C): 36.4 (10 Feb 2025 04:55), Max: 37.1 (09 Feb 2025 10:18)  T(F): 97.5 (10 Feb 2025 04:55), Max: 98.7 (09 Feb 2025 10:18)  HR: 79 (10 Feb 2025 07:27) (79 - 99)  BP: 103/58 (10 Feb 2025 04:55) (100/61 - 107/61)  BP(mean): --  RR: 18 (10 Feb 2025 04:55) (18 - 18)  SpO2: 97% (10 Feb 2025 07:27) (96% - 99%)    Parameters below as of 10 Feb 2025 07:27  Patient On (Oxygen Delivery Method): nasal cannula, 3 lpm        PHYSICAL EXAM:  GENERAL: NAD on 2L NC  HEENT:  anicteric, moist mucous membranes  CHEST/LUNG: mild rales b/l lung bases. no  wheezes, or rhonchi  HEART: in afib on monitor, rate controlled. S1, S2  ABDOMEN:  BS+, soft, nontender, nondistended  EXTREMITIES: no edema, cyanosis, or calf tenderness  NERVOUS SYSTEM: answers questions and follows commands appropriately    LABS:                        8.2    7.54  )-----------( 399      ( 10 Feb 2025 06:55 )             25.5     CBC Full  -  ( 10 Feb 2025 06:55 )  WBC Count : 7.54 K/uL  Hemoglobin : 8.2 g/dL  Hematocrit : 25.5 %  Platelet Count - Automated : 399 K/uL  Mean Cell Volume : 91.1 fl  Mean Cell Hemoglobin : 29.3 pg  Mean Cell Hemoglobin Concentration : 32.2 g/dL  Auto Neutrophil # : 5.76 K/uL  Auto Lymphocyte # : 1.01 K/uL  Auto Monocyte # : 0.45 K/uL  Auto Eosinophil # : 0.20 K/uL  Auto Basophil # : 0.01 K/uL  Auto Neutrophil % : 76.3 %  Auto Lymphocyte % : 13.4 %  Auto Monocyte % : 6.0 %  Auto Eosinophil % : 2.7 %  Auto Basophil % : 0.1 %    10 Feb 2025 06:55    141    |  109    |  60     ----------------------------<  182    3.7     |  26     |  2.20     Ca    8.7        10 Feb 2025 06:55  Mg     2.3       10 Feb 2025 06:55    TPro  5.8    /  Alb  2.4    /  TBili  0.6    /  DBili  x      /  AST  16     /  ALT  24     /  AlkPhos  75     10 Feb 2025 06:55      Urinalysis Basic - ( 10 Feb 2025 06:55 )    Color: x / Appearance: x / SG: x / pH: x  Gluc: 182 mg/dL / Ketone: x  / Bili: x / Urobili: x   Blood: x / Protein: x / Nitrite: x   Leuk Esterase: x / RBC: x / WBC x   Sq Epi: x / Non Sq Epi: x / Bacteria: x      CAPILLARY BLOOD GLUCOSE      POCT Blood Glucose.: 190 mg/dL (10 Feb 2025 07:45)  POCT Blood Glucose.: 236 mg/dL (09 Feb 2025 21:32)  POCT Blood Glucose.: 193 mg/dL (09 Feb 2025 16:46)  POCT Blood Glucose.: 168 mg/dL (09 Feb 2025 11:41)        Culture - Fungal, Body Fluid (collected 02-06-25 @ 09:44)  Source: Pleural Fl  Preliminary Report (02-09-25 @ 08:38):    No growth    Culture - Body Fluid with Gram Stain (collected 02-06-25 @ 09:44)  Source: Pleural Fl  Gram Stain (02-06-25 @ 23:27):    polymorphonuclear leukocytes seen    No organisms seen    by cytocentrifuge  Preliminary Report (02-07-25 @ 15:59):    No growth        RADIOLOGY & ADDITIONAL TESTS:  < from: CT Chest No Cont (02.09.25 @ 16:43) >    IMPRESSION:  Interval evolution and organization of previously visualized pneumonia in   bilateral lungs. Interval decrease in density of consolidation and   groundglass opacities in right upper lobe. Dilated airways likely a   sequela of organization.  Small right pleural effusion with interval decrease in size. Small to   moderate left pleural effusions, relatively stable.  Mediastinal and right hilar lymphadenopathy  Enlarged heterogeneous right thyroid lobe. For further evaluation,   ultrasound soft tissue neck is recommended.    < end of copied text >      Personally reviewed.     Consultant(s) Notes Reviewed:  [x] YES  [ ] NO

## 2025-02-11 LAB
ALBUMIN SERPL ELPH-MCNC: 2.6 G/DL — LOW (ref 3.3–5)
ALP SERPL-CCNC: 77 U/L — SIGNIFICANT CHANGE UP (ref 40–120)
ALT FLD-CCNC: 26 U/L — SIGNIFICANT CHANGE UP (ref 12–78)
ANION GAP SERPL CALC-SCNC: 6 MMOL/L — SIGNIFICANT CHANGE UP (ref 5–17)
AST SERPL-CCNC: 18 U/L — SIGNIFICANT CHANGE UP (ref 15–37)
BASOPHILS # BLD AUTO: 0.02 K/UL — SIGNIFICANT CHANGE UP (ref 0–0.2)
BASOPHILS NFR BLD AUTO: 0.3 % — SIGNIFICANT CHANGE UP (ref 0–2)
BILIRUB SERPL-MCNC: 0.7 MG/DL — SIGNIFICANT CHANGE UP (ref 0.2–1.2)
BUN SERPL-MCNC: 63 MG/DL — HIGH (ref 7–23)
CALCIUM SERPL-MCNC: 8.9 MG/DL — SIGNIFICANT CHANGE UP (ref 8.5–10.1)
CHLORIDE SERPL-SCNC: 109 MMOL/L — HIGH (ref 96–108)
CO2 SERPL-SCNC: 26 MMOL/L — SIGNIFICANT CHANGE UP (ref 22–31)
CREAT SERPL-MCNC: 2.3 MG/DL — HIGH (ref 0.5–1.3)
CULTURE RESULTS: SIGNIFICANT CHANGE UP
EGFR: 20 ML/MIN/1.73M2 — LOW
EOSINOPHIL # BLD AUTO: 0.26 K/UL — SIGNIFICANT CHANGE UP (ref 0–0.5)
EOSINOPHIL NFR BLD AUTO: 3.3 % — SIGNIFICANT CHANGE UP (ref 0–6)
GLUCOSE SERPL-MCNC: 128 MG/DL — HIGH (ref 70–99)
HCT VFR BLD CALC: 26.9 % — LOW (ref 34.5–45)
HGB BLD-MCNC: 8.5 G/DL — LOW (ref 11.5–15.5)
IMM GRANULOCYTES NFR BLD AUTO: 1.6 % — HIGH (ref 0–0.9)
LYMPHOCYTES # BLD AUTO: 1.02 K/UL — SIGNIFICANT CHANGE UP (ref 1–3.3)
LYMPHOCYTES # BLD AUTO: 12.9 % — LOW (ref 13–44)
MAGNESIUM SERPL-MCNC: 2.4 MG/DL — SIGNIFICANT CHANGE UP (ref 1.6–2.6)
MCHC RBC-ENTMCNC: 28.8 PG — SIGNIFICANT CHANGE UP (ref 27–34)
MCHC RBC-ENTMCNC: 31.6 G/DL — LOW (ref 32–36)
MCV RBC AUTO: 91.2 FL — SIGNIFICANT CHANGE UP (ref 80–100)
MONOCYTES # BLD AUTO: 0.57 K/UL — SIGNIFICANT CHANGE UP (ref 0–0.9)
MONOCYTES NFR BLD AUTO: 7.2 % — SIGNIFICANT CHANGE UP (ref 2–14)
NEUTROPHILS # BLD AUTO: 5.9 K/UL — SIGNIFICANT CHANGE UP (ref 1.8–7.4)
NEUTROPHILS NFR BLD AUTO: 74.7 % — SIGNIFICANT CHANGE UP (ref 43–77)
NRBC # BLD: 0 /100 WBCS — SIGNIFICANT CHANGE UP (ref 0–0)
NRBC BLD-RTO: 0 /100 WBCS — SIGNIFICANT CHANGE UP (ref 0–0)
PHOSPHATE SERPL-MCNC: 4.8 MG/DL — HIGH (ref 2.5–4.5)
PLATELET # BLD AUTO: 394 K/UL — SIGNIFICANT CHANGE UP (ref 150–400)
POTASSIUM SERPL-MCNC: 3.7 MMOL/L — SIGNIFICANT CHANGE UP (ref 3.5–5.3)
POTASSIUM SERPL-SCNC: 3.7 MMOL/L — SIGNIFICANT CHANGE UP (ref 3.5–5.3)
PROT SERPL-MCNC: 6.1 G/DL — SIGNIFICANT CHANGE UP (ref 6–8.3)
RBC # BLD: 2.95 M/UL — LOW (ref 3.8–5.2)
RBC # FLD: 14.4 % — SIGNIFICANT CHANGE UP (ref 10.3–14.5)
SODIUM SERPL-SCNC: 141 MMOL/L — SIGNIFICANT CHANGE UP (ref 135–145)
SPECIMEN SOURCE: SIGNIFICANT CHANGE UP
WBC # BLD: 7.9 K/UL — SIGNIFICANT CHANGE UP (ref 3.8–10.5)
WBC # FLD AUTO: 7.9 K/UL — SIGNIFICANT CHANGE UP (ref 3.8–10.5)

## 2025-02-11 PROCEDURE — 99233 SBSQ HOSP IP/OBS HIGH 50: CPT

## 2025-02-11 PROCEDURE — 99233 SBSQ HOSP IP/OBS HIGH 50: CPT | Mod: GC

## 2025-02-11 RX ADMIN — Medication 2.5 MILLIGRAM(S): at 07:36

## 2025-02-11 RX ADMIN — Medication 40 MILLIGRAM(S): at 11:13

## 2025-02-11 RX ADMIN — Medication 3: at 21:47

## 2025-02-11 RX ADMIN — Medication 4 MILLILITER(S): at 20:01

## 2025-02-11 RX ADMIN — Medication 600 MILLIGRAM(S): at 17:11

## 2025-02-11 RX ADMIN — Medication 40 MILLIGRAM(S): at 05:22

## 2025-02-11 RX ADMIN — Medication 25 MILLIGRAM(S): at 05:23

## 2025-02-11 RX ADMIN — Medication 2.5 MILLIGRAM(S): at 20:01

## 2025-02-11 RX ADMIN — Medication 600 MILLIGRAM(S): at 05:22

## 2025-02-11 RX ADMIN — ANTISEPTIC SURGICAL SCRUB 1 APPLICATION(S): 0.04 SOLUTION TOPICAL at 05:27

## 2025-02-11 RX ADMIN — Medication 2.5 MILLIGRAM(S): at 00:56

## 2025-02-11 RX ADMIN — Medication 75 MILLIGRAM(S): at 11:12

## 2025-02-11 RX ADMIN — Medication 2.5 MILLIGRAM(S): at 14:08

## 2025-02-11 RX ADMIN — Medication 3: at 17:10

## 2025-02-11 RX ADMIN — Medication 4 MILLILITER(S): at 07:36

## 2025-02-11 RX ADMIN — Medication 1: at 11:23

## 2025-02-11 RX ADMIN — NYSTATIN 1 APPLICATION(S): 100000 POWDER TOPICAL at 05:22

## 2025-02-11 RX ADMIN — Medication 1: at 08:01

## 2025-02-11 RX ADMIN — APIXABAN 2.5 MILLIGRAM(S): 5 TABLET, FILM COATED ORAL at 17:11

## 2025-02-11 RX ADMIN — AMIODARONE HYDROCHLORIDE 400 MILLIGRAM(S): 50 INJECTION, SOLUTION INTRAVENOUS at 05:22

## 2025-02-11 RX ADMIN — ATORVASTATIN CALCIUM 80 MILLIGRAM(S): 80 TABLET, FILM COATED ORAL at 21:47

## 2025-02-11 RX ADMIN — DULOXETINE 90 MILLIGRAM(S): 20 CAPSULE, DELAYED RELEASE ORAL at 11:13

## 2025-02-11 RX ADMIN — NYSTATIN 1 APPLICATION(S): 100000 POWDER TOPICAL at 17:12

## 2025-02-11 RX ADMIN — APIXABAN 2.5 MILLIGRAM(S): 5 TABLET, FILM COATED ORAL at 05:22

## 2025-02-11 NOTE — PROGRESS NOTE ADULT - SUBJECTIVE AND OBJECTIVE BOX
[facility]                                  Nephrology and Hypertension                             Timur Kwon                                          [phone]     Patient is a 89y old  Female who presents with a chief complaint of STEMI (02 Feb 2025 08:42)       HPI:  Pt is a 89y/o F with PMHx HTN, DM2, HLD, depression who presented to the ED with intermittent jaw pain followed by anterior chest pressure radiating to the left shoulder, Pt was found to have elevated troponins and EKG with ST elevations with reciprocal ST-T changes in leads II, III, aVF. Code STEMI was activated and patient was taken to the cath lab where she was found to have 100% occlusion to the diagonal now s/p 1 stent placement. Patient seen and examined in the ICU, reports feeling much better. She denies any current chest pain or jaw pain, and also denies dizziness, headache, numbness/tingling, nausea, vomiting, palpitations, shortness of breath, abdominal pain. She does admit to chronic diarrhea. Prior to this event, patient was in her usual state of health. No other concerns at this time.    Renal consulted on 2/2/25 for JOHNNY/CKD. S/p ICU course with STEMI Code STEMI, 100% occlusion to the diagonal now s/p x1 stent placed. No cath lab complication reported and pt admitted to the ICU for post cath. Patient denies any knowledge of prior renal issues but Cr was 1.5 on admission     No acute events noted    PAST MEDICAL & SURGICAL HISTORY:  HTN (hypertension)      DM (diabetes mellitus)      HLD (hyperlipidemia)      Major depression      No significant past surgical history           FAMILY HISTORY:  NC    Social History:Non smoker    MEDICATIONS  (STANDING):  albuterol    0.083% 2.5 milliGRAM(s) Nebulizer every 6 hours  aspirin enteric coated 81 milliGRAM(s) Oral daily  atorvastatin 40 milliGRAM(s) Oral at bedtime  chlorhexidine 2% Cloths 1 Application(s) Topical <User Schedule>  clopidogrel Tablet 75 milliGRAM(s) Oral every 24 hours  dextrose 5%. 1000 milliLiter(s) (50 mL/Hr) IV Continuous <Continuous>  dextrose 5%. 1000 milliLiter(s) (100 mL/Hr) IV Continuous <Continuous>  dextrose 50% Injectable 25 Gram(s) IV Push once  dextrose 50% Injectable 12.5 Gram(s) IV Push once  dextrose 50% Injectable 25 Gram(s) IV Push once  DULoxetine 90 milliGRAM(s) Oral daily  glucagon  Injectable 1 milliGRAM(s) IntraMuscular once  heparin   Injectable 5000 Unit(s) SubCutaneous every 8 hours  influenza  Vaccine (HIGH DOSE) 0.5 milliLiter(s) IntraMuscular once  insulin lispro (ADMELOG) corrective regimen sliding scale   SubCutaneous Before meals and at bedtime  metoprolol tartrate 12.5 milliGRAM(s) Oral two times a day  nystatin Powder 1 Application(s) Topical two times a day  oseltamivir 30 milliGRAM(s) Oral daily    MEDICATIONS  (PRN):  acetaminophen     Tablet .. 650 milliGRAM(s) Oral every 6 hours PRN Temp greater or equal to 38C (100.4F), Mild Pain (1 - 3)  dextrose Oral Gel 15 Gram(s) Oral once PRN Blood Glucose LESS THAN 70 milliGRAM(s)/deciliter  sodium chloride 0.65% Nasal 1 Spray(s) Both Nostrils two times a day PRN Nasal Congestion   Meds reviewed    Allergies    No Known Allergies    Intolerances         REVIEW OF SYSTEMS: As per HPI, otherwise negative     Vital Signs Last 24 Hrs  T(C): 36.4 (11 Feb 2025 11:10), Max: 36.5 (11 Feb 2025 05:10)  T(F): 97.6 (11 Feb 2025 11:10), Max: 97.7 (11 Feb 2025 05:10)  HR: 72 (11 Feb 2025 11:10) (61 - 100)  BP: 106/60 (11 Feb 2025 11:10) (106/57 - 138/62)  BP(mean): --  RR: 17 (11 Feb 2025 11:10) (17 - 17)  SpO2: 94% (11 Feb 2025 11:10) (94% - 99%)    Parameters below as of 11 Feb 2025 11:10  Patient On (Oxygen Delivery Method): nasal cannula  O2 Flow (L/min): 2        PHYSICAL EXAM:    GENERAL: NAD  HEAD:  Atraumatic, Normocephalic  EYES: EOMI, conjunctiva and sclera clear  ENMT: No Drainage from nares, No drainage from ears  NECK: Supple, neck  veins full  NERVOUS SYSTEM:  Awake and Alert  CHEST/LUNG: coarseness noted, better   HEART: Regular rate and rhythm; No murmurs, rubs, or gallops  EXTREMITIES:  No Edema          LABS:                                       8.5    7.90  )-----------( 394      ( 11 Feb 2025 06:15 )             26.9     02-11    141  |  109[H]  |  63[H]  ----------------------------<  128[H]  3.7   |  26  |  2.30[H]    Ca    8.9      11 Feb 2025 06:15  Phos  4.8     02-11  Mg     2.4     02-11    TPro  6.1  /  Alb  2.6[L]  /  TBili  0.7  /  DBili  x   /  AST  18  /  ALT  26  /  AlkPhos  77  02-11      Urinalysis Basic - ( 11 Feb 2025 06:15 )    Color: x / Appearance: x / SG: x / pH: x  Gluc: 128 mg/dL / Ketone: x  / Bili: x / Urobili: x   Blood: x / Protein: x / Nitrite: x   Leuk Esterase: x / RBC: x / WBC x   Sq Epi: x / Non Sq Epi: x / Bacteria: x

## 2025-02-11 NOTE — PROGRESS NOTE ADULT - PROBLEM SELECTOR PROBLEM 7
Need for prophylactic measure
Need for prophylactic measure
Imaging abnormality
Need for prophylactic measure
Imaging abnormality
Imaging abnormality
Need for prophylactic measure
Need for prophylactic measure
Anxiety and depression
Anxiety and depression
Need for prophylactic measure
Imaging abnormality

## 2025-02-11 NOTE — PROGRESS NOTE ADULT - PROBLEM SELECTOR PROBLEM 2
STEMI (ST elevation myocardial infarction)
Atrial fibrillation
Atrial fibrillation
JOHNNY (acute kidney injury)
STEMI (ST elevation myocardial infarction)
Atrial fibrillation
Atrial fibrillation
STEMI (ST elevation myocardial infarction)
Atrial fibrillation
JOHNNY (acute kidney injury)
JOHNNY (acute kidney injury)
STEMI (ST elevation myocardial infarction)
JOHNNY (acute kidney injury)
JOHNNY (acute kidney injury)
STEMI (ST elevation myocardial infarction)
JOHNNY (acute kidney injury)

## 2025-02-11 NOTE — PROGRESS NOTE ADULT - PROBLEM SELECTOR PLAN 4
Chronic  - elevated on arrival likely in setting of STEMI; now improved  - takes fosinopril at home; previously on HCTZ however pt states she hasn't been taking this recently  - could hold ACE for now given JOHNNY, restart when able  - monitor routine hemodynamics
Chronic, DM2  - glucose elevated to 300 on arrival  - takes metformin at home; hold   - recommend PRITESH with FS QAC/QHS  - hypoglycemia protocol  - Return to home on metformin + farxiga  - A1c 7.3
Chronic  - elevated on arrival likely in setting of STEMI; now improved  - takes fosinopril at home; previously on HCTZ however pt states she hasn't been taking this recently  - could hold ACE for now given JOHNNY, restart when able  - monitor routine hemodynamics
Likely 2/2 CARLO +/- hypotension induced ATN  - unclear baseline, - GFR similar to 2023 on chart review  - Cr up to 2.3 today from 2.2  - monitor renal function, increased s/p cardiac cath - likely ATN from hypotension and CARLO  - Nephro consulted, Dr. Payan - recs appreciated - Cr likely at new baseline with fluctuation due to need for lasix
- unclear baseline, - GFR similar to 2023 on chart review  - Slowly improving, Cr 2.1 today  - monitor renal function, increased s/p cardiac cath - likely ATN from hypotension and CARLO
Chronic  - elevated on arrival likely in setting of STEMI; now improved  - takes fosinopril at home; previously on HCTZ however pt states she hasn't been taking this recently  - hold home antihypertensives given episodes of hypotension and recent JOHNNY (recovering)  - to initiate low dose BB today  - monitor routine hemodynamics
Chronic, DM2  - glucose elevated to 300 on arrival  - takes metformin at home; hold   - recommend PRITESH with FS QAC/QHS  - hypoglycemia protocol  - Return to home on metformin + farxiga  - A1c 7.3
Chronic  - elevated on arrival likely in setting of STEMI; now improved  - takes fosinopril at home; previously on HCTZ however pt states she hasn't been taking this recently  - could hold ACE for now given JOHNNY, restart when able  - monitor routine hemodynamics
Chronic, DM2  - glucose elevated to 300 on arrival  - takes metformin at home; hold   - recommend PRITESH with FS QAC/QHS  - hypoglycemia protocol  - Return to home on metformin + farxiga  - A1c 7.3
Chronic  - elevated on arrival likely in setting of STEMI; now improved  - takes fosinopril at home; previously on HCTZ however pt states she hasn't been taking this recently  - could hold ACE for now given JOHNNY, restart when able  - monitor routine hemodynamics
Chronic, DM2  - glucose elevated to 300 on arrival  - takes metformin at home; hold   - recommend PRITESH with FS QAC/QHS  - hypoglycemia protocol  - Return to home on metformin + farxiga  - A1c 7.3
- unclear baseline, - GFR similar to 2023 on chart review  - Slowly improving, Cr 2.1 today  - monitor renal function, increased s/p cardiac cath - likely ATN from hypotension and CARLO
Chronic  - elevated on arrival likely in setting of STEMI; now improved  - takes fosinopril at home; previously on HCTZ however pt states she hasn't been taking this recently  - hold home antihypertensives given episodes of hypotension and JOHNNY  - Continue low dose BB 12.5mg BID  - monitor routine hemodynamics
- unclear baseline, - GFR similar to 2023 on chart review  - Slowly improving, Cr 2.1 today  - monitor renal function, increased s/p cardiac cath - likely ATN from hypotension and CARLO
- unclear baseline, - GFR similar to 2023 on chart review  - Slowly improving, Cr 2.1 today  - monitor renal function, increased s/p cardiac cath - likely ATN from hypotension and CARLO
Chronic, DM2  - glucose elevated to 300 on arrival  - takes metformin at home; hold   - recommend PRITESH with FS QAC/QHS  - hypoglycemia protocol  - Return to home on metformin + farxiga  - A1c 7.3

## 2025-02-11 NOTE — PROGRESS NOTE ADULT - ASSESSMENT
89y/o F with PMHx HTN, DM2, HLD, depression who presented to the ED with intermittent jaw pain followed by anterior chest pressure radiating to the left shoulder, Pt was found to have elevated troponins and EKG with ST elevations with reciprocal ST-T changes in leads II, III, aVF. Code STEMI was activated and patient was taken to the cath lab where she was found to have 100% occlusion to the diagonal now s/p 1 stent placement. Patient seen and examined in the ICU, reports feeling much better.    flu  malaise  weakness  STEMI  OP  OA  HTN  DM  HLD    570 cc drained - right side - exudate - PATH NEG  on amio  on lasix  on nebs  o2 support - plan for home o2 -   cm f/u    monitor for pulm edema - CHF ex -   Cardio f/u   I and O  replete lytes  cvs rx regimen optimization and HD monitoring  TTE 1/27/25 Left ventricular systolic function is moderately decreased with an ejection fraction visually estimated at 40 to 45 %. small pericardial effusion noted  FLU management - isol precs  nebs - mucolytics  cough rx regimen  I velma  fio2 wean as tolerated  goal sat > 88 pct  oral hygiene  skin care  DM care  s/p ICU stay -   chest imaging reviewed - eff - atelectasis - pulm edema - will benefit from repeat

## 2025-02-11 NOTE — PROGRESS NOTE ADULT - ATTENDING COMMENTS
89 yo woman with Hx htn, DM2, HLD presenting with inferior STEMI s/p MARLENE to diag.  Course complicated by hypotension responsive to IVF, now improved.    --mentating well, continue home cymbalta  --inferior STEMI s/p MARLENE to diag  continue ASA, brilinta, atorva  HLD, atorva  --hypotension, new reduced EF   hypotension now improved and remains asymptomatic  continue to hold lopressor  small pericardial effusion without signs of tamponade on most recent echo, monitor  --respiratory status stable  --mild JOHNNY in setting of hypotension, improving  --PO diet  --no infectious concerns  --DM2, continue ISS  --OOB to chair, ambulating PT eval  --plan discussed with pt     Upon my evaluation, this patient is at high risk for imminent or life threatening deterioration due to STEMI, hypotension and other active medical issues which require my direct attention, intervention, and personal management.
89 yo woman with Hx htn, DM2, HLD presenting with inferior STEMI s/p MARLENE to diag.  Course complicated by hypotension responsive to IVF, now improved.    --mentating well, continue home cymbalta  --inferior STEMI s/p MARLENE to diag  continue ASA, brilinta, atorva  HLD, atorva  --hypotension, new reduced EF   hypotension improved, received 500cc bolus this afternoon with improvement  continue to hold lopressor  small pericardial effusion without signs of tamponade, monitor  --respiratory status stable  --mild JOHNNY in setting of hypotension, supportive care as above  --PO diet  --no infectious concerns  --DM2, continue ISS  --OOB to chair, PT eval  --plan discussed with pt   discussed with Karma Maurice    Upon my evaluation, this patient is at high risk for imminent or life threatening deterioration due to STEMI, hypotension and other active medical issues which require my direct attention, intervention, and personal management.
89 yo woman with Hx htn, DM2, HLD presenting with inferior STEMI s/p MARLENE to diag.  Course complicated by hypotension responsive to IVF, now resolved.  Course complicated by mild pulmonary edema and influenza A.    --mentating well, continue home cymbalta  --inferior STEMI s/p MARLENE to diag  continue ASA, brilinta, atorva  HLD, atorva  change lopressor to toprol XL  --hypoxemia suspect more from flu than pulmonary edema now, hold diuresis  --mild JOHNNY in setting of hypotension persists, FeNa 3.6%  --PO diet  bowel regimen for constipation  --influenza A infection, continue tamiflu  --DM2, hypoglycemia, decrease lantus to 12units, continue ISS  --plan discussed with pt   --stable for floor with remote tele
89 yo woman with Hx htn, DM2, HLD presenting with inferior STEMI s/p MARLENE to diag.  Course complicated by hypotension responsive to IVF, now resolved.  But today with cough and dyspnea, and now pulmonary edema and influenza A.    --mentating well, continue home cymbalta  panic attack resolved with xanax (home med)  --inferior STEMI s/p MARLENE to diag  continue ASA, brilinta, atorva  HLD, atorva  continue lopressor  --pulmonary edema on CXR, diurese  --mild JOHNNY in setting of hypotension, improving  --PO diet  --influenza A infection, continue tamiflu  --DM2, continue ISS  --OOB to chair, ambulating, PT eval  --plan discussed with pt   --stable for floor with remote tele      Upon my evaluation, this patient is at high risk for imminent or life threatening deterioration due to STEMI, flu A, pulmonary edema and other active medical issues which require my direct attention, intervention, and personal management.
89 yo woman with Hx htn, DM2, HLD presenting with inferior STEMI s/p MARLENE to diag.  Remains with hypotension though asymptomatic and improving with IVF.    --mentating well, continue home cymbalta  --inferior STEMI s/p MARLENE to diag  continue ASA, brilinta, atorva  HLD, atorva  --hypotension, new reduced EF but clinically volume responsive  improved with additional 1L IVF today  hold lopressor  small pericardial effusion without signs of tamponade, monitor  --respiratory status stable  --mild JOHNNY in setting of hypotension, supportive care as above  --PO diet  --no infectious concerns  --DM2, continue ISS  --plan discussed with pt   discussed with Karma Elizabeth    Upon my evaluation, this patient is at high risk for imminent or life threatening deterioration due to STEMI, hypotension and other active medical issues which require my direct attention, intervention, and personal management.
89 yo woman with Hx htn, DM2, HLD presenting with inferior STEMI s/p MARLENE to diag.  Course complicated by hypotension responsive to IVF, now resolved.  But today with cough and dyspnea, and now pulmonary edema.     --mentating well, continue home cymbalta  --inferior STEMI s/p MARLENE to diag  continue ASA, brilinta, atorva  HLD, atorva  --hypotension, new reduced EF   hypotension now resolved  trial of lopressor 12.5 bid  --today with WILSON and cough, pulmonary edema on CXR  diurese  --mild JOHNNY in setting of hypotension, improving  --PO diet  --no infectious concerns  --DM2, continue ISS  --OOB to chair, ambulating, PT eval  --plan discussed with pt     Upon my evaluation, this patient is at high risk for imminent or life threatening deterioration due to STEMI, hypotension, pulmonary edema and other active medical issues which require my direct attention, intervention, and personal management.
89 yo woman with Hx htn, DM2, HLD presenting with inferior STEMI s/p MARLENE to diag.  This afternoon with hypotension, ?related to starting lopressor v volume depletion.    --mentating well, continue home cymbalta  --inferior STEMI s/p MARLENE to diag  continue ASA, brilinta, atorva  HLD, atorva  --hypotension, new reduced EF but clinically may be volume responsive  hold lopressor  IVF bolus, start standing IVF  small pericardial effusion  --respiratory status stable  --CKD at baseline  --PO diet  --no infectious concerns  --DM2, continue ISS  --plan discussed with pt   discussed with Karma Campbell
89y/o F with PMHx HTN, DM2, HLD, depression who presented to the ED with intermittent jaw pain followed by anterior chest pressure radiating to the left shoulder, admitted for STEMI. Course c/b acute hypoxic respiratory failure secondary to acute HFrEF and influenza. Also with JOHNNY. Continue DAPT. Responded excellently to lasix although with Cr bump. Wean off O2. Continue tamiflu. Continue BB for now. Cardiology following. DC planning when stable. D/w Dr. Artis.
89y/o F with PMHx HTN, DM2, HLD, depression who presented to the ED with intermittent jaw pain followed by anterior chest pressure radiating to the left shoulder, admitted for STEMI. S/p MARLENE. TTE reviewed with EF 40-45%. Patient unable to tolerate BB or ACE/ARB/ARNI. Will evaluate to start farxiga on discharge for GDMT. D/w MICU attending - patient became extremely hypotensive with low dose BB. Planning for discharge tomorrow. Discussed with patient at bedside.
89y/o F with PMHx HTN, DM2, HLD, depression who presented to the ED with intermittent jaw pain followed by anterior chest pressure radiating to the left shoulder, admitted for STEMI. Course c/b acute hypoxic respiratory failure secondary to acute HFrEF and influenza. Also with JOHNNY. Feels better than last night, although still feels unwell. Rhonchorous BS bilaterally with bibasilar crackles. No significant LE edema. CXR concerning for volume overload. CT chest obtained and personally reviewed with R GGO - likely viral PN and superimposed pulm edema, b/l effusions L>R. Hold BB. Diurese with albumin to avoid hypotension. Monitor Cr. Discussed with cardiology. Discussed with patient. Dr. Field updated daughter Corinne. Patient states that her son (primary contact) is away traveling and her other son works - [employer] and [employer]. States OK with update to Corinne. Will re-address GOC at some point - DNR/DNI was reversed to address potential ventricular arrhthymias post STEMI and PCI - would not make sense iso AHRF/asystolic arrest. Currently O2 is being weaned down but patient is at risk for abrupt decompensation.
89y/o F with PMHx HTN, DM2, HLD, depression who presented to the ED with intermittent jaw pain followed by anterior chest pressure radiating to the left shoulder, admitted for STEMI. Course c/b acute hypoxic respiratory failure secondary to acute HFrEF and influenza. Also with JOHNNY. Lung exam worse today. RUL rhonchi. Rales. Downgraded from ICU and had panic attack with desaturation and acute respiratory distress. CXR showed worsening CHF - R sided congestion. On nebs and tamiflu for influenza. Cr at 2. May need albumin assisted diuresis if worsening. Full Code per ICU - her DNR/DNI was reversed given known consequence of arrhythmia. If respiratory status worsens will need to discuss further. At risk for abrupt decompensation. D/w patient at bedside. D/w ICU - will take over care. Pulmonary consult. Cardio/Nephro follow up.
89y/o F with PMHx HTN, DM2, HLD, depression who presented to the ED with intermittent jaw pain followed by anterior chest pressure radiating to the left shoulder, admitted for STEMI. Course c/b acute hypoxic respiratory failure secondary to acute HFrEF and influenza. Also with JOHNNY. New onset atrial fibrillation - start beta blockade. Diurese as needed. Supportive care for influenza PNA. Given new arrhythmia concern for loss of AV synchrony. If decompensates further may benefit from DCCV. Full code - d/w son 2/4/25 - with explanation of limitations iso resp arrest or asystole. Further GOC pending hospital course. At risk for decompensated. EKG with ischemic changes - cardiology and interventional cardiology notified. Patient without chest pain. Continue to monitor for arrhthymias, etc.
89F with PMHx HTN, DM2, HLD, depression who presented to the ED with intermittent jaw pain followed by anterior chest pressure radiating to the left shoulder, admitted for STEMI. Course c/b acute hypoxic respiratory failure secondary to acute HFrEF and influenza. Also with JOHNNY. s/p thoracentesis - 570cc drained - exudative. Continue to taper with appropriate SPO2. chest x-ray reviewed - pre-kent improvement in RL effusion.   Continue Lovenox full dose and Plavix. Cr overall stable, elevated - will plan for DOAC as Cr improving. JOHNNY likely ATN. Continue Lasix 20mg daily.  Completed course of Tamiflu  New onset a fib - switch to long acting Toprol XL. Monitor vitals - started on PO Amiodarone to maintain NSR - discussed with cardio Dr Post - if does not convert to sinus may dc medication.   Increase Lipitor to 80mg daily LDL reviewed.
89F with PMHx HTN, DM2, HLD, depression who presented to the ED with intermittent jaw pain followed by anterior chest pressure radiating to the left shoulder, admitted for STEMI. Course c/b acute hypoxic respiratory failure secondary to acute HFrEF and influenza. Also with JOHNNY. s/p thoracentesis - 570cc drained - exudative. Continue to taper with appropriate SPO2. Check chest x-ray as patient desat to 70s with ambulation this afternoon. Continue Lovenox full dose and Plavix. Cr overall stable, elevated - will plan for DOAC as Cr improving. JOHNNY likely ATN. Continue Lasix 20mg daily.  Completed course of Tamiflu  New onset a fib - switch to long acting Toprol XL. Monitor vitals - started on PO Amiodarone to maintain NSR - discussed with cardio Dr Post.   Increase Lipitor to 80mg daily LDL reviewed.
89F with PMHx HTN, DM2, HLD, depression who presented to the ED with intermittent jaw pain followed by anterior chest pressure radiating to the left shoulder, admitted for STEMI. Course c/b acute hypoxic respiratory failure secondary to acute HFrEF and influenza. Also with JOHNNY. s/p thoracentesis - 570cc drained - exudative. Continue to taper with appropriate SPO2. CT chest reviewed.   Transition to Eliquis. Continue Plavix. Cr overall stable, elevated. JOHNNY likely ATN.   Discussed with cardio Dr Sim Lasix increased to 40mg daily  Completed course of Tamiflu  New onset a fib - switch to long acting Toprol XL. Monitor vitals - started on PO Amiodarone to maintain NSR - discussed with cardio Dr Sim may need cardioversion outpatient if persists in a fib.  Continue Lipitor 80mg daily given LDL levels.
89y/o F with PMHx HTN, DM2, HLD, depression who presented to the ED with intermittent jaw pain followed by anterior chest pressure radiating to the left shoulder, admitted for STEMI. Course c/b acute hypoxic respiratory failure secondary to acute HFrEF and influenza. Also with JOHNNY. Plan for thoracentesis today - after procedure on 2L NC - will continue to taper with appropriate SPO2. Lovenox full dose and Plavix resumed after thora. Cr overall stable, elevated - will plan for DOAC in Am if Cr improving. JOHNNY likely ATN.   Completed course of Tamiflu  New onset a fib - continue Metoprolol tartrate - will switch to long acting closer to dc , monitor vitals.  Discussed with daughter in law at bedside, aware and in agreement with above.
89F with PMHx HTN, DM2, HLD, depression who presented to the ED with intermittent jaw pain followed by anterior chest pressure radiating to the left shoulder, admitted for STEMI. Course c/b acute hypoxic respiratory failure secondary to acute HFrEF and influenza. Also with JOHNNY. s/p thoracentesis - 570cc drained - exudative. Continue to taper with appropriate SPO2. CT chest reviewed.   Transition to Eliquis. Continue Plavix. Cr overall stable, elevated. JOHNNY likely ATN.   Continue Lasix 40mg daily. Plan for additional dose of IV Lasix 40mg - discussed with cardio Dr Villela  Completed course of Tamiflu  New onset a fib - switch to long acting Toprol XL. Monitor vitals - started on PO Amiodarone to maintain NSR -  may need cardioversion outpatient if persists in a fib.  Continue Lipitor 80mg daily given LDL levels.
89F with PMHx HTN, DM2, HLD, depression who presented to the ED with intermittent jaw pain followed by anterior chest pressure radiating to the left shoulder, admitted for STEMI. Course c/b acute hypoxic respiratory failure secondary to acute HFrEF and influenza. Also with JOHNNY. s/p thoracentesis - 570cc drained - exudative. Continue to taper with appropriate SPO2. chest x-ray reviewed - pre-kent improvement in RL effusion. Persistent desaturations will check CT chest. Continue Lovenox full dose and Plavix. Cr overall stable, elevated - will plan for DOAC as Cr improving. JOHNNY likely ATN. Continue Lasix 20mg daily - dose IV Lasix 40mg x 1 today - discussed with cardio Dr Post.  Completed course of Tamiflu  New onset a fib - switch to long acting Toprol XL. Monitor vitals - started on PO Amiodarone to maintain NSR - discussed with cardio Dr Post - if does not convert to sinus may dc medication.   Continue Lipitor 80mg daily given LDL levels.
89y/o F with PMHx HTN, DM2, HLD, depression who presented to the ED with intermittent jaw pain followed by anterior chest pressure radiating to the left shoulder, admitted for STEMI. Course c/b acute hypoxic respiratory failure secondary to acute HFrEF and influenza. Also with JOHNNY. Remains on 6L NC - discussed with cardio Dr Villela and pulm dr cadet will plan for thoracentesis in AM - stop Lovenox, hold Plavix until after thora. Hold Lasix given worsening JOHNNY - likely ATN. Monitor Cr closely. Taper off supplemental O2 with appropriate spo2.   Continue full dose Lovenox - transition to DOAC once Cr stabilizes.   Completed course of Tamiflu  New onset a fib - continue Metoprolol tartrate - will switch to long acting closer to dc , monitor vitals.

## 2025-02-11 NOTE — PROGRESS NOTE ADULT - PROBLEM SELECTOR PROBLEM 6
Anxiety and depression
HTN (hypertension)
Anxiety and depression
HTN (hypertension)
Anxiety and depression
Anxiety and depression
HTN (hypertension)
HTN (hypertension)
HLD (hyperlipidemia)
Anxiety and depression
HTN (hypertension)
Anxiety and depression
HTN (hypertension)
HLD (hyperlipidemia)
HTN (hypertension)
HTN (hypertension)

## 2025-02-11 NOTE — PROGRESS NOTE ADULT - SUBJECTIVE AND OBJECTIVE BOX
Patient is a 89y old  Female who presents with a chief complaint of STEMI (11 Feb 2025 05:50)      INTERVAL HPI/OVERNIGHT EVENTS: No acute overnight events. Pt was seen and examined at bedside. Pt states she still has a cough, but otherwise feels well. VSS on 2L NC. Denies any chest pain, SOB, nausea, vomiting, diarrhea, constipation.   Remains in Afib on the monitor, rate controlled 60s-80s. Overnight on tele remained in afib, rate controlled    MEDICATIONS  (STANDING):  albuterol    0.083% 2.5 milliGRAM(s) Nebulizer every 6 hours  aMIOdarone    Tablet   Oral   aMIOdarone    Tablet 200 milliGRAM(s) Oral daily  apixaban 2.5 milliGRAM(s) Oral two times a day  atorvastatin 80 milliGRAM(s) Oral at bedtime  chlorhexidine 2% Cloths 1 Application(s) Topical <User Schedule>  clopidogrel Tablet 75 milliGRAM(s) Oral daily  dextrose 5%. 1000 milliLiter(s) (50 mL/Hr) IV Continuous <Continuous>  dextrose 5%. 1000 milliLiter(s) (100 mL/Hr) IV Continuous <Continuous>  dextrose 50% Injectable 25 Gram(s) IV Push once  dextrose 50% Injectable 12.5 Gram(s) IV Push once  dextrose 50% Injectable 25 Gram(s) IV Push once  DULoxetine 90 milliGRAM(s) Oral daily  furosemide    Tablet 40 milliGRAM(s) Oral daily  glucagon  Injectable 1 milliGRAM(s) IntraMuscular once  guaiFENesin  milliGRAM(s) Oral every 12 hours  influenza  Vaccine (HIGH DOSE) 0.5 milliLiter(s) IntraMuscular once  insulin lispro (ADMELOG) corrective regimen sliding scale   SubCutaneous Before meals and at bedtime  metoprolol succinate ER 25 milliGRAM(s) Oral daily  nystatin Powder 1 Application(s) Topical two times a day  sodium chloride 3%  Inhalation 4 milliLiter(s) Inhalation every 12 hours    MEDICATIONS  (PRN):  acetaminophen     Tablet .. 650 milliGRAM(s) Oral every 6 hours PRN Temp greater or equal to 38C (100.4F), Mild Pain (1 - 3)  dextrose Oral Gel 15 Gram(s) Oral once PRN Blood Glucose LESS THAN 70 milliGRAM(s)/deciliter  sodium chloride 0.65% Nasal 1 Spray(s) Both Nostrils two times a day PRN Nasal Congestion      Allergies    No Known Allergies    Intolerances        REVIEW OF SYSTEMS:  CONSTITUTIONAL: No fever or chills  HEENT:  No headache, no sore throat  RESPIRATORY:+ cough, no wheezing, or shortness of breath  CARDIOVASCULAR: No chest pain, palpitations  GASTROINTESTINAL: No abd pain, nausea, vomiting, or diarrhea  GENITOURINARY: No dysuria, frequency, or hematuria  NEUROLOGICAL: no focal weakness or dizziness  MUSCULOSKELETAL: no myalgias     Vital Signs Last 24 Hrs  T(C): 36.5 (11 Feb 2025 05:10), Max: 36.6 (10 Feb 2025 11:19)  T(F): 97.7 (11 Feb 2025 05:10), Max: 97.9 (10 Feb 2025 11:19)  HR: 61 (11 Feb 2025 08:24) (61 - 100)  BP: 138/62 (11 Feb 2025 05:10) (106/57 - 138/62)  BP(mean): --  RR: 17 (11 Feb 2025 05:10) (17 - 17)  SpO2: 99% (11 Feb 2025 08:24) (95% - 99%)    Parameters below as of 11 Feb 2025 08:24  Patient On (Oxygen Delivery Method): nasal cannula, 3L        PHYSICAL EXAM:  GENERAL: NAD on 2L NC  HEENT:  anicteric, moist mucous membranes  CHEST/LUNG: mild rales b/l lung bases. no  wheezes, or rhonchi  HEART: irregularly irregular rhythm, in afib on monitor, rate controlled. S1, S2  ABDOMEN:  BS+, soft, nontender, nondistended  EXTREMITIES: no edema, cyanosis, or calf tenderness  NERVOUS SYSTEM: answers questions and follows commands appropriately    LABS:                        8.5    7.90  )-----------( 394      ( 11 Feb 2025 06:15 )             26.9     CBC Full  -  ( 11 Feb 2025 06:15 )  WBC Count : 7.90 K/uL  Hemoglobin : 8.5 g/dL  Hematocrit : 26.9 %  Platelet Count - Automated : 394 K/uL  Mean Cell Volume : 91.2 fl  Mean Cell Hemoglobin : 28.8 pg  Mean Cell Hemoglobin Concentration : 31.6 g/dL  Auto Neutrophil # : x  Auto Lymphocyte # : x  Auto Monocyte # : x  Auto Eosinophil # : x  Auto Basophil # : x  Auto Neutrophil % : x  Auto Lymphocyte % : x  Auto Monocyte % : x  Auto Eosinophil % : x  Auto Basophil % : x    11 Feb 2025 06:15    141    |  109    |  63     ----------------------------<  128    3.7     |  26     |  2.30     Ca    8.9        11 Feb 2025 06:15  Phos  4.8       11 Feb 2025 06:15  Mg     2.4       11 Feb 2025 06:15    TPro  6.1    /  Alb  2.6    /  TBili  0.7    /  DBili  x      /  AST  18     /  ALT  26     /  AlkPhos  77     11 Feb 2025 06:15      Urinalysis Basic - ( 11 Feb 2025 06:15 )    Color: x / Appearance: x / SG: x / pH: x  Gluc: 128 mg/dL / Ketone: x  / Bili: x / Urobili: x   Blood: x / Protein: x / Nitrite: x   Leuk Esterase: x / RBC: x / WBC x   Sq Epi: x / Non Sq Epi: x / Bacteria: x      CAPILLARY BLOOD GLUCOSE      POCT Blood Glucose.: 160 mg/dL (11 Feb 2025 07:54)  POCT Blood Glucose.: 233 mg/dL (10 Feb 2025 21:24)  POCT Blood Glucose.: 198 mg/dL (10 Feb 2025 17:24)  POCT Blood Glucose.: 232 mg/dL (10 Feb 2025 12:10)        Culture - Fungal, Body Fluid (collected 02-06-25 @ 09:44)  Source: Pleural Fl  Preliminary Report (02-09-25 @ 08:38):    No growth    Culture - Body Fluid with Gram Stain (collected 02-06-25 @ 09:44)  Source: Pleural Fl  Gram Stain (02-06-25 @ 23:27):    polymorphonuclear leukocytes seen    No organisms seen    by cytocentrifuge  Preliminary Report (02-07-25 @ 15:59):    No growth        RADIOLOGY & ADDITIONAL TESTS:     Personally reviewed.     Consultant(s) Notes Reviewed:  [x] YES  [ ] NO     Patient is a 89y old  Female who presents with a chief complaint of STEMI (11 Feb 2025 05:50)      INTERVAL HPI/OVERNIGHT EVENTS: No acute overnight events. Pt was seen and examined at bedside. Pt states she still has a cough, but otherwise feels well. VSS on 3L NC. Denies any chest pain, SOB, nausea, vomiting, diarrhea, constipation.   Remains in Afib on the monitor, rate controlled 60s-80s. Overnight on tele remained in afib, rate controlled    MEDICATIONS  (STANDING):  albuterol    0.083% 2.5 milliGRAM(s) Nebulizer every 6 hours  aMIOdarone    Tablet   Oral   aMIOdarone    Tablet 200 milliGRAM(s) Oral daily  apixaban 2.5 milliGRAM(s) Oral two times a day  atorvastatin 80 milliGRAM(s) Oral at bedtime  chlorhexidine 2% Cloths 1 Application(s) Topical <User Schedule>  clopidogrel Tablet 75 milliGRAM(s) Oral daily  dextrose 5%. 1000 milliLiter(s) (50 mL/Hr) IV Continuous <Continuous>  dextrose 5%. 1000 milliLiter(s) (100 mL/Hr) IV Continuous <Continuous>  dextrose 50% Injectable 25 Gram(s) IV Push once  dextrose 50% Injectable 12.5 Gram(s) IV Push once  dextrose 50% Injectable 25 Gram(s) IV Push once  DULoxetine 90 milliGRAM(s) Oral daily  furosemide    Tablet 40 milliGRAM(s) Oral daily  glucagon  Injectable 1 milliGRAM(s) IntraMuscular once  guaiFENesin  milliGRAM(s) Oral every 12 hours  influenza  Vaccine (HIGH DOSE) 0.5 milliLiter(s) IntraMuscular once  insulin lispro (ADMELOG) corrective regimen sliding scale   SubCutaneous Before meals and at bedtime  metoprolol succinate ER 25 milliGRAM(s) Oral daily  nystatin Powder 1 Application(s) Topical two times a day  sodium chloride 3%  Inhalation 4 milliLiter(s) Inhalation every 12 hours    MEDICATIONS  (PRN):  acetaminophen     Tablet .. 650 milliGRAM(s) Oral every 6 hours PRN Temp greater or equal to 38C (100.4F), Mild Pain (1 - 3)  dextrose Oral Gel 15 Gram(s) Oral once PRN Blood Glucose LESS THAN 70 milliGRAM(s)/deciliter  sodium chloride 0.65% Nasal 1 Spray(s) Both Nostrils two times a day PRN Nasal Congestion      Allergies    No Known Allergies    Intolerances        REVIEW OF SYSTEMS:  CONSTITUTIONAL: No fever or chills  HEENT:  No headache, no sore throat  RESPIRATORY:+ cough, no wheezing, or shortness of breath  CARDIOVASCULAR: No chest pain, palpitations  GASTROINTESTINAL: No abd pain, nausea, vomiting, or diarrhea  GENITOURINARY: No dysuria, frequency, or hematuria  NEUROLOGICAL: no focal weakness or dizziness  MUSCULOSKELETAL: no myalgias     Vital Signs Last 24 Hrs  T(C): 36.5 (11 Feb 2025 05:10), Max: 36.6 (10 Feb 2025 11:19)  T(F): 97.7 (11 Feb 2025 05:10), Max: 97.9 (10 Feb 2025 11:19)  HR: 61 (11 Feb 2025 08:24) (61 - 100)  BP: 138/62 (11 Feb 2025 05:10) (106/57 - 138/62)  BP(mean): --  RR: 17 (11 Feb 2025 05:10) (17 - 17)  SpO2: 99% (11 Feb 2025 08:24) (95% - 99%)    Parameters below as of 11 Feb 2025 08:24  Patient On (Oxygen Delivery Method): nasal cannula, 3L        PHYSICAL EXAM:  GENERAL: NAD on 2L NC  HEENT:  anicteric, moist mucous membranes  CHEST/LUNG: mild rales b/l lung bases. no  wheezes, or rhonchi  HEART: irregularly irregular rhythm, in afib on monitor, rate controlled. S1, S2  ABDOMEN:  BS+, soft, nontender, nondistended  EXTREMITIES: no edema, cyanosis, or calf tenderness  NERVOUS SYSTEM: answers questions and follows commands appropriately    LABS:                        8.5    7.90  )-----------( 394      ( 11 Feb 2025 06:15 )             26.9     CBC Full  -  ( 11 Feb 2025 06:15 )  WBC Count : 7.90 K/uL  Hemoglobin : 8.5 g/dL  Hematocrit : 26.9 %  Platelet Count - Automated : 394 K/uL  Mean Cell Volume : 91.2 fl  Mean Cell Hemoglobin : 28.8 pg  Mean Cell Hemoglobin Concentration : 31.6 g/dL  Auto Neutrophil # : x  Auto Lymphocyte # : x  Auto Monocyte # : x  Auto Eosinophil # : x  Auto Basophil # : x  Auto Neutrophil % : x  Auto Lymphocyte % : x  Auto Monocyte % : x  Auto Eosinophil % : x  Auto Basophil % : x    11 Feb 2025 06:15    141    |  109    |  63     ----------------------------<  128    3.7     |  26     |  2.30     Ca    8.9        11 Feb 2025 06:15  Phos  4.8       11 Feb 2025 06:15  Mg     2.4       11 Feb 2025 06:15    TPro  6.1    /  Alb  2.6    /  TBili  0.7    /  DBili  x      /  AST  18     /  ALT  26     /  AlkPhos  77     11 Feb 2025 06:15      Urinalysis Basic - ( 11 Feb 2025 06:15 )    Color: x / Appearance: x / SG: x / pH: x  Gluc: 128 mg/dL / Ketone: x  / Bili: x / Urobili: x   Blood: x / Protein: x / Nitrite: x   Leuk Esterase: x / RBC: x / WBC x   Sq Epi: x / Non Sq Epi: x / Bacteria: x      CAPILLARY BLOOD GLUCOSE      POCT Blood Glucose.: 160 mg/dL (11 Feb 2025 07:54)  POCT Blood Glucose.: 233 mg/dL (10 Feb 2025 21:24)  POCT Blood Glucose.: 198 mg/dL (10 Feb 2025 17:24)  POCT Blood Glucose.: 232 mg/dL (10 Feb 2025 12:10)        Culture - Fungal, Body Fluid (collected 02-06-25 @ 09:44)  Source: Pleural Fl  Preliminary Report (02-09-25 @ 08:38):    No growth    Culture - Body Fluid with Gram Stain (collected 02-06-25 @ 09:44)  Source: Pleural Fl  Gram Stain (02-06-25 @ 23:27):    polymorphonuclear leukocytes seen    No organisms seen    by cytocentrifuge  Preliminary Report (02-07-25 @ 15:59):    No growth        RADIOLOGY & ADDITIONAL TESTS:     Personally reviewed.     Consultant(s) Notes Reviewed:  [x] YES  [ ] NO

## 2025-02-11 NOTE — PROGRESS NOTE ADULT - PROBLEM SELECTOR PLAN 10
VTE ppx: eliquis 2.5mg BID    Downgraded from ICU 2/2/25 VTE ppx: eliquis 2.5mg BID    Downgraded from ICU 2/2/25    Daughter Corinne updated over the phone by resident 2/11

## 2025-02-11 NOTE — PROGRESS NOTE ADULT - PROBLEM SELECTOR PLAN 7
Thyroid nodules on CT Chest w tracheal deviation  - repeat CT chest with Enlarged heterogeneous right thyroid lobe. For further evaluation, recommend ultrasound soft tissue neck outpt  - Patient notified and family notified, outpatient follow up

## 2025-02-11 NOTE — PROGRESS NOTE ADULT - PROBLEM SELECTOR PROBLEM 1
Influenza A
STEMI (ST elevation myocardial infarction)
Influenza A
Acute hypoxic respiratory failure
Acute hypoxic respiratory failure
Influenza A
STEMI (ST elevation myocardial infarction)
Acute hypoxic respiratory failure
STEMI (ST elevation myocardial infarction)
Acute hypoxic respiratory failure
STEMI (ST elevation myocardial infarction)
Influenza A
Influenza A
Acute hypoxic respiratory failure

## 2025-02-11 NOTE — PROGRESS NOTE ADULT - PROBLEM SELECTOR PLAN 1
AHRF 2/2 acute HFrEF and influenza viral PNA  - CT Chest: Moderate to severe right upper lobe pneumonia. Mild left upper lobe pneumonia, moderate b/l pleural effusions  - pleural effusion s/p right thoracentesis 2/6 with 570cc output  - d/c IV Lasix, increase lasix to 40mg PO qd  - TTE with mild pericardial effusion, EF 40%-45%  - Found to be Flu A + completed course of tamiflu  - standing saline and albuterol nebs  - continue O2 supplementation and wean as tolerated, on 2L NC now  - started mucinex for congestion  - repeat CT chest - Interval decrease in density of consolidation and groundglass opacities in right upper lobe. Small right pleural effusion with interval decrease in size. Small to moderate left pleural effusions, relatively stable. Mediastinal and right hilar lymphadenopathy Enlarged heterogeneous right thyroid lobe.   - Pulm following - Dr. Parrish AHRF 2/2 acute HFrEF and influenza viral PNA  - CT Chest: Moderate to severe right upper lobe pneumonia. Mild left upper lobe pneumonia, moderate b/l pleural effusions  - pleural effusion s/p right thoracentesis 2/6 with 570cc output  - d/c IV Lasix, increase lasix to 40mg PO qd  - will give 40mg IV lasix x1 IVP today per cardio recs  - TTE with mild pericardial effusion, EF 40%-45%  - Found to be Flu A + completed course of tamiflu  - standing saline and albuterol nebs  - continue O2 supplementation and wean as tolerated, on 3L NC now  - started mucinex for congestion  - repeat CT chest - Interval decrease in density of consolidation and groundglass opacities in right upper lobe. Small right pleural effusion with interval decrease in size. Small to moderate left pleural effusions, relatively stable. Mediastinal and right hilar lymphadenopathy Enlarged heterogeneous right thyroid lobe.   - Pulm following - Dr. Parrish AHRF 2/2 acute HFrEF and influenza viral PNA  - CT Chest: Moderate to severe right upper lobe pneumonia. Mild left upper lobe pneumonia, moderate b/l pleural effusions  - pleural effusion s/p right thoracentesis 2/6 with 570cc output  - d/c IV Lasix, increase lasix to 40mg PO qd  - will give 40mg IV lasix x1 IVP today per cardio recs  - TTE with mild pericardial effusion, EF 40%-45%  - Found to be Flu A + completed course of tamiflu  - standing saline and albuterol nebs  - continue O2 supplementation and wean as tolerated, on 2L NC now  - started mucinex for congestion  - repeat CT chest - Interval decrease in density of consolidation and groundglass opacities in right upper lobe. Small right pleural effusion with interval decrease in size. Small to moderate left pleural effusions, relatively stable. Mediastinal and right hilar lymphadenopathy Enlarged heterogeneous right thyroid lobe.   - Pulm following - Dr. Parrish

## 2025-02-11 NOTE — PROGRESS NOTE ADULT - ASSESSMENT
89y/o F with HTN, DM2, HLD, depression who presented to the ED with intermittent jaw pain followed by anterior chest pressure radiating to the left shoulder, admitted for STEMI.    STEMI, New Afib/HFmofEF  - S/p PCI (1/26/25) with 100% occlusion of diagonal and MARLENE x1  - Continue Plavix 75 mg daily without ASA to avoid triple therapy  - Continue Lipitor to 80mg   - Continue BB    - TTE 1/27/25 Left ventricular systolic function is moderately decreased with an ejection fraction visually estimated at 40 to 45 %. small pericardial effusion noted.   - BNP:  <--[id number]  - S/p thoracentesis 2/6 570cc of yellow pleuritic fluid  -Increasing O2 requirement  with cough, now on NC at 3L/min from 1L  - Had recent CxR, 2/6, showing interval resolution of small right-sided pleural effusion  - CT noted  - Continue Toprol XL fro GDMT.  Uptitrate as BP tolerates  - Unable to initiate ACEI/ARB in the setting of JOHNNY on CKD  - Recommend Farxiga once BP tolerates   - Continue Lasix 20 mg PO daily      - Afib on tele, rate-controlled.    - C/W renal dose DOAC  - Continue Amio load with the hope that she converts back to NSR for now    - BP stable    - Monitor and replete lytes, keep K>4, Mg>2.  - Will continue to follow.    Chandler Frost DNP, AGACNP-BC  Cardiology   Call Teams

## 2025-02-11 NOTE — PROGRESS NOTE ADULT - SUBJECTIVE AND OBJECTIVE BOX
[facility] -- Jamal Moralez Pannella, Patel, Savella, Goodger, Cohen: Office # [phone]    Follow Up:  STEMI/Afib    Subjective/Observations: No events overnight resting comfortably in bed.  No complaints of chest pain, dyspnea, or palpitations reported. No signs of orthopnea or PND. Wearing nasal cannula 3 L    REVIEW OF SYSTEMS: All other review of systems are negative unless indicated above    PAST MEDICAL & SURGICAL HISTORY:  HTN (hypertension)      DM (diabetes mellitus)      HLD (hyperlipidemia)      Major depression      No significant past surgical history          MEDICATIONS  (STANDING):  albuterol    0.083% 2.5 milliGRAM(s) Nebulizer every 6 hours  aMIOdarone    Tablet   Oral   aMIOdarone    Tablet 200 milliGRAM(s) Oral daily  apixaban 2.5 milliGRAM(s) Oral two times a day  atorvastatin 80 milliGRAM(s) Oral at bedtime  chlorhexidine 2% Cloths 1 Application(s) Topical <User Schedule>  clopidogrel Tablet 75 milliGRAM(s) Oral daily  dextrose 5%. 1000 milliLiter(s) (50 mL/Hr) IV Continuous <Continuous>  dextrose 5%. 1000 milliLiter(s) (100 mL/Hr) IV Continuous <Continuous>  dextrose 50% Injectable 25 Gram(s) IV Push once  dextrose 50% Injectable 12.5 Gram(s) IV Push once  dextrose 50% Injectable 25 Gram(s) IV Push once  DULoxetine 90 milliGRAM(s) Oral daily  furosemide    Tablet 40 milliGRAM(s) Oral daily  glucagon  Injectable 1 milliGRAM(s) IntraMuscular once  guaiFENesin  milliGRAM(s) Oral every 12 hours  influenza  Vaccine (HIGH DOSE) 0.5 milliLiter(s) IntraMuscular once  insulin lispro (ADMELOG) corrective regimen sliding scale   SubCutaneous Before meals and at bedtime  metoprolol succinate ER 25 milliGRAM(s) Oral daily  nystatin Powder 1 Application(s) Topical two times a day  sodium chloride 3%  Inhalation 4 milliLiter(s) Inhalation every 12 hours    MEDICATIONS  (PRN):  acetaminophen     Tablet .. 650 milliGRAM(s) Oral every 6 hours PRN Temp greater or equal to 38C (100.4F), Mild Pain (1 - 3)  dextrose Oral Gel 15 Gram(s) Oral once PRN Blood Glucose LESS THAN 70 milliGRAM(s)/deciliter  sodium chloride 0.65% Nasal 1 Spray(s) Both Nostrils two times a day PRN Nasal Congestion    Allergies    No Known Allergies    Intolerances      Vital Signs Last 24 Hrs  T(C): 36.5 (11 Feb 2025 05:10), Max: 36.6 (10 Feb 2025 11:19)  T(F): 97.7 (11 Feb 2025 05:10), Max: 97.9 (10 Feb 2025 11:19)  HR: 61 (11 Feb 2025 08:24) (61 - 100)  BP: 138/62 (11 Feb 2025 05:10) (106/57 - 138/62)  BP(mean): --  RR: 17 (11 Feb 2025 05:10) (17 - 17)  SpO2: 99% (11 Feb 2025 08:24) (95% - 99%)    Parameters below as of 11 Feb 2025 08:24  Patient On (Oxygen Delivery Method): nasal cannula, 3L      I&O's Summary    10 Feb 2025 07:01  -  11 Feb 2025 07:00  --------------------------------------------------------  IN: 240 mL / OUT: 2000 mL / NET: -1760 mL          TELE: Afib   PHYSICAL EXAM:  Constitutional: NAD, awake and alert  HEENT: Moist Mucous Membranes, Anicteric  Pulmonary: Non-labored, breath sounds are clear bilaterally, No wheezing, rales or rhonchi  Cardiovascular: Regular, S1 and S2, No murmurs, No rubs, gallops or clicks  Gastrointestinal:  soft, nontender, nondistended   Lymph: No peripheral edema. No lymphadenopathy.   Skin: No visible rashes or ulcers.  Psych:  Mood & affect appropriate      LABS: All Labs Reviewed:                        8.5    7.90  )-----------( 394      ( 11 Feb 2025 06:15 )             26.9                         8.2    7.54  )-----------( 399      ( 10 Feb 2025 06:55 )             25.5                         8.3    7.66  )-----------( 390      ( 09 Feb 2025 06:55 )             25.9     11 Feb 2025 06:15    141    |  109    |  63     ----------------------------<  128    3.7     |  26     |  2.30   10 Feb 2025 06:55    141    |  109    |  60     ----------------------------<  182    3.7     |  26     |  2.20   09 Feb 2025 06:55    141    |  109    |  64     ----------------------------<  180    3.9     |  24     |  2.10     Ca    8.9        11 Feb 2025 06:15  Ca    8.7        10 Feb 2025 06:55  Ca    8.6        09 Feb 2025 06:55  Phos  4.8       11 Feb 2025 06:15  Mg     2.4       11 Feb 2025 06:15  Mg     2.3       10 Feb 2025 06:55    TPro  6.1    /  Alb  2.6    /  TBili  0.7    /  DBili  x      /  AST  18     /  ALT  26     /  AlkPhos  77     11 Feb 2025 06:15  TPro  5.8    /  Alb  2.4    /  TBili  0.6    /  DBili  x      /  AST  16     /  ALT  24     /  AlkPhos  75     10 Feb 2025 06:55  TPro  5.9    /  Alb  2.4    /  TBili  0.7    /  DBili  x      /  AST  17     /  ALT  26     /  AlkPhos  77     09 Feb 2025 06:55   LIVER FUNCTIONS - ( 11 Feb 2025 06:15 )  Alb: 2.6 g/dL / Pro: 6.1 g/dL / ALK PHOS: 77 U/L / ALT: 26 U/L / AST: 18 U/L / GGT: x           Cholesterol: 261 mg/dL (01-27-25 @ 06:10)  HDL Cholesterol: 43 mg/dL (01-27-25 @ 06:10)  Triglycerides, Serum: 239 mg/dL (01-27-25 @ 06:10)    12 Lead ECG:   Ventricular Rate 100 BPM    QRS Duration 98 ms    Q-T Interval 360 ms    QTC Calculation(Bazett) 464 ms    R Axis 50 degrees    T Axis -47 degrees    Diagnosis Line *** Critical Test Result: STEMI  Atrial fibrillation  Lateral infarct (cited on or before 26-JAN-2025)  ST & T wave abnormality, consider inferior ischemia  ST & T wave abnormality, consider anterior ischemia    Confirmed by ISAAC PALUMBO ([id number]) on 2/5/2025 12:55:12 PM (02-04-25 @ 16:25)      TRANSTHORACIC ECHOCARDIOGRAM REPORT  ________________________________________________________________________________                                      _______       Pt. Name:       DOMITILA GRACE Study Date:    1/28/2025  MRN:            [id number]           YOB: 1933  Accession #:    [id number]          Age:           89 years  Account#:       [id number]         Gender:        F  Heart Rate:                        Height:        62.20 in (158.00 cm)  Rhythm:          Weight:        160.93 lb (73.00 kg)  Blood Pressure: 93/52 mmHg         BSA/BMI:       1.75 m² / 29.24 kg/m²  ________________________________________________________________________________________  Referring Physician:    [id number] Carlos Alberto  Interpreting Physician: Braxton Bowens M.D.  Primary Sonographer:    Marlys Queen    CPT:               ECHO TTE W/O CON F/U LTD - 55740.m  Indication(s):     ST elevation [STEMI] myocardial infarction of unspecified                     site - I21.3  Procedure:         Limited transthoracic echocardiogram.  Ordering Location: ICU1  Admission Status:  Inpatient    _______________________________________________________________________________________     CONCLUSIONS:      1. Left ventricular systolic function is moderately decreased with an ejection fraction visually estimated at 40 to 45 %.   2. Small pericardial effusion noted adjacent to the right atrium and small pericardial effusion noted adjacent to the right ventricle. The effusion measures 0.82 cm in diastole in the subcostal view adjacent ot the RV.   3. Thickened pericardium.   4. No significant change in the size of the pericardial effusion compared to TTE study from yesterday (1/27/25) is noted.    ________________________________________________________________________________________  FINDINGS:     Left Ventricle:  Left ventricular systolic function is moderately decreased with an ejection fraction visually estimated at 40 to 45%.     Right Ventricle:  Right ventricular systolic function is normal.     Left Atrium:  The left atrium is dilated.     Mitral Valve:  There is moderate calcification of the mitral valve annulus.     Pericardium:  The pericardium is thickened. There is a small pericardial effusion noted adjacent to the right atrium and a small pericardial effusion noted adjacent to the right ventricle.     Systemic Veins:  The inferior vena cava is normal in size measuring 1.83 cm in diameter, (normal <2.1cm) with normal inspiratory collapse (normal >50%) consistent with normal right atrial pressure (~3, range 0-5mmHg).  ____________________________________________________________________  QUANTITATIVE DATA:  Left Ventricle Measurements: (Indexed to BSA)     Visualized LV EF%: 40 to 45%       ________________________________________________________________________________________  Electronically signed on 1/28/2025 at 2:21:15 PM by Braxton Bowens M.D.         *** Final ***

## 2025-02-11 NOTE — PROGRESS NOTE ADULT - TIME BILLING
activities including direct patient care, counseling and/or coordinating care, reviewing notes/lab data/imaging, DC planning and discussion with multidisciplinary team (excluding time spent on resident teaching)
Note written by attending. Meds, labs, vitals, chart reviewed
Note written by attending. Meds, labs, vitals, chart reviewed
activities including direct patient care, counseling and/or coordinating care, reviewing notes/lab data/imaging, and discussion with multidisciplinary team (excluding time spent on resident teaching)
activities including direct patient care, counseling and/or coordinating care, reviewing notes/lab data/imaging, and discussion with multidisciplinary team (excluding time spent on resident teaching)
Reviewing chart notes and data, reviewing telemetry monitor records, face to face time counseling the patient, communicating with cardio dr forte -continue lasix, amiodarone and taper o2 as tolerated, coordinating care with SW/CM at [facility].   Pt seen and examined. Case discussed with resident. Agree with assessment and plan above (edited by me in detail)
Reviewing chart notes and data, reviewing telemetry monitor records, face to face time counseling the patient, communicating with cardio dr forte adjust lipitor, start amiodarone, coordinating care with SW/CM at [facility].   Pt seen and examined. Case discussed with resident. Agree with assessment and plan above (edited by me in detail)
activities including direct patient care, counseling and/or coordinating care, reviewing notes/lab data/imaging, and discussion with multidisciplinary team (excluding time spent on resident teaching)
Reviewing chart notes and data, reviewing telemetry monitor records, face to face time counseling the patient, communicating with specialists,, coordinating care with SW/CM at [facility].   Pt seen and examined. Case discussed with resident. Agree with assessment and plan above (edited by me in detail)
Reviewing chart notes and data, reviewing telemetry monitor records, face to face time counseling the patient, communicating with cardio dr forte -continue lasix, amiodarone and taper o2 as tolerated, coordinating care with SW/CM at [facility].   Pt seen and examined. Case discussed with resident. Agree with assessment and plan above (edited by me in detail)
activities including direct patient care, counseling and/or coordinating care, reviewing notes/lab data/imaging, and discussion with multidisciplinary team (excluding time spent on resident teaching)
Reviewing chart notes and data, reviewing telemetry monitor records, face to face time counseling the patient, communicating with cardio dr tree salcedo, continue amiodarone, coordinating care with SW/CM at [facility].   Pt seen and examined. Case discussed with resident. Agree with assessment and plan above (edited by me in detail)
Reviewing chart notes and data, reviewing telemetry monitor records, face to face time counseling the patient, communicating with pulm dr cadet cardio dr karla salcedo plan for thora, coordinating care with SW/CM at [facility].   Pt seen and examined. Case discussed with resident. Agree with assessment and plan above (edited by me in detail)
Reviewing chart notes and data, reviewing telemetry monitor records, face to face time counseling the patient, communicating with pulm dr cadet cardio dr karla salcedo plan for thora, coordinating care with SW/CM at [facility].   Pt seen and examined. Case discussed with resident. Agree with assessment and plan above (edited by me in detail)

## 2025-02-11 NOTE — PROGRESS NOTE ADULT - ASSESSMENT
JOHNNY/CKDIII  STEMI s/p MARLENE  HTN  Anemia     -CKDIII baseline with Cr 1.5 on admission, possibly due to hypertensive nephrosclerosis   -JOHNNY clinically due to CARLO +/- hypotension induced ATN. Now stable likely at new baseline with fluctuation with need for intermittent doses of lasix; monitor for now  -Urine studies reviewed   -No IVF needed   -No indication for RRT   -No kidney biopsy indicated   -Will benefit from RAAS blockade +/- SGLT2i in the future for CKD  -Lasix 20 mg IV x 1 was given 2/4/25, then started on PO Lasix. Renal indices with a slow rise, but will monitor for now due to need for maintenance diuresis  -S/p thora 2/6/25

## 2025-02-11 NOTE — PROGRESS NOTE ADULT - PROBLEM SELECTOR PROBLEM 5
Atrial fibrillation
DM (diabetes mellitus)
HLD (hyperlipidemia)
HTN (hypertension)
Atrial fibrillation
HLD (hyperlipidemia)
DM (diabetes mellitus)
HLD (hyperlipidemia)
DM (diabetes mellitus)
DM (diabetes mellitus)
HTN (hypertension)
HLD (hyperlipidemia)
DM (diabetes mellitus)
Atrial fibrillation

## 2025-02-11 NOTE — PROGRESS NOTE ADULT - PROBLEM SELECTOR PLAN 9
Chronic  - pt takes duloxetine 90mg daily  - would continue home medications  - pt uses alprazolam 0.25mg PRN - start given panic attacks
continue duloxetine 90mg daily  - pt uses alprazolam 0.25mg PRN - start given panic attacks
Chronic  - pt takes duloxetine 90mg daily  - would continue home medications  - pt uses alprazolam 0.25mg PRN - start given panic attacks
continue duloxetine 90mg daily  - pt uses alprazolam 0.25mg PRN - start given panic attacks
Chronic  - pt takes duloxetine 90mg daily  - would continue home medications  - pt uses alprazolam 0.25mg PRN - start given panic attacks
continue duloxetine 90mg daily  - pt uses alprazolam 0.25mg PRN - start given panic attacks

## 2025-02-11 NOTE — PROGRESS NOTE ADULT - PROBLEM SELECTOR PLAN 3
- s/p PCI x1 to D1 (100% occlusion)  - Continue plavix and statin, anemia noted stable  - s/p FD lovenox, on eliquis 2.5mg BID  - With acute systolic congestive heart failure - initiated GDMT now on Toprol XL 25mg qd, BPs soft  - TTE as above, increased lasix to 40mg qd PO  - Cardio Following, recs appreciated

## 2025-02-11 NOTE — PROGRESS NOTE ADULT - PROBLEM SELECTOR PROBLEM 4
JOHNNY (acute kidney injury)
JOHNNY (acute kidney injury)
HTN (hypertension)
HTN (hypertension)
DM (diabetes mellitus)
HTN (hypertension)
DM (diabetes mellitus)
HTN (hypertension)
DM (diabetes mellitus)
JOHNNY (acute kidney injury)

## 2025-02-11 NOTE — PROGRESS NOTE ADULT - SUBJECTIVE AND OBJECTIVE BOX
Date/Time Patient Seen:  		  Referring MD:   Data Reviewed	       Patient is a 89y old  Female who presents with a chief complaint of STEMI (10 Feb 2025 18:45)      Subjective/HPI     PAST MEDICAL & SURGICAL HISTORY:  HTN (hypertension)    DM (diabetes mellitus)    HLD (hyperlipidemia)    Major depression    No significant past surgical history          Medication list         MEDICATIONS  (STANDING):  albuterol    0.083% 2.5 milliGRAM(s) Nebulizer every 6 hours  aMIOdarone    Tablet   Oral   aMIOdarone    Tablet 200 milliGRAM(s) Oral daily  apixaban 2.5 milliGRAM(s) Oral two times a day  atorvastatin 80 milliGRAM(s) Oral at bedtime  chlorhexidine 2% Cloths 1 Application(s) Topical <User Schedule>  clopidogrel Tablet 75 milliGRAM(s) Oral daily  dextrose 5%. 1000 milliLiter(s) (50 mL/Hr) IV Continuous <Continuous>  dextrose 5%. 1000 milliLiter(s) (100 mL/Hr) IV Continuous <Continuous>  dextrose 50% Injectable 25 Gram(s) IV Push once  dextrose 50% Injectable 12.5 Gram(s) IV Push once  dextrose 50% Injectable 25 Gram(s) IV Push once  DULoxetine 90 milliGRAM(s) Oral daily  furosemide    Tablet 40 milliGRAM(s) Oral daily  glucagon  Injectable 1 milliGRAM(s) IntraMuscular once  guaiFENesin  milliGRAM(s) Oral every 12 hours  influenza  Vaccine (HIGH DOSE) 0.5 milliLiter(s) IntraMuscular once  insulin lispro (ADMELOG) corrective regimen sliding scale   SubCutaneous Before meals and at bedtime  metoprolol succinate ER 25 milliGRAM(s) Oral daily  nystatin Powder 1 Application(s) Topical two times a day  sodium chloride 3%  Inhalation 4 milliLiter(s) Inhalation every 12 hours    MEDICATIONS  (PRN):  acetaminophen     Tablet .. 650 milliGRAM(s) Oral every 6 hours PRN Temp greater or equal to 38C (100.4F), Mild Pain (1 - 3)  dextrose Oral Gel 15 Gram(s) Oral once PRN Blood Glucose LESS THAN 70 milliGRAM(s)/deciliter  sodium chloride 0.65% Nasal 1 Spray(s) Both Nostrils two times a day PRN Nasal Congestion         Vitals log        ICU Vital Signs Last 24 Hrs  T(C): 36.5 (11 Feb 2025 05:10), Max: 36.6 (10 Feb 2025 11:19)  T(F): 97.7 (11 Feb 2025 05:10), Max: 97.9 (10 Feb 2025 11:19)  HR: 73 (11 Feb 2025 05:10) (73 - 100)  BP: 138/62 (11 Feb 2025 05:10) (106/57 - 138/62)  BP(mean): --  ABP: --  ABP(mean): --  RR: 17 (11 Feb 2025 05:10) (17 - 17)  SpO2: 98% (11 Feb 2025 05:10) (95% - 99%)    O2 Parameters below as of 11 Feb 2025 05:10  Patient On (Oxygen Delivery Method): nasal cannula  O2 Flow (L/min): 2               Input and Output:  I&O's Detail    09 Feb 2025 07:01  -  10 Feb 2025 07:00  --------------------------------------------------------  IN:    Oral Fluid: 540 mL  Total IN: 540 mL    OUT:    Voided (mL): 650 mL  Total OUT: 650 mL    Total NET: -110 mL      10 Feb 2025 07:01  -  11 Feb 2025 05:50  --------------------------------------------------------  IN:    Oral Fluid: 240 mL  Total IN: 240 mL    OUT:    Incontinent per Collection Bag (mL): 800 mL    Voided (mL): 500 mL  Total OUT: 1300 mL    Total NET: -1060 mL          Lab Data                        8.2    7.54  )-----------( 399      ( 10 Feb 2025 06:55 )             25.5     02-10    141  |  109[H]  |  60[H]  ----------------------------<  182[H]  3.7   |  26  |  2.20[H]    Ca    8.7      10 Feb 2025 06:55  Mg     2.3     02-10    TPro  5.8[L]  /  Alb  2.4[L]  /  TBili  0.6  /  DBili  x   /  AST  16  /  ALT  24  /  AlkPhos  75  02-10            Review of Systems	      Objective     Physical Examination    heart s1s2  lung dec BS  head nc  head at      Pertinent Lab findings & Imaging      Mery:  NO   Adequate UO     I&O's Detail    09 Feb 2025 07:01  -  10 Feb 2025 07:00  --------------------------------------------------------  IN:    Oral Fluid: 540 mL  Total IN: 540 mL    OUT:    Voided (mL): 650 mL  Total OUT: 650 mL    Total NET: -110 mL      10 Feb 2025 07:01  -  11 Feb 2025 05:50  --------------------------------------------------------  IN:    Oral Fluid: 240 mL  Total IN: 240 mL    OUT:    Incontinent per Collection Bag (mL): 800 mL    Voided (mL): 500 mL  Total OUT: 1300 mL    Total NET: -1060 mL               Discussed with:     Cultures:	        Radiology

## 2025-02-11 NOTE — PROGRESS NOTE ADULT - PROBLEM SELECTOR PLAN 2
New onset afib s/p PCI for STEMI  - c/w Toprol XL 25mg qd  - c/w PO amio load to bring to NSR  - pt remains in afib on monitor - per cardio, would like in sinus rhythm due to recent PCI  - s/p FD lovenox, on Eliquis 2.5mg BID   - Monitor on telemetry  - Replete electrolytes as needed  - Cardio ([facility]) following, recs appreciated

## 2025-02-11 NOTE — PROGRESS NOTE ADULT - PROBLEM SELECTOR PROBLEM 8
HLD (hyperlipidemia)
HLD (hyperlipidemia)
Need for prophylactic measure
Need for prophylactic measure
HLD (hyperlipidemia)

## 2025-02-11 NOTE — PROGRESS NOTE ADULT - PROBLEM SELECTOR PROBLEM 3
STEMI (ST elevation myocardial infarction)
DM (diabetes mellitus)
DM (diabetes mellitus)
JOHNNY (acute kidney injury)
DM (diabetes mellitus)
JOHNNY (acute kidney injury)
STEMI (ST elevation myocardial infarction)
JOHNNY (acute kidney injury)
STEMI (ST elevation myocardial infarction)
JOHNNY (acute kidney injury)
JOHNNY (acute kidney injury)
DM (diabetes mellitus)
STEMI (ST elevation myocardial infarction)
STEMI (ST elevation myocardial infarction)

## 2025-02-12 VITALS — OXYGEN SATURATION: 97 %

## 2025-02-12 LAB
ALBUMIN SERPL ELPH-MCNC: 2.7 G/DL — LOW (ref 3.3–5)
ALP SERPL-CCNC: 75 U/L — SIGNIFICANT CHANGE UP (ref 40–120)
ALT FLD-CCNC: 27 U/L — SIGNIFICANT CHANGE UP (ref 12–78)
ANION GAP SERPL CALC-SCNC: 7 MMOL/L — SIGNIFICANT CHANGE UP (ref 5–17)
AST SERPL-CCNC: 20 U/L — SIGNIFICANT CHANGE UP (ref 15–37)
BILIRUB SERPL-MCNC: 0.7 MG/DL — SIGNIFICANT CHANGE UP (ref 0.2–1.2)
BUN SERPL-MCNC: 65 MG/DL — HIGH (ref 7–23)
CALCIUM SERPL-MCNC: 9.1 MG/DL — SIGNIFICANT CHANGE UP (ref 8.5–10.1)
CHLORIDE SERPL-SCNC: 107 MMOL/L — SIGNIFICANT CHANGE UP (ref 96–108)
CO2 SERPL-SCNC: 27 MMOL/L — SIGNIFICANT CHANGE UP (ref 22–31)
CREAT SERPL-MCNC: 2.4 MG/DL — HIGH (ref 0.5–1.3)
EGFR: 19 ML/MIN/1.73M2 — LOW
GLUCOSE SERPL-MCNC: 116 MG/DL — HIGH (ref 70–99)
HCT VFR BLD CALC: 28 % — LOW (ref 34.5–45)
HGB BLD-MCNC: 8.9 G/DL — LOW (ref 11.5–15.5)
MAGNESIUM SERPL-MCNC: 2.4 MG/DL — SIGNIFICANT CHANGE UP (ref 1.6–2.6)
MCHC RBC-ENTMCNC: 29.4 PG — SIGNIFICANT CHANGE UP (ref 27–34)
MCHC RBC-ENTMCNC: 31.8 G/DL — LOW (ref 32–36)
MCV RBC AUTO: 92.4 FL — SIGNIFICANT CHANGE UP (ref 80–100)
NRBC BLD AUTO-RTO: 0 /100 WBCS — SIGNIFICANT CHANGE UP (ref 0–0)
PHOSPHATE SERPL-MCNC: 5.2 MG/DL — HIGH (ref 2.5–4.5)
PLATELET # BLD AUTO: 407 K/UL — HIGH (ref 150–400)
POTASSIUM SERPL-MCNC: 3.5 MMOL/L — SIGNIFICANT CHANGE UP (ref 3.5–5.3)
POTASSIUM SERPL-SCNC: 3.5 MMOL/L — SIGNIFICANT CHANGE UP (ref 3.5–5.3)
PROT SERPL-MCNC: 6.3 G/DL — SIGNIFICANT CHANGE UP (ref 6–8.3)
RBC # BLD: 3.03 M/UL — LOW (ref 3.8–5.2)
RBC # FLD: 14.6 % — HIGH (ref 10.3–14.5)
SODIUM SERPL-SCNC: 141 MMOL/L — SIGNIFICANT CHANGE UP (ref 135–145)
WBC # BLD: 8.73 K/UL — SIGNIFICANT CHANGE UP (ref 3.8–10.5)
WBC # FLD AUTO: 8.73 K/UL — SIGNIFICANT CHANGE UP (ref 3.8–10.5)

## 2025-02-12 PROCEDURE — 87205 SMEAR GRAM STAIN: CPT

## 2025-02-12 PROCEDURE — 83615 LACTATE (LD) (LDH) ENZYME: CPT

## 2025-02-12 PROCEDURE — 87637 SARSCOV2&INF A&B&RSV AMP PRB: CPT

## 2025-02-12 PROCEDURE — 87075 CULTR BACTERIA EXCEPT BLOOD: CPT

## 2025-02-12 PROCEDURE — 93005 ELECTROCARDIOGRAM TRACING: CPT

## 2025-02-12 PROCEDURE — 97162 PT EVAL MOD COMPLEX 30 MIN: CPT

## 2025-02-12 PROCEDURE — 84105 ASSAY OF URINE PHOSPHORUS: CPT

## 2025-02-12 PROCEDURE — 84484 ASSAY OF TROPONIN QUANT: CPT

## 2025-02-12 PROCEDURE — 97530 THERAPEUTIC ACTIVITIES: CPT

## 2025-02-12 PROCEDURE — 83735 ASSAY OF MAGNESIUM: CPT

## 2025-02-12 PROCEDURE — 93458 L HRT ARTERY/VENTRICLE ANGIO: CPT | Mod: 59

## 2025-02-12 PROCEDURE — 83036 HEMOGLOBIN GLYCOSYLATED A1C: CPT

## 2025-02-12 PROCEDURE — 83986 ASSAY PH BODY FLUID NOS: CPT

## 2025-02-12 PROCEDURE — 99233 SBSQ HOSP IP/OBS HIGH 50: CPT

## 2025-02-12 PROCEDURE — 82550 ASSAY OF CK (CPK): CPT

## 2025-02-12 PROCEDURE — 32555 ASPIRATE PLEURA W/ IMAGING: CPT

## 2025-02-12 PROCEDURE — 84540 ASSAY OF URINE/UREA-N: CPT

## 2025-02-12 PROCEDURE — C8924: CPT

## 2025-02-12 PROCEDURE — 85610 PROTHROMBIN TIME: CPT

## 2025-02-12 PROCEDURE — 88112 CYTOPATH CELL ENHANCE TECH: CPT

## 2025-02-12 PROCEDURE — 83880 ASSAY OF NATRIURETIC PEPTIDE: CPT

## 2025-02-12 PROCEDURE — 82553 CREATINE MB FRACTION: CPT

## 2025-02-12 PROCEDURE — 84133 ASSAY OF URINE POTASSIUM: CPT

## 2025-02-12 PROCEDURE — 97116 GAIT TRAINING THERAPY: CPT

## 2025-02-12 PROCEDURE — 81001 URINALYSIS AUTO W/SCOPE: CPT

## 2025-02-12 PROCEDURE — 82728 ASSAY OF FERRITIN: CPT

## 2025-02-12 PROCEDURE — 80053 COMPREHEN METABOLIC PANEL: CPT

## 2025-02-12 PROCEDURE — C1887: CPT

## 2025-02-12 PROCEDURE — 83935 ASSAY OF URINE OSMOLALITY: CPT

## 2025-02-12 PROCEDURE — 93308 TTE F-UP OR LMTD: CPT

## 2025-02-12 PROCEDURE — C1894: CPT

## 2025-02-12 PROCEDURE — 83550 IRON BINDING TEST: CPT

## 2025-02-12 PROCEDURE — 80048 BASIC METABOLIC PNL TOTAL CA: CPT

## 2025-02-12 PROCEDURE — 82803 BLOOD GASES ANY COMBINATION: CPT

## 2025-02-12 PROCEDURE — C1729: CPT

## 2025-02-12 PROCEDURE — 87040 BLOOD CULTURE FOR BACTERIA: CPT

## 2025-02-12 PROCEDURE — 94760 N-INVAS EAR/PLS OXIMETRY 1: CPT

## 2025-02-12 PROCEDURE — 82945 GLUCOSE OTHER FLUID: CPT

## 2025-02-12 PROCEDURE — 84156 ASSAY OF PROTEIN URINE: CPT

## 2025-02-12 PROCEDURE — 87015 SPECIMEN INFECT AGNT CONCNTJ: CPT

## 2025-02-12 PROCEDURE — 83540 ASSAY OF IRON: CPT

## 2025-02-12 PROCEDURE — 89051 BODY FLUID CELL COUNT: CPT

## 2025-02-12 PROCEDURE — 88305 TISSUE EXAM BY PATHOLOGIST: CPT

## 2025-02-12 PROCEDURE — C1874: CPT

## 2025-02-12 PROCEDURE — 84300 ASSAY OF URINE SODIUM: CPT

## 2025-02-12 PROCEDURE — C9600: CPT | Mod: LD

## 2025-02-12 PROCEDURE — 99239 HOSP IP/OBS DSCHRG MGMT >30: CPT

## 2025-02-12 PROCEDURE — C1725: CPT

## 2025-02-12 PROCEDURE — 71250 CT THORAX DX C-: CPT | Mod: MC

## 2025-02-12 PROCEDURE — 87070 CULTURE OTHR SPECIMN AEROBIC: CPT

## 2025-02-12 PROCEDURE — 87086 URINE CULTURE/COLONY COUNT: CPT

## 2025-02-12 PROCEDURE — C1769: CPT

## 2025-02-12 PROCEDURE — 84100 ASSAY OF PHOSPHORUS: CPT

## 2025-02-12 PROCEDURE — 82042 OTHER SOURCE ALBUMIN QUAN EA: CPT

## 2025-02-12 PROCEDURE — 85730 THROMBOPLASTIN TIME PARTIAL: CPT

## 2025-02-12 PROCEDURE — 80061 LIPID PANEL: CPT

## 2025-02-12 PROCEDURE — 71045 X-RAY EXAM CHEST 1 VIEW: CPT

## 2025-02-12 PROCEDURE — 94640 AIRWAY INHALATION TREATMENT: CPT

## 2025-02-12 PROCEDURE — 84157 ASSAY OF PROTEIN OTHER: CPT

## 2025-02-12 PROCEDURE — 85027 COMPLETE CBC AUTOMATED: CPT

## 2025-02-12 PROCEDURE — 36415 COLL VENOUS BLD VENIPUNCTURE: CPT

## 2025-02-12 PROCEDURE — 99285 EMERGENCY DEPT VISIT HI MDM: CPT

## 2025-02-12 PROCEDURE — 82962 GLUCOSE BLOOD TEST: CPT

## 2025-02-12 PROCEDURE — 82570 ASSAY OF URINE CREATININE: CPT

## 2025-02-12 PROCEDURE — 85025 COMPLETE CBC W/AUTO DIFF WBC: CPT

## 2025-02-12 PROCEDURE — P9045: CPT

## 2025-02-12 PROCEDURE — 87102 FUNGUS ISOLATION CULTURE: CPT

## 2025-02-12 RX ORDER — APIXABAN 5 MG/1
1 TABLET, FILM COATED ORAL
Qty: 60 | Refills: 0
Start: 2025-02-12 | End: 2025-03-13

## 2025-02-12 RX ORDER — METOPROLOL SUCCINATE 25 MG
50 TABLET, EXTENDED RELEASE 24 HR ORAL DAILY
Refills: 0 | Status: DISCONTINUED | OUTPATIENT
Start: 2025-02-12 | End: 2025-02-12

## 2025-02-12 RX ORDER — METOPROLOL SUCCINATE 25 MG
1 TABLET, EXTENDED RELEASE 24 HR ORAL
Qty: 30 | Refills: 0
Start: 2025-02-12

## 2025-02-12 RX ADMIN — Medication 1: at 12:09

## 2025-02-12 RX ADMIN — NYSTATIN 1 APPLICATION(S): 100000 POWDER TOPICAL at 17:40

## 2025-02-12 RX ADMIN — Medication 2.5 MILLIGRAM(S): at 01:38

## 2025-02-12 RX ADMIN — Medication 75 MILLIGRAM(S): at 11:45

## 2025-02-12 RX ADMIN — APIXABAN 2.5 MILLIGRAM(S): 5 TABLET, FILM COATED ORAL at 05:07

## 2025-02-12 RX ADMIN — AMIODARONE HYDROCHLORIDE 200 MILLIGRAM(S): 50 INJECTION, SOLUTION INTRAVENOUS at 05:06

## 2025-02-12 RX ADMIN — DULOXETINE 90 MILLIGRAM(S): 20 CAPSULE, DELAYED RELEASE ORAL at 11:45

## 2025-02-12 RX ADMIN — Medication 40 MILLIGRAM(S): at 05:10

## 2025-02-12 RX ADMIN — APIXABAN 2.5 MILLIGRAM(S): 5 TABLET, FILM COATED ORAL at 17:40

## 2025-02-12 RX ADMIN — ANTISEPTIC SURGICAL SCRUB 1 APPLICATION(S): 0.04 SOLUTION TOPICAL at 05:26

## 2025-02-12 RX ADMIN — Medication 600 MILLIGRAM(S): at 17:40

## 2025-02-12 RX ADMIN — Medication 2: at 17:39

## 2025-02-12 RX ADMIN — NYSTATIN 1 APPLICATION(S): 100000 POWDER TOPICAL at 05:26

## 2025-02-12 RX ADMIN — Medication 2.5 MILLIGRAM(S): at 13:35

## 2025-02-12 RX ADMIN — Medication 2.5 MILLIGRAM(S): at 07:34

## 2025-02-12 RX ADMIN — Medication 25 MILLIGRAM(S): at 05:05

## 2025-02-12 RX ADMIN — Medication 600 MILLIGRAM(S): at 05:07

## 2025-02-12 RX ADMIN — Medication 4 MILLILITER(S): at 07:34

## 2025-02-12 NOTE — PROGRESS NOTE ADULT - NS ATTEND AMEND GEN_ALL_CORE FT
89y/o F with HTN, DM2, HLD, depression who presented to the ED with intermittent jaw pain followed by anterior chest pressure radiating to the left shoulder, admitted for STEMI.    - S/p PCI (1/26/25) with 100% occlusion of diagonal and MARLENE x1  - Continue Plavix 75 mg daily without ASA to avoid triple therapy  - Continue Lipitor to 80mg   - Continue BB  - S/p thoracentesis 2/6 570cc of yellow pleuritic fluid  - Increase Toprol XL to 50 mg daily for GDMT.  Uptitrate as BP tolerates  - Unable to initiate ACEI/ARB in the setting of JOHNNY on CKD  - Recommend Farxiga once BP tolerates   - Reduce Lasix to 20 mg PO daily.  Creatinine slowly uptrending  - Afib on tele, rate-controlled.  Can D/C  - C/W renal dose DOAC  - D/c amio as still in AF
89y/o F with PMHx HTN, DM2, HLD, depression who presented to the ED with intermittent jaw pain followed by anterior chest pressure radiating to the left shoulder, admitted for STEMI.    - Pt p/w intermittent jaw pain followed by anterior chest pressure radiating to the left shoulder, admitted for STEMI.  - S/p PCI (1/26/25) with 100% occlusion of diagonal and MARLENE x1  - Aspirin d/c as pt is now on full dose AC for Afib to avoid triple therapy  - cont plavix  - Please increase Lipitor to 80mg QD, LDL c is in the 170s    - Previously with hypotension after getting BB dosing sbp improved metoprolol 12.5 mg twice daily started    - b/l effusions and hypoxia.   - cont 02 supplementation as needed  - remains on low dose Lasix   - S/p thora with 570cc removed, feels much improved. On 1L NC, would wean off  - Hold off on ARB/ARNI given JOHNNY  - Continue Metoprolol Succinate as tolerated by BP  - Continue Lovenox full dose, dose has been adjusted for creatinine clearance  - Will need to transition to Eliquis when she is more clinically stable from a JOHNNY/ flu standpoint.  - Was in pAF now remains in AF, initiated on Amio load on 2/7, would continue for now but will DC in the coming days if does not convert.
2y/o F with PMHx HTN, DM2, HLD, depression who presented to the ED with intermittent jaw pain followed by anterior chest pressure radiating to the left shoulder, admitted for STEMI.    - Pt p/w intermittent jaw pain followed by anterior chest pressure radiating to the left shoulder, admitted for STEMI.  - S/p PCI (1/26/25) with 100% occlusion of diagonal and MARLENE x1  - Aspirin d/c as pt is now on full dose AC for Afib to avoid triple therapy  - cont plavix  - Please increase Lipitor to 80mg QD, LDL c is in the 170s    - Previously with hypotension after getting BB dosing sbp improved metoprolol 12.5 mg twice daily started    - b/l effusions and hypoxia.   - cont 02 supplementation as needed  - remains on low dose Lasix   - S/p thora with 570cc removed, feels much improved  - Hold off on ARB/ARNI given JOHNNY  - Would switch Lopressor to Metoprolol Succinate   - Continue Lovenox full dose, dose has been adjusted for creatinine clearance  - Will need to transition to Eliquis when she is more clinically stable from a JOHNNY/ flu standpoint.  - Is in and out of AF, will assess last time in SR and initiate PO Amio load to try and maintain NSR
89y/o F with PMHx HTN, DM2, HLD, depression who presented to the ED with intermittent jaw pain followed by anterior chest pressure radiating to the left shoulder, admitted for STEMI.    - s/p code STEMI  - S/P: PCI (1/26/25) with 100% occlusion of diagonal and MARLENE x1  - continue DAPT with ASA 81 QD and plavix  - cont Lipitor 40mg QHS  - TTE 1/27/25 Left ventricular systolic function is moderately decreased with an ejection fraction visually estimated at 40 to 45 %. small pericardial effusion noted.     - lung exam with coarse sounds.   - now with flu A  - Appears compensated from HF POV.   - cont O2 support and conservative measure    - Previously with hypotension after getting BB dosing sbp still on soft side would hold BB for today (2/3) and reattmept low dose BB on 2/4   - ef 40-45%, with small unchanged pericardial effusion without tamponade  - Would hold on further fluid boluses and starting to get trace LE edema  - Please continue to maintain strict I/Os, monitor daily weights, Cr, and K.
89y/o F with PMHx HTN, DM2, HLD, depression who presented to the ED with intermittent jaw pain followed by anterior chest pressure radiating to the left shoulder, admitted for STEMI.    - Pt p/w intermittent jaw pain followed by anterior chest pressure radiating to the left shoulder, admitted for STEMI.  - S/p PCI (1/26/25) with 100% occlusion of diagonal and MARLENE x1  - Aspirin d/c as pt is now on full dose AC for Afib to avoid triple therapy  - cont plavix  - Continue Lipitor 40mg QHS    - Previously with hypotension after getting BB dosing sbp improved metoprolol 12.5 mg twice daily started    - b/l effusions and hypoxia.   - cont 02 supplementation as needed  - has improved notably with iv diuretics, though creatinine 2.3  - for thora today (needs to be restarted on plavix and ac after)  - Hold off on ARB/ARNI given JOHNNY  - Continue Metoprolol 12.5 BID. Can eventually switch to Toprol for GDMT  - Continue Lovenox full dose, dose has been adjusted for creatinine clearance  - Will need to transition to Eliquis when she is mor clinically stable from a JOHNNY/ flu standpoint.  - cont to monitor for paf    - will follow with you
89y/o F with PMHx HTN, DM2, HLD, depression who presented to the ED with intermittent jaw pain followed by anterior chest pressure radiating to the left shoulder, admitted for STEMI.    - Pt p/w intermittent jaw pain followed by anterior chest pressure radiating to the left shoulder, admitted for STEMI.  - S/p PCI (1/26/25) with 100% occlusion of diagonal and MARLENE x1  - Aspirin d/c as pt is now on full dose AC for Afib to avoid triple therapy  - cont plavix  - Please increase Lipitor to 80mg QD, LDL c is in the 170s    - Previously with hypotension after getting BB dosing  - b/l effusions and hypoxia.   - cont 02 supplementation as needed  - remains on low dose Lasix   - S/p thora with 570cc removed, feels much improved.   - got lasix 40mg IV yest  - would inc po lasix to 40mg QDAy  - ct chest 2/9 appears to be c/w organizing pna and mod left pleural effusion     - Hold off on ARB/ARNI given JOHNNY  - Continue Metoprolol Succinate as tolerated by BP  - Continue Lovenox full dose, dose has been adjusted for creatinine clearance  - consider transition to eliquis  - cont amio load. plan to possible dccv as outpt if remains in AF
89y/o F with PMHx HTN, DM2, HLD, depression who presented to the ED with intermittent jaw pain followed by anterior chest pressure radiating to the left shoulder, admitted for STEMI.    - Pt p/w intermittent jaw pain followed by anterior chest pressure radiating to the left shoulder, admitted for STEMI.  - S/p PCI (1/26/25) with 100% occlusion of diagonal and MARLENE x1  - Continue Plavix 75 mg daily  - Aspirin d/c as pt is now on full dose AC for Afib to avoid triple therapy  - Continue Lipitor 40mg QHS  - Previously with hypotension after getting BB dosing sbp improved metoprolol 12.5 mg twice daily started    - b/l effusions and hypoxia.  Poor candidate to drain effusions as she is on AC and Plavix  - Got diuretic yesterday.  Would hold off today and redose Lasix tomorrow.   - Hold off on ARB/ARNI given JOHNNY  - Continue Metoprolol 12.5 BID. Can eventually switch to Toprol for GDMT  - Continue Lovenox full dose, dose has been adjusted for creatinine clearance  - Will need to transition to Eliquis when she is mor clinically stable from a JOHNNY/ flu standpoint.  - For now will hope that she reverts to SR as flu improves, etc.   - If she does not can consider DCCV which would be more important if she decompensates further, attributable to loss of av synchrony
89y/o F with HTN, DM2, HLD, depression who presented to the ED with intermittent jaw pain followed by anterior chest pressure radiating to the left shoulder, admitted for STEMI.    - S/p PCI (1/26/25) with 100% occlusion of diagonal and MARLENE x1  - Continue Plavix 75 mg daily without ASA to avoid triple therapy  - Continue Lipitor to 80mg   - Continue BB  - S/p thoracentesis 2/6 570cc of yellow pleuritic fluid  -Increasing O2 requirement  with cough, now on NC at 3L/min from 1L.  Would give extra dose of IV Lasix today.  - Continue Toprol XL fro GDMT.  Uptitrate as BP tolerates  - Unable to initiate ACEI/ARB in the setting of JOHNNY on CKD  - Recommend Farxiga once BP tolerates   - Continue Lasix 20 mg PO daily    - C/W renal dose DOAC  - Continue Amio load with the hope that she converts back to NSR for now
89y/o F with PMHx HTN, DM2, HLD, depression who presented to the ED with intermittent jaw pain followed by anterior chest pressure radiating to the left shoulder, admitted for STEMI.    - s/p code STEMI  - S/P: PCI (1/26/25) with 100% occlusion of diagonal and MARLENE x1  - continue plavix 75 mg daily  -will D/C aspirin as pt is now being started on full dose AC for Afib and would like to avoid triple therapy  - cont Lipitor 40mg QHS  - TTE 1/27/25 Left ventricular systolic function is moderately decreased with an ejection fraction visually estimated at 40 to 45 %. small pericardial effusion noted.     - Appears compensated from HF POV.   - Previously with hypotension after getting BB dosing sbp improved metoprolol 12.5 mg twice daily started    - ef 40-45%, with small unchanged pericardial effusion without tamponade  - Would hold on further fluid boluses and starting to get trace LE edema    Afib   -converted into Afib overnight rate controlled   - CW BB at current dosing   - start lovenox full dose, dose has been adjusted for creatinine clearance  - will need to transition to eliquis when she is mor clinically stable from a JOHNNY/flu standpoint.  -for now will hope that she reverts to sr as flu improves, etc. if she does not can consider dccv, which would be more important if she decompensates further, attributable to loss of av synchrony    - cr stable  - Please continue to maintain strict I/Os, monitor daily weights, Cr, and K.   - hold off on ARB/ARNI    - now flu+    - Monitor creatinine and electrolytes. Keep K>4, Mg>2  - Further cardiac workup will depend on clinical course.   - All other workup per primary team
89y/o F with PMHx HTN, DM2, HLD, depression who presented to the ED with intermittent jaw pain followed by anterior chest pressure radiating to the left shoulder, admitted for STEMI.    - Pt p/w intermittent jaw pain followed by anterior chest pressure radiating to the left shoulder, admitted for STEMI.  - S/p PCI (1/26/25) with 100% occlusion of diagonal and MARLENE x1  - Aspirin d/c as pt is now on full dose AC for Afib to avoid triple therapy  - cont plavix  - Please increase Lipitor to 80mg QD, LDL c is in the 170s    - Previously with hypotension after getting BB dosing  - b/l effusions and hypoxia.   - cont 02 supplementation as needed  - remains on low dose Lasix   - S/p thora with 570cc removed, feels much improved.   - Still with rhonchi, trace edema, would give additional 40mg IV lasix x 1  - Please obtain repeat CXR this AM  - Hold off on ARB/ARNI given JOHNNY  - Continue Metoprolol Succinate as tolerated by BP  - Continue Lovenox full dose, dose has been adjusted for creatinine clearance  - Will need to transition to Eliquis when she is more clinically stable from a JOHNNY/ flu standpoint.  - Was in pAF now remains in AF, initiated on Amio load on 2/7, would continue for now but will DC in the coming days if does not convert.

## 2025-02-12 NOTE — PROGRESS NOTE ADULT - SUBJECTIVE AND OBJECTIVE BOX
[facility] -- Jamal Moralez, Manohar, Christopher Sim, , Yasir Post  Office # [phone]    Follow Up:   STEMI/Afib    Subjective/Observations: No c/o respiratory or cardiac discomfort.  On NC at 2L/min.  No tele events    REVIEW OF SYSTEMS: All other review of systems is negative unless indicated above  PAST MEDICAL & SURGICAL HISTORY:  HTN (hypertension)      DM (diabetes mellitus)      HLD (hyperlipidemia)      Major depression      No significant past surgical history        MEDICATIONS  (STANDING):  albuterol    0.083% 2.5 milliGRAM(s) Nebulizer every 6 hours  aMIOdarone    Tablet   Oral   aMIOdarone    Tablet 200 milliGRAM(s) Oral daily  apixaban 2.5 milliGRAM(s) Oral two times a day  atorvastatin 80 milliGRAM(s) Oral at bedtime  chlorhexidine 2% Cloths 1 Application(s) Topical <User Schedule>  clopidogrel Tablet 75 milliGRAM(s) Oral daily  dextrose 5%. 1000 milliLiter(s) (50 mL/Hr) IV Continuous <Continuous>  dextrose 5%. 1000 milliLiter(s) (100 mL/Hr) IV Continuous <Continuous>  dextrose 50% Injectable 25 Gram(s) IV Push once  dextrose 50% Injectable 12.5 Gram(s) IV Push once  dextrose 50% Injectable 25 Gram(s) IV Push once  DULoxetine 90 milliGRAM(s) Oral daily  furosemide    Tablet 40 milliGRAM(s) Oral daily  glucagon  Injectable 1 milliGRAM(s) IntraMuscular once  guaiFENesin  milliGRAM(s) Oral every 12 hours  influenza  Vaccine (HIGH DOSE) 0.5 milliLiter(s) IntraMuscular once  insulin lispro (ADMELOG) corrective regimen sliding scale   SubCutaneous Before meals and at bedtime  metoprolol succinate ER 25 milliGRAM(s) Oral daily  nystatin Powder 1 Application(s) Topical two times a day  sodium chloride 3%  Inhalation 4 milliLiter(s) Inhalation every 12 hours    MEDICATIONS  (PRN):  acetaminophen     Tablet .. 650 milliGRAM(s) Oral every 6 hours PRN Temp greater or equal to 38C (100.4F), Mild Pain (1 - 3)  dextrose Oral Gel 15 Gram(s) Oral once PRN Blood Glucose LESS THAN 70 milliGRAM(s)/deciliter  sodium chloride 0.65% Nasal 1 Spray(s) Both Nostrils two times a day PRN Nasal Congestion    Allergies    No Known Allergies    Intolerances      Vital Signs Last 24 Hrs  T(C): 36.8 (12 Feb 2025 12:10), Max: 36.8 (12 Feb 2025 12:10)  T(F): 98.2 (12 Feb 2025 12:10), Max: 98.2 (12 Feb 2025 12:10)  HR: 72 (12 Feb 2025 12:10) (57 - 98)  BP: 102/57 (12 Feb 2025 12:10) (101/57 - 106/54)  BP(mean): --  RR: 16 (12 Feb 2025 12:10) (16 - 18)  SpO2: 99% (12 Feb 2025 12:10) (98% - 100%)    Parameters below as of 12 Feb 2025 12:10  Patient On (Oxygen Delivery Method): nasal cannula  O2 Flow (L/min): 2    I&O's Summary    11 Feb 2025 07:01  -  12 Feb 2025 07:00  --------------------------------------------------------  IN: 240 mL / OUT: 1150 mL / NET: -910 mL    12 Feb 2025 07:01  -  12 Feb 2025 14:17  --------------------------------------------------------  IN: 150 mL / OUT: 0 mL / NET: 150 mL        PHYSICAL EXAM:  TELE: Afib  Constitutional: NAD, awake and alert, frail  HEENT: Moist Mucous Membranes, Anicteric  Pulmonary: Non-labored, breath sounds are diminished bilaterally, No wheezing, rales or rhonchi  Cardiovascular: IRRR, S1 and S2, No murmurs, rubs, gallops or clicks  Gastrointestinal: Bowel Sounds present, soft, nontender.   Lymph: No peripheral edema. No lymphadenopathy.  Skin: No visible rashes or ulcers.  Psych:  Mood & affect appropriate  LABS: All Labs Reviewed:                        8.9    8.73  )-----------( 407      ( 12 Feb 2025 06:30 )             28.0                         8.5    7.90  )-----------( 394      ( 11 Feb 2025 06:15 )             26.9                         8.2    7.54  )-----------( 399      ( 10 Feb 2025 06:55 )             25.5     12 Feb 2025 06:30    141    |  107    |  65     ----------------------------<  116    3.5     |  27     |  2.40   11 Feb 2025 06:15    141    |  109    |  63     ----------------------------<  128    3.7     |  26     |  2.30   10 Feb 2025 06:55    141    |  109    |  60     ----------------------------<  182    3.7     |  26     |  2.20     Ca    9.1        12 Feb 2025 06:30  Ca    8.9        11 Feb 2025 06:15  Ca    8.7        10 Feb 2025 06:55  Phos  5.2       12 Feb 2025 06:30  Phos  4.8       11 Feb 2025 06:15  Mg     2.4       12 Feb 2025 06:30  Mg     2.4       11 Feb 2025 06:15  Mg     2.3       10 Feb 2025 06:55    TPro  6.3    /  Alb  2.7    /  TBili  0.7    /  DBili  x      /  AST  20     /  ALT  27     /  AlkPhos  75     12 Feb 2025 06:30  TPro  6.1    /  Alb  2.6    /  TBili  0.7    /  DBili  x      /  AST  18     /  ALT  26     /  AlkPhos  77     11 Feb 2025 06:15  TPro  5.8    /  Alb  2.4    /  TBili  0.6    /  DBili  x      /  AST  16     /  ALT  24     /  AlkPhos  75     10 Feb 2025 06:55          12 Lead ECG:   Ventricular Rate 100 BPM    QRS Duration 98 ms    Q-T Interval 360 ms    QTC Calculation(Bazett) 464 ms    R Axis 50 degrees    T Axis -47 degrees    Diagnosis Line *** Critical Test Result: STEMI  Atrial fibrillation  Lateral infarct (cited on or before 26-JAN-2025)  ST & T wave abnormality, consider inferior ischemia  ST & T wave abnormality, consider anterior ischemia    Confirmed by ISAAC PALUMBO ([id number]) on 2/5/2025 12:55:12 PM (02-04-25 @ 16:25)      TRANSTHORACIC ECHOCARDIOGRAM REPORT  ________________________________________________________________________________                                      _______       Pt. Name:       DOMITILA GRACE Study Date:    1/28/2025  MRN:            [id number]           YOB: 1933  Accession #:    [id number]          Age:           89 years  Account#:       [id number]         Gender:        F  Heart Rate:                        Height:        62.20 in (158.00 cm)  Rhythm:          Weight:        160.93 lb (73.00 kg)  Blood Pressure: 93/52 mmHg         BSA/BMI:       1.75 m² / 29.24 kg/m²  ________________________________________________________________________________________  Referring Physician:    [id number] Carlos Alberto  Interpreting Physician: Braxton Bowens M.D.  Primary Sonographer:    Marlys Queen    CPT:               ECHO TTE W/O CON F/U LTD - 08529.m  Indication(s):     ST elevation [STEMI] myocardial infarction of unspecified                     site - I21.3  Procedure:         Limited transthoracic echocardiogram.  Ordering Location: [facility]  Admission Status:  Inpatient    _______________________________________________________________________________________     CONCLUSIONS:      1. Left ventricular systolic function is moderately decreased with an ejection fraction visually estimated at 40 to 45 %.   2. Small pericardial effusion noted adjacent to the right atrium and small pericardial effusion noted adjacent to the right ventricle. The effusion measures 0.82 cm in diastole in the subcostal view adjacent ot the RV.   3. Thickened pericardium.   4. No significant change in the size of the pericardial effusion compared to TTE study from yesterday (1/27/25) is noted.    ________________________________________________________________________________________  FINDINGS:     Left Ventricle:  Left ventricular systolic function is moderately decreased with an ejection fraction visually estimated at 40 to 45%.     Right Ventricle:  Right ventricular systolic function is normal.     Left Atrium:  The left atrium is dilated.     Mitral Valve:  There is moderate calcification of the mitral valve annulus.     Pericardium:  The pericardium is thickened. There is a small pericardial effusionnoted adjacent to the right atrium and a small pericardial effusion noted adjacent to the right ventricle.     Systemic Veins:  The inferior vena cava is normal in size measuring 1.83 cm in diameter, (normal <2.1cm) with normal inspiratory collapse (normal >50%) consistent with normal right atrial pressure (~3, range 0-5mmHg).  ____________________________________________________________________  QUANTITATIVE DATA:  Left Ventricle Measurements: (Indexed to BSA)     Visualized LV EF%: 40 to 45%  ________________________________________________________________________________________  Electronically signed on 1/28/2025 at 2:21:15 PM by Braxton Bowens M.D.         *** Final ***

## 2025-02-12 NOTE — CASE MANAGEMENT PROGRESS NOTE - NSCMPROGRESSNOTE_GEN_ALL_CORE
Discussed patient on rounds this morning and chart reviewed. Awaiting medical clearance for discharge. Home oxygen evaluation note and prescription for home oxygen has been sent to [facility] with request for POC to be delivered to the patient at the bedside upon approval. Referral to [facility] has been accepted-SOC pending discharge date. CM met with the patient and spoke to her daughter Corinne [phone] and discussed the stated above. CM remains available.

## 2025-02-12 NOTE — HOME OXYGEN EVALUATION NOTE - NSHOMEOXYEVAL_HOMEOXYON_GEN_ALL_CORE
2
,[email or web address]
2

## 2025-02-12 NOTE — PROGRESS NOTE ADULT - ASSESSMENT
JOHNNY/CKDIII  STEMI s/p MARLENE  HTN  Anemia     -CKDIII baseline with Cr 1.5 on admission, possibly due to hypertensive nephrosclerosis   -JOHNNY clinically due to CARLO +/- hypotension induced ATN. Now stable likely at new baseline with fluctuation with need for intermittent doses of lasix; monitor for now  -Urine studies reviewed   -No IVF needed   -No indication for RRT   -No kidney biopsy indicated   -Will benefit from RAAS blockade +/- SGLT2i in the future for CKD  -Lasix 20 mg IV x 1 was given 2/4/25, then started on PO Lasix. Renal indices stabilized; monitor  -S/p thora 2/6/25

## 2025-02-12 NOTE — PROGRESS NOTE ADULT - ASSESSMENT
89y/o F with PMHx HTN, DM2, HLD, depression who presented to the ED with intermittent jaw pain followed by anterior chest pressure radiating to the left shoulder, Pt was found to have elevated troponins and EKG with ST elevations with reciprocal ST-T changes in leads II, III, aVF. Code STEMI was activated and patient was taken to the cath lab where she was found to have 100% occlusion to the diagonal now s/p 1 stent placement. Patient seen and examined in the ICU, reports feeling much better.    flu  malaise  weakness  STEMI  OP  OA  HTN  DM  HLD    570 cc drained - right side - exudate - PATH NEG  on amio  on lasix  on nebs  B Blocker  o2 support - plan for home o2 -   cm f/u    monitor for pulm edema - CHF ex -   Cardio f/u   I and O  replete lytes  cvs rx regimen optimization and HD monitoring  TTE 1/27/25 Left ventricular systolic function is moderately decreased with an ejection fraction visually estimated at 40 to 45 %. small pericardial effusion noted  FLU management - isol precs  nebs - mucolytics  cough rx regimen  I velma  fio2 wean as tolerated  goal sat > 88 pct  oral hygiene  skin care  DM care  s/p ICU stay -   chest imaging reviewed - eff - atelectasis - pulm edema - will benefit from repeat

## 2025-02-12 NOTE — PHARMACOTHERAPY INTERVENTION NOTE - COMMENTS
Meds to beds requested by provider.    The following prescriptions were filled at [facility]: clopidogrel and atorvastatin       Medications delivered by pharmacist at bedside and counseled on indication, directions, and side effects.  Patient verbalized understanding and had no further questions
STARs Discharge Reconciliation    Reviewed completed discharge reconciliation. Per discussion with interventional radiology, aspirin discontinued in setting of full AC with Eliquis for Afib, recommended discontinuing order from the discharge reconciliation. Accepted.

## 2025-02-12 NOTE — PROGRESS NOTE ADULT - ASSESSMENT
89y/o F with HTN, DM2, HLD, depression who presented to the ED with intermittent jaw pain followed by anterior chest pressure radiating to the left shoulder, admitted for STEMI.    STEMI, New Afib/HFmodEF  - S/p PCI (1/26/25) with 100% occlusion of diagonal and MARLENE x1  - Continue Plavix 75 mg daily without ASA to avoid triple therapy  - Continue Lipitor to 80mg   - Continue BB    - TTE 1/27/25 Left ventricular systolic function is moderately decreased with an ejection fraction visually estimated at 40 to 45 %. small pericardial effusion noted.   - BNP:  <--95114  - S/p thoracentesis 2/6 570cc of yellow pleuritic fluid  - CxR, 2/6, showing interval resolution of small right-sided pleural effusion  - Increase Toprol XL to 50 mg daily for GDMT.  Uptitrate as BP tolerates  - Unable to initiate ACEI/ARB in the setting of JOHNNY on CKD  - Recommend Farxiga once BP tolerates   - Reduce Lasix to 20 mg PO daily.  Creatinine slowly uptrending    - Afib on tele, rate-controlled.  Can D/C  - C/W renal dose DOAC  - s/p Amio load of 5gm.  D/C Maintenance as she is unable to chemically convert    - BP stable    - Monitor and replete lytes, keep K>4, Mg>2.  - Will continue to follow.    Jenna Martinez DNP, NP-C, AGACNP-C  Cardiology   Call TEAMS

## 2025-02-12 NOTE — PROGRESS NOTE ADULT - REASON FOR ADMISSION
STEMI

## 2025-02-12 NOTE — CAREGIVER ENGAGEMENT NOTE - CAREGIVER OUTREACH NOTES - FREE TEXT
Per MD, patient is medically cleared for discharge home today. Home oxygen has been approved and POC will be delivered by [facility] prior to discharge. Referral to [facility] has been accepted SOC 2/13/25. CM met with the patient and spoke to Corinne to reinforce the transition plan as stated above. Corinne will transport the patient home today. IMM reviewed over the phone with Corinne. Patient and Corinne verbalized understanding of the transition plan and are in agreement. CMS STAR patient. Patient has a follow up with PCP on 2/18/25 and will follow up with Cardiology. CM remains available.

## 2025-02-12 NOTE — PROGRESS NOTE ADULT - SUBJECTIVE AND OBJECTIVE BOX
Date/Time Patient Seen:  		  Referring MD:   Data Reviewed	       Patient is a 89y old  Female who presents with a chief complaint of STEMI (11 Feb 2025 11:35)      Subjective/HPI     PAST MEDICAL & SURGICAL HISTORY:  HTN (hypertension)    DM (diabetes mellitus)    HLD (hyperlipidemia)    Major depression    No significant past surgical history          Medication list         MEDICATIONS  (STANDING):  albuterol    0.083% 2.5 milliGRAM(s) Nebulizer every 6 hours  aMIOdarone    Tablet   Oral   aMIOdarone    Tablet 200 milliGRAM(s) Oral daily  apixaban 2.5 milliGRAM(s) Oral two times a day  atorvastatin 80 milliGRAM(s) Oral at bedtime  chlorhexidine 2% Cloths 1 Application(s) Topical <User Schedule>  clopidogrel Tablet 75 milliGRAM(s) Oral daily  dextrose 5%. 1000 milliLiter(s) (100 mL/Hr) IV Continuous <Continuous>  dextrose 5%. 1000 milliLiter(s) (50 mL/Hr) IV Continuous <Continuous>  dextrose 50% Injectable 25 Gram(s) IV Push once  dextrose 50% Injectable 12.5 Gram(s) IV Push once  dextrose 50% Injectable 25 Gram(s) IV Push once  DULoxetine 90 milliGRAM(s) Oral daily  furosemide    Tablet 40 milliGRAM(s) Oral daily  glucagon  Injectable 1 milliGRAM(s) IntraMuscular once  guaiFENesin  milliGRAM(s) Oral every 12 hours  influenza  Vaccine (HIGH DOSE) 0.5 milliLiter(s) IntraMuscular once  insulin lispro (ADMELOG) corrective regimen sliding scale   SubCutaneous Before meals and at bedtime  metoprolol succinate ER 25 milliGRAM(s) Oral daily  nystatin Powder 1 Application(s) Topical two times a day  sodium chloride 3%  Inhalation 4 milliLiter(s) Inhalation every 12 hours    MEDICATIONS  (PRN):  acetaminophen     Tablet .. 650 milliGRAM(s) Oral every 6 hours PRN Temp greater or equal to 38C (100.4F), Mild Pain (1 - 3)  dextrose Oral Gel 15 Gram(s) Oral once PRN Blood Glucose LESS THAN 70 milliGRAM(s)/deciliter  sodium chloride 0.65% Nasal 1 Spray(s) Both Nostrils two times a day PRN Nasal Congestion         Vitals log        ICU Vital Signs Last 24 Hrs  T(C): 36.7 (12 Feb 2025 04:55), Max: 36.7 (11 Feb 2025 21:31)  T(F): 98 (12 Feb 2025 04:55), Max: 98 (11 Feb 2025 21:31)  HR: 71 (12 Feb 2025 04:55) (61 - 98)  BP: 106/54 (12 Feb 2025 04:55) (101/57 - 106/60)  BP(mean): --  ABP: --  ABP(mean): --  RR: 18 (12 Feb 2025 04:55) (17 - 18)  SpO2: 98% (12 Feb 2025 04:55) (94% - 100%)    O2 Parameters below as of 12 Feb 2025 04:55  Patient On (Oxygen Delivery Method): nasal cannula  O2 Flow (L/min): 2               Input and Output:  I&O's Detail    11 Feb 2025 07:01  -  12 Feb 2025 07:00  --------------------------------------------------------  IN:    Oral Fluid: 240 mL  Total IN: 240 mL    OUT:    Voided (mL): 1150 mL  Total OUT: 1150 mL    Total NET: -910 mL          Lab Data                        8.5    7.90  )-----------( 394      ( 11 Feb 2025 06:15 )             26.9     02-11    141  |  109[H]  |  63[H]  ----------------------------<  128[H]  3.7   |  26  |  2.30[H]    Ca    8.9      11 Feb 2025 06:15  Phos  4.8     02-11  Mg     2.4     02-11    TPro  6.1  /  Alb  2.6[L]  /  TBili  0.7  /  DBili  x   /  AST  18  /  ALT  26  /  AlkPhos  77  02-11            Review of Systems	      Objective     Physical Examination    heart s1s2  lung dec BS  head nc  head at  abd soft      Pertinent Lab findings & Imaging      Mery:  NO   Adequate UO     I&O's Detail    11 Feb 2025 07:01  -  12 Feb 2025 07:00  --------------------------------------------------------  IN:    Oral Fluid: 240 mL  Total IN: 240 mL    OUT:    Voided (mL): 1150 mL  Total OUT: 1150 mL    Total NET: -910 mL               Discussed with:     Cultures:	        Radiology

## 2025-02-12 NOTE — PROGRESS NOTE ADULT - SUBJECTIVE AND OBJECTIVE BOX
[facility]                                  Nephrology and Hypertension                             Timur Kwon                                          [phone]     Patient is a 89y old  Female who presents with a chief complaint of STEMI (02 Feb 2025 08:42)       HPI:  Pt is a 89y/o F with PMHx HTN, DM2, HLD, depression who presented to the ED with intermittent jaw pain followed by anterior chest pressure radiating to the left shoulder, Pt was found to have elevated troponins and EKG with ST elevations with reciprocal ST-T changes in leads II, III, aVF. Code STEMI was activated and patient was taken to the cath lab where she was found to have 100% occlusion to the diagonal now s/p 1 stent placement. Patient seen and examined in the ICU, reports feeling much better. She denies any current chest pain or jaw pain, and also denies dizziness, headache, numbness/tingling, nausea, vomiting, palpitations, shortness of breath, abdominal pain. She does admit to chronic diarrhea. Prior to this event, patient was in her usual state of health. No other concerns at this time.    Renal consulted on 2/2/25 for JOHNNY/CKD. S/p ICU course with STEMI Code STEMI, 100% occlusion to the diagonal now s/p x1 stent placed. No cath lab complication reported and pt admitted to the ICU for post cath. Patient denies any knowledge of prior renal issues but Cr was 1.5 on admission     No acute events noted    PAST MEDICAL & SURGICAL HISTORY:  HTN (hypertension)      DM (diabetes mellitus)      HLD (hyperlipidemia)      Major depression      No significant past surgical history           FAMILY HISTORY:  NC    Social History:Non smoker    MEDICATIONS  (STANDING):  albuterol    0.083% 2.5 milliGRAM(s) Nebulizer every 6 hours  aspirin enteric coated 81 milliGRAM(s) Oral daily  atorvastatin 40 milliGRAM(s) Oral at bedtime  chlorhexidine 2% Cloths 1 Application(s) Topical <User Schedule>  clopidogrel Tablet 75 milliGRAM(s) Oral every 24 hours  dextrose 5%. 1000 milliLiter(s) (50 mL/Hr) IV Continuous <Continuous>  dextrose 5%. 1000 milliLiter(s) (100 mL/Hr) IV Continuous <Continuous>  dextrose 50% Injectable 25 Gram(s) IV Push once  dextrose 50% Injectable 12.5 Gram(s) IV Push once  dextrose 50% Injectable 25 Gram(s) IV Push once  DULoxetine 90 milliGRAM(s) Oral daily  glucagon  Injectable 1 milliGRAM(s) IntraMuscular once  heparin   Injectable 5000 Unit(s) SubCutaneous every 8 hours  influenza  Vaccine (HIGH DOSE) 0.5 milliLiter(s) IntraMuscular once  insulin lispro (ADMELOG) corrective regimen sliding scale   SubCutaneous Before meals and at bedtime  metoprolol tartrate 12.5 milliGRAM(s) Oral two times a day  nystatin Powder 1 Application(s) Topical two times a day  oseltamivir 30 milliGRAM(s) Oral daily    MEDICATIONS  (PRN):  acetaminophen     Tablet .. 650 milliGRAM(s) Oral every 6 hours PRN Temp greater or equal to 38C (100.4F), Mild Pain (1 - 3)  dextrose Oral Gel 15 Gram(s) Oral once PRN Blood Glucose LESS THAN 70 milliGRAM(s)/deciliter  sodium chloride 0.65% Nasal 1 Spray(s) Both Nostrils two times a day PRN Nasal Congestion   Meds reviewed    Allergies    No Known Allergies    Intolerances         REVIEW OF SYSTEMS: As per HPI, otherwise negative     Vital Signs Last 24 Hrs  T(C): 36.8 (12 Feb 2025 12:10), Max: 36.8 (12 Feb 2025 12:10)  T(F): 98.2 (12 Feb 2025 12:10), Max: 98.2 (12 Feb 2025 12:10)  HR: 70 (12 Feb 2025 13:35) (57 - 98)  BP: 102/57 (12 Feb 2025 12:10) (101/57 - 106/54)  BP(mean): --  RR: 16 (12 Feb 2025 12:10) (16 - 18)  SpO2: 97% (12 Feb 2025 13:35) (97% - 100%)    Parameters below as of 12 Feb 2025 13:35  Patient On (Oxygen Delivery Method): nasal cannula          PHYSICAL EXAM:    GENERAL: NAD  HEAD:  Atraumatic, Normocephalic  EYES: EOMI, conjunctiva and sclera clear  ENMT: No Drainage from nares, No drainage from ears  NECK: Supple, neck  veins full  NERVOUS SYSTEM:  Awake and Alert  CHEST/LUNG: coarseness noted, better   HEART: Regular rate and rhythm; No murmurs, rubs, or gallops  EXTREMITIES:  No Edema          LABS:                                                          8.9    8.73  )-----------( 407      ( 12 Feb 2025 06:30 )             28.0     02-12    141  |  107  |  65[H]  ----------------------------<  116[H]  3.5   |  27  |  2.40[H]    Ca    9.1      12 Feb 2025 06:30  Phos  5.2     02-12  Mg     2.4     02-12    TPro  6.3  /  Alb  2.7[L]  /  TBili  0.7  /  DBili  x   /  AST  20  /  ALT  27  /  AlkPhos  75  02-12      Urinalysis Basic - ( 12 Feb 2025 06:30 )    Color: x / Appearance: x / SG: x / pH: x  Gluc: 116 mg/dL / Ketone: x  / Bili: x / Urobili: x   Blood: x / Protein: x / Nitrite: x   Leuk Esterase: x / RBC: x / WBC x   Sq Epi: x / Non Sq Epi: x / Bacteria: x

## 2025-02-12 NOTE — PROGRESS NOTE ADULT - PROVIDER SPECIALTY LIST ADULT
Cardiology
Critical Care
Nephrology
Pulmonology
Pulmonology
Cardiology
Cardiology
Critical Care
Critical Care
Intervent Cardiology
Nephrology
Pulmonology
Cardiology
Critical Care
Critical Care
Hospitalist
Hospitalist
Intervent Cardiology
Nephrology
Pulmonology
Cardiology
Critical Care
Hospitalist
Intervent Cardiology
Nephrology
Pulmonology
Cardiology
Hospitalist
5
Hospitalist

## 2025-02-18 PROBLEM — E11.9 TYPE 2 DIABETES MELLITUS WITHOUT COMPLICATIONS: Chronic | Status: ACTIVE | Noted: 2025-01-26

## 2025-02-18 PROBLEM — F32.9 MAJOR DEPRESSIVE DISORDER, SINGLE EPISODE, UNSPECIFIED: Chronic | Status: ACTIVE | Noted: 2025-01-26

## 2025-02-18 PROBLEM — E78.5 HYPERLIPIDEMIA, UNSPECIFIED: Chronic | Status: ACTIVE | Noted: 2025-01-26

## 2025-02-18 PROBLEM — I10 ESSENTIAL (PRIMARY) HYPERTENSION: Chronic | Status: ACTIVE | Noted: 2025-01-26

## 2025-02-27 ENCOUNTER — NON-APPOINTMENT (OUTPATIENT)
Age: 89
End: 2025-02-27

## 2025-02-27 ENCOUNTER — APPOINTMENT (OUTPATIENT)
Dept: CARDIOLOGY | Facility: CLINIC | Age: 89
End: 2025-02-27
Payer: MEDICARE

## 2025-02-27 VITALS
TEMPERATURE: 97.2 F | OXYGEN SATURATION: 99 % | DIASTOLIC BLOOD PRESSURE: 73 MMHG | BODY MASS INDEX: 29.45 KG/M2 | HEIGHT: 61 IN | WEIGHT: 156 LBS | HEART RATE: 84 BPM | SYSTOLIC BLOOD PRESSURE: 120 MMHG

## 2025-02-27 DIAGNOSIS — E78.00 PURE HYPERCHOLESTEROLEMIA, UNSPECIFIED: ICD-10-CM

## 2025-02-27 DIAGNOSIS — I21.29 ST ELEVATION (STEMI) MYOCARDIAL INFARCTION INVOLVING OTHER SITES: ICD-10-CM

## 2025-02-27 DIAGNOSIS — I10 ESSENTIAL (PRIMARY) HYPERTENSION: ICD-10-CM

## 2025-02-27 DIAGNOSIS — I25.10 ATHEROSCLEROTIC HEART DISEASE OF NATIVE CORONARY ARTERY W/OUT ANGINA PECTORIS: ICD-10-CM

## 2025-02-27 PROCEDURE — 99214 OFFICE O/P EST MOD 30 MIN: CPT

## 2025-02-27 PROCEDURE — 93000 ELECTROCARDIOGRAM COMPLETE: CPT

## 2025-02-27 RX ORDER — ATORVASTATIN CALCIUM 40 MG/1
40 TABLET, FILM COATED ORAL
Refills: 0 | Status: ACTIVE | COMMUNITY

## 2025-02-27 RX ORDER — CLOPIDOGREL BISULFATE 75 MG/1
75 TABLET, FILM COATED ORAL
Refills: 0 | Status: ACTIVE | COMMUNITY

## 2025-02-27 RX ORDER — APIXABAN 2.5 MG/1
2.5 TABLET, FILM COATED ORAL
Refills: 0 | Status: ACTIVE | COMMUNITY

## 2025-02-27 RX ORDER — FUROSEMIDE 20 MG/1
20 TABLET ORAL
Refills: 0 | Status: ACTIVE | COMMUNITY

## 2025-02-27 RX ORDER — CHLORHEXIDINE GLUCONATE 4 %
5 LIQUID (ML) TOPICAL
Refills: 0 | Status: ACTIVE | COMMUNITY

## 2025-02-27 RX ORDER — METOPROLOL TARTRATE 50 MG/1
50 TABLET ORAL
Refills: 0 | Status: ACTIVE | COMMUNITY

## 2025-03-02 PROBLEM — I21.29 ST ELEVATION MYOCARDIAL INFARCTION (STEMI) INVOLVING OTHER CORONARY ARTERY: Status: ACTIVE | Noted: 2025-03-02

## 2025-03-02 PROBLEM — I25.10 CAD IN NATIVE ARTERY: Status: ACTIVE | Noted: 2025-03-02

## 2025-03-08 LAB
CULTURE RESULTS: SIGNIFICANT CHANGE UP
SPECIMEN SOURCE: SIGNIFICANT CHANGE UP

## 2025-03-14 RX ORDER — METOPROLOL SUCCINATE 50 MG/1
50 TABLET, EXTENDED RELEASE ORAL
Qty: 90 | Refills: 1 | Status: ACTIVE | COMMUNITY
Start: 2025-03-14 | End: 1900-01-01

## 2025-03-21 PROBLEM — Z83.3 FAMILY HISTORY OF DIABETES MELLITUS: Status: ACTIVE | Noted: 2025-03-21

## 2025-04-17 ENCOUNTER — APPOINTMENT (OUTPATIENT)
Dept: CARDIOLOGY | Facility: CLINIC | Age: 89
End: 2025-04-17
Payer: MEDICARE

## 2025-04-17 ENCOUNTER — NON-APPOINTMENT (OUTPATIENT)
Age: 89
End: 2025-04-17

## 2025-04-17 VITALS
BODY MASS INDEX: 29.27 KG/M2 | WEIGHT: 155 LBS | DIASTOLIC BLOOD PRESSURE: 71 MMHG | HEIGHT: 61 IN | SYSTOLIC BLOOD PRESSURE: 118 MMHG | HEART RATE: 72 BPM | OXYGEN SATURATION: 98 %

## 2025-04-17 DIAGNOSIS — I25.10 ATHEROSCLEROTIC HEART DISEASE OF NATIVE CORONARY ARTERY W/OUT ANGINA PECTORIS: ICD-10-CM

## 2025-04-17 DIAGNOSIS — I10 ESSENTIAL (PRIMARY) HYPERTENSION: ICD-10-CM

## 2025-04-17 DIAGNOSIS — E78.00 PURE HYPERCHOLESTEROLEMIA, UNSPECIFIED: ICD-10-CM

## 2025-04-17 PROCEDURE — 99214 OFFICE O/P EST MOD 30 MIN: CPT

## 2025-04-17 PROCEDURE — 93000 ELECTROCARDIOGRAM COMPLETE: CPT

## 2025-05-04 NOTE — PROGRESS NOTE ADULT - ASSESSMENT
89 y/o F w/ pmh of htn, dm2, hld, presented to [facility] for chest pain radiating into the jaw and L. shoulder, in the ED pt was found to have +trops and ST changes concerning for STEMI. Code STEMI activated pt taken to the cath lab was found to have 100% occlusion to the diagonal now s/p x1 stent placed. No cath lab complication reported and pt admitted to the ICU for post cath lab management.    -Neuro: No acute issues, MS stable. Continue Cymbalta home medication.  -Cardiac: STEMI now s/p x1 MARLENE to the diagonal, cont asa/brilliant, repeat EKGs, TTE x2 revealing mild pericardial effusion, continue to hold BB with repeat hypotensive episodes, lipid panel with elevated LDL; continue statin therapy. Repeat Limited TTE today to evaluate perfusion. Repeat lytes and cardiac enzymes in afternoon.  -Resp: No acute issues, o2 stable on RA  -GI: Diet CC/DASH  -Renal: Renal function stable cont to monitor along w/ lytes  -ID: No acute infectious process suspected  -Endo: H/o DM2, A1c 7.3  -Heme: DVT ppx w/ lovenox, cont asa/brilliant   89 y/o F w/ pmh of htn, dm2, hld, presented to [facility] for chest pain radiating into the jaw and L. shoulder, in the ED pt was found to have +trops and ST changes concerning for STEMI. Code STEMI activated pt taken to the cath lab was found to have 100% occlusion to the diagonal now s/p x1 stent placed. No cath lab complication reported and pt admitted to the ICU for post cath lab management.    -Neuro: No acute issues, MS stable. Continue Cymbalta home medication.  -Cardiac: STEMI now s/p x1 MARLENE to the diagonal, cont asa/brilliant, repeat EKGs, TTE x2 revealing mild pericardial effusion, continue to hold BB with repeat hypotensive episodes, lipid panel with elevated LDL; continue statin therapy. Repeat Limited TTE today to evaluate perfusion. Repeat lytes and cardiac enzymes in afternoon.  -Resp: No acute issues, o2 stable on RA  -GI: Diet CC/DASH  -Renal: JOHNNY likely 2/2 hypotension vs contrast. cont to monitor along w/ lytes.   -ID: No acute infectious process suspected  -Endo: H/o DM2, A1c 7.3  -Heme: DVT ppx w/ heparin subQ, cont asa/brilliant   100

## 2025-05-09 ENCOUNTER — APPOINTMENT (OUTPATIENT)
Dept: PODIATRY | Facility: CLINIC | Age: 89
End: 2025-05-09

## 2025-05-09 DIAGNOSIS — H91.90 UNSPECIFIED HEARING LOSS, UNSPECIFIED EAR: ICD-10-CM

## 2025-05-09 DIAGNOSIS — Z82.49 FAMILY HISTORY OF ISCHEMIC HEART DISEASE AND OTHER DISEASES OF THE CIRCULATORY SYSTEM: ICD-10-CM

## 2025-05-09 DIAGNOSIS — M19.90 UNSPECIFIED OSTEOARTHRITIS, UNSPECIFIED SITE: ICD-10-CM

## 2025-05-09 DIAGNOSIS — Z78.9 OTHER SPECIFIED HEALTH STATUS: ICD-10-CM

## 2025-05-09 DIAGNOSIS — C44.91 BASAL CELL CARCINOMA OF SKIN, UNSPECIFIED: ICD-10-CM

## 2025-05-09 PROCEDURE — 11755 BIOPSY NAIL UNIT: CPT | Mod: 59,TA

## 2025-05-09 PROCEDURE — 11056 PARNG/CUTG B9 HYPRKR LES 2-4: CPT | Mod: 59,Q9

## 2025-05-09 PROCEDURE — 99202 OFFICE O/P NEW SF 15 MIN: CPT | Mod: 25

## 2025-05-12 PROBLEM — Z82.49 FAMILY HISTORY OF CARDIAC DISORDER: Status: ACTIVE | Noted: 2025-05-12

## 2025-05-12 PROBLEM — H91.90 HARD OF HEARING: Status: ACTIVE | Noted: 2025-05-12

## 2025-05-12 PROBLEM — C44.91 BASAL CELL CARCINOMA: Status: ACTIVE | Noted: 2025-05-12

## 2025-05-12 PROBLEM — Z78.9 ACTIVE ADVANCE DIRECTIVE: Status: ACTIVE | Noted: 2025-05-12

## 2025-05-12 PROBLEM — M19.90 ARTHRITIS: Status: ACTIVE | Noted: 2025-05-12

## 2025-05-19 PROBLEM — L60.3 DYSTROPHIC NAIL: Status: ACTIVE | Noted: 2025-05-19

## 2025-05-19 PROBLEM — M79.676 TOE PAIN: Status: ACTIVE | Noted: 2025-05-19

## 2025-05-22 ENCOUNTER — APPOINTMENT (OUTPATIENT)
Dept: CARDIOLOGY | Facility: CLINIC | Age: 89
End: 2025-05-22
Payer: MEDICARE

## 2025-05-22 ENCOUNTER — NON-APPOINTMENT (OUTPATIENT)
Age: 89
End: 2025-05-22

## 2025-05-22 VITALS
DIASTOLIC BLOOD PRESSURE: 68 MMHG | HEART RATE: 61 BPM | SYSTOLIC BLOOD PRESSURE: 112 MMHG | BODY MASS INDEX: 29.27 KG/M2 | HEIGHT: 61 IN | OXYGEN SATURATION: 94 % | WEIGHT: 155 LBS

## 2025-05-22 DIAGNOSIS — I25.10 ATHEROSCLEROTIC HEART DISEASE OF NATIVE CORONARY ARTERY W/OUT ANGINA PECTORIS: ICD-10-CM

## 2025-05-22 DIAGNOSIS — I70.203 UNSPECIFIED ATHEROSCLEROSIS OF NATIVE ARTERIES OF EXTREMITIES, BILATERAL LEGS: ICD-10-CM

## 2025-05-22 DIAGNOSIS — E78.00 PURE HYPERCHOLESTEROLEMIA, UNSPECIFIED: ICD-10-CM

## 2025-05-22 DIAGNOSIS — I10 ESSENTIAL (PRIMARY) HYPERTENSION: ICD-10-CM

## 2025-05-22 PROCEDURE — 99214 OFFICE O/P EST MOD 30 MIN: CPT

## 2025-05-22 PROCEDURE — 93000 ELECTROCARDIOGRAM COMPLETE: CPT

## 2025-05-22 RX ORDER — CALCITRIOL 0.25 UG/1
0.25 CAPSULE, GELATIN COATED ORAL
Refills: 0 | Status: ACTIVE | COMMUNITY

## 2025-05-24 PROBLEM — I70.203 ATHEROSCLEROSIS OF NATIVE ARTERY OF BOTH LOWER EXTREMITIES, WITH UNSPECIFIED PRESENCE OF CLINICAL MANIFESTATION: Status: ACTIVE | Noted: 2025-05-19

## 2025-06-06 ENCOUNTER — APPOINTMENT (OUTPATIENT)
Dept: PODIATRY | Facility: CLINIC | Age: 89
End: 2025-06-06

## 2025-06-09 PROBLEM — E78.5 HYPERLIPIDEMIA, UNSPECIFIED HYPERLIPIDEMIA TYPE: Status: ACTIVE | Noted: 2017-09-13

## 2025-06-12 ENCOUNTER — RX RENEWAL (OUTPATIENT)
Age: 89
End: 2025-06-12

## 2025-07-02 ENCOUNTER — OUTPATIENT (OUTPATIENT)
Dept: OUTPATIENT SERVICES | Facility: HOSPITAL | Age: 89
LOS: 1 days | End: 2025-07-02
Payer: MEDICARE

## 2025-07-02 ENCOUNTER — RESULT REVIEW (OUTPATIENT)
Age: 89
End: 2025-07-02

## 2025-07-02 DIAGNOSIS — E78.00 PURE HYPERCHOLESTEROLEMIA, UNSPECIFIED: ICD-10-CM

## 2025-07-02 DIAGNOSIS — I70.203 UNSPECIFIED ATHEROSCLEROSIS OF NATIVE ARTERIES OF EXTREMITIES, BILATERAL LEGS: ICD-10-CM

## 2025-07-02 DIAGNOSIS — I10 ESSENTIAL (PRIMARY) HYPERTENSION: ICD-10-CM

## 2025-07-02 PROCEDURE — 93306 TTE W/DOPPLER COMPLETE: CPT | Mod: 26

## 2025-07-02 PROCEDURE — 93306 TTE W/DOPPLER COMPLETE: CPT

## 2025-07-11 ENCOUNTER — APPOINTMENT (OUTPATIENT)
Dept: PODIATRY | Facility: CLINIC | Age: 89
End: 2025-07-11

## 2025-07-11 PROCEDURE — 11721 DEBRIDE NAIL 6 OR MORE: CPT | Mod: 59,Q9

## 2025-07-11 PROCEDURE — 11056 PARNG/CUTG B9 HYPRKR LES 2-4: CPT | Mod: 59,Q9

## 2025-07-18 PROBLEM — B35.1 TINEA UNGUIUM: Status: ACTIVE | Noted: 2025-07-18

## 2025-07-25 ENCOUNTER — APPOINTMENT (OUTPATIENT)
Dept: PODIATRY | Facility: CLINIC | Age: 89
End: 2025-07-25
Payer: MEDICARE

## 2025-07-25 DIAGNOSIS — T14.8XXA OTHER INJURY OF UNSPECIFIED BODY REGION, INITIAL ENCOUNTER: ICD-10-CM

## 2025-07-25 PROCEDURE — 10140 I&D HMTMA SEROMA/FLUID COLLJ: CPT

## 2025-08-01 ENCOUNTER — APPOINTMENT (OUTPATIENT)
Dept: PODIATRY | Facility: CLINIC | Age: 89
End: 2025-08-01
Payer: MEDICARE

## 2025-08-01 PROCEDURE — 99212 OFFICE O/P EST SF 10 MIN: CPT

## 2025-08-15 ENCOUNTER — APPOINTMENT (OUTPATIENT)
Dept: PODIATRY | Facility: CLINIC | Age: 89
End: 2025-08-15

## 2025-08-15 DIAGNOSIS — S91.109A UNSPECIFIED OPEN WOUND OF UNSPECIFIED TOE(S) W/OUT DAMAGE TO NAIL, INITIAL ENCOUNTER: ICD-10-CM

## 2025-08-15 PROCEDURE — 99212 OFFICE O/P EST SF 10 MIN: CPT

## 2025-08-21 ENCOUNTER — APPOINTMENT (OUTPATIENT)
Dept: CARDIOLOGY | Facility: CLINIC | Age: 89
End: 2025-08-21

## 2025-08-21 ENCOUNTER — NON-APPOINTMENT (OUTPATIENT)
Age: 89
End: 2025-08-21

## 2025-08-21 VITALS
HEART RATE: 70 BPM | BODY MASS INDEX: 29.27 KG/M2 | WEIGHT: 155 LBS | OXYGEN SATURATION: 99 % | DIASTOLIC BLOOD PRESSURE: 70 MMHG | HEIGHT: 61 IN | SYSTOLIC BLOOD PRESSURE: 116 MMHG

## 2025-09-02 ENCOUNTER — RX RENEWAL (OUTPATIENT)
Age: 89
End: 2025-09-02

## 2025-09-08 ENCOUNTER — RX RENEWAL (OUTPATIENT)
Age: 89
End: 2025-09-08

## 2025-09-12 ENCOUNTER — APPOINTMENT (OUTPATIENT)
Dept: PODIATRY | Facility: CLINIC | Age: 89
End: 2025-09-12

## 2025-09-12 DIAGNOSIS — S91.109A UNSPECIFIED OPEN WOUND OF UNSPECIFIED TOE(S) W/OUT DAMAGE TO NAIL, INITIAL ENCOUNTER: ICD-10-CM

## 2025-09-12 DIAGNOSIS — B35.1 TINEA UNGUIUM: ICD-10-CM

## 2025-09-12 PROCEDURE — 99212 OFFICE O/P EST SF 10 MIN: CPT
